# Patient Record
Sex: FEMALE | Race: WHITE | Employment: UNEMPLOYED | URBAN - METROPOLITAN AREA
[De-identification: names, ages, dates, MRNs, and addresses within clinical notes are randomized per-mention and may not be internally consistent; named-entity substitution may affect disease eponyms.]

---

## 2018-08-31 ENCOUNTER — HOSPITAL ENCOUNTER (OUTPATIENT)
Dept: OTHER | Age: 58
Discharge: HOME | End: 2018-08-31
Attending: FAMILY MEDICINE | Admitting: FAMILY MEDICINE

## 2018-08-31 NOTE — PROGRESS NOTES
Physical Therapy    Facility/Department: [facility]  Power Wheelchair Assessment    NAME: Thad Zuleta  : 1960  MRN: [id number]    Date of Service: 2018    Patient Diagnosis(es): cerebral palsy (G80.9)   has a past medical history of Closed anterior dislocation of humerus; Infantile cerebral palsy, unspecified ([address] and Irritable bowel syndrome. has a past surgical history that includes pr total hip arthroplasty (Right); pr total knee arthroplasty (Left); and Total shoulder arthroplasty. Patient is 65year old with the diagnosis of cerebral palsy (G80.9). Present for wheelchair assessment: the patient, her mother, Rishi Alvarez, the patient's son, Costa Andrews;  ATP from [employer]; and myself, Sabra Hayes, PT. See entire wheelchair assessment completed on this date and later scanned into the system. Overall, Thad Zuleta  requires a wheelchair for the following reasons:    Patient is nonambulatory due to severe spasticity and contractions in lower extremities since her cardiac arrest in . The following conditions impact the patient's ability to ambulate and/or propel an optimally configured manual wheelchair independently, safely and in a timely manner to participate in mobility related activities of daily living:  Weakness, loss of range of motion, lack of coordination/motor skills, cardiac condition, imbalance, history of falls, fatigue/poor endurance, comprised respiratory system, and pain. The goals of the wheelchair that is being requested is as follows:   Allow participation in mobility related activities of daily living  Reduce incidence of skin breakdown  Improve independent function  Accommodate/slow progression of deformity  Provide total body comfort/increase sitting tolerance  Improve sitting balance  Improve mobility  Promote/improve alignment    Assessment   Patient is a good candidate for a power wheelchair given she has been nonambulatory since her cardiac arrest in 2015. She currently relies on assist of 2 people using a kenzie lift for all transfers, as the patient is unable to bear any weight on her lower extremities due to spasticity and severe equinas positioning in bilateral feet. She is unable to propel an optimally configured manual wheelchair due to sever limitation in bilateral lower extremities and left upper extremity, due to history of 2 shoulder replacements with humeral dislocation. Her sitting balance is poor and she would not be able to sit in or transfer in/out of a power scooter. Patient would benefit from a power wheelchair for mobility related ADLs to allow increased independence. See recommendations for specifics for power WC. Plan Justod SONIYA Lyons from [employer] already completed a home assessment with the patient. He has determined the specifics required for the power wheelchair, which was included in the paperwork given to him. When all signatures are obtained, [employer] will file the paperwork with the patient's insurance company and deliver the approved wheelchair to the patient's home.     G-Code  PT G-Codes  Functional Limitation: Mobility: Walking and moving around  Mobility: Walking and Moving Around Current Status (): 100 percent impaired, limited or restricted  Mobility: Walking and Moving Around Goal Status (): 100 percent impaired, limited or restricted  Mobility: Walking and Moving Around Discharge Status (): 100 percent impaired, limited or restricted     Therapy Time   Individual Concurrent Group Co-treatment   Time In [address]         Time Out 1215         Minutes 90         Timed Code Treatment Minutes: Minh PT #8833

## 2018-09-01 ENCOUNTER — HOSPITAL ENCOUNTER (OUTPATIENT)
Dept: OTHER | Age: 58
Discharge: HOME | End: 2018-09-01
Attending: FAMILY MEDICINE | Admitting: FAMILY MEDICINE

## 2021-09-27 ENCOUNTER — HOSPITAL ENCOUNTER (INPATIENT)
Age: 61
LOS: 9 days | Discharge: HOME | DRG: 871 | End: 2021-10-06
Attending: EMERGENCY MEDICINE | Admitting: INTERNAL MEDICINE
Payer: MEDICARE

## 2021-09-27 ENCOUNTER — APPOINTMENT (OUTPATIENT)
Dept: GENERAL RADIOLOGY | Age: 61
DRG: 871 | End: 2021-09-27
Payer: MEDICARE

## 2021-09-27 DIAGNOSIS — G89.4 CHRONIC PAIN SYNDROME: ICD-10-CM

## 2021-09-27 DIAGNOSIS — J69.0 ASPIRATION PNEUMONITIS (HCC): Primary | ICD-10-CM

## 2021-09-27 PROBLEM — J96.01 ACUTE TYPE 1 RESPIRATORY FAILURE (HCC): Status: ACTIVE | Noted: 2021-09-27

## 2021-09-27 PROBLEM — J96.02 ACUTE RESPIRATORY FAILURE WITH HYPOXIA AND HYPERCAPNIA (HCC): Status: ACTIVE | Noted: 2021-09-27

## 2021-09-27 PROBLEM — J96.01 ACUTE RESPIRATORY FAILURE WITH HYPOXIA AND HYPERCAPNIA (HCC): Status: ACTIVE | Noted: 2021-09-27

## 2021-09-27 LAB
A/G RATIO: 1 (ref 1.1–2.2)
ALBUMIN SERPL-MCNC: 4.1 G/DL (ref 3.4–5)
ALP BLD-CCNC: 267 U/L (ref 40–129)
ALT SERPL-CCNC: 156 U/L (ref 10–40)
ANION GAP SERPL CALCULATED.3IONS-SCNC: 20 MMOL/L (ref 3–16)
ANISOCYTOSIS: ABNORMAL
AST SERPL-CCNC: 31 U/L (ref 15–37)
BANDED NEUTROPHILS RELATIVE PERCENT: 1 % (ref 0–7)
BASE EXCESS ARTERIAL: -3.9 MMOL/L (ref -3–3)
BASE EXCESS VENOUS: -4.8 MMOL/L
BASOPHILS ABSOLUTE: 0 K/UL (ref 0–0.2)
BASOPHILS RELATIVE PERCENT: 0 %
BILIRUB SERPL-MCNC: 0.7 MG/DL (ref 0–1)
BUN BLDV-MCNC: 21 MG/DL (ref 7–20)
CALCIUM SERPL-MCNC: 9.1 MG/DL (ref 8.3–10.6)
CARBOXYHEMOGLOBIN ARTERIAL: 0.8 % (ref 0–1.5)
CARBOXYHEMOGLOBIN: 0.2 %
CHLORIDE BLD-SCNC: 101 MMOL/L (ref 99–110)
CO2: 20 MMOL/L (ref 21–32)
CREAT SERPL-MCNC: 1.2 MG/DL (ref 0.9–1.3)
EOSINOPHILS ABSOLUTE: 0.2 K/UL (ref 0–0.6)
EOSINOPHILS RELATIVE PERCENT: 3 %
GFR AFRICAN AMERICAN: >60
GFR NON-AFRICAN AMERICAN: >60
GLOBULIN: 4 G/DL
GLUCOSE BLD-MCNC: 170 MG/DL (ref 70–99)
HCO3 ARTERIAL: 26.5 MMOL/L (ref 21–29)
HCO3 VENOUS: 26 MMOL/L (ref 23–29)
HCT VFR BLD CALC: 43.4 % (ref 40.5–52.5)
HEMOGLOBIN, ART, EXTENDED: 13.2 G/DL (ref 13.5–17.5)
HEMOGLOBIN: 13.6 G/DL (ref 13.5–17.5)
LACTIC ACID: 3.5 MMOL/L (ref 0.4–2)
LYMPHOCYTES ABSOLUTE: 3.6 K/UL (ref 1–5.1)
LYMPHOCYTES RELATIVE PERCENT: 45 %
MCH RBC QN AUTO: 28.5 PG (ref 26–34)
MCHC RBC AUTO-ENTMCNC: 31.4 G/DL (ref 31–36)
MCV RBC AUTO: 90.7 FL (ref 80–100)
METAMYELOCYTES RELATIVE PERCENT: 1 %
METHEMOGLOBIN ARTERIAL: 1.5 %
METHEMOGLOBIN VENOUS: 0.7 %
MONOCYTES ABSOLUTE: 0.5 K/UL (ref 0–1.3)
MONOCYTES RELATIVE PERCENT: 6 %
NEUTROPHILS ABSOLUTE: 3.7 K/UL (ref 1.7–7.7)
NEUTROPHILS RELATIVE PERCENT: 44 %
O2 SAT, ARTERIAL: 94.8 %
O2 SAT, VEN: 55 %
O2 THERAPY: ABNORMAL
O2 THERAPY: ABNORMAL
PCO2 ARTERIAL: 75.4 MMHG (ref 35–45)
PCO2, VEN: 76.9 MMHG (ref 40–50)
PDW BLD-RTO: 18.3 % (ref 12.4–15.4)
PH ARTERIAL: 7.16 (ref 7.35–7.45)
PH VENOUS: 7.14 (ref 7.35–7.45)
PLATELET # BLD: 127 K/UL (ref 135–450)
PLATELET SLIDE REVIEW: ADEQUATE
PMV BLD AUTO: 9.3 FL (ref 5–10.5)
PO2 ARTERIAL: 95.2 MMHG (ref 75–108)
PO2, VEN: 38 MMHG
POTASSIUM REFLEX MAGNESIUM: 4.5 MMOL/L (ref 3.5–5.1)
RBC # BLD: 4.78 M/UL (ref 4.2–5.9)
SLIDE REVIEW: ABNORMAL
SODIUM BLD-SCNC: 141 MMOL/L (ref 136–145)
TCO2 ARTERIAL: 28.8 MMOL/L
TCO2 CALC VENOUS: 29 MMOL/L
TOTAL PROTEIN: 8.1 G/DL (ref 6.4–8.2)
TROPONIN: <0.01 NG/ML
WBC # BLD: 8.1 K/UL (ref 4–11)

## 2021-09-27 PROCEDURE — 85025 COMPLETE CBC W/AUTO DIFF WBC: CPT

## 2021-09-27 PROCEDURE — 82803 BLOOD GASES ANY COMBINATION: CPT

## 2021-09-27 PROCEDURE — 71045 X-RAY EXAM CHEST 1 VIEW: CPT

## 2021-09-27 PROCEDURE — 36600 WITHDRAWAL OF ARTERIAL BLOOD: CPT

## 2021-09-27 PROCEDURE — 2700000000 HC OXYGEN THERAPY PER DAY

## 2021-09-27 PROCEDURE — 36415 COLL VENOUS BLD VENIPUNCTURE: CPT

## 2021-09-27 PROCEDURE — 2000000000 HC ICU R&B

## 2021-09-27 PROCEDURE — 99285 EMERGENCY DEPT VISIT HI MDM: CPT

## 2021-09-27 PROCEDURE — 2580000003 HC RX 258: Performed by: EMERGENCY MEDICINE

## 2021-09-27 PROCEDURE — 83605 ASSAY OF LACTIC ACID: CPT

## 2021-09-27 PROCEDURE — 96360 HYDRATION IV INFUSION INIT: CPT

## 2021-09-27 PROCEDURE — 80053 COMPREHEN METABOLIC PANEL: CPT

## 2021-09-27 PROCEDURE — 93005 ELECTROCARDIOGRAM TRACING: CPT | Performed by: EMERGENCY MEDICINE

## 2021-09-27 PROCEDURE — 96361 HYDRATE IV INFUSION ADD-ON: CPT

## 2021-09-27 PROCEDURE — 94660 CPAP INITIATION&MGMT: CPT

## 2021-09-27 PROCEDURE — 84484 ASSAY OF TROPONIN QUANT: CPT

## 2021-09-27 PROCEDURE — 94761 N-INVAS EAR/PLS OXIMETRY MLT: CPT

## 2021-09-27 RX ORDER — 0.9 % SODIUM CHLORIDE 0.9 %
1000 INTRAVENOUS SOLUTION INTRAVENOUS ONCE
Status: COMPLETED | OUTPATIENT
Start: 2021-09-27 | End: 2021-09-27

## 2021-09-27 RX ORDER — LEVOFLOXACIN 5 MG/ML
750 INJECTION, SOLUTION INTRAVENOUS ONCE
Status: DISCONTINUED | OUTPATIENT
Start: 2021-09-27 | End: 2021-09-27

## 2021-09-27 RX ADMIN — SODIUM CHLORIDE 1000 ML: 9 INJECTION, SOLUTION INTRAVENOUS at 19:13

## 2021-09-27 ASSESSMENT — ENCOUNTER SYMPTOMS
CHOKING: 1
STRIDOR: 1
WHEEZING: 1
CHEST TIGHTNESS: 0
TROUBLE SWALLOWING: 0
ABDOMINAL PAIN: 0
SHORTNESS OF BREATH: 1
COUGH: 1

## 2021-09-27 ASSESSMENT — PAIN SCALES - GENERAL
PAINLEVEL_OUTOF10: 0
PAINLEVEL_OUTOF10: 0

## 2021-09-27 NOTE — ED PROVIDER NOTES
eMERGENCY dEPARTMENT eNCOUnter      Pt Name: Tejal Mosley  MRN: [id number]  [place] 1960  Date of evaluation: 9/27/2021  Provider: Yi Joseph MD     [address] [place] [address]       Chief Complaint   Patient presents with    Aspiration         HISTORY OF PRESENT ILLNESS   (Location/Symptom, Timing/Onset,Context/Setting, Quality, Duration, Modifying Factors, Severity) Note limiting factors. Jane Larkin is a __ y/o female with cerebral palsy who presents via EMS from her ECF after aspirating while eating dinner. She is short of breath. She is a poor historian and is unable to communicate what happened to her. She denies abdominal pain and chest pain. Nursing Notes were reviewed. REVIEW OFSYSTEMS    (2+ for level 4; 10+ for level 5)   Review of Systems   Constitutional: Negative for fever. HENT: Negative for trouble swallowing. Respiratory: Positive for cough, choking, shortness of breath, wheezing and stridor. Negative for chest tightness. Cardiovascular: Negative for chest pain. Gastrointestinal: Negative for abdominal pain. PAST MEDICAL HISTORY     Past Medical History:   Diagnosis Date    Cerebral palsy ([facility]        SURGICAL HISTORY     History reviewed. No pertinent surgical history. CURRENT MEDICATIONS       Previous Medications    No medications on file       ALLERGIES     Patient has no known allergies. FAMILY HISTORY     History reviewed. No pertinent family history.      SOCIAL HISTORY       Social History     Socioeconomic History    Marital status: Single     Spouse name: None    Number of children: None    Years of education: None    Highest education level: None   Occupational History    None   Tobacco Use    Smoking status: Never Smoker    Smokeless tobacco: Never Used   Vaping Use    Vaping Use: Never used   Substance and Sexual Activity    Alcohol use: Never    Drug use: Never    Sexual activity: Not Currently   Other Topics Concern    None   Social and dry. Neurological:      Mental Status: Bt [place]k [place] is alert. Bt Unk [place] is disoriented. Comments: Unsure of baseline neurological function due to cerebral palsy and poor historian; patient was not able to move arms or legs. DIAGNOSTIC RESULTS     EKG (Per Emergency Physician):       RADIOLOGY (Per Emergency Physician): Interpretation per the Radiologist below, if available at the time of this note:  XR CHEST PORTABLE    Result Date: 9/27/2021  EXAMINATION: ONE XRAY VIEW OF THE CHEST 9/27/2021 7:49 pm COMPARISON: None. HISTORY: ORDERING SYSTEM PROVIDED HISTORY: SOB TECHNOLOGIST PROVIDED HISTORY: Reason for exam:->SOB Reason for Exam: sob Acuity: Acute Type of Exam: Initial FINDINGS: Frontal portable view of the chest.  Patient rotation to the right. Reverse apical lordotic projection. Low lung volume. Right greater than left basilar patchy opacities. No definite pleural effusion or pneumothorax. Borderline cardiomegaly. Prominence of the superior mediastinum. No acute osseous abnormality. Right greater than left basilar patchy opacities may represent atelectasis or a developing infectious/inflammatory process. Follow-up PA and lateral chest radiographs may be helpful for further evaluation. Prominence of the superior mediastinum may be accentuated by patient positioning. Underlying mediastinal pathology is not excluded. Short-term follow-up PA and lateral chest radiographs or chest CT may be helpful for further evaluation as warranted.        ED BEDSIDE ULTRASOUND:   Performed by ED Physician - none    LABS:  Labs Reviewed   CBC WITH AUTO DIFFERENTIAL - Abnormal; Notable for the following components:       Result Value    RDW 18.3 (*)     Platelets 729 (*)     Metamyelocytes Relative 1 (*)     Anisocytosis Occasional (*)     All other components within normal limits    Narrative:     Performed at:  [facility]  [address] [place] [address]., [facility] [address]   Phone [phone]   COMPREHENSIVE METABOLIC PANEL W/ REFLEX TO MG FOR LOW K - Abnormal; Notable for the following components:    CO2 20 (*)     Anion Gap 20 (*)     Glucose 170 (*)     BUN 21 (*)     Albumin/Globulin Ratio 1.0 (*)     Alkaline Phosphatase 267 (*)      (*)     All other components within normal limits    Narrative:     Performed at:  [address] [facility] [address]   Phone [phone]   LACTIC ACID, PLASMA - Abnormal; Notable for the following components:    Lactic Acid 3.5 (*)     All other components within normal limits    Narrative:     Performed at:  [address] [place] [address]   Phone [phone]   BLOOD GAS, VENOUS - Abnormal; Notable for the following components:    pH, Taran 7.140 (*)     pCO2, Taran 76.9 (*)     All other components within normal limits    Narrative:     Rick Mason tel. [phone],  Chemistry results called to and read back by Toya Chung RN, 09/27/2021  19:11, by Ramo Xie  Performed at:  [address] [place] [address]   Phone [phone]   BLOOD GAS, ARTERIAL - Abnormal; Notable for the following components:    pH, Arterial 7.155 (*)     pCO2, Arterial 75.4 (*)     Base Excess, Arterial -3.9 (*)     Hemoglobin, Art, Extended 13.2 (*)     Methemoglobin, Arterial 1.5 (*)     All other components within normal limits    Narrative:     Rick Mason tel. [phone],  Chemistry results called to and read back by Jamie Yuan RN, 09/27/2021  22:34, by Ramo Xie  Performed at:  [address] [place] [address]   Phone [phone]   TROPONIN    Narrative:     Performed at:  [address] [facility] [place]   Phone  COVID-19   COVID-19        All other labs were within normal range or not returned as of this dictation. Procedures      EMERGENCY DEPARTMENT COURSE and DIFFERENTIAL DIAGNOSIS/MDM:   Vitals:    Vitals:    09/27/21 2140 09/27/21 2216 09/27/21 2249 09/27/21 2326   BP: (!) 103/59   103/63   Pulse: 73   75   Resp: 18 11 20 17   Temp:       TempSrc:       SpO2: 96% 96%  96%   Weight:           Medications   0.9 % sodium chloride bolus (0 mLs IntraVENous Stopped 9/27/21 2126)       MDM. Patient is a 81-year-old cerebral palsy patient presents with respiratory distress secondary to aspiration. Patient has aspiration pneumonitis. On arrival was hypoxic and was placed on BiPAP. Patient responded. However she was still very acidotic from her VBG. Patient's pH was 7. 14. Patient will need to be titrated for the hyperventilation. Patient is alert awake discussed with the hospitalist for admission. Patient will need a PCU bed. However ABG was obtained which shows a low pH consistent with her respiratory acidosis still. Patient does not want to be intubated. Patient when I talked about intubation her heart rate as well as a respiratory rate jumped up. 2year-old freighter that. But because he was able to make her decisions we will honor her wishes. Will discuss with hospitalist.      Ransom Halsted:         CRITICAL CARE TIME   Total CriticalCare time was 30 minutes, excluding separately reportable procedures. There was a high probability of clinically significant/life threatening deterioration in the patient's condition which required my urgent intervention. CONSULTS:  None    PROCEDURES:  Unless otherwise noted below, none     [unfilled]    FINAL IMPRESSION      1. Aspiration pneumonitis (HCC)          DISPOSITION/PLAN   DISPOSITION        PATIENT REFERRED TO:  No follow-up provider specified.     DISCHARGE MEDICATIONS:  New Prescriptions    No medications on file          (Please note:  Portions of this note were completed with a voice recognition program.Efforts were made to edit the dictations but occasionally words and phrases are mis-transcribed.)  Form v2016. J.5-cn    Emerson OLIVA MD (electronically signed)  Emergency Medicine Provider        Radha Morris MD  09/27/21 4508

## 2021-09-28 LAB
ANION GAP SERPL CALCULATED.3IONS-SCNC: 16 MMOL/L (ref 3–16)
BASE EXCESS ARTERIAL: -0.7 MMOL/L (ref -3–3)
BASE EXCESS ARTERIAL: 2.1 MMOL/L (ref -3–3)
BASOPHILS ABSOLUTE: 0.1 K/UL (ref 0–0.2)
BASOPHILS RELATIVE PERCENT: 0.8 %
BUN BLDV-MCNC: 19 MG/DL (ref 7–20)
CALCIUM SERPL-MCNC: 8.8 MG/DL (ref 8.3–10.6)
CARBOXYHEMOGLOBIN ARTERIAL: 0.5 % (ref 0–1.5)
CARBOXYHEMOGLOBIN ARTERIAL: 0.6 % (ref 0–1.5)
CHLORIDE BLD-SCNC: 101 MMOL/L (ref 99–110)
CO2: 22 MMOL/L (ref 21–32)
CREAT SERPL-MCNC: 0.7 MG/DL (ref 0.9–1.3)
EKG ATRIAL RATE: 89 BPM
EKG DIAGNOSIS: NORMAL
EKG P AXIS: 25 DEGREES
EKG P-R INTERVAL: 168 MS
EKG Q-T INTERVAL: 400 MS
EKG QRS DURATION: 102 MS
EKG QTC CALCULATION (BAZETT): 486 MS
EKG R AXIS: 4 DEGREES
EKG T AXIS: -9 DEGREES
EKG VENTRICULAR RATE: 89 BPM
EOSINOPHILS ABSOLUTE: 0 K/UL (ref 0–0.6)
EOSINOPHILS RELATIVE PERCENT: 0 %
GFR AFRICAN AMERICAN: >60
GFR NON-AFRICAN AMERICAN: >60
GLUCOSE BLD-MCNC: 113 MG/DL (ref 70–99)
GLUCOSE BLD-MCNC: 113 MG/DL (ref 70–99)
GLUCOSE BLD-MCNC: 119 MG/DL (ref 70–99)
GLUCOSE BLD-MCNC: 120 MG/DL (ref 70–99)
GLUCOSE BLD-MCNC: 123 MG/DL (ref 70–99)
GLUCOSE BLD-MCNC: 159 MG/DL (ref 70–99)
HCO3 ARTERIAL: 29.2 MMOL/L (ref 21–29)
HCO3 ARTERIAL: 29.4 MMOL/L (ref 21–29)
HCT VFR BLD CALC: 39.3 % (ref 40.5–52.5)
HEMOGLOBIN, ART, EXTENDED: 12.4 G/DL (ref 13.5–17.5)
HEMOGLOBIN, ART, EXTENDED: 12.9 G/DL (ref 13.5–17.5)
HEMOGLOBIN: 12.1 G/DL (ref 13.5–17.5)
LACTIC ACID: 2.1 MMOL/L (ref 0.4–2)
LYMPHOCYTES ABSOLUTE: 1.6 K/UL (ref 1–5.1)
LYMPHOCYTES RELATIVE PERCENT: 10.4 %
MAGNESIUM: 2 MG/DL (ref 1.8–2.4)
MCH RBC QN AUTO: 28.2 PG (ref 26–34)
MCHC RBC AUTO-ENTMCNC: 30.9 G/DL (ref 31–36)
MCV RBC AUTO: 91.1 FL (ref 80–100)
METHEMOGLOBIN ARTERIAL: 1.2 %
METHEMOGLOBIN ARTERIAL: 1.6 %
MONOCYTES ABSOLUTE: 0.9 K/UL (ref 0–1.3)
MONOCYTES RELATIVE PERCENT: 5.9 %
NEUTROPHILS ABSOLUTE: 12.8 K/UL (ref 1.7–7.7)
NEUTROPHILS RELATIVE PERCENT: 82.9 %
O2 SAT, ARTERIAL: 97.2 %
O2 SAT, ARTERIAL: 97.3 %
O2 THERAPY: ABNORMAL
O2 THERAPY: ABNORMAL
PCO2 ARTERIAL: 57 MMHG (ref 35–45)
PCO2 ARTERIAL: 75.2 MMHG (ref 35–45)
PDW BLD-RTO: 18.3 % (ref 12.4–15.4)
PERFORMED ON: ABNORMAL
PH ARTERIAL: 7.2 (ref 7.35–7.45)
PH ARTERIAL: 7.32 (ref 7.35–7.45)
PHOSPHORUS: 3.8 MG/DL (ref 2.5–4.9)
PLATELET # BLD: 114 K/UL (ref 135–450)
PLATELET SLIDE REVIEW: ABNORMAL
PMV BLD AUTO: 10 FL (ref 5–10.5)
PO2 ARTERIAL: 131 MMHG (ref 75–108)
PO2 ARTERIAL: 93.2 MMHG (ref 75–108)
POTASSIUM SERPL-SCNC: 5.4 MMOL/L (ref 3.5–5.1)
PROCALCITONIN: 0.97 NG/ML (ref 0–0.15)
RBC # BLD: 4.31 M/UL (ref 4.2–5.9)
SARS-COV-2: NOT DETECTED
SLIDE REVIEW: ABNORMAL
SODIUM BLD-SCNC: 139 MMOL/L (ref 136–145)
TCO2 ARTERIAL: 31.2 MMOL/L
TCO2 ARTERIAL: 31.5 MMOL/L
WBC # BLD: 15.5 K/UL (ref 4–11)

## 2021-09-28 PROCEDURE — 2580000003 HC RX 258: Performed by: INTERNAL MEDICINE

## 2021-09-28 PROCEDURE — 84100 ASSAY OF PHOSPHORUS: CPT

## 2021-09-28 PROCEDURE — 6360000002 HC RX W HCPCS: Performed by: NURSE PRACTITIONER

## 2021-09-28 PROCEDURE — 99291 CRITICAL CARE FIRST HOUR: CPT | Performed by: INTERNAL MEDICINE

## 2021-09-28 PROCEDURE — 84145 PROCALCITONIN (PCT): CPT

## 2021-09-28 PROCEDURE — U0003 INFECTIOUS AGENT DETECTION BY NUCLEIC ACID (DNA OR RNA); SEVERE ACUTE RESPIRATORY SYNDROME CORONAVIRUS 2 (SARS-COV-2) (CORONAVIRUS DISEASE [COVID-19]), AMPLIFIED PROBE TECHNIQUE, MAKING USE OF HIGH THROUGHPUT TECHNOLOGIES AS DESCRIBED BY CMS-2020-01-R: HCPCS

## 2021-09-28 PROCEDURE — 6360000002 HC RX W HCPCS: Performed by: INTERNAL MEDICINE

## 2021-09-28 PROCEDURE — 2000000000 HC ICU R&B

## 2021-09-28 PROCEDURE — 83605 ASSAY OF LACTIC ACID: CPT

## 2021-09-28 PROCEDURE — APPNB15 APP NON BILLABLE TIME 0-15 MINS: Performed by: NURSE PRACTITIONER

## 2021-09-28 PROCEDURE — 94761 N-INVAS EAR/PLS OXIMETRY MLT: CPT

## 2021-09-28 PROCEDURE — 2700000000 HC OXYGEN THERAPY PER DAY

## 2021-09-28 PROCEDURE — 2580000003 HC RX 258: Performed by: NURSE PRACTITIONER

## 2021-09-28 PROCEDURE — 36415 COLL VENOUS BLD VENIPUNCTURE: CPT

## 2021-09-28 PROCEDURE — 36600 WITHDRAWAL OF ARTERIAL BLOOD: CPT

## 2021-09-28 PROCEDURE — 93010 ELECTROCARDIOGRAM REPORT: CPT | Performed by: INTERNAL MEDICINE

## 2021-09-28 PROCEDURE — U0005 INFEC AGEN DETEC AMPLI PROBE: HCPCS

## 2021-09-28 PROCEDURE — 80048 BASIC METABOLIC PNL TOTAL CA: CPT

## 2021-09-28 PROCEDURE — 85025 COMPLETE CBC W/AUTO DIFF WBC: CPT

## 2021-09-28 PROCEDURE — 94660 CPAP INITIATION&MGMT: CPT

## 2021-09-28 PROCEDURE — 6370000000 HC RX 637 (ALT 250 FOR IP): Performed by: NURSE PRACTITIONER

## 2021-09-28 PROCEDURE — 83735 ASSAY OF MAGNESIUM: CPT

## 2021-09-28 PROCEDURE — 82803 BLOOD GASES ANY COMBINATION: CPT

## 2021-09-28 PROCEDURE — 87040 BLOOD CULTURE FOR BACTERIA: CPT

## 2021-09-28 RX ORDER — FAMOTIDINE 40 MG/1
40 TABLET, FILM COATED ORAL DAILY
Status: ON HOLD | COMMUNITY
End: 2021-10-06 | Stop reason: HOSPADM

## 2021-09-28 RX ORDER — SERTRALINE HYDROCHLORIDE 100 MG/1
150 TABLET, FILM COATED ORAL DAILY
COMMUNITY

## 2021-09-28 RX ORDER — PREGABALIN 200 MG/1
200 CAPSULE ORAL 3 TIMES DAILY
COMMUNITY

## 2021-09-28 RX ORDER — ACETAMINOPHEN AND CODEINE PHOSPHATE 300; 30 MG/1; MG/1
1 TABLET ORAL EVERY 12 HOURS PRN
Status: ON HOLD | COMMUNITY
End: 2021-10-06 | Stop reason: SDUPTHER

## 2021-09-28 RX ORDER — SODIUM CHLORIDE 0.9 % (FLUSH) 0.9 %
5-40 SYRINGE (ML) INJECTION PRN
Status: DISCONTINUED | OUTPATIENT
Start: 2021-09-28 | End: 2021-10-06 | Stop reason: HOSPADM

## 2021-09-28 RX ORDER — METOPROLOL SUCCINATE 50 MG/1
50 TABLET, EXTENDED RELEASE ORAL DAILY
Status: ON HOLD | COMMUNITY
End: 2021-10-06 | Stop reason: HOSPADM

## 2021-09-28 RX ORDER — NICOTINE POLACRILEX 4 MG
15 LOZENGE BUCCAL PRN
Status: DISCONTINUED | OUTPATIENT
Start: 2021-09-28 | End: 2021-10-06 | Stop reason: HOSPADM

## 2021-09-28 RX ORDER — DEXTROSE MONOHYDRATE 50 MG/ML
100 INJECTION, SOLUTION INTRAVENOUS PRN
Status: DISCONTINUED | OUTPATIENT
Start: 2021-09-28 | End: 2021-10-06 | Stop reason: HOSPADM

## 2021-09-28 RX ORDER — ONDANSETRON 2 MG/ML
4 INJECTION INTRAMUSCULAR; INTRAVENOUS EVERY 6 HOURS PRN
Status: DISCONTINUED | OUTPATIENT
Start: 2021-09-28 | End: 2021-10-06 | Stop reason: HOSPADM

## 2021-09-28 RX ORDER — RISPERIDONE 0.5 MG/1
0.5 TABLET, FILM COATED ORAL NIGHTLY
COMMUNITY

## 2021-09-28 RX ORDER — CLONAZEPAM 2 MG/1
2 TABLET ORAL 2 TIMES DAILY
Status: ON HOLD | COMMUNITY
End: 2021-10-06 | Stop reason: SDUPTHER

## 2021-09-28 RX ORDER — DANTROLENE SODIUM 100 MG/1
200 CAPSULE ORAL 2 TIMES DAILY
COMMUNITY

## 2021-09-28 RX ORDER — SODIUM CHLORIDE 0.9 % (FLUSH) 0.9 %
5-40 SYRINGE (ML) INJECTION EVERY 12 HOURS SCHEDULED
Status: DISCONTINUED | OUTPATIENT
Start: 2021-09-28 | End: 2021-10-06 | Stop reason: HOSPADM

## 2021-09-28 RX ORDER — POTASSIUM CHLORIDE 7.45 MG/ML
10 INJECTION INTRAVENOUS PRN
Status: DISCONTINUED | OUTPATIENT
Start: 2021-09-28 | End: 2021-10-06 | Stop reason: HOSPADM

## 2021-09-28 RX ORDER — DEXTROSE MONOHYDRATE 25 G/50ML
12.5 INJECTION, SOLUTION INTRAVENOUS PRN
Status: DISCONTINUED | OUTPATIENT
Start: 2021-09-28 | End: 2021-10-06 | Stop reason: HOSPADM

## 2021-09-28 RX ORDER — OMEPRAZOLE 40 MG/1
40 CAPSULE, DELAYED RELEASE ORAL 2 TIMES DAILY
COMMUNITY

## 2021-09-28 RX ORDER — AMITRIPTYLINE HYDROCHLORIDE 25 MG/1
25 TABLET, FILM COATED ORAL NIGHTLY
COMMUNITY

## 2021-09-28 RX ORDER — MAGNESIUM SULFATE IN WATER 40 MG/ML
2000 INJECTION, SOLUTION INTRAVENOUS PRN
Status: DISCONTINUED | OUTPATIENT
Start: 2021-09-28 | End: 2021-10-06 | Stop reason: HOSPADM

## 2021-09-28 RX ORDER — SODIUM CHLORIDE 9 MG/ML
25 INJECTION, SOLUTION INTRAVENOUS PRN
Status: DISCONTINUED | OUTPATIENT
Start: 2021-09-28 | End: 2021-10-06 | Stop reason: HOSPADM

## 2021-09-28 RX ORDER — SULFAMETHOXAZOLE AND TRIMETHOPRIM 800; 160 MG/1; MG/1
1 TABLET ORAL 2 TIMES DAILY
Status: ON HOLD | COMMUNITY
End: 2021-10-06 | Stop reason: HOSPADM

## 2021-09-28 RX ORDER — LEVOTHYROXINE SODIUM 0.03 MG/1
25 TABLET ORAL DAILY
COMMUNITY

## 2021-09-28 RX ADMIN — SODIUM CHLORIDE, PRESERVATIVE FREE 10 ML: 5 INJECTION INTRAVENOUS at 21:00

## 2021-09-28 RX ADMIN — ENOXAPARIN SODIUM 40 MG: 40 INJECTION SUBCUTANEOUS at 08:36

## 2021-09-28 RX ADMIN — AZITHROMYCIN MONOHYDRATE 500 MG: 500 INJECTION, POWDER, LYOPHILIZED, FOR SOLUTION INTRAVENOUS at 04:11

## 2021-09-28 RX ADMIN — AMPICILLIN SODIUM AND SULBACTAM SODIUM 3000 MG: 2; 1 INJECTION, POWDER, FOR SOLUTION INTRAMUSCULAR; INTRAVENOUS at 10:08

## 2021-09-28 RX ADMIN — INSULIN LISPRO 1 UNITS: 100 INJECTION, SOLUTION INTRAVENOUS; SUBCUTANEOUS at 08:36

## 2021-09-28 RX ADMIN — PIPERACILLIN AND TAZOBACTAM 3375 MG: 3; .375 INJECTION, POWDER, LYOPHILIZED, FOR SOLUTION INTRAVENOUS at 06:24

## 2021-09-28 RX ADMIN — SODIUM CHLORIDE, PRESERVATIVE FREE 10 ML: 5 INJECTION INTRAVENOUS at 08:37

## 2021-09-28 RX ADMIN — AMPICILLIN SODIUM AND SULBACTAM SODIUM 3000 MG: 2; 1 INJECTION, POWDER, FOR SOLUTION INTRAMUSCULAR; INTRAVENOUS at 16:37

## 2021-09-28 ASSESSMENT — PAIN SCALES - GENERAL
PAINLEVEL_OUTOF10: 0

## 2021-09-28 NOTE — PROGRESS NOTES
0045 To ICU bed 2125 from ED on BIPAP after aspiration and decreased LOC at home. 0500 Critical ABG results pH 7.198 and pCO2 75.2 received from lab    0505 Dr. Daisy Cabezas to bedside. Patient arousable and able to answer questions appropriately. States she would not desire use of a ventilator if needed to save her life. She stated she would rather die comfortably. Code status updated to DNR CCA. Orders for levophed placed Map >60in case BP decreases. 0700 Still no output from purewick. Attempted with CHIO Ordoñez to place indwelling catheter. Unable to place. 3468 Spoke with patient's sister, Luis Carnes, for update. She is an RN and her family members defer to her for medical questions in most cases. She was able to give some insight. She states that patient lives at home and has 24 hour home care. She is oriented and of sound mind at baseline. She mostly remains in bed at home, with wheelchair available when she needs it. Right hand strength and coordination is greater than left. Lately she has had increased difficulty sitting up to eat. She is also experiencing decreased coordination when trying to feed herself and increased difficulty swallowing. With these combined issues, she states it sometimes takes a few hours for pt to simply eat a meal. She has still been able to swallow pills with thin liquids as far as Luis Carnes knows. From Nina's understanding, the patient was at home eating and the aide went to the kitchen as patient can feed herself at baseline. Aide returned and found patient unresponsive. Nina also provided updates on contact information. Her next of kin would be her son, Angela Cochran. She believes that she recently updated her POA paperwork, and either Akshat or patient's mother, Jerome Bryant, would be the POA. Jerome Bryant is in assisted living. Luis Carnes will try to clarify with their brother, Brian Coleman, who helped with the paperwork.      Nina will also try to get patient's medication list and call us later to update it. Will update contacts in chart.

## 2021-09-28 NOTE — H&P
[address] [facility]                              HISTORY AND PHYSICAL    PATIENT NAME: Jesus Metz                     :        1960  MED REC NO:   [id number]                          ROOM:       2125  ACCOUNT NO:   [de-identified]                           ADMIT DATE: 2021  PROVIDER:     Rudi Cramer MD    I obtained the history and performed the physical exam on the patient in  the intensive care unit on 2021. CHIEF COMPLAINT:  Drowsiness. The patient unable to provide a good  history. HISTORY OF PRESENT ILLNESS:  The patient is a 81-year-old   female who presented with episode of aspiration, this is per the  emergency room EMS report. They were called to the [facility] because the  patient started having choking and shortness of breath while she was  eating her dinner. She was found to be severely hypoxic and was placed  on noninvasive positive pressure ventilation. At the time of my exam,  the patient is definitely significantly drowsy but is waking up and  responding. PAST MEDICAL/PAST SURGICAL HISTORY:  Cerebral palsy. PAST SURGICAL HISTORY:  No major surgical procedures. ALLERGIC HISTORY:  No known drug allergies. FAMILY HISTORY:  Reviewed by me and is currently noncontributory. SOCIAL HISTORY:  Nonsmoker. No illicit substance use. MEDICATIONS:  The patient is not on any medications. REVIEW OF SYSTEMS:  Unobtainable from the patient because of drowsiness  and per the history of present illness. All other systems have been  reviewed and they are negative except for the history of present  illness. PHYSICAL EXAMINATION:  VITAL SIGNS:  Temperature is 98.5, respiratory rate 13, pulse 93, blood  pressure 95/55, saturating 95%.   CNS:  Patient is drowsy but arousable and when she is awake, she knows  where she is and is able to answer questions but then falls

## 2021-09-28 NOTE — CONSULTS
PATIENT IS SEEN AT THE REQUEST OF DR. Pickett for respiratory failure, ICU admission     CONSULTING PHYSICIAN: Piyush    HISTORY OF PRESENT ILLNESS:  This is a 62 y.o. adult who presented to the ED on 9/27 with a CC of aspiration. Per ED notes she has underlying cerebral palsy and came to the ED after aspirating while eating dinner. She was hypoxic and hypercapnia and placed on bpap. She can only provide limited information       Established Pulmonologist:      PAST MEDICAL HISTORY:  Past Medical History:   Diagnosis Date    Cerebral palsy ([address]        PAST SURGICAL HISTORY:  Hip replacements x 2    FAMILY HISTORY:  Unknown     SOCIAL HISTORY:   reports that she has never smoked. She has never used smokeless tobacco.    Scheduled Meds:   sodium chloride flush  5-40 mL IntraVENous 2 times per day    enoxaparin  40 mg SubCUTAneous Daily    piperacillin-tazobactam  3,375 mg IntraVENous Q8H    azithromycin  500 mg IntraVENous Q24H       Continuous Infusions:   sodium chloride      norepinephrine         PRN Meds:  sodium chloride flush, sodium chloride, potassium chloride, magnesium sulfate, ondansetron    ALLERGIES:  Patient has No Known Allergies. REVIEW OF SYSTEMS:  Unable    PHYSICAL EXAM:  Blood pressure (!) 91/49, pulse 73, temperature 96.8 °F (36 °C), temperature source Axillary, resp. rate 20, weight 200 lb 13.4 oz (91.1 kg), SpO2 94 %.'  Gen: Somnolent on bpap   Eyes: bpa mask on, dilated pupils   ENT: No discharge. Pharynx with bpap   Neck: Trachea midline. No obvious mass. Resp: Diminished   CV: Regular rate. Regular rhythm. No murmur or rub. GI: Non-tender. Non-distended. No hernia. BS present. Skin: Warm and dry. No nodule on exposed extremities. Lymph: No cervical LAD. No supraclavicular LAD.    M/S: Contractures, deformities  Neuro: Awakens to name, quickly falls back asleep   EXT:   no edema, no clubbing    LABS:  CBC:   Recent Labs     09/27/21  1848   WBC 8.1   HGB 13.6   HCT 43.4

## 2021-09-28 NOTE — CARE COORDINATION
Patient sleeping in the room. Per nurse, she is becoming more alert. Will try again for initial assessment tomorrow.   Marietta Minor RN, BSN, Case Management  Phone: [phone]  Electronically signed by Marietta Minor RN on 9/28/2021 at 5:07 PM

## 2021-09-28 NOTE — PLAN OF CARE
Problem: Falls - Risk of:  Goal: Will remain free from falls  Description: Will remain free from falls  Outcome: Ongoing  Note: Side rails up x 3. Bed locked and low position. Falling star program remains in place. Call light and personal belongings within reach. Frequent visual monitoring continues. Toileting program inplace. Patient assisted in turning/repositioning at least once every 2 hours, and on a prn basis. Problem: Falls - Risk of:  Goal: Absence of physical injury  Description: Absence of physical injury  Outcome: Ongoing  Note: No physical injury during this shift. Problem: Pain:  Goal: Control of acute pain  Description: Control of acute pain  Outcome: Ongoing     Problem: Pain:  Goal: Control of chronic pain  Description: Control of chronic pain  Outcome: Ongoing     Problem: Skin Integrity:  Goal: Will show no infection signs and symptoms  Description: Will show no infection signs and symptoms  Outcome: Ongoing  Note: Reposition was performed to PRN basis. Skin care was provided. Mepilex was placed in her bilateral heels. Problem: Skin Integrity:  Goal: Absence of new skin breakdown  Description: Absence of new skin breakdown  Outcome: Ongoing  Note: No new skin breakdown during this shift. Problem: Pain:  Goal: Pain level will decrease  Description: Pain level will decrease  Note: Patient was monitor for pain during this shift. FLACC scale was used. No pain med given during this shift.

## 2021-09-28 NOTE — PROGRESS NOTES
Patient refusing ABG stick. Yelling \"No no no no do not stick me\". ABG not obtained at this time. Nurse aware.   Electronically signed by Frantz Hernandez RCP on 9/28/2021 at 11:01 AM

## 2021-09-28 NOTE — PROGRESS NOTES
H/p dictation id [id number]. Date of service 09/28/21. Acute encephalopathy. Respi failure with hypoxia and hypercapnia. Aspiration pneumonia.   CP.

## 2021-09-29 ENCOUNTER — APPOINTMENT (OUTPATIENT)
Dept: GENERAL RADIOLOGY | Age: 61
DRG: 871 | End: 2021-09-29
Payer: MEDICARE

## 2021-09-29 LAB
ANION GAP SERPL CALCULATED.3IONS-SCNC: 13 MMOL/L (ref 3–16)
BASOPHILS ABSOLUTE: 0 K/UL (ref 0–0.2)
BASOPHILS RELATIVE PERCENT: 0.4 %
BUN BLDV-MCNC: 15 MG/DL (ref 7–20)
CALCIUM SERPL-MCNC: 9 MG/DL (ref 8.3–10.6)
CHLORIDE BLD-SCNC: 106 MMOL/L (ref 99–110)
CO2: 26 MMOL/L (ref 21–32)
CREAT SERPL-MCNC: <0.5 MG/DL (ref 0.9–1.3)
EOSINOPHILS ABSOLUTE: 0 K/UL (ref 0–0.6)
EOSINOPHILS RELATIVE PERCENT: 0 %
GFR AFRICAN AMERICAN: >60
GFR NON-AFRICAN AMERICAN: >60
GLUCOSE BLD-MCNC: 116 MG/DL (ref 70–99)
GLUCOSE BLD-MCNC: 123 MG/DL (ref 70–99)
GLUCOSE BLD-MCNC: 126 MG/DL (ref 70–99)
GLUCOSE BLD-MCNC: 163 MG/DL (ref 70–99)
HCT VFR BLD CALC: 34.6 % (ref 40.5–52.5)
HEMOGLOBIN: 11.1 G/DL (ref 13.5–17.5)
LYMPHOCYTES ABSOLUTE: 1.3 K/UL (ref 1–5.1)
LYMPHOCYTES RELATIVE PERCENT: 11.8 %
MAGNESIUM: 1.8 MG/DL (ref 1.8–2.4)
MCH RBC QN AUTO: 28.5 PG (ref 26–34)
MCHC RBC AUTO-ENTMCNC: 32 G/DL (ref 31–36)
MCV RBC AUTO: 89.2 FL (ref 80–100)
MONOCYTES ABSOLUTE: 0.6 K/UL (ref 0–1.3)
MONOCYTES RELATIVE PERCENT: 5.8 %
NEUTROPHILS ABSOLUTE: 9 K/UL (ref 1.7–7.7)
NEUTROPHILS RELATIVE PERCENT: 82 %
PDW BLD-RTO: 17.4 % (ref 12.4–15.4)
PERFORMED ON: ABNORMAL
PHOSPHORUS: 1.4 MG/DL (ref 2.5–4.9)
PLATELET # BLD: 133 K/UL (ref 135–450)
PMV BLD AUTO: 9.7 FL (ref 5–10.5)
POTASSIUM SERPL-SCNC: 4 MMOL/L (ref 3.5–5.1)
RBC # BLD: 3.88 M/UL (ref 4.2–5.9)
SODIUM BLD-SCNC: 145 MMOL/L (ref 136–145)
WBC # BLD: 11 K/UL (ref 4–11)

## 2021-09-29 PROCEDURE — 84100 ASSAY OF PHOSPHORUS: CPT

## 2021-09-29 PROCEDURE — 74230 X-RAY XM SWLNG FUNCJ C+: CPT

## 2021-09-29 PROCEDURE — 6370000000 HC RX 637 (ALT 250 FOR IP): Performed by: STUDENT IN AN ORGANIZED HEALTH CARE EDUCATION/TRAINING PROGRAM

## 2021-09-29 PROCEDURE — 2500000003 HC RX 250 WO HCPCS: Performed by: NURSE PRACTITIONER

## 2021-09-29 PROCEDURE — 92611 MOTION FLUOROSCOPY/SWALLOW: CPT

## 2021-09-29 PROCEDURE — 94761 N-INVAS EAR/PLS OXIMETRY MLT: CPT

## 2021-09-29 PROCEDURE — 94660 CPAP INITIATION&MGMT: CPT

## 2021-09-29 PROCEDURE — 83735 ASSAY OF MAGNESIUM: CPT

## 2021-09-29 PROCEDURE — 6370000000 HC RX 637 (ALT 250 FOR IP): Performed by: NURSE PRACTITIONER

## 2021-09-29 PROCEDURE — 80048 BASIC METABOLIC PNL TOTAL CA: CPT

## 2021-09-29 PROCEDURE — 36415 COLL VENOUS BLD VENIPUNCTURE: CPT

## 2021-09-29 PROCEDURE — 99233 SBSQ HOSP IP/OBS HIGH 50: CPT | Performed by: INTERNAL MEDICINE

## 2021-09-29 PROCEDURE — 92610 EVALUATE SWALLOWING FUNCTION: CPT

## 2021-09-29 PROCEDURE — 2060000000 HC ICU INTERMEDIATE R&B

## 2021-09-29 PROCEDURE — 92526 ORAL FUNCTION THERAPY: CPT

## 2021-09-29 PROCEDURE — 2700000000 HC OXYGEN THERAPY PER DAY

## 2021-09-29 PROCEDURE — 6360000002 HC RX W HCPCS: Performed by: INTERNAL MEDICINE

## 2021-09-29 PROCEDURE — 6360000002 HC RX W HCPCS: Performed by: NURSE PRACTITIONER

## 2021-09-29 PROCEDURE — 85025 COMPLETE CBC W/AUTO DIFF WBC: CPT

## 2021-09-29 PROCEDURE — 2580000003 HC RX 258: Performed by: INTERNAL MEDICINE

## 2021-09-29 PROCEDURE — 2580000003 HC RX 258: Performed by: NURSE PRACTITIONER

## 2021-09-29 PROCEDURE — APPNB15 APP NON BILLABLE TIME 0-15 MINS: Performed by: NURSE PRACTITIONER

## 2021-09-29 RX ORDER — SODIUM CHLORIDE 9 MG/ML
INJECTION, SOLUTION INTRAVENOUS CONTINUOUS
Status: DISCONTINUED | OUTPATIENT
Start: 2021-09-30 | End: 2021-10-06 | Stop reason: HOSPADM

## 2021-09-29 RX ORDER — RISPERIDONE 0.5 MG/1
0.5 TABLET, FILM COATED ORAL NIGHTLY
Status: DISCONTINUED | OUTPATIENT
Start: 2021-09-29 | End: 2021-10-06 | Stop reason: HOSPADM

## 2021-09-29 RX ORDER — AMITRIPTYLINE HYDROCHLORIDE 25 MG/1
25 TABLET, FILM COATED ORAL NIGHTLY
Status: DISCONTINUED | OUTPATIENT
Start: 2021-09-29 | End: 2021-10-06 | Stop reason: HOSPADM

## 2021-09-29 RX ORDER — METOPROLOL SUCCINATE 50 MG/1
50 TABLET, EXTENDED RELEASE ORAL DAILY
Status: DISCONTINUED | OUTPATIENT
Start: 2021-09-29 | End: 2021-09-29

## 2021-09-29 RX ORDER — LEVOTHYROXINE SODIUM 0.03 MG/1
25 TABLET ORAL DAILY
Status: DISCONTINUED | OUTPATIENT
Start: 2021-09-29 | End: 2021-10-06 | Stop reason: HOSPADM

## 2021-09-29 RX ORDER — DANTROLENE SODIUM 25 MG/1
100 CAPSULE ORAL 2 TIMES DAILY
Status: DISCONTINUED | OUTPATIENT
Start: 2021-09-29 | End: 2021-09-30

## 2021-09-29 RX ORDER — PREGABALIN 100 MG/1
200 CAPSULE ORAL 3 TIMES DAILY
Status: DISCONTINUED | OUTPATIENT
Start: 2021-09-29 | End: 2021-10-06 | Stop reason: HOSPADM

## 2021-09-29 RX ORDER — MAGNESIUM SULFATE IN WATER 40 MG/ML
2000 INJECTION, SOLUTION INTRAVENOUS ONCE
Status: COMPLETED | OUTPATIENT
Start: 2021-09-29 | End: 2021-09-29

## 2021-09-29 RX ADMIN — AMPICILLIN SODIUM AND SULBACTAM SODIUM 3000 MG: 2; 1 INJECTION, POWDER, FOR SOLUTION INTRAMUSCULAR; INTRAVENOUS at 17:01

## 2021-09-29 RX ADMIN — AMPICILLIN SODIUM AND SULBACTAM SODIUM 3000 MG: 2; 1 INJECTION, POWDER, FOR SOLUTION INTRAMUSCULAR; INTRAVENOUS at 04:42

## 2021-09-29 RX ADMIN — METOPROLOL TARTRATE 25 MG: 25 TABLET, FILM COATED ORAL at 21:05

## 2021-09-29 RX ADMIN — SERTRALINE 150 MG: 100 TABLET, FILM COATED ORAL at 10:54

## 2021-09-29 RX ADMIN — LEVOTHYROXINE SODIUM 25 MCG: 0.03 TABLET ORAL at 15:38

## 2021-09-29 RX ADMIN — DANTROLENE SODIUM 100 MG: 25 CAPSULE ORAL at 21:05

## 2021-09-29 RX ADMIN — PREGABALIN 200 MG: 100 CAPSULE ORAL at 15:38

## 2021-09-29 RX ADMIN — INSULIN LISPRO 1 UNITS: 100 INJECTION, SOLUTION INTRAVENOUS; SUBCUTANEOUS at 12:40

## 2021-09-29 RX ADMIN — AMPICILLIN SODIUM AND SULBACTAM SODIUM 3000 MG: 2; 1 INJECTION, POWDER, FOR SOLUTION INTRAMUSCULAR; INTRAVENOUS at 11:44

## 2021-09-29 RX ADMIN — SODIUM CHLORIDE, PRESERVATIVE FREE 10 ML: 5 INJECTION INTRAVENOUS at 08:22

## 2021-09-29 RX ADMIN — MAGNESIUM SULFATE HEPTAHYDRATE 2000 MG: 40 INJECTION, SOLUTION INTRAVENOUS at 08:37

## 2021-09-29 RX ADMIN — ENOXAPARIN SODIUM 40 MG: 40 INJECTION SUBCUTANEOUS at 08:58

## 2021-09-29 RX ADMIN — AMPICILLIN SODIUM AND SULBACTAM SODIUM 3000 MG: 2; 1 INJECTION, POWDER, FOR SOLUTION INTRAMUSCULAR; INTRAVENOUS at 21:07

## 2021-09-29 RX ADMIN — AMITRIPTYLINE HYDROCHLORIDE 25 MG: 25 TABLET, FILM COATED ORAL at 21:05

## 2021-09-29 RX ADMIN — DANTROLENE SODIUM 100 MG: 25 CAPSULE ORAL at 10:54

## 2021-09-29 RX ADMIN — PREGABALIN 200 MG: 100 CAPSULE ORAL at 21:06

## 2021-09-29 RX ADMIN — POTASSIUM PHOSPHATE, MONOBASIC AND POTASSIUM PHOSPHATE, DIBASIC 20 MMOL: 224; 236 INJECTION, SOLUTION, CONCENTRATE INTRAVENOUS at 12:45

## 2021-09-29 RX ADMIN — PREGABALIN 200 MG: 100 CAPSULE ORAL at 10:53

## 2021-09-29 RX ADMIN — RISPERIDONE 0.5 MG: 0.5 TABLET ORAL at 21:06

## 2021-09-29 ASSESSMENT — PAIN SCALES - GENERAL
PAINLEVEL_OUTOF10: 0

## 2021-09-29 NOTE — CARE COORDINATION
Attempted to see patient for initial assessment, she was getting on the bed pan. Will try back later.   Monisha García RN, BSN, Case Management  Phone: [phone]  Electronically signed by Monisha García RN on 9/29/2021 at 4:47 PM

## 2021-09-29 NOTE — PROGRESS NOTES
Pt is Alert oriented to self and situation including why in the hospital and tx. Pt told this RN it is okay to share information with Nina when she called this morning. Pt stated it is okay for son Brien Cruz to visit. Brien Cruz made aware at this time.      Electronically signed by Ian Prince RN on 9/29/2021 at 1:04 PM

## 2021-09-29 NOTE — PROGRESS NOTES
Speech Language Pathology  Facility/Department: [address] ICU   CLINICAL BEDSIDE SWALLOW EVALUATION    NAME: Noelle Rowe  : 1960  MRN: [id number]    ADMISSION DATE: 2021  ADMITTING DIAGNOSIS:  Aspiration pneumonitis ([address] [J69.0]  Acute respiratory failure with hypoxia and hypercapnia ([address] [J96.01, J96.02]  Acute type 1 respiratory failure ([address] [J96.01]     Noelle Rowe has Acute type 1 respiratory failure ([address] Acute respiratory failure with hypoxia and hypercapnia ([address] Aspiration pneumonitis ([address] Choking; and Metabolic encephalopathy on their problem list.    ONSET DATE: 2021    CHART REVIEW:  2021 admitted s/p reported aspiration episode: ADMISSION H&P HPI: The patient is a 52-year-old  female who presented with episode of aspiration, this is per the emergency room EMS report. They were called to the F because the patient started having choking and shortness of breath while she was eating her dinner. She was found to be severely hypoxic and was placed on noninvasive positive pressure ventilation. At the time of my exam, the patient is definitely significantly drowsy but is waking up and responding. 2021 CXR  Impression   Right greater than left basilar patchy opacities may represent atelectasis or   a developing infectious/inflammatory process.  Follow-up PA and lateral chest   radiographs may be helpful for further evaluation.       Prominence of the superior mediastinum may be accentuated by patient   positioning.  Underlying mediastinal pathology is not excluded.  Short-term   follow-up PA and lateral chest radiographs or chest CT may be helpful for   further evaluation as warranted.      2021 BiPAP until afternoon when patient placed on 6L NC; BiPAP resumed overnight with return to 6L NC in the AM    DYSPHAGIA HISTORY  PEG placed  s/p cardiac arrest with intubation; PEG removed : no SLP notes per EMR review      Date of Eval: 9/29/2021  Evaluating Therapist: TAWANDA Crawley    Current Diet level:  Current Diet : NPO (reported regular diet texture prior to admission)  Current Liquid Diet : NPO (reported thin liquids prior to admission)      Primary Complaint  Patient Complaint: patient reports coughing with liquids prior to admission    Pain:  Pain Level: 0    Reason for Referral  Enedelia Reyes was referred for a bedside swallow evaluation to assess the efficiency of her swallow function, identify signs and symptoms of aspiration and make recommendations regarding safe dietary consistencies, effective compensatory strategies, and safe eating environment. Impression  Dysphagia Diagnosis: Moderate to severe oral stage dysphagia; Moderate to severe pharyngeal stage dysphagia; Suspected needs further assessment  · Accepted and tolerated evaluation at bedside. · Patient alert, cooperative, peasant, follows dx, verbally responsive able to make needs/wants/known but poor historian; oriented to self and situation. · Extensive oral care administered prior to PO trials with noted mucous residue throughout oropharynx; patient sensates and requests continued oral care until oral cavity clear with ability to self-administer oral care which assists with toleration limited by hyperactive gag at times. · Moderate-severe oropharyngeal dysphagia characterized by poor mastication, poor lingual manipulation/coordination, delayed swallow, and decreased laryngeal elevation. Concern for disorganized, potentially incomplete at times, bolus prep d/t severiyt of oral motor weakness and discoordination. Patient appears at high risk for premature bolus loss to the pharynx d/t severity of reduced coordination. Wet vocal quality followed by throat clear noted with nectar thick liquids. · Patient does appears at increased risk for penetration/aspiration and would likely benefit from [address] for continued assessment.  Recommend consideration for minced/moist with honey thick liquids until MBS can be completed; please write//obtain MD order for diet as well as MBS if in agreement. Dysphagia Outcome Severity Scale: Level 3: Moderate dysphagia- Total assisstance, supervision or strategies. Two or more diet consistencies restricted     Treatment Plan  Requires SLP Intervention: Yes  Duration/Frequency of Treatment: ST to tx 3-5 times per week during acute admission unless otherwise noted  D/C Recommendations: To be determined    Recommended Diet and Intervention  Diet Solids Recommendation: Dysphagia Minced and Moist (Dysphagia II)  Liquid Consistency Recommendation: Moderately Thick (Honey)  Recommended Form of Meds: Crushed in puree as able     Therapeutic Interventions: Diet tolerance monitoring;Patient/Family education; Therapeutic PO trials with SLP;Bolus control exercises;Oral motor exercises; Laryngeal exercises; Pharyngeal exercises    Compensatory Swallowing Strategies  Compensatory Swallowing Strategies: Upright as possible for all oral intake;Remain upright for 30-45 minutes after meals;Eat/Feed slowly; Small bites/sips    Treatment/Goals  The patient will tolerate instrumental swallowing procedure; The patient will tolerate recommended diet without observed clinical signs of aspiration; The patient/caregiver will demonstrate understanding of compensatory strategies for improved swallowing safety.;  The patient will improve oral motor function via therapeutic oral motor exercises to 5/5 each trial.;  The patient will improve oral preparation phase via bolus control/manipulation exercises to 5/5 each trial.    General  Chart Reviewed: Yes  Behavior/Cognition: Alert; Cooperative;Pleasant mood (Oriented to self and situation;  Follows dx; Verbally repsonsive - makes wants/needs known, but poor historian)  Communication Observation: Dysarthria  Follows Directions: Simple  Dentition: Adequate  Patient Positioning: Upright in bed  Baseline Vocal Quality: Weak  Volitional Cough: Weak  Volitional Swallow: Delayed  Consistencies Administered: Dysphagia Pureed (Dysphagia I); Honey - cup;Honey - straw;Nectar - cup;Dysphagia Minced and Moist (Dysphagia II); Dysphagia Soft and Bite-Sized (Dysphagia III)    Vision/Hearing  Vision:  (adequate for assessment needs)  Hearing:  (adequate for assessment needs)    Oral Motor Deficits  Labial Strength: Reduced  Labial Coordination: Reduced  Lingual Strength: Reduced  Lingual Coordination: Reduced  Mandible: Impaired  Gag:  (hypersensitive)    Oral Phase Dysfunction  Impaired Mastication: Mechanical soft; Soft Solid (concern for high risk for incomplete mastication with soft solids d/t reduced coordination)  Decreased Anterior to Posterior Transit: All  Suspected Premature Bolus Loss: All     Indicators of Pharyngeal Phase Dysfunction  Delayed Swallow: All  Decreased Laryngeal Elevation: All  Wet Vocal Quality: Nectar - cup  Throat Clearing - Immediate: Nectar - cup    Prognosis  Prognosis for safe diet advancement: guarded  Barriers to reach goals: severity of dysphagia (suspect potential long-term dysphagia?)  Consulted and agree with results and recommendations: Patient;RN    Education  Patient Education: Completed on resutls/recs/plan  Patient Education Response: Verbalizes understanding;Needs reinforcement         Therapy Time  SLP Individual Minutes  Time In: 4478  Time Out: [address]  Minutes: [address]  Barbara Mart, [id number]  Speech-Language Pathologist  Portable phone: [phone]  9/29/2021 9:50 AM

## 2021-09-29 NOTE — PROGRESS NOTES
Pulmonary Progress Note    CC:  Follow up respiratory     Subjective: On and off bpap  FIO2 down to 50%  Acidosis has improved but still reliant on bpap   Says she is a little less SOB    ROS  Less SOB  Dry mouth       Intake/Output Summary (Last 24 hours) at 9/29/2021 0711  Last data filed at 9/29/2021 0600  Gross per 24 hour   Intake 485.59 ml   Output --   Net 485.59 ml         PHYSICAL EXAM:  Blood pressure (!) 97/52, pulse 93, temperature 99.1 °F (37.3 °C), temperature source Axillary, resp. rate 21, weight 182 lb 12.2 oz (82.9 kg), SpO2 98 %.'  Gen: Frail, chronically ill  Eyes: PERRL. No sclera icterus. No conjunctival injection. ENT: No discharge. Pharynx clear. External appearance of ears and nose normal.  Neck: Trachea midline. No obvious mass. Resp: Diminished   CV: Regular rate. Regular rhythm. No murmur or rub. GI: Non-tender. Non-distended. No hernia. Skin: Warm, dry, normal texture and turgor. No nodule on exposed extremities. Lymph: No cervical LAD. No supraclavicular LAD. M/S: Contractures, deformities   Neuro: Moves all four extremities. CN 2-12 tested, no defect noted.   Ext:   no edema    Medications:    Scheduled Meds:   sodium chloride flush  5-40 mL IntraVENous 2 times per day    enoxaparin  40 mg SubCUTAneous Daily    insulin lispro  0-6 Units SubCUTAneous Q4H    ampicillin-sulbactam  3,000 mg IntraVENous Q6H       Continuous Infusions:   sodium chloride      dextrose         PRN Meds:  sodium chloride flush, sodium chloride, potassium chloride, magnesium sulfate, ondansetron, glucose, dextrose, glucagon (rDNA), dextrose    Labs:  CBC:   Recent Labs     09/27/21 1848 09/28/21  0945 09/29/21  0420   WBC 8.1 15.5* 11.0   HGB 13.6 12.1* 11.1*   HCT 43.4 39.3* 34.6*   MCV 90.7 91.1 89.2   * 114* 133*     BMP:   Recent Labs     09/27/21 1848 09/28/21  0945 09/29/21  0420    139 145   K 4.5 5.4* 4.0    101 106   CO2 20* 22 26   PHOS  --  3.8 1.4*   BUN 21* 19 15   CREATININE 1.2 0.7* <0.5*     LIVER PROFILE:   Recent Labs     21  1848   AST 31   *   BILITOT 0.7   ALKPHOS 267*     PT/INR: No results for input(s): PROTIME, INR in the last 72 hours. APTT: No results for input(s): APTT in the last 72 hours. UA:No results for input(s): NITRITE, COLORU, PHUR, LABCAST, WBCUA, RBCUA, MUCUS, TRICHOMONAS, YEAST, BACTERIA, CLARITYU, SPECGRAV, LEUKOCYTESUR, UROBILINOGEN, BILIRUBINUR, BLOODU, GLUCOSEU, AMORPHOUS in the last 72 hours. Invalid input(s): Carmen Dumas  No results for input(s): PH, PCO2, PO2 in the last 72 hours. Films:  Chest imaging reports were reviewed and imaging was reviewed by me and showed no new films     AB    Cultures:  Blood:  Pending    I reviewed the labs and images listed above    ASSESSMENT/PLAN:  · Acute Hypoxic/Hypercapnic Respiratory Failure with saturations less than 90% on room air and pH less than 7.35 due to aspiration/choking/pneumonia  ? Cycle between NC and bpap based on needs, take off bpap now     · Titrate oxygen for saturations greater than or equal to 90%  · Choking episode   ? Swallow evaluation  · Aspiration into the airway   ? Unasyn   ? Blood cultures sent   ? Add lactobacillus when able to swallow   ? Check procal    · Acute Metabolic Encephalopathy   ?  Likely due to CO2 narcosis   · Lactic acidosis, improved  · Hypophosphatemia   · Replace phos       Needs to get her home meds resumed once off bpap    DVT prophylaxis  Adelia Rios, DO [facility]

## 2021-09-29 NOTE — PROGRESS NOTES
Hospitalist Progress Note      PCP: Liset Chanel    Date of Admission: 9/27/2021    Chief Complaint: aspiration. Hospital Course:   62year old female patient with a past medical history of cerebral palsy who presents via EMS from her ECF after aspirating while eating dinner, patient was  hypoxic and hypercapnia and placed on BiPAP. Patient was admitted as a case of aspiration pneumonia. Subjective:   Patient is significantly better, alert and calm, denies any active complaints at the time being. Medications:  Reviewed    Infusion Medications    sodium chloride      dextrose       Scheduled Medications    potassium phosphate IVPB  20 mmol IntraVENous Once    amitriptyline  25 mg Oral Nightly    dantrolene  100 mg Oral BID    sertraline  150 mg Oral Daily    pregabalin  200 mg Oral TID    risperiDONE  0.5 mg Oral Nightly    sodium chloride flush  5-40 mL IntraVENous 2 times per day    enoxaparin  40 mg SubCUTAneous Daily    insulin lispro  0-6 Units SubCUTAneous Q4H    ampicillin-sulbactam  3,000 mg IntraVENous Q6H     PRN Meds: sodium chloride flush, sodium chloride, potassium chloride, magnesium sulfate, ondansetron, glucose, dextrose, glucagon (rDNA), dextrose      Intake/Output Summary (Last 24 hours) at 9/29/2021 1234  Last data filed at 9/29/2021 0600  Gross per 24 hour   Intake 485.59 ml   Output --   Net 485.59 ml       Physical Exam Performed:    BP (!) 155/88   Pulse 102   Temp 99.6 °F (37.6 °C) (Axillary)   Resp (!) 46   Wt 182 lb 12.2 oz (82.9 kg)   SpO2 93%     General appearance:plesant female patient, not in pain or distress. HEENT: Pupils equal, round, and reactive to light. Conjunctivae/corneas clear. Neck: Supple, with full range of motion. No jugular venous distention. Trachea midline. Respiratory:  Normal respiratory effort. Clear to auscultation, bilaterally without Rales/Wheezes/Rhonchi.   Cardiovascular: Regular rate and rhythm with normal S1/S2 without murmurs, rubs or gallops. Abdomen: Soft, non-tender, non-distended with normal bowel sounds. Musculoskeletal:upper and lower limb deformity. Skin: Skin color, texture, turgor normal.  No rashes or lesions. Neurologic:  Neurovascularly intact without any focal sensory/motor deficits. Cranial nerves: II-XII intact, grossly non-focal.  Psychiatric: Alert . Capillary Refill: Brisk,3 seconds, normal   Peripheral Pulses: +2 palpable, equal bilaterally       Labs:   Recent Labs     09/27/21 1848 09/28/21  0945 09/29/21  0420   WBC 8.1 15.5* 11.0   HGB 13.6 12.1* 11.1*   HCT 43.4 39.3* 34.6*   * 114* 133*     Recent Labs     09/27/21 1848 09/28/21  0945 09/29/21  0420    139 145   K 4.5 5.4* 4.0    101 106   CO2 20* 22 26   BUN 21* 19 15   CREATININE 1.2 0.7* <0.5*   CALCIUM 9.1 8.8 9.0   PHOS  --  3.8 1.4*     Recent Labs     09/27/21 1848   AST 31   *   BILITOT 0.7   ALKPHOS 267*     No results for input(s): INR in the last 72 hours. Recent Labs     09/27/21 1848   TROPONINI <0.01       Urinalysis:    No results found for: Jn [place], BACTERIA, RBCUA, BLOODU, SPECGRAV, GLUCOSEU    Radiology:  XR CHEST PORTABLE   Final Result   Right greater than left basilar patchy opacities may represent atelectasis or   a developing infectious/inflammatory process. Follow-up PA and lateral chest   radiographs may be helpful for further evaluation. Prominence of the superior mediastinum may be accentuated by patient   positioning. Underlying mediastinal pathology is not excluded. Short-term   follow-up PA and lateral chest radiographs or chest CT may be helpful for   further evaluation as warranted.          Fluoroscopy modified barium swallow with video    (Results Pending)           Assessment/Plan:    Active Hospital Problems    Diagnosis     Aspiration pneumonitis ([facility] [J69.0]     Choking [T21.128S]     Metabolic encephalopathy [O52.69]     Acute type 1 respiratory failure ([facility] [J96.01]     Acute respiratory failure with hypoxia and hypercapnia ([facility] [J96.01, J96.02]      Acute hypoxic and hypercapnic respiratory failure. Aspiration pneumonia. Acute metabolic encephalopathy. Patient presented from ECF with aspiration, was found to have hypoxic and hypercapnic, patient was agitated and confused. XR with R>L infiltration suggestive of aspiration. Patient was managed in the ICU with BiPAP and unasyn. Plan :  - continue Unasyn Day 2 .   - BiPAP as per ICU. - agree with SLP eval, patient placed on modified diet. - home meds resumed ( clonopin held for now). CP.   bedbound at baseline. To return to [facility] after improvement. DVT Prophylaxis: lovenox  Diet: ADULT DIET; Dysphagia - Minced and Moist; Moderately Thick (Honey)  Code Status: DNR-CCA    PT/OT Eval Status: bedbound at baseline. Dispo - ECF once stable .      Janet Michaels MD

## 2021-09-29 NOTE — PROCEDURES
INSTRUMENTAL SWALLOW REPORT  MODIFIED BARIUM SWALLOW    NAME: Jim Posadas   : 1960  MRN: [id number]       Date of Eval: 2021     Ordering Physician: Keisha Jimenez  Radiologist: Maya Wei     Referring Diagnosis: oropharyngeal dysphagia; r13.12    Jim Posadas has a past medical history of Cerebral palsy ([address] She has no past surgical history on file.     Current Diet Solid Consistency: Dysphagia Minced and Moist (Dysphagia II)  Current Diet Liquid Consistency: Moderately Thick (Honey)    CHART REVIEW:  2021 admitted s/p reported aspiration episode: ADMISSION H&P HPI: The patient is a 71-year-old  female who presented with episode of aspiration, this is per the emergency room EMS report. Verena Newman were called to the ECF because the patient started having choking and shortness of breath while she was eating her dinner. Reyes Gale was found to be severely hypoxic and was placed on noninvasive positive pressure ventilation.  At the time of my exam, the patient is definitely significantly drowsy but is waking up and responding.     2021 CXR  Impression   Right greater than left basilar patchy opacities may represent atelectasis or   a developing infectious/inflammatory process.  Follow-up PA and lateral chest   radiographs may be helpful for further evaluation.       Prominence of the superior mediastinum may be accentuated by patient   positioning.  Underlying mediastinal pathology is not excluded.  Short-term   follow-up PA and lateral chest radiographs or chest CT may be helpful for   further evaluation as warranted.      2021 BiPAP until afternoon when patient placed on 6L NC; BiPAP resumed overnight with return to 6L NC in the AM     DYSPHAGIA HISTORY  PEG placed  s/p cardiac arrest with intubation; PEG removed 2015: no SLP notes per EMR review       Patient Complaints/Reason for Referral:  Jim Posadas was referred for a MBS to assess the efficiency of his/her swallow function, assess for aspiration, and to make recommendations regarding safe dietary consistencies, effective compensatory strategies, and safe eating environment. Patient complaints: patient reports coughing with liquids prior to admission    Onset of problem:   Date of Onset: 9/27/2021    Behavior/Cognition/Vision/Hearing:  Behavior/Cognition: Alert; Cooperative;Pleasant mood (Oriented to self and situation; Follows dx; Verbally responsive - makes wants/needs known, inconsistent historian)  Vision:  (adequate for assessment needs)  Hearing:  (adequate for assessment needs)    Impressions:  Treatment Dx and ICD 10: oropharyngeal dysphagia; r 13.12    Patient Position: Lateral   Patient Degrees: Fully upright in VSE chair  Consistencies Administered: Dysphagia Pureed (Dysphagia I); Honey straw;Nectar straw; Thin straw;Dysphagia Minced and Moist (Dysphagia II); Dysphagia Soft and Bite-Sized (Dysphagia III)    Moderate-severe oral stage dysphagia characterized by decreased mastication and decreased lingula manipulation/coordination. · Prolonged, disorganized bolus formation and movement with all. Incomplete bolus formation/cohesion with all but adequate mastication with solids. · Premature bolus loss to the e pharynx with all; decreased oral control exacerbates. · Piecemeal swallow with all. · Oral residue with all with repeat swallows, but residue is not completely cleared (even with liquids). Moderate pharyngeal stage dysphagia characterized by delayed swallow, decreased laryngeal elevation, and decreased pharyngeal peristalsis. · Premature spillage to the valleculae with all. · Mild vallecular residue with moist solids, puree, and liquids; mod vallecular residue with soft solids. Trace pyriform residue with all appears r/t reduced cricopharyngeal opening. Spontaneous repeat swallows do not completely clear residue.  Liquid wash minimizes solids residue, but results in deep penetration and/or aspiration with thin liquids. · Appears to present with inconsistent, intermittent deep penetration/aspiration of thin taken in isolation. · Patient appears most optimal with minced/moist with nectar thick liquids; could consider SMALL sips thin between meals. Cricopharyngeal dysfunction noted with potential to exacerbate pharyngeal symptoms of dysphagia. Dysphagia Outcome Severity Scale: Level 3: Moderate dysphagia- Total assisstance, supervision or strategies. Two or more diet consistencies restricted  Penetration-Aspiration Scale (PAS): 8 - Material Enters the airway, passes below the vocal folds, and no effort is made to eject    Recommended Diet:  Solid consistency: Dysphagia Minced and Moist (Dysphagia II)  Liquid consistency: Mildly Thick (Nectar)  Liquid administration via: Straw (unable to toelrate via cup)  Medication administration: Meds in puree (crushed if able)  Compensatory Swallowing Strategies: Upright as possible for all oral intake;Remain upright for 30-45 minutes after meals;Eat/Feed slowly; Small bites/sips    Recommendations/Treatment  Requires SLP Intervention: Yes  Duration/Frequency of Treatment: ST to tx 3-5 times per week during acute admission unless otherwise noted  Therapeutic Interventions: Diet tolerance monitoring;Patient/Family education; Therapeutic PO trials with SLP;Bolus control exercises;Oral motor exercises; Laryngeal exercises; Pharyngeal exercises  D/C Recommendations: Home ST    Education:   Patient Education: Completed on resutls/recs/plan  Patient Education Response: Verbalizes understanding;Needs reinforcement    Prognosis  Prognosis for safe diet advancement: guarded  Barriers to reach goals: severity of dysphagia (suspect potential chronic dysphagia without recent work-up)      Goals:    Dysphagia Goals: The patient will tolerate recommended diet without observed clinical signs of aspiration; The patient/caregiver will demonstrate understanding of compensatory strategies for improved swallowing safety. ;The patient will improve oral motor function via therapeutic oral motor exercises to 5/5 each trial.;The patient will improve oral preparation phase via bolus control/manipulation exercises to 5/5 each trial. (The patient will improve pharyngeal phase via pharyngolaryngeal exercise completed 10/10)    Oral Preparation / Oral Phase  Oral Phase - Major Contributing Deficits  Poor Mastication: Mechanical soft solid; Soft solid  Weak Lingual Manipulation: All  Reduced Posterior Propulsion: All  Reduced Bolus Control: All  Decreased Bolus Cohesion: All  Lingual / Palatal Residue: All  Piecemeal Swallowing: All  Premature Bolus Loss to Pharynx: All    Pharyngeal Phase  Pharyngeal Phase - Major Contributing Deficits  Delayed Swallow Initiation: All  Premature Spillage to Valleculae: All  Reduced Pharyngeal Peristalsis: All  Reduced Laryngeal Elevation: All  Deep Penetration During: Thin straw (consistently noted with liquid wash with soft solids; intermittent with isolated thin liquid trials)  Pharyngeal Residue - Valleculae: All (minimal with puree, moist solid, and liquids; moderate with soft solid)  Pharyngeal Residue - Pyriform: All (trace - appears 2/2 cricopharyngeal dysfunction)    Upper Esophageal Phase  Upper Esophageal Screen- Major Contributing Deficits  Reduced Cricopharyngeal Opening: All      Pain   Patient Currently in Pain: No      Therapy Time:   Individual   Time In 1415   Time Out 1515   Minutes 60       Aleida Martinez, [address] [facility], [id number]  Speech-Language Pathologist  Portable phone: [phone]  9/29/2021, 3:15 PM

## 2021-09-29 NOTE — PROGRESS NOTES
0730: Report received from [address].     2139: Spoke with sibling Donna Roman) with updates on patient. Was informed that the pt's son will be visiting 9/29.    0731 40 [phone] [id number]: Patient placed on BiPAP to maintain O2 sats > 90%.    0700: Patient tolerating BiPAP well. O2 sats remain > 90%.

## 2021-09-30 ENCOUNTER — APPOINTMENT (OUTPATIENT)
Dept: GENERAL RADIOLOGY | Age: 61
DRG: 871 | End: 2021-09-30
Payer: MEDICARE

## 2021-09-30 LAB
ANION GAP SERPL CALCULATED.3IONS-SCNC: 13 MMOL/L (ref 3–16)
BASOPHILS ABSOLUTE: 0 K/UL (ref 0–0.2)
BASOPHILS RELATIVE PERCENT: 0.3 %
BUN BLDV-MCNC: 15 MG/DL (ref 7–20)
CALCIUM SERPL-MCNC: 8.7 MG/DL (ref 8.3–10.6)
CHLORIDE BLD-SCNC: 104 MMOL/L (ref 99–110)
CO2: 27 MMOL/L (ref 21–32)
CREAT SERPL-MCNC: <0.5 MG/DL (ref 0.9–1.3)
EOSINOPHILS ABSOLUTE: 0 K/UL (ref 0–0.6)
EOSINOPHILS RELATIVE PERCENT: 0.3 %
GFR AFRICAN AMERICAN: >60
GFR NON-AFRICAN AMERICAN: >60
GLUCOSE BLD-MCNC: 101 MG/DL (ref 70–99)
GLUCOSE BLD-MCNC: 104 MG/DL (ref 70–99)
GLUCOSE BLD-MCNC: 106 MG/DL (ref 70–99)
GLUCOSE BLD-MCNC: 124 MG/DL (ref 70–99)
GLUCOSE BLD-MCNC: 131 MG/DL (ref 70–99)
GLUCOSE BLD-MCNC: 91 MG/DL (ref 70–99)
HCT VFR BLD CALC: 33.4 % (ref 40.5–52.5)
HEMOGLOBIN: 10.7 G/DL (ref 13.5–17.5)
LYMPHOCYTES ABSOLUTE: 1.5 K/UL (ref 1–5.1)
LYMPHOCYTES RELATIVE PERCENT: 14.7 %
MAGNESIUM: 1.9 MG/DL (ref 1.8–2.4)
MCH RBC QN AUTO: 28.6 PG (ref 26–34)
MCHC RBC AUTO-ENTMCNC: 32.1 G/DL (ref 31–36)
MCV RBC AUTO: 88.9 FL (ref 80–100)
MONOCYTES ABSOLUTE: 0.7 K/UL (ref 0–1.3)
MONOCYTES RELATIVE PERCENT: 6.3 %
NEUTROPHILS ABSOLUTE: 8.2 K/UL (ref 1.7–7.7)
NEUTROPHILS RELATIVE PERCENT: 78.4 %
PDW BLD-RTO: 17 % (ref 12.4–15.4)
PERFORMED ON: ABNORMAL
PERFORMED ON: NORMAL
PHOSPHORUS: 2.4 MG/DL (ref 2.5–4.9)
PLATELET # BLD: 140 K/UL (ref 135–450)
PMV BLD AUTO: 9.2 FL (ref 5–10.5)
POTASSIUM SERPL-SCNC: 3.7 MMOL/L (ref 3.5–5.1)
PROCALCITONIN: 0.31 NG/ML (ref 0–0.15)
RBC # BLD: 3.76 M/UL (ref 4.2–5.9)
SODIUM BLD-SCNC: 144 MMOL/L (ref 136–145)
WBC # BLD: 10.5 K/UL (ref 4–11)

## 2021-09-30 PROCEDURE — 85025 COMPLETE CBC W/AUTO DIFF WBC: CPT

## 2021-09-30 PROCEDURE — 6370000000 HC RX 637 (ALT 250 FOR IP): Performed by: NURSE PRACTITIONER

## 2021-09-30 PROCEDURE — 6370000000 HC RX 637 (ALT 250 FOR IP): Performed by: STUDENT IN AN ORGANIZED HEALTH CARE EDUCATION/TRAINING PROGRAM

## 2021-09-30 PROCEDURE — 2580000003 HC RX 258: Performed by: INTERNAL MEDICINE

## 2021-09-30 PROCEDURE — 92526 ORAL FUNCTION THERAPY: CPT

## 2021-09-30 PROCEDURE — 2580000003 HC RX 258: Performed by: NURSE PRACTITIONER

## 2021-09-30 PROCEDURE — 2060000000 HC ICU INTERMEDIATE R&B

## 2021-09-30 PROCEDURE — 6360000002 HC RX W HCPCS: Performed by: NURSE PRACTITIONER

## 2021-09-30 PROCEDURE — 94761 N-INVAS EAR/PLS OXIMETRY MLT: CPT

## 2021-09-30 PROCEDURE — 2500000003 HC RX 250 WO HCPCS: Performed by: NURSE PRACTITIONER

## 2021-09-30 PROCEDURE — 71045 X-RAY EXAM CHEST 1 VIEW: CPT

## 2021-09-30 PROCEDURE — 84145 PROCALCITONIN (PCT): CPT

## 2021-09-30 PROCEDURE — 83735 ASSAY OF MAGNESIUM: CPT

## 2021-09-30 PROCEDURE — APPNB15 APP NON BILLABLE TIME 0-15 MINS: Performed by: NURSE PRACTITIONER

## 2021-09-30 PROCEDURE — 6360000002 HC RX W HCPCS: Performed by: INTERNAL MEDICINE

## 2021-09-30 PROCEDURE — 99233 SBSQ HOSP IP/OBS HIGH 50: CPT | Performed by: INTERNAL MEDICINE

## 2021-09-30 PROCEDURE — 36415 COLL VENOUS BLD VENIPUNCTURE: CPT

## 2021-09-30 PROCEDURE — 2700000000 HC OXYGEN THERAPY PER DAY

## 2021-09-30 PROCEDURE — 84100 ASSAY OF PHOSPHORUS: CPT

## 2021-09-30 PROCEDURE — 6370000000 HC RX 637 (ALT 250 FOR IP): Performed by: INTERNAL MEDICINE

## 2021-09-30 PROCEDURE — 80048 BASIC METABOLIC PNL TOTAL CA: CPT

## 2021-09-30 RX ORDER — PANTOPRAZOLE SODIUM 40 MG/1
40 TABLET, DELAYED RELEASE ORAL
Status: DISCONTINUED | OUTPATIENT
Start: 2021-09-30 | End: 2021-10-06 | Stop reason: HOSPADM

## 2021-09-30 RX ORDER — CLONAZEPAM 1 MG/1
1 TABLET ORAL 2 TIMES DAILY
Status: DISCONTINUED | OUTPATIENT
Start: 2021-09-30 | End: 2021-10-06 | Stop reason: HOSPADM

## 2021-09-30 RX ORDER — DANTROLENE SODIUM 25 MG/1
200 CAPSULE ORAL 2 TIMES DAILY
Status: DISCONTINUED | OUTPATIENT
Start: 2021-09-30 | End: 2021-10-06 | Stop reason: HOSPADM

## 2021-09-30 RX ORDER — MAGNESIUM SULFATE IN WATER 40 MG/ML
2000 INJECTION, SOLUTION INTRAVENOUS ONCE
Status: COMPLETED | OUTPATIENT
Start: 2021-09-30 | End: 2021-09-30

## 2021-09-30 RX ORDER — LACTOBACILLUS RHAMNOSUS GG 10B CELL
1 CAPSULE ORAL 2 TIMES DAILY WITH MEALS
Status: DISCONTINUED | OUTPATIENT
Start: 2021-09-30 | End: 2021-10-06 | Stop reason: HOSPADM

## 2021-09-30 RX ADMIN — METOPROLOL TARTRATE 25 MG: 25 TABLET, FILM COATED ORAL at 21:07

## 2021-09-30 RX ADMIN — DANTROLENE SODIUM 200 MG: 25 CAPSULE ORAL at 21:45

## 2021-09-30 RX ADMIN — POTASSIUM PHOSPHATE, MONOBASIC AND POTASSIUM PHOSPHATE, DIBASIC 20 MMOL: 224; 236 INJECTION, SOLUTION, CONCENTRATE INTRAVENOUS at 11:52

## 2021-09-30 RX ADMIN — LEVOTHYROXINE SODIUM 25 MCG: 0.03 TABLET ORAL at 05:54

## 2021-09-30 RX ADMIN — ENOXAPARIN SODIUM 40 MG: 40 INJECTION SUBCUTANEOUS at 09:34

## 2021-09-30 RX ADMIN — PREGABALIN 200 MG: 100 CAPSULE ORAL at 14:35

## 2021-09-30 RX ADMIN — PANTOPRAZOLE SODIUM 40 MG: 40 TABLET, DELAYED RELEASE ORAL at 13:08

## 2021-09-30 RX ADMIN — AMPICILLIN SODIUM AND SULBACTAM SODIUM 3000 MG: 2; 1 INJECTION, POWDER, FOR SOLUTION INTRAMUSCULAR; INTRAVENOUS at 16:43

## 2021-09-30 RX ADMIN — AMPICILLIN SODIUM AND SULBACTAM SODIUM 3000 MG: 2; 1 INJECTION, POWDER, FOR SOLUTION INTRAMUSCULAR; INTRAVENOUS at 21:51

## 2021-09-30 RX ADMIN — CLONAZEPAM 1 MG: 1 TABLET ORAL at 21:07

## 2021-09-30 RX ADMIN — SODIUM CHLORIDE, PRESERVATIVE FREE 10 ML: 5 INJECTION INTRAVENOUS at 21:08

## 2021-09-30 RX ADMIN — METOPROLOL TARTRATE 25 MG: 25 TABLET, FILM COATED ORAL at 09:34

## 2021-09-30 RX ADMIN — Medication 1 CAPSULE: at 13:08

## 2021-09-30 RX ADMIN — PANTOPRAZOLE SODIUM 40 MG: 40 TABLET, DELAYED RELEASE ORAL at 16:44

## 2021-09-30 RX ADMIN — DANTROLENE SODIUM 100 MG: 25 CAPSULE ORAL at 09:34

## 2021-09-30 RX ADMIN — SODIUM CHLORIDE, PRESERVATIVE FREE 10 ML: 5 INJECTION INTRAVENOUS at 09:35

## 2021-09-30 RX ADMIN — CLONAZEPAM 1 MG: 1 TABLET ORAL at 09:34

## 2021-09-30 RX ADMIN — AMPICILLIN SODIUM AND SULBACTAM SODIUM 3000 MG: 2; 1 INJECTION, POWDER, FOR SOLUTION INTRAMUSCULAR; INTRAVENOUS at 04:17

## 2021-09-30 RX ADMIN — SERTRALINE 150 MG: 100 TABLET, FILM COATED ORAL at 09:34

## 2021-09-30 RX ADMIN — RISPERIDONE 0.5 MG: 0.5 TABLET ORAL at 21:08

## 2021-09-30 RX ADMIN — MAGNESIUM SULFATE HEPTAHYDRATE 2000 MG: 40 INJECTION, SOLUTION INTRAVENOUS at 09:37

## 2021-09-30 RX ADMIN — AMITRIPTYLINE HYDROCHLORIDE 25 MG: 25 TABLET, FILM COATED ORAL at 21:08

## 2021-09-30 RX ADMIN — SODIUM CHLORIDE: 9 INJECTION, SOLUTION INTRAVENOUS at 00:01

## 2021-09-30 RX ADMIN — Medication 1 CAPSULE: at 16:44

## 2021-09-30 RX ADMIN — PREGABALIN 200 MG: 100 CAPSULE ORAL at 21:08

## 2021-09-30 RX ADMIN — AMPICILLIN SODIUM AND SULBACTAM SODIUM 3000 MG: 2; 1 INJECTION, POWDER, FOR SOLUTION INTRAMUSCULAR; INTRAVENOUS at 11:53

## 2021-09-30 RX ADMIN — PREGABALIN 200 MG: 100 CAPSULE ORAL at 09:34

## 2021-09-30 ASSESSMENT — PAIN SCALES - GENERAL
PAINLEVEL_OUTOF10: 0

## 2021-09-30 NOTE — PROGRESS NOTES
TRANSFER - IN REPORT:    Verbal report received from ICU on Clarice Talbert  being received from [facility] for routine progression of patient care      Report consisted of patient's Situation, Background, Assessment and   Recommendations(SBAR). Information from the following report(s) Nurse Handoff Report was reviewed with the receiving nurse. Opportunity for questions and clarification was provided. Assessment completed upon patient's arrival to unit and care assumed. Pt was sent with belongings including cell phone,  and personal pillows and blankets.     Orientation to room and use of call light was provided    Electronically signed by Cyndy North RN on 9/30/2021 at 4:32 PM

## 2021-09-30 NOTE — PROGRESS NOTES
Physician Progress Note      PATIENT:               Dinh Clemens  CSN #:                  [id number]  :                       1960  ADMIT DATE:       2021 6:43 PM  DISCH DATE:  RESPONDING  PROVIDER #:        Terry Flores          QUERY TEXT:    Pt admitted with aspiration PNA. Pt noted to have WBC- 15.5, HR- 93, Resp-23,   B/P 95/55, lactic acid 3.5 . If possible, please document in the progress   notes and discharge summary if you are evaluating and /or treating any of the   following: The medical record reflects the following:  Risk Factors: aspiration PNA  Clinical Indicators: on admission, WBC- 15.5, HR- 93, Resp-23, B/P 95/55,   lactic acid 3.5, procalcitonin 0.97,  acute resp failure with hypoxia and    metabolic encephalopathy  Treatment: IV ABX, IVF, blood cultures, BiPap  Options provided:  -- Sepsis, present on admission  -- Other - I will add my own diagnosis  -- Disagree - Not applicable / Not valid  -- Disagree - Clinically unable to determine / Unknown  -- Refer to Clinical Documentation Reviewer    PROVIDER RESPONSE TEXT:    This patient has sepsis which was present on admission.     Query created by: Regla Ashby on 2021 11:34 AM      Electronically signed by:  Terry Flores 2021 3:00 PM

## 2021-09-30 NOTE — PROGRESS NOTES
Pulmonary Progress Note    CC:  Follow up respiratory     Subjective:  6 liters of oxygen   Does not feel SOB nor congested   Does not like the modified diet as the food does not taste good     ROS  NO SOB  NO congestion       Intake/Output Summary (Last 24 hours) at 9/30/2021 1224  Last data filed at 9/30/2021 0555  Gross per 24 hour   Intake 861.51 ml   Output 525 ml   Net 336.51 ml         PHYSICAL EXAM:  Blood pressure 130/81, pulse 94, temperature 99.7 °F (37.6 °C), temperature source Oral, resp. rate (!) 42, weight 181 lb (82.1 kg), SpO2 93 %.'  Gen: Frail, chronically ill, more awake  Eyes: PERRL. No sclera icterus. No conjunctival injection. ENT: No discharge. Pharynx clear. External appearance of ears and nose normal.  Neck: Trachea midline. No obvious mass. Resp: Diminished   CV: Regular rate. Regular rhythm. No murmur or rub. GI: Non-tender. Non-distended. No hernia. Skin: Warm, dry, normal texture and turgor. No nodule on exposed extremities. Lymph: No cervical LAD. No supraclavicular LAD. M/S: Contractures, deformities   Neuro: Moves all four extremities. CN 2-12 tested, no defect noted.   Ext:   no edema    Medications:    Scheduled Meds:   lactobacillus  1 capsule Oral BID WC    potassium phosphate IVPB  20 mmol IntraVENous Once    pantoprazole  40 mg Oral BID AC    clonazePAM  1 mg Oral BID    dantrolene  200 mg Oral BID    amitriptyline  25 mg Oral Nightly    sertraline  150 mg Oral Daily    pregabalin  200 mg Oral TID    risperiDONE  0.5 mg Oral Nightly    levothyroxine  25 mcg Oral Daily    metoprolol tartrate  25 mg Oral BID    sodium chloride flush  5-40 mL IntraVENous 2 times per day    enoxaparin  40 mg SubCUTAneous Daily    insulin lispro  0-6 Units SubCUTAneous Q4H    ampicillin-sulbactam  3,000 mg IntraVENous Q6H       Continuous Infusions:   sodium chloride 5 mL/hr at 09/30/21 0555    sodium chloride      dextrose         PRN Meds:  sodium chloride flush, sodium chloride, potassium chloride, magnesium sulfate, ondansetron, glucose, dextrose, glucagon (rDNA), dextrose    Labs:  CBC:   Recent Labs     21  0945 21  0420 21   WBC 15.5* 11.0 10.5   HGB 12.1* 11.1* 10.7*   HCT 39.3* 34.6* 33.4*   MCV 91.1 89.2 88.9   * 133* 140     BMP:   Recent Labs     21  0945 21  0420 21  042    145 144   K 5.4* 4.0 3.7    106 104   CO2 22 26 27   PHOS 3.8 1.4* 2.4*   BUN 19 15 15   CREATININE 0.7* <0.5* <0.5*     LIVER PROFILE:   Recent Labs     21  1848   AST 31   *   BILITOT 0.7   ALKPHOS 267*     PT/INR: No results for input(s): PROTIME, INR in the last 72 hours. APTT: No results for input(s): APTT in the last 72 hours. UA:No results for input(s): NITRITE, COLORU, PHUR, LABCAST, WBCUA, RBCUA, MUCUS, TRICHOMONAS, YEAST, BACTERIA, CLARITYU, SPECGRAV, LEUKOCYTESUR, UROBILINOGEN, BILIRUBINUR, BLOODU, GLUCOSEU, AMORPHOUS in the last 72 hours. Invalid input(s): Guy Munoz  No results for input(s): PH, PCO2, PO2 in the last 72 hours. Films:  Chest imaging reports were reviewed and imaging was reviewed by me and showed no new films     AB.32/57/93    Cultures:  Blood:  Pending    I reviewed the labs and images listed above    ASSESSMENT/PLAN:  · Acute Hypoxic/Hypercapnic Respiratory Failure with saturations less than 90% on room air and pH less than 7.35 due to aspiration/choking/pneumonia  · Titrate oxygen for saturations greater than or equal to 90%  · CXR today   · Choking episode   ? Barium swallow showed intermittent aspiration  ? Modified diet in place   · Aspiration into the airway   ? Unasyn for 7 days (most likely)  ? Lactobacillus   ? Check procal today   · Acute Metabolic Encephalopathy, improved   ?  Likely due to CO2 narcosis   · Lactic acidosis, improved  · Hypophosphatemia   · Replace phos again today        DVT prophylaxis  Lovenox   PT/OT       Polina Mccray DO  [facility]

## 2021-09-30 NOTE — PLAN OF CARE
Problem: Falls - Risk of:  Goal: Will remain free from falls  Description: Will remain free from falls  Outcome: Ongoing     Problem: Pain:  Goal: Control of acute pain  Description: Control of acute pain  Outcome: Ongoing     Problem: Skin Integrity:  Goal: Absence of new skin breakdown  Description: Absence of new skin breakdown  Outcome: Ongoing

## 2021-09-30 NOTE — PROGRESS NOTES
[facility]   Speech Therapy  Daily Dysphagia Treatment Note    Jaylin Michael  AGE: 62 y.o. GENDER: adult  : 1960  [id number]  EPISODE DATE:  2021     Patient Active Problem List   Diagnosis    Acute type 1 respiratory failure (HCC)    Acute respiratory failure with hypoxia and hypercapnia (HCC)    Aspiration pneumonitis (HCC)    Choking    Metabolic encephalopathy     No Known Allergies    Treatment Diagnosis: Dysphagia     CHART REVIEW:  2021 admitted s/p reported aspiration episode: ADMISSION H&P HPI: The patient is a 71-year-old  female who presented with episode of aspiration, this is per the emergency room EMS report. Cuca Gallardo were called to the ECF because the patient started having choking and shortness of breath while she was eating her dinner. Dalton Maxwell was found to be severely hypoxic and was placed on noninvasive positive pressure ventilation.  At the time of my exam, the patient is definitely significantly drowsy but is waking up and responding.     2021 CXR  Impression   Right greater than left basilar patchy opacities may represent atelectasis or   a developing infectious/inflammatory process.  Follow-up PA and lateral chest   radiographs may be helpful for further evaluation.       Prominence of the superior mediastinum may be accentuated by patient   positioning.  Underlying mediastinal pathology is not excluded.  Short-term   follow-up PA and lateral chest radiographs or chest CT may be helpful for   further evaluation as warranted.      2021 BiPAP until afternoon when patient placed on 6L NC; BiPAP resumed overnight with return to 6L NC in the AM     2021 MBS  Moderate-severe oral stage dysphagia characterized by decreased mastication and decreased lingula manipulation/coordination. · Prolonged, disorganized bolus formation and movement with all. Incomplete bolus formation/cohesion with all but adequate mastication with solids. · Premature bolus loss to the e pharynx with all; decreased oral control exacerbates. · Piecemeal swallow with all. · Oral residue with all with repeat swallows, but residue is not completely cleared (even with liquids). Moderate pharyngeal stage dysphagia characterized by delayed swallow, decreased laryngeal elevation, and decreased pharyngeal peristalsis. · Premature spillage to the valleculae with all. · Mild vallecular residue with moist solids, puree, and liquids; mod vallecular residue with soft solids. Trace pyriform residue with all appears r/t reduced cricopharyngeal opening. Spontaneous repeat swallows do not completely clear residue. Liquid wash minimizes solids residue, but results in deep penetration and/or aspiration with thin liquids. · Appears to present with inconsistent, intermittent deep penetration/aspiration of thin taken in isolation. · Patient appears most optimal with minced/moist with nectar thick liquids; could consider SMALL sips thin between meals. Cricopharyngeal dysfunction noted with potential to exacerbate pharyngeal symptoms of dysphagia. DYSPHAGIA HISTORY  PEG placed 2015 s/p cardiac arrest with intubation; PEG removed 2015: no SLP notes per EMR review      Subjective:     Current diet  Solid consistency: Dysphagia Minced and Moist (Dysphagia II)  Liquid consistency: Mildly Thick (Nectar)  Liquid administration via: Straw (unable to tolerate via cup)  Medication administration: Meds in puree (crushed if able)  Compensatory Swallowing Strategies: Upright as possible for all oral intake;Remain upright for 30-45 minutes after meals;Eat/Feed slowly; Small bites/sips    Comments regarding tolerating Current Diet:   Patient with distaste for modified diet, but acknowledges need/benefit      Objective:     Pain   Patient Currently in Pain: No    Cognitive/Behavior   Behavior/Cognition: Alert, Cooperative, Pleasant mood, Oriented to self/situation/place;  Follows dx; Verbally responsive - makes wants/needs known, now adequate historian    Positioning   Upright in bed    PO Trials: held d/t distaste, focus on ed, and transport arrival  · Thin Liquids  · Nectar thick liquids  · Honey Thick liquids  · Puree   · Soft food  · Regular food    Dysphagia Tx:   Direct Dysphagia tx: PO trials not administered this date d/t initial focus on patient education followed by transport arrival for room transfer  · Patient education completed: patient with recall of MBS results/recs as well as rationale; patient disappointed by blandness of modified diet and lack of variety, but acknowledges ability to improve taste of minced/moist with foods at home; [employer] Protocol introduced - patient and pulmonology agreeable  Dysphagia ex: plan to address next visit; patient agreeable and motivated for dysphagia rehab  Training in compensatory strategies: patient recalls strats, but carryover not observed d/t no PO trials this date  Pt response to ex/training: good response; eager and motivated    Goals: The patient will tolerate recommended diet without observed clinical signs of aspiration, continue  The patient/caregiver will demonstrate understanding of compensatory strategies for improved swallowing safety. , continue  The patient will improve oral motor function via therapeutic oral motor exercises to 5/5 each trial., to be addressed  The patient will improve oral preparation phase via bolus control/manipulation exercises to 5/5 each trial. to be addressed  The patient will improve pharyngeal phase via pharyngolaryngeal exercise completed 10/10 to be addressed    Assessment:   Impressions:   Accepted and tolerated tx. Patient alert, cooperative, pleasant with good recall of MBS and recommendations. Son present.   Session focus on education: patient with recall of MBS results/recs as well as rationale; patient disappointed by blandness of modified diet and lack of variety, but acknowledges ability to improve taste of minced/moist with foods at home; [employer] Protocol introduced - patient and pulmonology agreeable  Plan to address swallow ex next visit; patient agreeable and motivated for dysphagia rehab    Diet Recommendations:  Solid consistency: Dysphagia Minced and Moist (Dysphagia II)   Liquid consistency: Mildly Thick (Nectar)   Medication administration: Meds in puree (crushed if able)   Recommended Form of Meds: Crushed in puree as able    Strategies:   Compensatory Swallowing Strategies: Upright as possible for all oral intake, Remain upright for 30-45 minutes after meals, Eat/Feed slowly, Small bites/sips    Education:  Consulted and agree with results and recommendations: Patient, RN  Patient Education: Completed on resutls/recs/plan  Patient Education Response: Verbalizes understanding, Needs reinforcement    Prognosis:   Guarded for dysphagia    Plan:     Continue Dysphagia Therapy:   Interventions: Therapeutic Interventions: Diet tolerance monitoring, Patient/Family education, Therapeutic PO trials with SLP, Bolus control exercises, Oral motor exercises, Laryngeal exercises, Pharyngeal exercises  Duration/Frequency of therapy while on unit: Duration/Frequency of Treatment  Duration/Frequency of Treatment: [facility]  Discharge Instructions:   Anticipate NEED for further skilled Speech Therapy for Dysphagia at discharge    This note serves as a D/C Summary in the event that this patient is discharged prior to the next therapy session.     Coded treatment time: 0  Total treatment time: 30    Electronically signed by TAWANDA Schroeder on 9/30/2021 at 3:22 PM

## 2021-09-30 NOTE — PROGRESS NOTES
Hospitalist Progress Note      PCP: Mauro Sharpe    Date of Admission: 9/27/2021    Chief Complaint: aspiration. Hospital Course:   62year old female patient with a past medical history of cerebral palsy who presents via EMS from her ECF after aspirating while eating dinner, patient was  hypoxic and hypercapnia and placed on BiPAP. Patient was admitted as a case of aspiration pneumonia. Subjective:   Very pleasant this morning, on 6 liters of oxygen via nasal canula. On modified diet. Medications:  Reviewed    Infusion Medications    sodium chloride 5 mL/hr at 09/30/21 0555    sodium chloride      dextrose       Scheduled Medications    lactobacillus  1 capsule Oral BID WC    potassium phosphate IVPB  20 mmol IntraVENous Once    pantoprazole  40 mg Oral BID AC    clonazePAM  1 mg Oral BID    dantrolene  200 mg Oral BID    amitriptyline  25 mg Oral Nightly    sertraline  150 mg Oral Daily    pregabalin  200 mg Oral TID    risperiDONE  0.5 mg Oral Nightly    levothyroxine  25 mcg Oral Daily    metoprolol tartrate  25 mg Oral BID    sodium chloride flush  5-40 mL IntraVENous 2 times per day    enoxaparin  40 mg SubCUTAneous Daily    insulin lispro  0-6 Units SubCUTAneous Q4H    ampicillin-sulbactam  3,000 mg IntraVENous Q6H     PRN Meds: sodium chloride flush, sodium chloride, potassium chloride, magnesium sulfate, ondansetron, glucose, dextrose, glucagon (rDNA), dextrose      Intake/Output Summary (Last 24 hours) at 9/30/2021 1501  Last data filed at 9/30/2021 1300  Gross per 24 hour   Intake 861.51 ml   Output 650 ml   Net 211.51 ml       Physical Exam Performed:    /81   Pulse 94   Temp 98.4 °F (36.9 °C) (Axillary)   Resp (!) 42   Wt 181 lb (82.1 kg)   SpO2 93%     General appearance:plesant female patient, not in pain or distress. HEENT: Pupils equal, round, and reactive to light. Conjunctivae/corneas clear. Neck: Supple, with full range of motion.  No jugular venous distention. Trachea midline. Respiratory:  Normal respiratory effort. Clear to auscultation, bilaterally without Rales/Wheezes/Rhonchi. Cardiovascular: Regular rate and rhythm with normal S1/S2 without murmurs, rubs or gallops. Abdomen: Soft, non-tender, non-distended with normal bowel sounds. Musculoskeletal:upper and lower limb deformity. Skin: Skin color, texture, turgor normal.  No rashes or lesions. Neurologic:  Neurovascularly intact without any focal sensory/motor deficits. Cranial nerves: II-XII intact, grossly non-focal.  Psychiatric: Alert . Capillary Refill: Brisk,3 seconds, normal   Peripheral Pulses: +2 palpable, equal bilaterally       Labs:   Recent Labs     09/28/21  0945 09/29/21  0420 09/30/21 0421   WBC 15.5* 11.0 10.5   HGB 12.1* 11.1* 10.7*   HCT 39.3* 34.6* 33.4*   * 133* 140     Recent Labs     09/28/21  0945 09/29/21 0420 09/30/21 0421    145 144   K 5.4* 4.0 3.7    106 104   CO2 22 26 27   BUN 19 15 15   CREATININE 0.7* <0.5* <0.5*   CALCIUM 8.8 9.0 8.7   PHOS 3.8 1.4* 2.4*     Recent Labs     09/27/21  1848   AST 31   *   BILITOT 0.7   ALKPHOS 267*     No results for input(s): INR in the last 72 hours. Recent Labs     09/27/21  1848   TROPONINI <0.01       Urinalysis:    No results found for: Nell Kayleen, BACTERIA, RBCUA, BLOODU, SPECGRAV, GLUCOSEU    Radiology:  XR CHEST PORTABLE   Final Result   Airspace disease primarily right parahilar and lower lung. This likely   represents pneumonia and/or aspiration is also considered as given in the   clinical history. Fluoroscopy modified barium swallow with video   Final Result   Intermittent aspiration with thin liquid. Please see separate speech pathology report for full discussion of findings   and recommendations.          XR CHEST PORTABLE   Final Result   Right greater than left basilar patchy opacities may represent atelectasis or   a developing infectious/inflammatory process. Follow-up PA and lateral chest   radiographs may be helpful for further evaluation. Prominence of the superior mediastinum may be accentuated by patient   positioning. Underlying mediastinal pathology is not excluded. Short-term   follow-up PA and lateral chest radiographs or chest CT may be helpful for   further evaluation as warranted. Assessment/Plan:    Active Hospital Problems    Diagnosis     Aspiration pneumonitis ([facility] [address] [J69.0]     Choking [O61.332Z]     Metabolic encephalopathy [L79.73]     Acute type 1 respiratory failure (HCC) [J96.01]     Acute respiratory failure with hypoxia and hypercapnia (HCC) [J96.01, J96.02]      Acute hypoxic and hypercapnic respiratory failure. Aspiration pneumonia. Acute metabolic encephalopathy. Patient presented from ECF with aspiration, was found to have hypoxic and hypercapnic, patient was agitated and confused. XR with R>L infiltration suggestive of aspiration. Patient was managed in the ICU with BiPAP and unasyn. Continues to be on oxygen at 6 liters. Plan :  - continue Unasyn Day 3/7.   - wean down oxygen. - BiPAP as per ICU. - agree with SLP eval, patient placed on modified diet. - home meds resumed ( clonopin resumed). CP.   bedbound at baseline. Home with 24 hour care. DVT Prophylaxis: lovenox  Diet: ADULT DIET; Dysphagia - Minced and Moist; Mildly Thick (Nectar)  Adult Oral Nutrition Supplement; Frozen Oral Supplement  Code Status: DNR-CCA    PT/OT Eval Status: bedbound at baseline. Dispo - home with 24 hour care ( baseline ) once stable .      Fred Franklin MD

## 2021-10-01 LAB
ANION GAP SERPL CALCULATED.3IONS-SCNC: 10 MMOL/L (ref 3–16)
BASOPHILS ABSOLUTE: 0 K/UL (ref 0–0.2)
BASOPHILS RELATIVE PERCENT: 0.1 %
BUN BLDV-MCNC: 12 MG/DL (ref 7–20)
CALCIUM SERPL-MCNC: 8.3 MG/DL (ref 8.3–10.6)
CHLORIDE BLD-SCNC: 102 MMOL/L (ref 99–110)
CO2: 29 MMOL/L (ref 21–32)
CREAT SERPL-MCNC: <0.5 MG/DL (ref 0.9–1.3)
EOSINOPHILS ABSOLUTE: 0.1 K/UL (ref 0–0.6)
EOSINOPHILS RELATIVE PERCENT: 1.1 %
GFR AFRICAN AMERICAN: >60
GFR NON-AFRICAN AMERICAN: >60
GLUCOSE BLD-MCNC: 105 MG/DL (ref 70–99)
GLUCOSE BLD-MCNC: 109 MG/DL (ref 70–99)
GLUCOSE BLD-MCNC: 132 MG/DL (ref 70–99)
GLUCOSE BLD-MCNC: 175 MG/DL (ref 70–99)
HCT VFR BLD CALC: 33.3 % (ref 40.5–52.5)
HEMOGLOBIN: 10.7 G/DL (ref 13.5–17.5)
LYMPHOCYTES ABSOLUTE: 1.4 K/UL (ref 1–5.1)
LYMPHOCYTES RELATIVE PERCENT: 14.2 %
MAGNESIUM: 2 MG/DL (ref 1.8–2.4)
MCH RBC QN AUTO: 28.6 PG (ref 26–34)
MCHC RBC AUTO-ENTMCNC: 32.1 G/DL (ref 31–36)
MCV RBC AUTO: 89.1 FL (ref 80–100)
MONOCYTES ABSOLUTE: 0.6 K/UL (ref 0–1.3)
MONOCYTES RELATIVE PERCENT: 6.4 %
NEUTROPHILS ABSOLUTE: 7.4 K/UL (ref 1.7–7.7)
NEUTROPHILS RELATIVE PERCENT: 78.2 %
PDW BLD-RTO: 17 % (ref 12.4–15.4)
PERFORMED ON: ABNORMAL
PHOSPHORUS: 3.5 MG/DL (ref 2.5–4.9)
PLATELET # BLD: 139 K/UL (ref 135–450)
PMV BLD AUTO: 8.8 FL (ref 5–10.5)
POTASSIUM SERPL-SCNC: 3.7 MMOL/L (ref 3.5–5.1)
RBC # BLD: 3.73 M/UL (ref 4.2–5.9)
SODIUM BLD-SCNC: 141 MMOL/L (ref 136–145)
WBC # BLD: 9.5 K/UL (ref 4–11)

## 2021-10-01 PROCEDURE — 6370000000 HC RX 637 (ALT 250 FOR IP): Performed by: INTERNAL MEDICINE

## 2021-10-01 PROCEDURE — 1200000000 HC SEMI PRIVATE

## 2021-10-01 PROCEDURE — 6370000000 HC RX 637 (ALT 250 FOR IP): Performed by: NURSE PRACTITIONER

## 2021-10-01 PROCEDURE — 92526 ORAL FUNCTION THERAPY: CPT

## 2021-10-01 PROCEDURE — 2580000003 HC RX 258: Performed by: STUDENT IN AN ORGANIZED HEALTH CARE EDUCATION/TRAINING PROGRAM

## 2021-10-01 PROCEDURE — 94761 N-INVAS EAR/PLS OXIMETRY MLT: CPT

## 2021-10-01 PROCEDURE — 2580000003 HC RX 258: Performed by: INTERNAL MEDICINE

## 2021-10-01 PROCEDURE — 6360000002 HC RX W HCPCS: Performed by: INTERNAL MEDICINE

## 2021-10-01 PROCEDURE — 36415 COLL VENOUS BLD VENIPUNCTURE: CPT

## 2021-10-01 PROCEDURE — 80048 BASIC METABOLIC PNL TOTAL CA: CPT

## 2021-10-01 PROCEDURE — 85025 COMPLETE CBC W/AUTO DIFF WBC: CPT

## 2021-10-01 PROCEDURE — 99232 SBSQ HOSP IP/OBS MODERATE 35: CPT | Performed by: INTERNAL MEDICINE

## 2021-10-01 PROCEDURE — 83735 ASSAY OF MAGNESIUM: CPT

## 2021-10-01 PROCEDURE — 84100 ASSAY OF PHOSPHORUS: CPT

## 2021-10-01 PROCEDURE — 2580000003 HC RX 258: Performed by: NURSE PRACTITIONER

## 2021-10-01 PROCEDURE — 6370000000 HC RX 637 (ALT 250 FOR IP): Performed by: STUDENT IN AN ORGANIZED HEALTH CARE EDUCATION/TRAINING PROGRAM

## 2021-10-01 PROCEDURE — 6360000002 HC RX W HCPCS: Performed by: STUDENT IN AN ORGANIZED HEALTH CARE EDUCATION/TRAINING PROGRAM

## 2021-10-01 PROCEDURE — 2700000000 HC OXYGEN THERAPY PER DAY

## 2021-10-01 PROCEDURE — 6360000002 HC RX W HCPCS: Performed by: NURSE PRACTITIONER

## 2021-10-01 RX ORDER — ACETAMINOPHEN AND CODEINE PHOSPHATE 300; 30 MG/1; MG/1
1 TABLET ORAL EVERY 8 HOURS PRN
Status: DISCONTINUED | OUTPATIENT
Start: 2021-10-01 | End: 2021-10-06 | Stop reason: HOSPADM

## 2021-10-01 RX ADMIN — ACETAMINOPHEN AND CODEINE PHOSPHATE 1 TABLET: 300; 30 TABLET ORAL at 21:15

## 2021-10-01 RX ADMIN — DANTROLENE SODIUM 200 MG: 25 CAPSULE ORAL at 22:58

## 2021-10-01 RX ADMIN — DANTROLENE SODIUM 200 MG: 25 CAPSULE ORAL at 11:13

## 2021-10-01 RX ADMIN — ENOXAPARIN SODIUM 40 MG: 40 INJECTION SUBCUTANEOUS at 12:54

## 2021-10-01 RX ADMIN — METOPROLOL TARTRATE 25 MG: 25 TABLET, FILM COATED ORAL at 21:15

## 2021-10-01 RX ADMIN — METOPROLOL TARTRATE 25 MG: 25 TABLET, FILM COATED ORAL at 11:28

## 2021-10-01 RX ADMIN — AMPICILLIN SODIUM AND SULBACTAM SODIUM 3000 MG: 2; 1 INJECTION, POWDER, FOR SOLUTION INTRAMUSCULAR; INTRAVENOUS at 11:36

## 2021-10-01 RX ADMIN — Medication 1 CAPSULE: at 18:56

## 2021-10-01 RX ADMIN — AMITRIPTYLINE HYDROCHLORIDE 25 MG: 25 TABLET, FILM COATED ORAL at 21:15

## 2021-10-01 RX ADMIN — SODIUM CHLORIDE, PRESERVATIVE FREE 10 ML: 5 INJECTION INTRAVENOUS at 13:03

## 2021-10-01 RX ADMIN — CLONAZEPAM 1 MG: 1 TABLET ORAL at 21:15

## 2021-10-01 RX ADMIN — AMPICILLIN SODIUM AND SULBACTAM SODIUM 3000 MG: 2; 1 INJECTION, POWDER, FOR SOLUTION INTRAMUSCULAR; INTRAVENOUS at 18:57

## 2021-10-01 RX ADMIN — RISPERIDONE 0.5 MG: 0.5 TABLET ORAL at 21:15

## 2021-10-01 RX ADMIN — PREGABALIN 200 MG: 100 CAPSULE ORAL at 11:13

## 2021-10-01 RX ADMIN — PREGABALIN 200 MG: 100 CAPSULE ORAL at 21:15

## 2021-10-01 RX ADMIN — SODIUM CHLORIDE, PRESERVATIVE FREE 10 ML: 5 INJECTION INTRAVENOUS at 21:24

## 2021-10-01 RX ADMIN — LEVOTHYROXINE SODIUM 25 MCG: 0.03 TABLET ORAL at 06:22

## 2021-10-01 RX ADMIN — SERTRALINE 150 MG: 100 TABLET, FILM COATED ORAL at 11:13

## 2021-10-01 RX ADMIN — Medication 1 CAPSULE: at 12:55

## 2021-10-01 RX ADMIN — PANTOPRAZOLE SODIUM 40 MG: 40 TABLET, DELAYED RELEASE ORAL at 15:48

## 2021-10-01 RX ADMIN — AMPICILLIN SODIUM AND SULBACTAM SODIUM 3000 MG: 2; 1 INJECTION, POWDER, FOR SOLUTION INTRAMUSCULAR; INTRAVENOUS at 03:37

## 2021-10-01 RX ADMIN — PREGABALIN 200 MG: 100 CAPSULE ORAL at 12:55

## 2021-10-01 RX ADMIN — CLONAZEPAM 1 MG: 1 TABLET ORAL at 11:12

## 2021-10-01 RX ADMIN — PANTOPRAZOLE SODIUM 40 MG: 40 TABLET, DELAYED RELEASE ORAL at 06:22

## 2021-10-01 ASSESSMENT — PAIN DESCRIPTION - PROGRESSION
CLINICAL_PROGRESSION: NOT CHANGED
CLINICAL_PROGRESSION: GRADUALLY WORSENING

## 2021-10-01 ASSESSMENT — PAIN DESCRIPTION - LOCATION
LOCATION: FOOT
LOCATION: FOOT

## 2021-10-01 ASSESSMENT — PAIN SCALES - GENERAL
PAINLEVEL_OUTOF10: 8
PAINLEVEL_OUTOF10: 7

## 2021-10-01 ASSESSMENT — PAIN DESCRIPTION - ORIENTATION
ORIENTATION: RIGHT
ORIENTATION: RIGHT

## 2021-10-01 ASSESSMENT — PAIN DESCRIPTION - FREQUENCY
FREQUENCY: INTERMITTENT
FREQUENCY: INTERMITTENT

## 2021-10-01 ASSESSMENT — PAIN - FUNCTIONAL ASSESSMENT
PAIN_FUNCTIONAL_ASSESSMENT: ACTIVITIES ARE NOT PREVENTED
PAIN_FUNCTIONAL_ASSESSMENT: ACTIVITIES ARE NOT PREVENTED

## 2021-10-01 ASSESSMENT — PAIN DESCRIPTION - ONSET
ONSET: GRADUAL
ONSET: ON-GOING

## 2021-10-01 ASSESSMENT — PAIN DESCRIPTION - PAIN TYPE
TYPE: ACUTE PAIN
TYPE: ACUTE PAIN

## 2021-10-01 ASSESSMENT — PAIN DESCRIPTION - DESCRIPTORS
DESCRIPTORS: SORE
DESCRIPTORS: SORE

## 2021-10-01 NOTE — PROGRESS NOTES
Pulmonary Progress Note    Date of Admission: 9/27/2021   LOS: 4 days       CC:  Aspiration pneumonia    Subjective:  Feeling better. weane to 4L NC   Drinking     ROS:   No nausea  No Vomiting  No chest pain      Assessment:     Aspiration pneumonia   Choking  Acute hypoxemic respiratory failure with saturation < 90% on room air      Plan:        Hospital Day: 4     * Unasyn x 7 days. * Wean O2 to sat >90%  * current 4L   * SLP working with pt. Data:        PHYSICAL EXAM:   Blood pressure 121/69, pulse 89, temperature 99.5 °F (37.5 °C), temperature source Oral, resp. rate 16, weight 181 lb 3.5 oz (82.2 kg), SpO2 91 %.'  There is no height or weight on file to calculate BMI. Gen: No distress. ENT:   Resp: No accessory muscle use. No crackles. No wheezes. No rhonchi. CV: Regular rate. Regular rhythm. No murmur or rub. No edema. Skin: Warm, dry, normal texture and turgor. No nodule on exposed extremities. M/S: No cyanosis. No clubbing. No joint deformity. Psych: Oriented x 3. No anxiety. Awake. Alert. Intact judgement and insight. Good Mood / Affect.   Memory appears in tact       Medications:    Scheduled Meds:   lactobacillus  1 capsule Oral BID WC    pantoprazole  40 mg Oral BID AC    clonazePAM  1 mg Oral BID    dantrolene  200 mg Oral BID    amitriptyline  25 mg Oral Nightly    sertraline  150 mg Oral Daily    pregabalin  200 mg Oral TID    risperiDONE  0.5 mg Oral Nightly    levothyroxine  25 mcg Oral Daily    metoprolol tartrate  25 mg Oral BID    sodium chloride flush  5-40 mL IntraVENous 2 times per day    enoxaparin  40 mg SubCUTAneous Daily    insulin lispro  0-6 Units SubCUTAneous Q4H    ampicillin-sulbactam  3,000 mg IntraVENous Q6H       Continuous Infusions:   sodium chloride 5 mL/hr at 09/30/21 0555    sodium chloride 100 mL/hr at 10/01/21 0335    dextrose         PRN Meds:  sodium chloride flush, sodium chloride, potassium chloride, magnesium sulfate, ondansetron, glucose, dextrose, glucagon (rDNA), dextrose    Labs reviewed:  CBC:   Recent Labs     09/29/21  0420 09/30/21  0421 10/01/21  0457   WBC 11.0 10.5 9.5   HGB 11.1* 10.7* 10.7*   HCT 34.6* 33.4* 33.3*   MCV 89.2 88.9 89.1   * 140 139     BMP:   Recent Labs     09/29/21  0420 09/30/21  0421 10/01/21  0457    144 141   K 4.0 3.7 3.7    104 102   CO2 26 27 29   PHOS 1.4* 2.4* 3.5   BUN 15 15 12   CREATININE <0.5* <0.5* <0.5*     LIVER PROFILE: No results for input(s): AST, ALT, LIPASE, BILIDIR, BILITOT, ALKPHOS in the last 72 hours. Invalid input(s): AMYLASE,  ALB  PT/INR: No results for input(s): PROTIME, INR in the last 72 hours. APTT: No results for input(s): APTT in the last 72 hours. UA:No results for input(s): NITRITE, COLORU, PHUR, LABCAST, WBCUA, RBCUA, MUCUS, TRICHOMONAS, YEAST, BACTERIA, CLARITYU, SPECGRAV, LEUKOCYTESUR, UROBILINOGEN, BILIRUBINUR, BLOODU, GLUCOSEU, AMORPHOUS in the last 72 hours. Invalid input(s): Willaim Furnish  No results for input(s): PH, PCO2, PO2 in the last 72 hours. Cx:      Films:  Radiology Review:  Pertinent images / reports were reviewed as a part of this visit. CT Chest w/ contrast: No results found for this or any previous visit. CT Chest w/o contrast: No results found for this or any previous visit. CTPA: No results found for this or any previous visit. CXR PA/LAT: No results found for this or any previous visit. CXR portable: Results for orders placed during the hospital encounter of 09/27/21    XR CHEST PORTABLE    Narrative  EXAMINATION:  ONE XRAY VIEW OF THE CHEST    9/30/2021 10:01 am    COMPARISON:  09/27/2021    HISTORY:  ORDERING SYSTEM PROVIDED HISTORY: aspiration pneumonia  TECHNOLOGIST PROVIDED HISTORY:  Reason for exam:->aspiration pneumonia  Reason for Exam: aspiration pneumonia  Acuity: Acute  Type of Exam: Initial    FINDINGS:  The heart is upper normal size and unchanged.   There is asymmetric airspace  disease right parahilar and lower lung. Linear atelectasis seen left lung  inferolateral.  No pneumothorax or significant effusion. Impression  Airspace disease primarily right parahilar and lower lung. This likely  represents pneumonia and/or aspiration is also considered as given in the  clinical history. This note was transcribed using [id number] Tel[employer]. Please disregard any translational errors.       Mayito Sullivan Pulmonary, Sleep and [facility] [phone]

## 2021-10-01 NOTE — PROGRESS NOTES
[facility]   Speech Therapy  Daily Dysphagia Treatment Note    Gabriel Late  AGE: 62 y.o. GENDER: adult  : 1960  [id number]  EPISODE DATE:  2021     Patient Active Problem List   Diagnosis    Acute type 1 respiratory failure (HCC)    Acute respiratory failure with hypoxia and hypercapnia (HCC)    Aspiration pneumonitis (HCC)    Choking    Metabolic encephalopathy     No Known Allergies    Treatment Diagnosis: Dysphagia     CHART REVIEW:  2021 admitted s/p reported aspiration episode: ADMISSION H&P HPI: The patient is a 32-year-old  female who presented with episode of aspiration, this is per the emergency room EMS report. Varsha Rachele were called to the ECF because the patient started having choking and shortness of breath while she was eating her dinner. Jacinta Zarco was found to be severely hypoxic and was placed on noninvasive positive pressure ventilation.  At the time of my exam, the patient is definitely significantly drowsy but is waking up and responding.     2021 CXR  Impression   Right greater than left basilar patchy opacities may represent atelectasis or   a developing infectious/inflammatory process.  Follow-up PA and lateral chest   radiographs may be helpful for further evaluation.       Prominence of the superior mediastinum may be accentuated by patient   positioning.  Underlying mediastinal pathology is not excluded.  Short-term   follow-up PA and lateral chest radiographs or chest CT may be helpful for   further evaluation as warranted.      2021 BiPAP until afternoon when patient placed on 6L NC; BiPAP resumed overnight with return to 6L NC in the AM     2021 MBS  Moderate-severe oral stage dysphagia characterized by decreased mastication and decreased lingula manipulation/coordination. · Prolonged, disorganized bolus formation and movement with all. Incomplete bolus formation/cohesion with all but adequate mastication with solids. · Premature bolus loss to the e pharynx with all; decreased oral control exacerbates. · Piecemeal swallow with all. · Oral residue with all with repeat swallows, but residue is not completely cleared (even with liquids). Moderate pharyngeal stage dysphagia characterized by delayed swallow, decreased laryngeal elevation, and decreased pharyngeal peristalsis. · Premature spillage to the valleculae with all. · Mild vallecular residue with moist solids, puree, and liquids; mod vallecular residue with soft solids. Trace pyriform residue with all appears r/t reduced cricopharyngeal opening. Spontaneous repeat swallows do not completely clear residue. Liquid wash minimizes solids residue, but results in deep penetration and/or aspiration with thin liquids. · Appears to present with inconsistent, intermittent deep penetration/aspiration of thin taken in isolation. · Patient appears most optimal with minced/moist with nectar thick liquids; could consider SMALL sips thin between meals. Cricopharyngeal dysfunction noted with potential to exacerbate pharyngeal symptoms of dysphagia. DYSPHAGIA HISTORY  PEG placed 2015 s/p cardiac arrest with intubation; PEG removed 2015: no SLP notes per EMR review      Subjective:     Current diet  Solid consistency: Dysphagia Minced and Moist (Dysphagia II)  Liquid consistency: Mildly Thick (Nectar)  Liquid administration via: Straw (unable to tolerate via cup)  Medication administration: Meds in puree (crushed if able)  Compensatory Swallowing Strategies: Upright as possible for all oral intake;Remain upright for 30-45 minutes after meals;Eat/Feed slowly; Small bites/sips    Comments regarding tolerating Current Diet:   Patient with distaste for modified diet, but acknowledges need/benefit      Objective:     Pain   Patient Currently in Pain: No    Cognitive/Behavior   Behavior/Cognition: Alert, Cooperative, Pleasant mood, Oriented to self/situation/place;  Follows dx; Verbally responsive - makes wants/needs known, now adequate historian    Positioning   Upright in bed    PO Trials:   · Thin Liquids (thin H2O): prolonged transit; suspect premature bolus loss to the pharynx; delayed swallow; decreased laryngeal elevation  · Nectar thick liquids: DNT  · Puree: DNT   · Moist food: DNT    Dysphagia Tx:   Direct Dysphagia tx: tolerated thin H2O via straw; small sips independently; recalls procedure for Exelon Octapoly Protocol  Dysphagia ex: initiated lingual manipulation ex for improved bolus manipulation/control: mod imp; completed falsetto and glottals with mod imp; unable to execute Cherry but attempts with observed contraction consistently  Training in compensatory strategies: continue to reinforce  Pt response to ex/training: good response; eager and motivated    Goals: The patient will tolerate recommended diet without observed clinical signs of aspiration, continue  The patient/caregiver will demonstrate understanding of compensatory strategies for improved swallowing safety. , continue  The patient will improve oral motor function via therapeutic oral motor exercises to 5/5 each trial., continue  The patient will improve oral preparation phase via bolus control/manipulation exercises to 5/5 each trial. continue  The patient will improve pharyngeal phase via pharyngolaryngeal exercise completed 10/10 continue    Assessment:   Impressions:   Accepted and tolerated tx. Patient alert, cooperative, pleasant with good recall of MBS and recommendations. Swallow function suspected to remain comparable to above noted MBS  Executing Exelon Octapoly Protocol independently and noted with good comp start carryover. Swallow ex initiated.   ST to continue    Diet Recommendations:  Solid consistency: Dysphagia Minced and Moist (Dysphagia II)   Liquid consistency: Mildly Thick (Nectar)   Medication administration: Meds in puree (crushed if able)   May have thin H2O between meals    Strategies: Compensatory Swallowing Strategies: Upright as possible for all oral intake, Remain upright for 30-45 minutes after meals, Eat/Feed slowly, Small bites/sips    Education:  Consulted and agree with results and recommendations: Patient, RN  Patient Education: Completed on resutls/recs/plan  Patient Education Response: Verbalizes understanding, Needs reinforcement    Prognosis:   Guarded for dysphagia    Plan:     Continue Dysphagia Therapy:   Interventions: Therapeutic Interventions: Diet tolerance monitoring, Patient/Family education, Therapeutic PO trials with SLP, Bolus control exercises, Oral motor exercises, Laryngeal exercises, Pharyngeal exercises  Duration/Frequency of therapy while on unit: Duration/Frequency of Treatment  Duration/Frequency of Treatment: ST to tx 3-5 tiems per week durign acute admission unless otherwise noted  Discharge Instructions:   Anticipate NEED for further skilled Speech Therapy for Dysphagia at discharge    This note serves as a D/C Summary in the event that this patient is discharged prior to the next therapy session.     Coded treatment time: 0  Total treatment time: 30    Electronically signed by TAWANDA Conner on 10/1/2021 at 11:46 AM

## 2021-10-01 NOTE — PROGRESS NOTES
Hospitalist Progress Note      PCP: Shaun Martinez    Date of Admission: 9/27/2021    Chief Complaint: aspiration. Hospital Course:   62year old female patient with a past medical history of cerebral palsy who presents via EMS from her ECF after aspirating while eating dinner, patient was  hypoxic and hypercapnia and placed on BiPAP. Patient was admitted as a case of aspiration pneumonia. Subjective:   Very pleasant this morning, on 4 liters of oxygen via nasal canula. On modified diet. Denies any new complaints. Medications:  Reviewed    Infusion Medications    sodium chloride 5 mL/hr at 09/30/21 0555    sodium chloride 100 mL/hr at 10/01/21 0335    dextrose       Scheduled Medications    ampicillin-sulbactam  3,000 mg IntraVENous Q6H    lactobacillus  1 capsule Oral BID WC    pantoprazole  40 mg Oral BID AC    clonazePAM  1 mg Oral BID    dantrolene  200 mg Oral BID    amitriptyline  25 mg Oral Nightly    sertraline  150 mg Oral Daily    pregabalin  200 mg Oral TID    risperiDONE  0.5 mg Oral Nightly    levothyroxine  25 mcg Oral Daily    metoprolol tartrate  25 mg Oral BID    sodium chloride flush  5-40 mL IntraVENous 2 times per day    enoxaparin  40 mg SubCUTAneous Daily    insulin lispro  0-6 Units SubCUTAneous Q4H     PRN Meds: sodium chloride flush, sodium chloride, potassium chloride, magnesium sulfate, ondansetron, glucose, dextrose, glucagon (rDNA), dextrose      Intake/Output Summary (Last 24 hours) at 10/1/2021 1245  Last data filed at 10/1/2021 1044  Gross per 24 hour   Intake 440.7 ml   Output 300 ml   Net 140.7 ml       Physical Exam Performed:    /61   Pulse 94   Temp 98.2 °F (36.8 °C) (Oral)   Resp 24   Wt 181 lb 3.5 oz (82.2 kg)   SpO2 92%     General appearance:plesant female patient, not in pain or distress. On 4 liters of oxygen. HEENT: Pupils equal, round, and reactive to light. Conjunctivae/corneas clear.   Neck: Supple, with full range of motion. No jugular venous distention. Trachea midline. Respiratory:  Normal respiratory effort. Clear to auscultation, bilaterally without Rales/Wheezes/Rhonchi. Cardiovascular: Regular rate and rhythm with normal S1/S2 without murmurs, rubs or gallops. Abdomen: Soft, non-tender, non-distended with normal bowel sounds. Musculoskeletal:upper and lower limb deformity. Skin: Skin color, texture, turgor normal.  No rashes or lesions. Neurologic:  Neurovascularly intact without any focal sensory/motor deficits. Cranial nerves: II-XII intact, grossly non-focal.  Psychiatric: Alert . Capillary Refill: Brisk,3 seconds, normal   Peripheral Pulses: +2 palpable, equal bilaterally       Labs:   Recent Labs     09/29/21  0420 09/30/21  0421 10/01/21  0457   WBC 11.0 10.5 9.5   HGB 11.1* 10.7* 10.7*   HCT 34.6* 33.4* 33.3*   * 140 139     Recent Labs     09/29/21  0420 09/30/21  0421 10/01/21  0457    144 141   K 4.0 3.7 3.7    104 102   CO2 26 27 29   BUN 15 15 12   CREATININE <0.5* <0.5* <0.5*   CALCIUM 9.0 8.7 8.3   PHOS 1.4* 2.4* 3.5     No results for input(s): AST, ALT, BILIDIR, BILITOT, ALKPHOS in the last 72 hours. No results for input(s): INR in the last 72 hours. No results for input(s): Festus Pears in the last 72 hours. Urinalysis:    No results found for: Madi Drone, BACTERIA, RBCUA, BLOODU, SPECGRAV, [address] [place] [address]    Radiology:  XR CHEST PORTABLE   Final Result   Airspace disease primarily right parahilar and lower lung. This likely   represents pneumonia and/or aspiration is also considered as given in the   clinical history. Fluoroscopy modified barium swallow with video   Final Result   Intermittent aspiration with thin liquid. Please see separate speech pathology report for full discussion of findings   and recommendations.          XR CHEST PORTABLE   Final Result   Right greater than left basilar patchy opacities may represent atelectasis or   a developing infectious/inflammatory process. Follow-up PA and lateral chest   radiographs may be helpful for further evaluation. Prominence of the superior mediastinum may be accentuated by patient   positioning. Underlying mediastinal pathology is not excluded. Short-term   follow-up PA and lateral chest radiographs or chest CT may be helpful for   further evaluation as warranted. Assessment/Plan:    Active Hospital Problems    Diagnosis     Aspiration pneumonitis ([address] [J69.0]     Choking [C65.610J]     Metabolic encephalopathy [N59.97]     Acute type 1 respiratory failure (HCC) [J96.01]     Acute respiratory failure with hypoxia and hypercapnia (HCC) [J96.01, J96.02]      Acute hypoxic and hypercapnic respiratory failure. Aspiration pneumonia. Acute metabolic encephalopathy. Patient [facility] with aspiration, was found to have hypoxic and hypercapnic, patient was agitated and confused. XR with R>L infiltration suggestive of aspiration. Patient was managed in the ICU with BiPAP and unasyn, improved and transferred to the floor. Continues to be on oxygen at 4 liters. Plan :  - continue Unasyn Day 4/7.   - wean down oxygen. .   - agree with SLP eval, patient placed on modified diet. - home meds resumed ( clonopin resumed). CP.   bedbound at baseline. Home with 24 hour care. DVT Prophylaxis: lovenox  Diet: Adult Oral Nutrition Supplement; Frozen Oral Supplement  ADULT DIET; Dysphagia - Minced and Moist; Mildly Thick (Nectar)  Code Status: DNR-CCA    PT/OT Eval Status: bedbound at baseline. Dispo - home with 24 hour care ( baseline ) once stable .      Nicky Dillard MD

## 2021-10-01 NOTE — PROGRESS NOTES
Report given to [facility]. All questions answered. Notified son Seth Ragland and sister Yvonne Duarter of transfer. [facility] notified as well.

## 2021-10-01 NOTE — PROGRESS NOTES
Patient without urine output this shift. Bladder scan showed 491. Patient reports she may have been holding as she distrusts the purewick. Patient now with 200 out. Unable to reach primary MD, paged consulting physician.

## 2021-10-01 NOTE — PROGRESS NOTES
Patient is alert and oriented x 4. Patient takes medication whole with pudding. Patient is in bed with fall precautions in place and call light is within reach. Denies nausea and vomiting. Ice pack applied to left shoulder and right foot.   Electronically signed by Shawn Brown on 10/1/2021 at 7:18 PM

## 2021-10-02 LAB
ANION GAP SERPL CALCULATED.3IONS-SCNC: 10 MMOL/L (ref 3–16)
BASOPHILS ABSOLUTE: 0 K/UL (ref 0–0.2)
BASOPHILS RELATIVE PERCENT: 0.3 %
BLOOD CULTURE, ROUTINE: NORMAL
BUN BLDV-MCNC: 10 MG/DL (ref 7–20)
CALCIUM SERPL-MCNC: 8.2 MG/DL (ref 8.3–10.6)
CHLORIDE BLD-SCNC: 101 MMOL/L (ref 99–110)
CO2: 30 MMOL/L (ref 21–32)
CREAT SERPL-MCNC: <0.5 MG/DL (ref 0.9–1.3)
CULTURE, BLOOD 2: NORMAL
EOSINOPHILS ABSOLUTE: 0.1 K/UL (ref 0–0.6)
EOSINOPHILS RELATIVE PERCENT: 1 %
GFR AFRICAN AMERICAN: >60
GFR NON-AFRICAN AMERICAN: >60
GLUCOSE BLD-MCNC: 100 MG/DL (ref 70–99)
GLUCOSE BLD-MCNC: 115 MG/DL (ref 70–99)
GLUCOSE BLD-MCNC: 138 MG/DL (ref 70–99)
GLUCOSE BLD-MCNC: 142 MG/DL (ref 70–99)
GLUCOSE BLD-MCNC: 164 MG/DL (ref 70–99)
HCT VFR BLD CALC: 32.8 % (ref 40.5–52.5)
HEMOGLOBIN: 10.2 G/DL (ref 13.5–17.5)
LYMPHOCYTES ABSOLUTE: 1.8 K/UL (ref 1–5.1)
LYMPHOCYTES RELATIVE PERCENT: 20 %
MAGNESIUM: 1.8 MG/DL (ref 1.8–2.4)
MCH RBC QN AUTO: 28.2 PG (ref 26–34)
MCHC RBC AUTO-ENTMCNC: 31.1 G/DL (ref 31–36)
MCV RBC AUTO: 90.8 FL (ref 80–100)
MONOCYTES ABSOLUTE: 0.6 K/UL (ref 0–1.3)
MONOCYTES RELATIVE PERCENT: 6.9 %
NEUTROPHILS ABSOLUTE: 6.5 K/UL (ref 1.7–7.7)
NEUTROPHILS RELATIVE PERCENT: 71.8 %
PDW BLD-RTO: 16.8 % (ref 12.4–15.4)
PERFORMED ON: ABNORMAL
PHOSPHORUS: 3.7 MG/DL (ref 2.5–4.9)
PLATELET # BLD: 147 K/UL (ref 135–450)
PMV BLD AUTO: 8.8 FL (ref 5–10.5)
POTASSIUM SERPL-SCNC: 3.4 MMOL/L (ref 3.5–5.1)
RBC # BLD: 3.61 M/UL (ref 4.2–5.9)
SODIUM BLD-SCNC: 141 MMOL/L (ref 136–145)
WBC # BLD: 9 K/UL (ref 4–11)

## 2021-10-02 PROCEDURE — 1200000000 HC SEMI PRIVATE

## 2021-10-02 PROCEDURE — 84100 ASSAY OF PHOSPHORUS: CPT

## 2021-10-02 PROCEDURE — 80048 BASIC METABOLIC PNL TOTAL CA: CPT

## 2021-10-02 PROCEDURE — 2580000003 HC RX 258: Performed by: INTERNAL MEDICINE

## 2021-10-02 PROCEDURE — 83735 ASSAY OF MAGNESIUM: CPT

## 2021-10-02 PROCEDURE — 94760 N-INVAS EAR/PLS OXIMETRY 1: CPT

## 2021-10-02 PROCEDURE — 2700000000 HC OXYGEN THERAPY PER DAY

## 2021-10-02 PROCEDURE — 6370000000 HC RX 637 (ALT 250 FOR IP): Performed by: NURSE PRACTITIONER

## 2021-10-02 PROCEDURE — 6360000002 HC RX W HCPCS: Performed by: STUDENT IN AN ORGANIZED HEALTH CARE EDUCATION/TRAINING PROGRAM

## 2021-10-02 PROCEDURE — 2580000003 HC RX 258: Performed by: STUDENT IN AN ORGANIZED HEALTH CARE EDUCATION/TRAINING PROGRAM

## 2021-10-02 PROCEDURE — 99232 SBSQ HOSP IP/OBS MODERATE 35: CPT | Performed by: INTERNAL MEDICINE

## 2021-10-02 PROCEDURE — 83036 HEMOGLOBIN GLYCOSYLATED A1C: CPT

## 2021-10-02 PROCEDURE — 36415 COLL VENOUS BLD VENIPUNCTURE: CPT

## 2021-10-02 PROCEDURE — 6370000000 HC RX 637 (ALT 250 FOR IP): Performed by: STUDENT IN AN ORGANIZED HEALTH CARE EDUCATION/TRAINING PROGRAM

## 2021-10-02 PROCEDURE — 85025 COMPLETE CBC W/AUTO DIFF WBC: CPT

## 2021-10-02 PROCEDURE — 6360000002 HC RX W HCPCS: Performed by: INTERNAL MEDICINE

## 2021-10-02 PROCEDURE — 6370000000 HC RX 637 (ALT 250 FOR IP): Performed by: INTERNAL MEDICINE

## 2021-10-02 RX ORDER — POTASSIUM CHLORIDE 750 MG/1
40 TABLET, FILM COATED, EXTENDED RELEASE ORAL ONCE
Status: COMPLETED | OUTPATIENT
Start: 2021-10-02 | End: 2021-10-02

## 2021-10-02 RX ADMIN — Medication 1 CAPSULE: at 08:44

## 2021-10-02 RX ADMIN — SERTRALINE 150 MG: 100 TABLET, FILM COATED ORAL at 08:44

## 2021-10-02 RX ADMIN — AMPICILLIN SODIUM AND SULBACTAM SODIUM 3000 MG: 2; 1 INJECTION, POWDER, FOR SOLUTION INTRAMUSCULAR; INTRAVENOUS at 17:09

## 2021-10-02 RX ADMIN — CLONAZEPAM 1 MG: 1 TABLET ORAL at 22:20

## 2021-10-02 RX ADMIN — PANTOPRAZOLE SODIUM 40 MG: 40 TABLET, DELAYED RELEASE ORAL at 05:33

## 2021-10-02 RX ADMIN — PREGABALIN 200 MG: 100 CAPSULE ORAL at 22:22

## 2021-10-02 RX ADMIN — AMITRIPTYLINE HYDROCHLORIDE 25 MG: 25 TABLET, FILM COATED ORAL at 22:20

## 2021-10-02 RX ADMIN — METOPROLOL TARTRATE 25 MG: 25 TABLET, FILM COATED ORAL at 22:35

## 2021-10-02 RX ADMIN — SODIUM CHLORIDE, PRESERVATIVE FREE 10 ML: 5 INJECTION INTRAVENOUS at 08:45

## 2021-10-02 RX ADMIN — DANTROLENE SODIUM 200 MG: 25 CAPSULE ORAL at 08:45

## 2021-10-02 RX ADMIN — POTASSIUM CHLORIDE 40 MEQ: 750 TABLET, FILM COATED, EXTENDED RELEASE ORAL at 08:44

## 2021-10-02 RX ADMIN — CLONAZEPAM 1 MG: 1 TABLET ORAL at 08:44

## 2021-10-02 RX ADMIN — SODIUM CHLORIDE: 9 INJECTION, SOLUTION INTRAVENOUS at 18:22

## 2021-10-02 RX ADMIN — DANTROLENE SODIUM 200 MG: 25 CAPSULE ORAL at 22:18

## 2021-10-02 RX ADMIN — LEVOTHYROXINE SODIUM 25 MCG: 0.03 TABLET ORAL at 05:33

## 2021-10-02 RX ADMIN — ENOXAPARIN SODIUM 40 MG: 40 INJECTION SUBCUTANEOUS at 08:44

## 2021-10-02 RX ADMIN — AMPICILLIN SODIUM AND SULBACTAM SODIUM 3000 MG: 2; 1 INJECTION, POWDER, FOR SOLUTION INTRAMUSCULAR; INTRAVENOUS at 05:34

## 2021-10-02 RX ADMIN — Medication 1 CAPSULE: at 17:10

## 2021-10-02 RX ADMIN — PREGABALIN 200 MG: 100 CAPSULE ORAL at 15:35

## 2021-10-02 RX ADMIN — SODIUM CHLORIDE, PRESERVATIVE FREE 10 ML: 5 INJECTION INTRAVENOUS at 22:23

## 2021-10-02 RX ADMIN — PREGABALIN 200 MG: 100 CAPSULE ORAL at 08:44

## 2021-10-02 RX ADMIN — ACETAMINOPHEN AND CODEINE PHOSPHATE 1 TABLET: 300; 30 TABLET ORAL at 17:59

## 2021-10-02 RX ADMIN — PANTOPRAZOLE SODIUM 40 MG: 40 TABLET, DELAYED RELEASE ORAL at 17:10

## 2021-10-02 RX ADMIN — AMPICILLIN SODIUM AND SULBACTAM SODIUM 3000 MG: 2; 1 INJECTION, POWDER, FOR SOLUTION INTRAMUSCULAR; INTRAVENOUS at 12:25

## 2021-10-02 RX ADMIN — AMPICILLIN SODIUM AND SULBACTAM SODIUM 3000 MG: 2; 1 INJECTION, POWDER, FOR SOLUTION INTRAMUSCULAR; INTRAVENOUS at 00:36

## 2021-10-02 RX ADMIN — RISPERIDONE 0.5 MG: 0.5 TABLET ORAL at 22:20

## 2021-10-02 ASSESSMENT — PAIN DESCRIPTION - LOCATION
LOCATION: FOOT
LOCATION: FOOT

## 2021-10-02 ASSESSMENT — PAIN SCALES - GENERAL
PAINLEVEL_OUTOF10: 5
PAINLEVEL_OUTOF10: 6
PAINLEVEL_OUTOF10: 0

## 2021-10-02 ASSESSMENT — PAIN DESCRIPTION - PAIN TYPE
TYPE: ACUTE PAIN
TYPE: ACUTE PAIN

## 2021-10-02 ASSESSMENT — PAIN DESCRIPTION - DESCRIPTORS
DESCRIPTORS: SORE
DESCRIPTORS: SORE

## 2021-10-02 ASSESSMENT — PAIN DESCRIPTION - ORIENTATION
ORIENTATION: RIGHT
ORIENTATION: RIGHT

## 2021-10-02 ASSESSMENT — PAIN DESCRIPTION - ONSET
ONSET: ON-GOING
ONSET: ON-GOING

## 2021-10-02 ASSESSMENT — PAIN DESCRIPTION - PROGRESSION
CLINICAL_PROGRESSION: NOT CHANGED
CLINICAL_PROGRESSION: NOT CHANGED

## 2021-10-02 ASSESSMENT — PAIN DESCRIPTION - FREQUENCY
FREQUENCY: INTERMITTENT
FREQUENCY: INTERMITTENT

## 2021-10-02 NOTE — PROGRESS NOTES
Pulmonary Progress Note    CC:  Follow up respiratory     Subjective:  4 liters of oxygen   Eating with assistance. Swallowing has improved. Intake/Output Summary (Last 24 hours) at 10/2/2021 1040  Last data filed at 10/1/2021 1626  Gross per 24 hour   Intake 120 ml   Output 200 ml   Net -80 ml         PHYSICAL EXAM:  Blood pressure (!) 93/55, pulse 69, temperature 98.5 °F (36.9 °C), temperature source Oral, resp. rate 16, weight 179 lb 14.4 oz (81.6 kg), SpO2 98 %.'  Gen: Frail, chronically ill, awake  Eyes: PERRL. No sclera icterus. No conjunctival injection. ENT: No discharge. Pharynx clear. External appearance of ears and nose normal.  Neck: Trachea midline. No obvious mass. Resp: Diminished   CV: Regular rate. Regular rhythm. No murmur or rub. GI: Non-tender. Non-distended. No hernia. Skin: Warm, dry, normal texture and turgor. No nodule on exposed extremities. Lymph: No cervical LAD. No supraclavicular LAD. M/S: Contractures, deformities   Neuro: Moves all four extremities. CN 2-12 tested, no defect noted.   Ext:   no edema    Medications:    Scheduled Meds:   ampicillin-sulbactam  3,000 mg IntraVENous Q6H    lactobacillus  1 capsule Oral BID WC    pantoprazole  40 mg Oral BID AC    clonazePAM  1 mg Oral BID    dantrolene  200 mg Oral BID    amitriptyline  25 mg Oral Nightly    sertraline  150 mg Oral Daily    pregabalin  200 mg Oral TID    risperiDONE  0.5 mg Oral Nightly    levothyroxine  25 mcg Oral Daily    metoprolol tartrate  25 mg Oral BID    sodium chloride flush  5-40 mL IntraVENous 2 times per day    enoxaparin  40 mg SubCUTAneous Daily    insulin lispro  0-6 Units SubCUTAneous Q4H       Continuous Infusions:   sodium chloride 5 mL/hr at 09/30/21 0555    sodium chloride 100 mL/hr at 10/01/21 0335    dextrose         PRN Meds:  acetaminophen-codeine, sodium chloride flush, sodium chloride, potassium chloride, magnesium sulfate, ondansetron, glucose, dextrose, glucagon (rDNA), dextrose    Labs:  CBC:   Recent Labs     21  0421 10/01/21  0457 10/02/21  0605   WBC 10.5 9.5 9.0   HGB 10.7* 10.7* 10.2*   HCT 33.4* 33.3* 32.8*   MCV 88.9 89.1 90.8    139 147     BMP:   Recent Labs     21  0421 10/01/21  0457 10/02/21  0605    141 141   K 3.7 3.7 3.4*    102 101   CO2 27 29 30   PHOS 2.4* 3.5 3.7   BUN 15 12 10   CREATININE <0.5* <0.5* <0.5*     LIVER PROFILE:   No results for input(s): AST, ALT, LIPASE, BILIDIR, BILITOT, ALKPHOS in the last 72 hours. Invalid input(s): AMYLASE,  ALB  PT/INR: No results for input(s): PROTIME, INR in the last 72 hours. APTT: No results for input(s): APTT in the last 72 hours. UA:No results for input(s): NITRITE, COLORU, PHUR, LABCAST, WBCUA, RBCUA, MUCUS, TRICHOMONAS, YEAST, BACTERIA, CLARITYU, SPECGRAV, LEUKOCYTESUR, UROBILINOGEN, BILIRUBINUR, BLOODU, GLUCOSEU, AMORPHOUS in the last 72 hours. Invalid input(s): Abad Collar  No results for input(s): PH, PCO2, PO2 in the last 72 hours. Films:  Chest imaging reports were reviewed and imaging was reviewed by me and showed no new films     AB.[id number]    Cultures:  Blood:  Pending    I reviewed the labs and images listed above    ASSESSMENT/PLAN:  · Acute Hypoxic/Hypercapnic Respiratory Failure with saturations less than 90% on room air and pH less than 7.35 due to aspiration/choking/pneumonia  · Titrate oxygen for saturations greater than or equal to 90%  · Aspiration into the airway   ? Unasyn for 7 days. Stop dates added   ? Lactobacillus   · Acute Metabolic Encephalopathy, improved   ?  Likely due to CO2 narcosis        DVT prophylaxis  Lovenox    Increase activity as tolerated     Salvatore Aguilar, DO [facility]

## 2021-10-02 NOTE — PROGRESS NOTES
Patient is resting in bed with eyes closed. 4L of O2 NC. Fall precautions are in place. Bed alarm on. Bed is in lowest position. Call light, telephone and bedside table are within reach. Will continue to monitor patient per unit protocols.  Electronically signed by Earline Torres RN on 10/2/2021 at [address] PM

## 2021-10-02 NOTE — PLAN OF CARE
Problem: Falls - Risk of:  Goal: Will remain free from falls  Description: Will remain free from falls  10/2/2021 1014 by Nicolas Morley RN  Outcome: Ongoing   Will remain free from falls. Fall precautions are in place. Call light, telephone and bedside table are within reach. Problem: Falls - Risk of:  Goal: Absence of physical injury  Description: Absence of physical injury  10/2/2021 1014 by Nicolas Morley RN  Outcome: Ongoing     Problem: Pain:  Goal: Pain level will decrease  Description: Pain level will decrease  10/2/2021 1014 by Nicolas Morley RN  Outcome: Ongoing   Pain level will decrease  Problem: Pain:  Goal: Control of acute pain  Description: Control of acute pain  10/2/2021 1014 by Nicolas Morley RN  Outcome: Ongoing   Patient educated on acute pain. Taught patient to use call light to ask for pain medication. PRN pain medication given for acute pain. Will continue to monitor pain per unit protocol.     Problem: Pain:  Goal: Control of chronic pain  Description: Control of chronic pain  10/2/2021 1014 by Nicolas Morley RN  Outcome: Ongoing     Problem: Skin Integrity:  Goal: Will show no infection signs and symptoms  Description: Will show no infection signs and symptoms  10/2/2021 1014 by Nicolas Morley RN  Outcome: Ongoing     Problem: Skin Integrity:  Goal: Absence of new skin breakdown  Description: Absence of new skin breakdown  10/2/2021 1014 by Nicolas Morley RN  Outcome: Ongoing

## 2021-10-02 NOTE — PROGRESS NOTES
Patient is A&O. Patient is resting in bed, awake and quiet. 4L of O2 NC. Side rails are up x3. Fall precautions are in place. Bed alarm on. Bed is in lowest position. Call light, telephone and bedside table are within reach. VSS taken. AM meds given. Shift assessment completed. Will continue to monitor patient per unit protocols.  Electronically signed by Katelyn Olson RN on 10/2/2021 at 10:39 AM

## 2021-10-02 NOTE — PROGRESS NOTES
Patient resting comfortably. With complaints of pain this evening. Administered patient home dose of pain medication per eMAR. Patient expressing intermittent expressions of anxiety and concern with hospitalization. Tolerating PO intake well. Cleaned and repositioning Q2 hours. Continuing to monitor.

## 2021-10-02 NOTE — PROGRESS NOTES
Patient with episodes of urine x2. Some confusion overnight but easily oriented. Continuing to monitor.

## 2021-10-02 NOTE — PROGRESS NOTES
Patient is refusing insulin for blood glucose of 164. This RN educated patient about the importance of glucose management and insulin. Patient verbalized understanding. MD aware. Will continue to monitor patient per unit protocols.  Electronically signed by Belen Ramirez RN on 10/2/2021 at 12:34 PM

## 2021-10-02 NOTE — PROGRESS NOTES
range of motion. No jugular venous distention. Trachea midline. Respiratory:  Normal respiratory effort. Clear to auscultation, bilaterally without Rales/Wheezes/Rhonchi. Cardiovascular: Regular rate and rhythm with normal S1/S2 without murmurs, rubs or gallops. Abdomen: Soft, non-tender, non-distended with normal bowel sounds. Musculoskeletal:upper and lower limb deformity. Skin: Skin color, texture, turgor normal.  No rashes or lesions. Neurologic:  Neurovascularly intact without any focal sensory/motor deficits. Cranial nerves: II-XII intact, grossly non-focal.  Psychiatric: Alert . Capillary Refill: Brisk,3 seconds, normal   Peripheral Pulses: +2 palpable, equal bilaterally       Labs:   Recent Labs     09/30/21  0421 10/01/21  0457 10/02/21  0605   WBC 10.5 9.5 9.0   HGB 10.7* 10.7* 10.2*   HCT 33.4* 33.3* 32.8*    139 147     Recent Labs     09/30/21  0421 10/01/21  0457 10/02/21  0605    141 141   K 3.7 3.7 3.4*    102 101   CO2 27 29 30   BUN 15 12 10   CREATININE <0.5* <0.5* <0.5*   CALCIUM 8.7 8.3 8.2*   PHOS 2.4* 3.5 3.7     No results for input(s): AST, ALT, BILIDIR, BILITOT, ALKPHOS in the last 72 hours. No results for input(s): INR in the last 72 hours. No results for input(s): Prentis Revels in the last 72 hours. Urinalysis:    No results found for: Eckert Gonzales, BACTERIA, RBCUA, BLOODU, SPECGRAV, [address] [place] [address]    Radiology:  XR CHEST PORTABLE   Final Result   Airspace disease primarily right parahilar and lower lung. This likely   represents pneumonia and/or aspiration is also considered as given in the   clinical history. Fluoroscopy modified barium swallow with video   Final Result   Intermittent aspiration with thin liquid. Please see separate speech pathology report for full discussion of findings   and recommendations.          XR CHEST PORTABLE   Final Result   Right greater than left basilar patchy opacities may represent atelectasis or   a developing infectious/inflammatory process. Follow-up PA and lateral chest   radiographs may be helpful for further evaluation. Prominence of the superior mediastinum may be accentuated by patient   positioning. Underlying mediastinal pathology is not excluded. Short-term   follow-up PA and lateral chest radiographs or chest CT may be helpful for   further evaluation as warranted. Assessment/Plan:    Active Hospital Problems    Diagnosis     Aspiration pneumonitis ([address] [J69.0]     Choking [V08.533J]     Metabolic encephalopathy [Z24.18]     Acute type 1 respiratory failure (HCC) [J96.01]     Acute respiratory failure with hypoxia and hypercapnia (HCC) [J96.01, J96.02]      Acute hypoxic and hypercapnic respiratory failure. Aspiration pneumonia. Acute metabolic encephalopathy. Patient presented from [facility] with aspiration, was found to have hypoxic and hypercapnic, patient was agitated and confused. XR with R>L infiltration suggestive of aspiration. Patient was managed in the ICU with BiPAP and unasyn, improved and transferred to the floor. Continues to be on oxygen at 4 liters. Plan :  - continue Unasyn Day 5/7.   - wean down oxygen. .   - agree with SLP eval, patient placed on modified diet. - home meds resumed ( clonopin resumed). Mild hypokalemia . Replaced. CP.   bedbound at baseline. Home with 24 hour care. DVT Prophylaxis: lovenox  Diet: Adult Oral Nutrition Supplement; Frozen Oral Supplement  ADULT DIET; Dysphagia - Minced and Moist; Mildly Thick (Nectar)  Code Status: DNR-CCA    PT/OT Eval Status: bedbound at baseline. Dispo - home with 24 hour care ( baseline ) once stable .      Geraldo Cabrera MD

## 2021-10-03 LAB
ANION GAP SERPL CALCULATED.3IONS-SCNC: 8 MMOL/L (ref 3–16)
BASOPHILS ABSOLUTE: 0 K/UL (ref 0–0.2)
BASOPHILS RELATIVE PERCENT: 0.5 %
BUN BLDV-MCNC: 7 MG/DL (ref 7–20)
CALCIUM SERPL-MCNC: 8.7 MG/DL (ref 8.3–10.6)
CHLORIDE BLD-SCNC: 100 MMOL/L (ref 99–110)
CO2: 31 MMOL/L (ref 21–32)
CREAT SERPL-MCNC: <0.5 MG/DL (ref 0.9–1.3)
EOSINOPHILS ABSOLUTE: 0.1 K/UL (ref 0–0.6)
EOSINOPHILS RELATIVE PERCENT: 1.6 %
ESTIMATED AVERAGE GLUCOSE: 108.3 MG/DL
GFR AFRICAN AMERICAN: >60
GFR NON-AFRICAN AMERICAN: >60
GLUCOSE BLD-MCNC: 121 MG/DL (ref 70–99)
GLUCOSE BLD-MCNC: 146 MG/DL (ref 70–99)
GLUCOSE BLD-MCNC: 147 MG/DL (ref 70–99)
GLUCOSE BLD-MCNC: 154 MG/DL (ref 70–99)
GLUCOSE BLD-MCNC: 156 MG/DL (ref 70–99)
GLUCOSE BLD-MCNC: 94 MG/DL (ref 70–99)
HBA1C MFR BLD: 5.4 %
HCT VFR BLD CALC: 37.8 % (ref 40.5–52.5)
HEMOGLOBIN: 11.9 G/DL (ref 13.5–17.5)
LYMPHOCYTES ABSOLUTE: 2.6 K/UL (ref 1–5.1)
LYMPHOCYTES RELATIVE PERCENT: 29.3 %
MAGNESIUM: 1.7 MG/DL (ref 1.8–2.4)
MCH RBC QN AUTO: 28.4 PG (ref 26–34)
MCHC RBC AUTO-ENTMCNC: 31.5 G/DL (ref 31–36)
MCV RBC AUTO: 90.3 FL (ref 80–100)
MONOCYTES ABSOLUTE: 0.8 K/UL (ref 0–1.3)
MONOCYTES RELATIVE PERCENT: 9 %
NEUTROPHILS ABSOLUTE: 5.3 K/UL (ref 1.7–7.7)
NEUTROPHILS RELATIVE PERCENT: 59.6 %
PDW BLD-RTO: 17 % (ref 12.4–15.4)
PERFORMED ON: ABNORMAL
PHOSPHORUS: 3.5 MG/DL (ref 2.5–4.9)
PLATELET # BLD: 166 K/UL (ref 135–450)
PMV BLD AUTO: 9.3 FL (ref 5–10.5)
POTASSIUM SERPL-SCNC: 4.6 MMOL/L (ref 3.5–5.1)
RBC # BLD: 4.19 M/UL (ref 4.2–5.9)
SODIUM BLD-SCNC: 139 MMOL/L (ref 136–145)
WBC # BLD: 8.9 K/UL (ref 4–11)

## 2021-10-03 PROCEDURE — 6370000000 HC RX 637 (ALT 250 FOR IP): Performed by: NURSE PRACTITIONER

## 2021-10-03 PROCEDURE — 99232 SBSQ HOSP IP/OBS MODERATE 35: CPT | Performed by: INTERNAL MEDICINE

## 2021-10-03 PROCEDURE — 80048 BASIC METABOLIC PNL TOTAL CA: CPT

## 2021-10-03 PROCEDURE — 36415 COLL VENOUS BLD VENIPUNCTURE: CPT

## 2021-10-03 PROCEDURE — 6360000002 HC RX W HCPCS: Performed by: INTERNAL MEDICINE

## 2021-10-03 PROCEDURE — 85025 COMPLETE CBC W/AUTO DIFF WBC: CPT

## 2021-10-03 PROCEDURE — 6370000000 HC RX 637 (ALT 250 FOR IP): Performed by: STUDENT IN AN ORGANIZED HEALTH CARE EDUCATION/TRAINING PROGRAM

## 2021-10-03 PROCEDURE — 83735 ASSAY OF MAGNESIUM: CPT

## 2021-10-03 PROCEDURE — 1200000000 HC SEMI PRIVATE

## 2021-10-03 PROCEDURE — 2580000003 HC RX 258: Performed by: INTERNAL MEDICINE

## 2021-10-03 PROCEDURE — 6370000000 HC RX 637 (ALT 250 FOR IP): Performed by: INTERNAL MEDICINE

## 2021-10-03 PROCEDURE — 84100 ASSAY OF PHOSPHORUS: CPT

## 2021-10-03 RX ADMIN — DANTROLENE SODIUM 200 MG: 25 CAPSULE ORAL at 20:50

## 2021-10-03 RX ADMIN — Medication 1 CAPSULE: at 17:13

## 2021-10-03 RX ADMIN — ENOXAPARIN SODIUM 40 MG: 40 INJECTION SUBCUTANEOUS at 09:20

## 2021-10-03 RX ADMIN — CLONAZEPAM 1 MG: 1 TABLET ORAL at 09:21

## 2021-10-03 RX ADMIN — SERTRALINE 150 MG: 100 TABLET, FILM COATED ORAL at 09:21

## 2021-10-03 RX ADMIN — PREGABALIN 200 MG: 100 CAPSULE ORAL at 09:21

## 2021-10-03 RX ADMIN — DANTROLENE SODIUM 200 MG: 25 CAPSULE ORAL at 09:20

## 2021-10-03 RX ADMIN — SODIUM CHLORIDE, PRESERVATIVE FREE 10 ML: 5 INJECTION INTRAVENOUS at 09:22

## 2021-10-03 RX ADMIN — INSULIN LISPRO 2 UNITS: 100 INJECTION, SOLUTION INTRAVENOUS; SUBCUTANEOUS at 00:37

## 2021-10-03 RX ADMIN — AMPICILLIN SODIUM AND SULBACTAM SODIUM 3000 MG: 2; 1 INJECTION, POWDER, FOR SOLUTION INTRAMUSCULAR; INTRAVENOUS at 17:15

## 2021-10-03 RX ADMIN — ACETAMINOPHEN AND CODEINE PHOSPHATE 1 TABLET: 300; 30 TABLET ORAL at 22:48

## 2021-10-03 RX ADMIN — RISPERIDONE 0.5 MG: 0.5 TABLET ORAL at 20:49

## 2021-10-03 RX ADMIN — PREGABALIN 200 MG: 100 CAPSULE ORAL at 14:36

## 2021-10-03 RX ADMIN — PANTOPRAZOLE SODIUM 40 MG: 40 TABLET, DELAYED RELEASE ORAL at 17:13

## 2021-10-03 RX ADMIN — PREGABALIN 200 MG: 100 CAPSULE ORAL at 20:50

## 2021-10-03 RX ADMIN — Medication 1 CAPSULE: at 09:21

## 2021-10-03 RX ADMIN — AMPICILLIN SODIUM AND SULBACTAM SODIUM 3000 MG: 2; 1 INJECTION, POWDER, FOR SOLUTION INTRAMUSCULAR; INTRAVENOUS at 05:06

## 2021-10-03 RX ADMIN — AMPICILLIN SODIUM AND SULBACTAM SODIUM 3000 MG: 2; 1 INJECTION, POWDER, FOR SOLUTION INTRAMUSCULAR; INTRAVENOUS at 00:32

## 2021-10-03 RX ADMIN — AMPICILLIN SODIUM AND SULBACTAM SODIUM 3000 MG: 2; 1 INJECTION, POWDER, FOR SOLUTION INTRAMUSCULAR; INTRAVENOUS at 23:45

## 2021-10-03 RX ADMIN — LEVOTHYROXINE SODIUM 25 MCG: 0.03 TABLET ORAL at 05:05

## 2021-10-03 RX ADMIN — CLONAZEPAM 1 MG: 1 TABLET ORAL at 20:50

## 2021-10-03 RX ADMIN — METOPROLOL TARTRATE 25 MG: 25 TABLET, FILM COATED ORAL at 20:49

## 2021-10-03 RX ADMIN — SODIUM CHLORIDE, PRESERVATIVE FREE 10 ML: 5 INJECTION INTRAVENOUS at 20:51

## 2021-10-03 RX ADMIN — AMITRIPTYLINE HYDROCHLORIDE 25 MG: 25 TABLET, FILM COATED ORAL at 20:49

## 2021-10-03 RX ADMIN — PANTOPRAZOLE SODIUM 40 MG: 40 TABLET, DELAYED RELEASE ORAL at 05:05

## 2021-10-03 RX ADMIN — AMPICILLIN SODIUM AND SULBACTAM SODIUM 3000 MG: 2; 1 INJECTION, POWDER, FOR SOLUTION INTRAMUSCULAR; INTRAVENOUS at 12:01

## 2021-10-03 ASSESSMENT — PAIN DESCRIPTION - LOCATION
LOCATION: FOOT
LOCATION: FOOT

## 2021-10-03 ASSESSMENT — PAIN DESCRIPTION - FREQUENCY
FREQUENCY: INTERMITTENT
FREQUENCY: INTERMITTENT

## 2021-10-03 ASSESSMENT — PAIN SCALES - GENERAL
PAINLEVEL_OUTOF10: 2
PAINLEVEL_OUTOF10: 4
PAINLEVEL_OUTOF10: 0

## 2021-10-03 ASSESSMENT — PAIN DESCRIPTION - ONSET
ONSET: ON-GOING
ONSET: GRADUAL

## 2021-10-03 ASSESSMENT — PAIN DESCRIPTION - PROGRESSION
CLINICAL_PROGRESSION: GRADUALLY WORSENING
CLINICAL_PROGRESSION: GRADUALLY IMPROVING

## 2021-10-03 ASSESSMENT — PAIN DESCRIPTION - ORIENTATION: ORIENTATION: RIGHT

## 2021-10-03 ASSESSMENT — PAIN DESCRIPTION - PAIN TYPE
TYPE: ACUTE PAIN
TYPE: ACUTE PAIN

## 2021-10-03 ASSESSMENT — PAIN DESCRIPTION - DESCRIPTORS
DESCRIPTORS: SORE
DESCRIPTORS: SORE

## 2021-10-03 ASSESSMENT — PAIN - FUNCTIONAL ASSESSMENT
PAIN_FUNCTIONAL_ASSESSMENT: PREVENTS OR INTERFERES SOME ACTIVE ACTIVITIES AND ADLS
PAIN_FUNCTIONAL_ASSESSMENT: PREVENTS OR INTERFERES SOME ACTIVE ACTIVITIES AND ADLS

## 2021-10-03 NOTE — CARE COORDINATION
Chart Reviewed. Met with patient to introduce  role and complete ACP. See separate note for ACP. She reports she has named her son, Arpan Hernandez as Decision Maker and she believes the documents are at her mother's house. [facility]. She reports she does not wish to return home as she does not trust the private duty company that she is with. [facility] is. Referral initiated but she would be long term or private pay as she is bedbound for years. Did provide her with a new list of Private Duty agencies if she wanted to switch companies and return home. Following for DC disposition. [place], [place]     Case Management   [phone]    10/3/2021  3:19 PM   [phone]  Left message for admitting office.   [place], [place]     Case Management   [phone]    10/3/2021  3:24 PM

## 2021-10-03 NOTE — PLAN OF CARE
Problem: Falls - Risk of:  Goal: Absence of physical injury  Description: Absence of physical injury  Outcome: Ongoing  Note: Patient remains free from physical injury. Patient educated on safety precautions. Will continue to monitor to ensure patient remains free from physical injury throughout remainder of shift. Problem: Pain:  Goal: Pain level will decrease  Description: Pain level will decrease  Outcome: Ongoing  Note: Pt educated to attempt non-phagological method of pain control, but it it becomes too strong use PRN analgesics. Pain and discomfort being managed PRN analgesics per MD orders. Pt able to express presence of pain. Problem: Pain:  Goal: Control of chronic pain  Description: Control of chronic pain  Outcome: Ongoing  Note: Patient educated on chronic pain. Taught patient to use call light to ask for pain medication. PRN pain medication given for chronic pain. Will continue to monitor pain per unit protocol. Problem: Skin Integrity:  Goal: Will show no infection signs and symptoms  Description: Will show no infection signs and symptoms  Outcome: Ongoing  Note: Patient receiving antibiotics to treat infection. Problem: Skin Integrity:  Goal: Absence of new skin breakdown  Description: Absence of new skin breakdown  Outcome: Ongoing  Note: Skin assessment complete. No new signs of skin breakdown noted. Assistance provided with repositioning while in bed. Problem: Falls - Risk of:  Goal: Will remain free from falls  Description: Will remain free from falls  Note: Fall risk assessment completed . Fall precautions in place, bed/ chair alarm on, side rails 2/4 up, call light in reach, educated pt on calling for assistance when needed, room clear of clutter. Pt verbalized understanding. Problem: Pain:  Goal: Control of acute pain  Description: Control of acute pain  Note: Patient educated on acute pain. Taught patient to use call light to ask for pain medication.   PRN pain medication given for acute pain. Will continue to monitor pain per unit protocol.

## 2021-10-03 NOTE — PROGRESS NOTES
Patient is resting in bed with eyes closed. Fall precautions are in place. Bed alarm on. Bed is in lowest position. Call light, telephone and bedside table are within reach. Will continue to monitor patient per unit protocols.  Electronically signed by Maya Gonzalez RN on 10/3/2021 at 4:44 PM

## 2021-10-03 NOTE — PROGRESS NOTES
Hospitalist Progress Note      PCP: Mauro Sharpe    Date of Admission: 9/27/2021    Chief Complaint: aspiration. Hospital Course:   62year old female patient with a past medical history of cerebral palsy who presents via EMS from her ECF after aspirating while eating dinner, patient was  hypoxic and hypercapnia and placed on BiPAP. Patient was admitted as a case of aspiration pneumonia. Subjective:   Continues to be on 4 liters of oxygen via nasal canula. Denies any complaints this morning. Medications:  Reviewed    Infusion Medications    sodium chloride 5 mL/hr at 10/02/21 1822    sodium chloride 100 mL/hr at 10/01/21 0335    dextrose       Scheduled Medications    ampicillin-sulbactam  3,000 mg IntraVENous Q6H    lactobacillus  1 capsule Oral BID WC    pantoprazole  40 mg Oral BID AC    clonazePAM  1 mg Oral BID    dantrolene  200 mg Oral BID    amitriptyline  25 mg Oral Nightly    sertraline  150 mg Oral Daily    pregabalin  200 mg Oral TID    risperiDONE  0.5 mg Oral Nightly    levothyroxine  25 mcg Oral Daily    metoprolol tartrate  25 mg Oral BID    sodium chloride flush  5-40 mL IntraVENous 2 times per day    enoxaparin  40 mg SubCUTAneous Daily    insulin lispro  0-6 Units SubCUTAneous Q4H     PRN Meds: acetaminophen-codeine, sodium chloride flush, sodium chloride, potassium chloride, magnesium sulfate, ondansetron, glucose, dextrose, glucagon (rDNA), dextrose      Intake/Output Summary (Last 24 hours) at 10/3/2021 1242  Last data filed at 10/3/2021 0945  Gross per 24 hour   Intake 468.71 ml   Output 400 ml   Net 68.71 ml       Physical Exam Performed:    BP (!) 99/56   Pulse 94   Temp 99.7 °F (37.6 °C) (Oral)   Resp 16   Wt 183 lb 3.2 oz (83.1 kg)   SpO2 95%     General appearance:plesant female patient, not in pain or distress. On 4 liters of oxygen. HEENT: Pupils equal, round, and reactive to light. Conjunctivae/corneas clear.   Neck: Supple, with full range of motion. No jugular venous distention. Trachea midline. Respiratory:  Normal respiratory effort. Clear to auscultation, bilaterally without Rales/Wheezes/Rhonchi. Cardiovascular: Regular rate and rhythm with normal S1/S2 without murmurs, rubs or gallops. Abdomen: Soft, non-tender, non-distended with normal bowel sounds. Musculoskeletal:upper and lower limb deformity. Skin: Skin color, texture, turgor normal.  No rashes or lesions. Neurologic:  Neurovascularly intact without any focal sensory/motor deficits. Cranial nerves: II-XII intact, grossly non-focal.  Psychiatric: Alert . Capillary Refill: Brisk,3 seconds, normal   Peripheral Pulses: +2 palpable, equal bilaterally       Labs:   Recent Labs     10/01/21  0457 10/02/21  0605 10/03/21  0504   WBC 9.5 9.0 8.9   HGB 10.7* 10.2* 11.9*   HCT 33.3* 32.8* 37.8*    147 166     Recent Labs     10/01/21  0457 10/02/21  0605 10/03/21  0504    141 139   K 3.7 3.4* 4.6    101 100   CO2 29 30 31   BUN 12 10 7   CREATININE <0.5* <0.5* <0.5*   CALCIUM 8.3 8.2* 8.7   PHOS 3.5 3.7 3.5     No results for input(s): AST, ALT, BILIDIR, BILITOT, ALKPHOS in the last 72 hours. No results for input(s): INR in the last 72 hours. No results for input(s): [place] in the last 72 hours. Urinalysis:    No results found for: Madi Drone, BACTERIA, RBCUA, BLOODU, SPECGRAV, [address] [place] [address]    Radiology:  XR CHEST PORTABLE   Final Result   Airspace disease primarily right parahilar and lower lung. This likely   represents pneumonia and/or aspiration is also considered as given in the   clinical history. Fluoroscopy modified barium swallow with video   Final Result   Intermittent aspiration with thin liquid. Please see separate speech pathology report for full discussion of findings   and recommendations.          XR CHEST PORTABLE   Final Result   Right greater than left basilar patchy opacities may represent atelectasis or   a developing infectious/inflammatory process. Follow-up PA and lateral chest   radiographs may be helpful for further evaluation. Prominence of the superior mediastinum may be accentuated by patient   positioning. Underlying mediastinal pathology is not excluded. Short-term   follow-up PA and lateral chest radiographs or chest CT may be helpful for   further evaluation as warranted. Assessment/Plan:    Active Hospital Problems    Diagnosis     Aspiration pneumonitis ([address] [J69.0]     Choking [Q70.140G]     Metabolic encephalopathy [C49.96]     Acute type 1 respiratory failure (HCC) [J96.01]     Acute respiratory failure with hypoxia and hypercapnia (HCC) [J96.01, J96.02]      Acute hypoxic and hypercapnic respiratory failure. Aspiration pneumonia. Acute metabolic encephalopathy. Patient presented from F with aspiration, was found to have hypoxic and hypercapnic, patient was agitated and confused. XR with R>L infiltration suggestive of aspiration. Patient was managed in the ICU with BiPAP and unasyn, improved and transferred to the floor. Continues to be on oxygen at 4 liters. Plan :  - continue Unasyn Day 6/7.   - wean down oxygen. .   - agree with SLP eval, patient placed on modified diet. - home meds resumed ( clonopin resumed). Mild hypokalemia . Replaced. CP.   bedbound at baseline. Home with 24 hour care. DVT Prophylaxis: lovenox  Diet: Adult Oral Nutrition Supplement; Frozen Oral Supplement  ADULT DIET; Dysphagia - Minced and Moist; Mildly Thick (Nectar)  Code Status: DNR-CCA    PT/OT Eval Status: bedbound at baseline ( PT and OT ordered as the patient is below her baseline, has 24 hour aid at home, will ensure that patient is safe for DC). Dispo - home with 24 hour care ( baseline ) once stable .      Yassine Calloway MD

## 2021-10-03 NOTE — PROGRESS NOTES
Pulmonary Progress Note    CC:  Follow up respiratory     Subjective:  4 liters of oxygen   Feels like she has a cold  Runny nose         Intake/Output Summary (Last 24 hours) at 10/3/2021 1000  Last data filed at 10/3/2021 0531  Gross per 24 hour   Intake 348.71 ml   Output 400 ml   Net -51.29 ml         PHYSICAL EXAM:  Blood pressure 108/61, pulse 91, temperature 100.8 °F (38.2 °C), temperature source Oral, resp. rate 16, weight 183 lb 3.2 oz (83.1 kg), SpO2 96 %.'  Gen: Frail, chronically ill, awake  Eyes: PERRL. No sclera icterus. No conjunctival injection. ENT: No discharge. Pharynx clear. External appearance of ears and nose normal.  Neck: Trachea midline. No obvious mass. Resp: Diminished   CV: Regular rate. Regular rhythm. No murmur or rub. GI: Non-tender. Non-distended. No hernia. Skin: Warm, dry, normal texture and turgor. No nodule on exposed extremities. Lymph: No cervical LAD. No supraclavicular LAD. M/S: Contractures, deformities   Neuro: Moves all four extremities. CN 2-12 tested, no defect noted.   Ext:   no edema    Medications:    Scheduled Meds:   ampicillin-sulbactam  3,000 mg IntraVENous Q6H    lactobacillus  1 capsule Oral BID WC    pantoprazole  40 mg Oral BID AC    clonazePAM  1 mg Oral BID    dantrolene  200 mg Oral BID    amitriptyline  25 mg Oral Nightly    sertraline  150 mg Oral Daily    pregabalin  200 mg Oral TID    risperiDONE  0.5 mg Oral Nightly    levothyroxine  25 mcg Oral Daily    metoprolol tartrate  25 mg Oral BID    sodium chloride flush  5-40 mL IntraVENous 2 times per day    enoxaparin  40 mg SubCUTAneous Daily    insulin lispro  0-6 Units SubCUTAneous Q4H       Continuous Infusions:   sodium chloride 5 mL/hr at 10/02/21 1822    sodium chloride 100 mL/hr at 10/01/21 0335    dextrose         PRN Meds:  acetaminophen-codeine, sodium chloride flush, sodium chloride, potassium chloride, magnesium sulfate, ondansetron, glucose, dextrose, glucagon (rDNA), dextrose    Labs:  CBC:   Recent Labs     10/01/21  0457 10/02/21  0605 10/03/21  0504   WBC 9.5 9.0 8.9   HGB 10.7* 10.2* 11.9*   HCT 33.3* 32.8* 37.8*   MCV 89.1 90.8 90.3    147 166     BMP:   Recent Labs     10/01/21  0457 10/02/21  0605 10/03/21  0504    141 139   K 3.7 3.4* 4.6    101 100   CO2 29 30 31   PHOS 3.5 3.7 3.5   BUN 12 10 7   CREATININE <0.5* <0.5* <0.5*     LIVER PROFILE:   No results for input(s): AST, ALT, LIPASE, BILIDIR, BILITOT, ALKPHOS in the last 72 hours. Invalid input(s): AMYLASE,  ALB  PT/INR: No results for input(s): PROTIME, INR in the last 72 hours. APTT: No results for input(s): APTT in the last 72 hours. UA:No results for input(s): NITRITE, COLORU, PHUR, LABCAST, WBCUA, RBCUA, MUCUS, TRICHOMONAS, YEAST, BACTERIA, CLARITYU, SPECGRAV, LEUKOCYTESUR, UROBILINOGEN, BILIRUBINUR, BLOODU, GLUCOSEU, AMORPHOUS in the last 72 hours. Invalid input(s): Klaus Pippa  No results for input(s): PH, PCO2, PO2 in the last 72 hours. Films:  Chest imaging reports were reviewed and imaging was reviewed by me and showed no new films     AB.32//93    Cultures:  Blood:  Pending    I reviewed the labs and images listed above    ASSESSMENT/PLAN:  · Acute Hypoxic/Hypercapnic Respiratory Failure with saturations less than 90% on room air and pH less than 7.35 due to aspiration/choking/pneumonia  · Titrate oxygen for saturations greater than or equal to 90%  · Aspiration into the airway   ? Unasyn for 7 days. Stop dates added   ? Lactobacillus   · Acute Metabolic Encephalopathy, improved   ?  Likely due to CO2 narcosis        DVT prophylaxis  Lovenox    Increase activity as tolerated         Dennis Roche, DO  [facility]

## 2021-10-03 NOTE — CARE COORDINATION
Chart Reviewed. Spoke with bedside RN. Call placed to son, Gary Oconnor [phone] to obtain dc disposition. INITIAL CASE MANAGEMENT ASSESSMENT    Living Situation:    [facility] address on face sheet correct, with 24 hour Aide Services from [facility]. Goal is to return there with [facility] services. Ramped Entry. SON REPORTS HE IS NOT THE DPOA, HE BELIEVES HER MOTHER IS BUT WILL CHECK ON THIS. MOTHER IS CURRENLY LIVING IN ASSISTED LIVING RESIDENCE. ADLs:   Pt has been bed bound for 4 years. DME:    Hospital bed, wheelchair, kenzie lift. PT/OT Recs:   NONE. PT is bedbound     Active Services:   [facility] for Private Duty 24 hour Aide Services. Wants to resume upon discharge. Transportation:   Will need stretcher transport/ Ramped Entry. Medications:   Uses [facility] in [address]    PCP:   Dr Harley Girard      HD/PD:   neither  PLAN/COMMENTS:    Son's goal is to return home with 24 hour care in home from [place].           [facility]     Case Management   [phone]    10/3/2021  2:13 PM

## 2021-10-03 NOTE — PROGRESS NOTES
Patient refused insulin coverage for blood glucose level of 156. This RN educated patient about the importance of glucose management and insulin. Patient verbalized understanding. MD aware. Will continue to monitor per unit protocols.  Electronically signed by Yin Pepe RN on 10/3/2021 at 12:15 PM

## 2021-10-03 NOTE — PROGRESS NOTES
Patient is A&O. Patient is resting in bed, awake and quiet. Patient is on 4L of O2 NC. Side rails are up x3. Fall precautions are in place. Bed alarm on. Bed is in lowest position. Call light, telephone and bedside table are within reach. VSS taken. AM meds given. Shift assessment completed. Will continue to monitor patient per unit protocols.  Electronically signed by Placido Pickering RN on 10/3/2021 at 10:35 AM'

## 2021-10-03 NOTE — ACP (ADVANCE CARE PLANNING)
Advance Care Planning     Advance Care Planning Activator (Inpatient)  Conversation Note      Date of ACP Conversation: 10/3/2021     Conversation Conducted with: Danielle Allen    ACP Activator: [id number] Decision Maker:     Current Designated Health Care Decision Maker:     Click here to complete Healthcare Decision Makers including section of the Healthcare Decision Maker Relationship (ie \"Primary\")      Care Preferences    Ventilation: \"If you were in your present state of health and suddenly became very ill and were unable to breathe on your own, what would your preference be about the use of a ventilator (breathing machine) if it were available to you? \"      Would the patient desire the use of ventilator (breathing machine)?:    \"Yes\"    \"If your health worsens and it becomes clear that your chance of recovery is unlikely, what would your preference be about the use of a ventilator (breathing machine) if it were available to you? \"     Would the patient desire the use of ventilator (breathing machine)?:    :NO\"      Resuscitation  \"CPR works best to restart the heart when there is a sudden event, like a heart attack, in someone who is otherwise healthy. Unfortunately, CPR does not typically restart the heart for people who have serious health conditions or who are very sick. \"    \"In the event your heart stopped as a result of an underlying serious health condition, would you want attempts to be made to restart your heart (answer \"yes\" for attempt to resuscitate) or would you prefer a natural death (answer \"no\" for do not attempt to resuscitate)? \"      \"YES\"       [] Yes   [] No   Educated Patient / [facility] regarding differences between Advance Directives and portable DNR orders.     Length of ACP Conversation in minutes:   3 minutes    Conversation Outcomes:  [x] ACP discussion completed  [] Existing advance directive reviewed with patient; no changes to patient's previously recorded wishes  [] New Advance Directive completed  [] Portable Do Not Rescitate prepared for Provider review and signature  [] POLST/POST/MOLST/MOST prepared for Provider review and signature      Follow-up plan:    [] Schedule follow-up conversation to continue planning  [] Referred individual to Provider for additional questions/concerns   [] Advised patient/agent/surrogate to review completed ACP document and update if needed with changes in condition, patient preferences or care setting    [] This note routed to one or more involved healthcare providers    [place], [place]     Case Management   [phone]    10/3/2021  3:15 PM

## 2021-10-04 LAB
ANION GAP SERPL CALCULATED.3IONS-SCNC: 8 MMOL/L (ref 3–16)
BASOPHILS ABSOLUTE: 0 K/UL (ref 0–0.2)
BASOPHILS RELATIVE PERCENT: 0.4 %
BUN BLDV-MCNC: 5 MG/DL (ref 7–20)
CALCIUM SERPL-MCNC: 8.7 MG/DL (ref 8.3–10.6)
CHLORIDE BLD-SCNC: 98 MMOL/L (ref 99–110)
CO2: 34 MMOL/L (ref 21–32)
CREAT SERPL-MCNC: <0.5 MG/DL (ref 0.9–1.3)
EOSINOPHILS ABSOLUTE: 0.1 K/UL (ref 0–0.6)
EOSINOPHILS RELATIVE PERCENT: 1.5 %
GFR AFRICAN AMERICAN: >60
GFR NON-AFRICAN AMERICAN: >60
GLUCOSE BLD-MCNC: 129 MG/DL (ref 70–99)
GLUCOSE BLD-MCNC: 135 MG/DL (ref 70–99)
GLUCOSE BLD-MCNC: 137 MG/DL (ref 70–99)
GLUCOSE BLD-MCNC: 139 MG/DL (ref 70–99)
HCT VFR BLD CALC: 32.2 % (ref 40.5–52.5)
HEMOGLOBIN: 10.2 G/DL (ref 13.5–17.5)
LYMPHOCYTES ABSOLUTE: 1.9 K/UL (ref 1–5.1)
LYMPHOCYTES RELATIVE PERCENT: 24.4 %
MAGNESIUM: 1.5 MG/DL (ref 1.8–2.4)
MCH RBC QN AUTO: 28.7 PG (ref 26–34)
MCHC RBC AUTO-ENTMCNC: 31.8 G/DL (ref 31–36)
MCV RBC AUTO: 90.2 FL (ref 80–100)
MONOCYTES ABSOLUTE: 0.6 K/UL (ref 0–1.3)
MONOCYTES RELATIVE PERCENT: 8.2 %
NEUTROPHILS ABSOLUTE: 5 K/UL (ref 1.7–7.7)
NEUTROPHILS RELATIVE PERCENT: 65.5 %
PDW BLD-RTO: 16.2 % (ref 12.4–15.4)
PERFORMED ON: ABNORMAL
PHOSPHORUS: 3.3 MG/DL (ref 2.5–4.9)
PLATELET # BLD: 181 K/UL (ref 135–450)
PMV BLD AUTO: 8.8 FL (ref 5–10.5)
POTASSIUM SERPL-SCNC: 3.9 MMOL/L (ref 3.5–5.1)
RBC # BLD: 3.56 M/UL (ref 4.2–5.9)
SODIUM BLD-SCNC: 140 MMOL/L (ref 136–145)
WBC # BLD: 7.7 K/UL (ref 4–11)

## 2021-10-04 PROCEDURE — 6360000002 HC RX W HCPCS: Performed by: INTERNAL MEDICINE

## 2021-10-04 PROCEDURE — 6370000000 HC RX 637 (ALT 250 FOR IP): Performed by: INTERNAL MEDICINE

## 2021-10-04 PROCEDURE — 80048 BASIC METABOLIC PNL TOTAL CA: CPT

## 2021-10-04 PROCEDURE — 6370000000 HC RX 637 (ALT 250 FOR IP): Performed by: STUDENT IN AN ORGANIZED HEALTH CARE EDUCATION/TRAINING PROGRAM

## 2021-10-04 PROCEDURE — 83735 ASSAY OF MAGNESIUM: CPT

## 2021-10-04 PROCEDURE — 1200000000 HC SEMI PRIVATE

## 2021-10-04 PROCEDURE — 84100 ASSAY OF PHOSPHORUS: CPT

## 2021-10-04 PROCEDURE — 2580000003 HC RX 258: Performed by: INTERNAL MEDICINE

## 2021-10-04 PROCEDURE — 97530 THERAPEUTIC ACTIVITIES: CPT

## 2021-10-04 PROCEDURE — 97162 PT EVAL MOD COMPLEX 30 MIN: CPT

## 2021-10-04 PROCEDURE — 6370000000 HC RX 637 (ALT 250 FOR IP): Performed by: NURSE PRACTITIONER

## 2021-10-04 PROCEDURE — 92526 ORAL FUNCTION THERAPY: CPT

## 2021-10-04 PROCEDURE — 94761 N-INVAS EAR/PLS OXIMETRY MLT: CPT

## 2021-10-04 PROCEDURE — 97167 OT EVAL HIGH COMPLEX 60 MIN: CPT

## 2021-10-04 PROCEDURE — 2700000000 HC OXYGEN THERAPY PER DAY

## 2021-10-04 PROCEDURE — 36415 COLL VENOUS BLD VENIPUNCTURE: CPT

## 2021-10-04 PROCEDURE — 85025 COMPLETE CBC W/AUTO DIFF WBC: CPT

## 2021-10-04 RX ADMIN — DANTROLENE SODIUM 200 MG: 25 CAPSULE ORAL at 20:47

## 2021-10-04 RX ADMIN — PREGABALIN 200 MG: 100 CAPSULE ORAL at 20:47

## 2021-10-04 RX ADMIN — PREGABALIN 200 MG: 100 CAPSULE ORAL at 08:46

## 2021-10-04 RX ADMIN — CLONAZEPAM 1 MG: 1 TABLET ORAL at 20:47

## 2021-10-04 RX ADMIN — PANTOPRAZOLE SODIUM 40 MG: 40 TABLET, DELAYED RELEASE ORAL at 18:04

## 2021-10-04 RX ADMIN — Medication 1 CAPSULE: at 18:04

## 2021-10-04 RX ADMIN — AMITRIPTYLINE HYDROCHLORIDE 25 MG: 25 TABLET, FILM COATED ORAL at 20:47

## 2021-10-04 RX ADMIN — Medication 1 CAPSULE: at 08:46

## 2021-10-04 RX ADMIN — LEVOTHYROXINE SODIUM 25 MCG: 0.03 TABLET ORAL at 07:09

## 2021-10-04 RX ADMIN — RISPERIDONE 0.5 MG: 0.5 TABLET ORAL at 20:47

## 2021-10-04 RX ADMIN — MAGNESIUM SULFATE HEPTAHYDRATE 2000 MG: 40 INJECTION, SOLUTION INTRAVENOUS at 08:47

## 2021-10-04 RX ADMIN — DANTROLENE SODIUM 200 MG: 25 CAPSULE ORAL at 11:15

## 2021-10-04 RX ADMIN — PANTOPRAZOLE SODIUM 40 MG: 40 TABLET, DELAYED RELEASE ORAL at 07:09

## 2021-10-04 RX ADMIN — PREGABALIN 200 MG: 100 CAPSULE ORAL at 14:11

## 2021-10-04 RX ADMIN — METOPROLOL TARTRATE 25 MG: 25 TABLET, FILM COATED ORAL at 20:47

## 2021-10-04 RX ADMIN — CLONAZEPAM 1 MG: 1 TABLET ORAL at 08:46

## 2021-10-04 RX ADMIN — SERTRALINE 150 MG: 100 TABLET, FILM COATED ORAL at 08:46

## 2021-10-04 RX ADMIN — AMPICILLIN SODIUM AND SULBACTAM SODIUM 3000 MG: 2; 1 INJECTION, POWDER, FOR SOLUTION INTRAMUSCULAR; INTRAVENOUS at 17:35

## 2021-10-04 RX ADMIN — AMPICILLIN SODIUM AND SULBACTAM SODIUM 3000 MG: 2; 1 INJECTION, POWDER, FOR SOLUTION INTRAMUSCULAR; INTRAVENOUS at 07:10

## 2021-10-04 RX ADMIN — ENOXAPARIN SODIUM 40 MG: 40 INJECTION SUBCUTANEOUS at 08:46

## 2021-10-04 ASSESSMENT — PAIN SCALES - GENERAL: PAINLEVEL_OUTOF10: 0

## 2021-10-04 NOTE — PROGRESS NOTES
Occupational Therapy   Occupational Therapy Initial Assessment  Date: 10/4/2021   Patient Name: Delmy Salguero  MRN: [id number]     : 1960    Date of Service: 10/4/2021    Discharge Recommendations:  24 hour supervision or assist, Home with nursing aide  OT Equipment Recommendations  Equipment Needed: No    Delmy Salguero scored a 10/24 on the AM-[facility] ADL Inpatient form. At this time, no further OT is recommended upon discharge due to pt functioning at her baseline- bed bound with 24 hr care and dependent all ADL besides feeding. Recommend patient returns to prior setting with prior services. Assessment   Performance deficits / Impairments: Decreased functional mobility ; Decreased balance;Decreased ADL status; Decreased ROM; Decreased endurance;Decreased high-level IADLs;Decreased strength;Decreased fine motor control  Assessment: 61 yo female admitted for aspiration with respiratory failure. PMH: CP. PTA, pt lives alone bed bound at baseline with 24 hr Total care bathing, dressing, toileting and IADLs. Pt able to feed self with set up. Today, pt functioning at her baseline most limited by BUE weakness and BLE contractures. Pt dependent (Max x2) bed mobility rolling R and L and scooting up in bed. Pt is able to hold self rolled on L d/t  strength on rails with CGA, unable on R side. Pt total A toileting (purewick) and anticipate Max A LB and Mod/Max A UB ADL based on PLOF, pain and mobility. RUE WFL for self care with weak , while LUE near flaccid. Pt reporting functioning at baseline and do not anticipate need for further acute OT. Rec d/c back home with the same level of care 24 hr,  Prognosis: Poor  Decision Making: Medium Complexity  OT Education: OT Role;Plan of Care  REQUIRES OT FOLLOW UP: No  Activity Tolerance  Activity Tolerance: Patient limited by pain; Patient limited by fatigue  Safety Devices  Safety Devices in place: Yes  Type of devices: Nurse notified;Call light within reach; Patient at risk for falls; Left in bed;Bed alarm in place         Patient Diagnosis(es): The encounter diagnosis was Aspiration pneumonitis ([facility] has a past medical history of Cerebral palsy ([facility] [facility] has no past surgical history on file. Restrictions  Position Activity Restriction  Other position/activity restrictions: bed bound at baseline    Subjective   General  Chart Reviewed: Yes  Patient assessed for rehabilitation services?: Yes  Additional Pertinent Hx: 63 yo female admitted for aspiration with respiratory failure. PMH: CP  Family / Caregiver Present: No  Referring Practitioner: Nicky Dillard MD  Diagnosis: Respiratory Failure  Subjective  Subjective: Pt resting in bed upon arrival and agreeable to bed OT/PT eval. Pt reporting R foot pain  Patient Currently in Pain: Denies  Vital Signs  Patient Currently in Pain: Denies  Height and Weight  Height: 5' 9\" (175.3 cm)  Social/Functional History  Social/Functional History  Lives With: Alone  Type of Home: House (condo)  Home Equipment: Hospital bed, Wheelchair-electric  Receives Help From: Personal care attendant (24 hrs Aide Services- meals, bathing, laundry, cleaning)  ADL Assistance: Needs assistance (bed pan. Total A)  Homemaking Assistance: Needs assistance  Ambulation Assistance: Needs assistance (kenzie to w/c 1x/week)  Transfer Assistance: Needs assistance (kenzie to w/c)  Additional Comments: Pt has 24hr caregivers. States she uses kenzie to get to electric WC 1x/week. Objective   Vision: Impaired  Vision Exceptions: Wears glasses at all times  Hearing: Within functional limits    Orientation  Overall Orientation Status: Within Normal Limits  Observation/Palpation  Observation: RLE knee flexion ~90* contracture.  LLE knee extension contracture  Balance  Sitting Balance: Unable to assess(comment)  Standing Balance: Unable to assess(comment)  Functional Mobility  Functional Mobility Comments: MP- bed bound baseline  ADL  Toileting: Dependent/Total (purewick)  Additional Comments: Anticipate Max A LB and Mod/Max A UB ADL based on PLOF, pain and mobility  Tone RUE  RUE Tone: Normotonic  Tone LUE  LUE Tone: Hypotonic  Coordination  Coordination and Movement description: Decreased speed;Decreased accuracy; Right UE;Left UE;Fine motor impairments     Bed mobility  Rolling to Left: Maximum assistance;2 Person assistance  Rolling to Right: 2 Person assistance;Maximum assistance  Scooting: Dependent/Total (scooting up in bed)  Transfers  Transfer Comments: UTA_ bed bound     Cognition  Overall Cognitive Status: WNL  Cognition Comment: slowed speech        Sensation  Overall Sensation Status: Impaired  Light Touch: Partial deficits in the RLE        LUE AROM (degrees)  LUE General AROM: hypotonic- near flaccid- trace contractions with little wrist and digit flexion/extension. Passive- full elbow, 90* shoulder  RUE AROM (degrees)  RUE AROM : WFL  RUE General AROM: shoulder ~90* flexion/abduction  LUE Strength  LUE Strength Comment: varied, trace shoulder and elbow.  2+/5 wrist and digits  RUE Strength  R Hand General: 3/5                Plan   Plan  Times per week: 0    AM-PAC Score        AM-[facility] Inpatient Daily Activity Raw Score: 10 (10/04/21 1031)  AM-PAC Inpatient ADL T-Scale Score : 27.31 (10/04/21 1031)  ADL Inpatient CMS 0-100% Score: 74.7 (10/04/21 1031)  ADL Inpatient CMS G-Code Modifier : CL (10/04/21 1031)    Goals  Short term goals  Short term goal 1: no OT acute goals identified       Therapy Time   Individual Concurrent Group Co-treatment   Time In 0945         Time Out 1015         Minutes 30         Timed Code Treatment Minutes: 15 Minutes (15 eval. 15 TA)       MEHRDAD Paul, OTR/L

## 2021-10-04 NOTE — CARE COORDINATION
[facility] can accept patient upon d/c pending financial documents being completed. They will reach out to patient.   Electronically signed by Deep Livingston on 10/4/2021 at 3:14 PM

## 2021-10-04 NOTE — PROGRESS NOTES
Physical Therapy    Facility/Department: [address] ORTHOPEDICS  Initial Assessment & Treatment   Discharge Summary    NAME: Josselyn Rivas  : 1960  MRN: [id number]    Date of Service: 10/4/2021    Discharge Recommendations:  Josselyn Rivas scored a 7/24 on the AM-PAC short mobility form. Recommending continued 24hr caregivers at home or consider LTC facility. Please see assessment section for further patient specific details. If patient discharges prior to next session this note will serve as a discharge summary. Please see below for the latest assessment towards goals. Assessment   Body structures, Functions, Activity limitations: Decreased functional mobility   Assessment: Pt is a 62year old female patient with a past medical history of cerebral palsy who presents via EMS from her ECF after aspirating while eating dinner, patient was  hypoxic and hypercapnia and placed on BiPAP. At baseline, pt is bed bound with 24hr caregivers and gets up to an electric scooter 1x/week. Pt has not had home health PT for >5 months. Upon eval, pt is at baseline functioning. Pt signing off at this time. Recommending continued 24hr caregivers at home or consider LTC facility. No PT services needed. Treatment Diagnosis: weakness  Prognosis: Fair  Decision Making: Medium Complexity  Barriers to Learning: none  REQUIRES PT FOLLOW UP: No  Activity Tolerance  Activity Tolerance: Patient Tolerated treatment well       Patient Diagnosis(es): The encounter diagnosis was Aspiration pneumonitis ([address] has a past medical history of Cerebral palsy ([address] has no past surgical history on file.     Restrictions  Position Activity Restriction  Other position/activity restrictions: bed bound at baseline  Vision/Hearing  Vision: Impaired  Vision Exceptions: Wears glasses at all times  Hearing: Within functional limits     Subjective  General  Chart Reviewed: Yes  Patient assessed for rehabilitation services?: Yes  Additional Pertinent Hx: 62year old female patient with a past medical history of cerebral palsy who presents via EMS from her ECF after aspirating while eating dinner, patient was  hypoxic and hypercapnia and placed on BiPAP. Response To Previous Treatment: Not applicable  Family / Caregiver Present: No  Referring Practitioner: Yessenia Quevedo MD  Referral Date : 10/03/21  Diagnosis: hypoxia  Follows Commands: Within Functional Limits  Subjective  Subjective: Pt supine in bed upon arrival, agreeable to PT eval  Pain Screening  Patient Currently in Pain: Denies  Vital Signs  Patient Currently in Pain: Denies       Orientation  Orientation  Overall Orientation Status: Within Functional Limits  Social/Functional History  Social/Functional History  Lives With: Alone  Type of Home: House (condo)  Home Equipment: Hospital bed, Wheelchair-electric  Receives Help From: Personal care attendant (24 hrs Aide Services- meals, bathing, laundry, cleaning)  ADL Assistance: Needs assistance (bed pan. Total A)  Homemaking Assistance: Needs assistance  Ambulation Assistance: Needs assistance (kenzie to w/c 1x/week)  Transfer Assistance: Needs assistance (kenzie to w/c)  Additional Comments: Pt has 24hr caregivers. States she uses kenzie to get to electric WC 1x/week.   Cognition        Objective          PROM RLE (degrees)  RLE PROM: Exceptions  RLE General PROM: noted R knee flexion contracture ~100 deg flexion, R ankle inversion constracture  AROM RLE (degrees)  RLE AROM: Exceptions  RLE General AROM: Noted L knee ext contracture (able to passively flex apx 10 deg), L ankle DF contracture  Strength RLE  Strength RLE: Exception  Comment: not formally assessed, grossly 2/5  Strength LLE  Strength LLE: Exception  Comment: not formally assessed, grossly 2/5  Tone RLE  RLE Tone: Hypertonic  Tone LLE  LLE Tone: Hypertonic  Sensation  Overall Sensation Status: Impaired  Light Touch: Partial deficits in the RLE  Bed mobility  Rolling to Left: Maximum assistance;2 Person assistance  Rolling to Right: 2 Person assistance;Maximum assistance  Scooting: Dependent/Total (scooting up in bed)  Transfers  Comment: pt is Aileen transfer at baseline  Ambulation  Ambulation?: No (pt is non-ambulatory at baseline)  Stairs/Curb  Stairs?: No     Balance  Comments: unable to assess      Treatment included bed mobility, PROM, and pt education. Plan   Plan  Times per week: One time visit  Safety Devices  Type of devices:  All fall risk precautions in place, Bed alarm in place, Call light within reach, Left in bed, Nurse notified  Restraints  Initially in place: No    G-Code       OutComes Score                                                  AM-PAC Score  AM-PAC Inpatient Mobility Raw Score : 7 (10/04/21 1021)  AM-PAC Inpatient T-Scale Score : 26.42 (10/04/21 1021)  Mobility Inpatient CMS 0-100% Score: 92.36 (10/04/21 1021)  Mobility Inpatient CMS G-Code Modifier : CM (10/04/21 1021)          Goals  Short term goals  Time Frame for Short term goals: No acute PT goals identified  Patient Goals   Patient goals : none stated       Therapy Time   Individual Concurrent Group Co-treatment   Time In 0945         Time Out 1015         Minutes 30         Timed Code Treatment Minutes: [address] Toño Garrido PT   Electronically signed by Lisseth Ruiz PT on 10/4/2021 at 10:23 AM

## 2021-10-04 NOTE — PLAN OF CARE
Nutrition Problem #1:  Biting/chewing (masticatory) difficulty  Intervention: Food and/or Nutrient Delivery: Continue Current Diet, Continue Oral Nutrition Supplement  Nutritional Goals: tolerate most appropriate form of nutrition

## 2021-10-04 NOTE — PROGRESS NOTES
Comprehensive Nutrition Assessment    Type and Reason for Visit:  Initial, RD Nutrition Re-Screen/LOS    Nutrition Recommendations/Plan:   Continue Magic Cup bid    Nutrition Assessment:  Pt screened for LOS. PMH includes Cerebral Palsy. Adm diagnosis included aspiration pnuemonia, following a choking event at the [facility]. Diet advanced per Speech Therapy to Dysphagia, Minced and Moist with Nectar Liquids. Magic Cup added bid per Provider. Intake much improved at %. Noted records reflect a wt loss trend, but not at significant rate. Will continue ONS for now. Malnutrition Assessment:  Malnutrition Status:  No malnutrition    Context:  Acute Illness       Estimated Daily Nutrient Needs:  Energy (kcal):  6346-6805 (20-25 x ABW 83 kg); Weight Used for Energy Requirements:        Protein (g):  100 (1.2 x ABW 83 kg); Weight Used for Protein Requirements:           Fluid (ml/day):   ; Method Used for Fluid Requirements:  1 ml/kcal      Nutrition Related Findings:  Noted BM on 9/30. Noted +1 edema to BUE and BLE. Wounds:  None       Current Nutrition Therapies:    Adult Oral Nutrition Supplement; Frozen Oral Supplement  ADULT DIET;  Dysphagia - Minced and Moist; Mildly Thick (Nectar)    Anthropometric Measures:  · Height: 5' 9\" (175.3 cm)  · Current Body Weight: 183 lb (83 kg)     · Ideal Body Weight:   145 lb;   · BMI: 27  · Adjusted Body Weight:  ; No Adjustment   ·     Nutrition Diagnosis:   · Biting/chewing (masticatory) difficulty related to altered GI function as evidenced by swallow study results      Nutrition Interventions:   Food and/or Nutrient Delivery:  Continue Current Diet, Continue Oral Nutrition Supplement  Nutrition Education/Counseling:  No recommendation at this time   Coordination of Nutrition Care:  Continue to monitor while inpatient    Goals:  tolerate most appropriate form of nutrition       Nutrition Monitoring and Evaluation:   Behavioral-Environmental Outcomes:  None Identified Food/Nutrient Intake Outcomes:  Diet Advancement/Tolerance, Food and Nutrient Intake, Supplement Intake  Physical Signs/Symptoms Outcomes:  Biochemical Data, Chewing or Swallowing, Constipation, Diarrhea, Weight, Skin, Fluid Status or Edema     Discharge Planning:     Too soon to determine     Electronically signed by Shyann Varela RD, LD on 10/4/21 at 11:46 AM EDT    Contact: [phone]

## 2021-10-04 NOTE — PROGRESS NOTES
Hospitalist Progress Note      PCP: Donte Jones    Date of Admission: 9/27/2021    Chief Complaint: aspiration. Hospital Course:   62year old female patient with a past medical history of cerebral palsy who presents via EMS from her ECF after aspirating while eating dinner, patient was  hypoxic and hypercapnia and placed on BiPAP. Patient was admitted as a case of aspiration pneumonia. Subjective:   Spiked fever up to 39 yesterday night. Denies any complaints , continues to be on 3 liters via nasal canula. Medications:  Reviewed    Infusion Medications    sodium chloride 5 mL/hr at 10/02/21 1822    sodium chloride 100 mL/hr at 10/01/21 0335    dextrose       Scheduled Medications    ampicillin-sulbactam  3,000 mg IntraVENous Q6H    lactobacillus  1 capsule Oral BID WC    pantoprazole  40 mg Oral BID AC    clonazePAM  1 mg Oral BID    dantrolene  200 mg Oral BID    amitriptyline  25 mg Oral Nightly    sertraline  150 mg Oral Daily    pregabalin  200 mg Oral TID    risperiDONE  0.5 mg Oral Nightly    levothyroxine  25 mcg Oral Daily    metoprolol tartrate  25 mg Oral BID    sodium chloride flush  5-40 mL IntraVENous 2 times per day    enoxaparin  40 mg SubCUTAneous Daily    insulin lispro  0-6 Units SubCUTAneous Q4H     PRN Meds: acetaminophen-codeine, sodium chloride flush, sodium chloride, potassium chloride, magnesium sulfate, ondansetron, glucose, dextrose, glucagon (rDNA), dextrose      Intake/Output Summary (Last 24 hours) at 10/4/2021 1029  Last data filed at 10/4/2021 0958  Gross per 24 hour   Intake 820 ml   Output 650 ml   Net 170 ml       Physical Exam Performed:    BP (!) 98/56   Pulse 72   Temp 98.3 °F (36.8 °C) (Oral)   Resp 14   Ht 5' 9\" (1.753 m)   Wt 183 lb 3.2 oz (83.1 kg)   SpO2 97%   BMI 27.05 kg/m²     General appearance:plesant female patient, not in pain or distress. On 3 liters of oxygen. HEENT: Pupils equal, round, and reactive to light. Conjunctivae/corneas clear. Neck: Supple, with full range of motion. No jugular venous distention. Trachea midline. Respiratory:  Normal respiratory effort. Clear to auscultation, bilaterally without Rales/Wheezes/Rhonchi. Cardiovascular: Regular rate and rhythm with normal S1/S2 without murmurs, rubs or gallops. Abdomen: Soft, non-tender, non-distended with normal bowel sounds. Musculoskeletal:upper and lower limb deformity. Skin: Skin color, texture, turgor normal.  No rashes or lesions. Neurologic:  Neurovascularly intact without any focal sensory/motor deficits. Cranial nerves: II-XII intact, grossly non-focal.  Psychiatric: Alert . Capillary Refill: Brisk,3 seconds, normal   Peripheral Pulses: +2 palpable, equal bilaterally       Labs:   Recent Labs     10/02/21  0605 10/03/21  0504 10/04/21  0453   WBC 9.0 8.9 7.7   HGB 10.2* 11.9* 10.2*   HCT 32.8* 37.8* 32.2*    166 181     Recent Labs     10/02/21  0605 10/03/21  0504 10/04/21  0453    139 140   K 3.4* 4.6 3.9    100 98*   CO2 30 31 34*   BUN 10 7 5*   CREATININE <0.5* <0.5* <0.5*   CALCIUM 8.2* 8.7 8.7   PHOS 3.7 3.5 3.3     No results for input(s): AST, ALT, BILIDIR, BILITOT, ALKPHOS in the last 72 hours. No results for input(s): INR in the last 72 hours. No results for input(s): Disha Benedict in the last 72 hours. Urinalysis:    No results found for: Dorethia Pall, BACTERIA, RBCUA, BLOODU, SPECGRAV, [address] [place] [address]    Radiology:  XR CHEST PORTABLE   Final Result   Airspace disease primarily right parahilar and lower lung. This likely   represents pneumonia and/or aspiration is also considered as given in the   clinical history. Fluoroscopy modified barium swallow with video   Final Result   Intermittent aspiration with thin liquid. Please see separate speech pathology report for full discussion of findings   and recommendations.          XR CHEST PORTABLE   Final Result   Right greater than left basilar patchy opacities may represent atelectasis or   a developing infectious/inflammatory process. Follow-up PA and lateral chest   radiographs may be helpful for further evaluation. Prominence of the superior mediastinum may be accentuated by patient   positioning. Underlying mediastinal pathology is not excluded. Short-term   follow-up PA and lateral chest radiographs or chest CT may be helpful for   further evaluation as warranted. Assessment/Plan:    Active Hospital Problems    Diagnosis     Aspiration pneumonitis ([address] [J69.0]     Choking [F31.943O]     Metabolic encephalopathy [H68.44]     Acute type 1 respiratory failure (HCC) [J96.01]     Acute respiratory failure with hypoxia and hypercapnia (HCC) [J96.01, J96.02]      Acute hypoxic and hypercapnic respiratory failure. Aspiration pneumonia. Acute metabolic encephalopathy. Patient presented from ECF with aspiration, was found to have hypoxic and hypercapnic, patient was agitated and confused. XR with R>L infiltration suggestive of aspiration. Patient was managed in the ICU with BiPAP and unasyn, improved and transferred to the floor. Continues to be on oxygen at 3 liters, spiked fever up to 39 C last night. Will complete course today. Plan :  - continue Unasyn Day 7/7.   - wean down oxygen. .   - will send sepsis work up if patient spikes fever again and possibly restart AB. - agree with SLP eval, patient placed on modified diet. - home meds resumed ( clonopin resumed). Mild hypokalemia . Replaced. CP.   bedbound at baseline. Home with 24 hour care. Patient is interested in placement to ECF. DVT Prophylaxis: lovenox  Diet: Adult Oral Nutrition Supplement; Frozen Oral Supplement  ADULT DIET; Dysphagia - Minced and Moist; Mildly Thick (Nectar)  Code Status: DNR-CCA    PT/OT Eval Status: following. Dispo - ECF vs home with 24 hour care ( once clinically stable).      Phuc Ruvalcaba MD

## 2021-10-04 NOTE — PROGRESS NOTES
NP perfect served for temp 102. 4. tylenol given, no other orders given. Will continue to monitor pt temp.

## 2021-10-04 NOTE — PLAN OF CARE
Problem: Falls - Risk of:  Goal: Will remain free from falls  Description: Will remain free from falls  Outcome: Ongoing  Note: Fall risk assessment completed . Fall precautions in place, bed/ chair alarm on, side rails 2/4 up, call light in reach, educated pt on calling for assistance when needed, room clear of clutter. Pt verbalized understanding. Problem: Falls - Risk of:  Goal: Absence of physical injury  Description: Absence of physical injury  Outcome: Ongoing  Note: Patient remains free from physical injury. Patient educated on safety precautions. Will continue to monitor to ensure patient remains free from physical injury throughout remainder of shift. Problem: Pain:  Goal: Pain level will decrease  Description: Pain level will decrease  Outcome: Ongoing  Note: Pt educated to attempt non-phagological method of pain control, but it it becomes too strong use PRN analgesics. Pain and discomfort being managed PRN analgesics per MD orders. Pt able to express presence of pain. Problem: Pain:  Goal: Control of acute pain  Description: Control of acute pain  Outcome: Ongoing  Note: Patient educated on acute pain. Taught patient to use call light to ask for pain medication. PRN pain medication given for acute pain. Will continue to monitor pain per unit protocol. Problem: Pain:  Goal: Control of chronic pain  Description: Control of chronic pain  Outcome: Ongoing  Note: Patient educated on chronic pain. Taught patient to use call light to ask for pain medication. PRN pain medication given for chronic pain. Will continue to monitor pain per unit protocol. Problem: Skin Integrity:  Goal: Will show no infection signs and symptoms  Description: Will show no infection signs and symptoms  Outcome: Ongoing  Note: Pt temp 102.4, treated with tylenol. No antibiotics at this time. Will monitor.      Problem: Skin Integrity:  Goal: Absence of new skin breakdown  Description: Absence of new

## 2021-10-05 LAB
ANION GAP SERPL CALCULATED.3IONS-SCNC: 10 MMOL/L (ref 3–16)
BASOPHILS ABSOLUTE: 0 K/UL (ref 0–0.2)
BASOPHILS RELATIVE PERCENT: 0.4 %
BUN BLDV-MCNC: 8 MG/DL (ref 7–20)
CALCIUM SERPL-MCNC: 8.6 MG/DL (ref 8.3–10.6)
CHLORIDE BLD-SCNC: 99 MMOL/L (ref 99–110)
CO2: 34 MMOL/L (ref 21–32)
CREAT SERPL-MCNC: <0.5 MG/DL (ref 0.9–1.3)
EOSINOPHILS ABSOLUTE: 0.1 K/UL (ref 0–0.6)
EOSINOPHILS RELATIVE PERCENT: 2.1 %
GFR AFRICAN AMERICAN: >60
GFR NON-AFRICAN AMERICAN: >60
GLUCOSE BLD-MCNC: 102 MG/DL (ref 70–99)
HCT VFR BLD CALC: 31.2 % (ref 40.5–52.5)
HEMOGLOBIN: 10 G/DL (ref 13.5–17.5)
LYMPHOCYTES ABSOLUTE: 1.4 K/UL (ref 1–5.1)
LYMPHOCYTES RELATIVE PERCENT: 24.5 %
MAGNESIUM: 1.8 MG/DL (ref 1.8–2.4)
MCH RBC QN AUTO: 28.6 PG (ref 26–34)
MCHC RBC AUTO-ENTMCNC: 31.9 G/DL (ref 31–36)
MCV RBC AUTO: 89.5 FL (ref 80–100)
MONOCYTES ABSOLUTE: 0.6 K/UL (ref 0–1.3)
MONOCYTES RELATIVE PERCENT: 10.5 %
NEUTROPHILS ABSOLUTE: 3.6 K/UL (ref 1.7–7.7)
NEUTROPHILS RELATIVE PERCENT: 62.5 %
PDW BLD-RTO: 16.5 % (ref 12.4–15.4)
PHOSPHORUS: 3.4 MG/DL (ref 2.5–4.9)
PLATELET # BLD: 170 K/UL (ref 135–450)
PMV BLD AUTO: 8.4 FL (ref 5–10.5)
POTASSIUM SERPL-SCNC: 4.2 MMOL/L (ref 3.5–5.1)
RBC # BLD: 3.49 M/UL (ref 4.2–5.9)
SODIUM BLD-SCNC: 143 MMOL/L (ref 136–145)
WBC # BLD: 5.8 K/UL (ref 4–11)

## 2021-10-05 PROCEDURE — 92526 ORAL FUNCTION THERAPY: CPT

## 2021-10-05 PROCEDURE — 80048 BASIC METABOLIC PNL TOTAL CA: CPT

## 2021-10-05 PROCEDURE — 1200000000 HC SEMI PRIVATE

## 2021-10-05 PROCEDURE — 84100 ASSAY OF PHOSPHORUS: CPT

## 2021-10-05 PROCEDURE — 83735 ASSAY OF MAGNESIUM: CPT

## 2021-10-05 PROCEDURE — 2700000000 HC OXYGEN THERAPY PER DAY

## 2021-10-05 PROCEDURE — 6370000000 HC RX 637 (ALT 250 FOR IP): Performed by: INTERNAL MEDICINE

## 2021-10-05 PROCEDURE — 94761 N-INVAS EAR/PLS OXIMETRY MLT: CPT

## 2021-10-05 PROCEDURE — 85025 COMPLETE CBC W/AUTO DIFF WBC: CPT

## 2021-10-05 PROCEDURE — 6370000000 HC RX 637 (ALT 250 FOR IP): Performed by: NURSE PRACTITIONER

## 2021-10-05 PROCEDURE — 36415 COLL VENOUS BLD VENIPUNCTURE: CPT

## 2021-10-05 PROCEDURE — 6370000000 HC RX 637 (ALT 250 FOR IP): Performed by: STUDENT IN AN ORGANIZED HEALTH CARE EDUCATION/TRAINING PROGRAM

## 2021-10-05 PROCEDURE — 6360000002 HC RX W HCPCS: Performed by: INTERNAL MEDICINE

## 2021-10-05 RX ADMIN — PREGABALIN 200 MG: 100 CAPSULE ORAL at 09:27

## 2021-10-05 RX ADMIN — ENOXAPARIN SODIUM 40 MG: 40 INJECTION SUBCUTANEOUS at 09:28

## 2021-10-05 RX ADMIN — Medication 1 CAPSULE: at 09:27

## 2021-10-05 RX ADMIN — LEVOTHYROXINE SODIUM 25 MCG: 0.03 TABLET ORAL at 05:36

## 2021-10-05 RX ADMIN — CLONAZEPAM 1 MG: 1 TABLET ORAL at 09:27

## 2021-10-05 RX ADMIN — PANTOPRAZOLE SODIUM 40 MG: 40 TABLET, DELAYED RELEASE ORAL at 16:02

## 2021-10-05 RX ADMIN — ACETAMINOPHEN AND CODEINE PHOSPHATE 1 TABLET: 300; 30 TABLET ORAL at 18:30

## 2021-10-05 RX ADMIN — PANTOPRAZOLE SODIUM 40 MG: 40 TABLET, DELAYED RELEASE ORAL at 05:36

## 2021-10-05 RX ADMIN — DANTROLENE SODIUM 200 MG: 25 CAPSULE ORAL at 09:27

## 2021-10-05 RX ADMIN — SERTRALINE 150 MG: 100 TABLET, FILM COATED ORAL at 09:26

## 2021-10-05 RX ADMIN — PREGABALIN 200 MG: 100 CAPSULE ORAL at 16:02

## 2021-10-05 RX ADMIN — Medication 1 CAPSULE: at 16:02

## 2021-10-05 ASSESSMENT — PAIN SCALES - GENERAL
PAINLEVEL_OUTOF10: 0
PAINLEVEL_OUTOF10: 0
PAINLEVEL_OUTOF10: 8

## 2021-10-05 ASSESSMENT — PAIN DESCRIPTION - DESCRIPTORS: DESCRIPTORS: SORE

## 2021-10-05 ASSESSMENT — PAIN DESCRIPTION - LOCATION: LOCATION: GENERALIZED

## 2021-10-05 ASSESSMENT — PAIN - FUNCTIONAL ASSESSMENT: PAIN_FUNCTIONAL_ASSESSMENT: PREVENTS OR INTERFERES SOME ACTIVE ACTIVITIES AND ADLS

## 2021-10-05 ASSESSMENT — PAIN DESCRIPTION - PROGRESSION: CLINICAL_PROGRESSION: GRADUALLY IMPROVING

## 2021-10-05 ASSESSMENT — PAIN DESCRIPTION - FREQUENCY: FREQUENCY: INTERMITTENT

## 2021-10-05 ASSESSMENT — PAIN DESCRIPTION - PAIN TYPE: TYPE: ACUTE PAIN

## 2021-10-05 ASSESSMENT — PAIN DESCRIPTION - ONSET: ONSET: ON-GOING

## 2021-10-05 ASSESSMENT — PAIN DESCRIPTION - ORIENTATION: ORIENTATION: RIGHT

## 2021-10-05 NOTE — PROGRESS NOTES
Hospitalist Progress Note      PCP: Dino Aldridge    Date of Admission: 9/27/2021    Chief Complaint: aspiration. Hospital Course:   62year old female patient with a past medical history of cerebral palsy who presents via EMS from her ECF after aspirating while eating dinner, patient was  hypoxic and hypercapnia and placed on BiPAP. Patient was admitted with aspiration pneumonia. Subjective:   continues to be on 3 liters via nasal canula. Still SOB. Not much cough and no sputum. Medications:  Reviewed    Infusion Medications    sodium chloride 5 mL/hr at 10/02/21 1822    sodium chloride 100 mL/hr at 10/01/21 0335    dextrose       Scheduled Medications    lactobacillus  1 capsule Oral BID WC    pantoprazole  40 mg Oral BID AC    clonazePAM  1 mg Oral BID    dantrolene  200 mg Oral BID    amitriptyline  25 mg Oral Nightly    sertraline  150 mg Oral Daily    pregabalin  200 mg Oral TID    risperiDONE  0.5 mg Oral Nightly    levothyroxine  25 mcg Oral Daily    metoprolol tartrate  25 mg Oral BID    sodium chloride flush  5-40 mL IntraVENous 2 times per day    enoxaparin  40 mg SubCUTAneous Daily     PRN Meds: acetaminophen-codeine, sodium chloride flush, sodium chloride, potassium chloride, magnesium sulfate, ondansetron, glucose, dextrose, glucagon (rDNA), dextrose      Intake/Output Summary (Last 24 hours) at 10/5/2021 1247  Last data filed at 10/5/2021 0928  Gross per 24 hour   Intake 420 ml   Output 550 ml   Net -130 ml       Physical Exam Performed:    /60   Pulse 90   Temp 98.8 °F (37.1 °C) (Oral)   Resp 14   Ht 5' 9\" (1.753 m)   Wt 183 lb 6.8 oz (83.2 kg)   SpO2 92%   BMI 27.09 kg/m²     General appearance:plesant female patient, not in pain or distress. On 3 liters of oxygen. HEENT: Pupils equal, round, and reactive to light. Conjunctivae/corneas clear. Neck: Supple, with full range of motion. No jugular venous distention.  Trachea midline. Respiratory:  Normal respiratory effort. Clear to auscultation, bilaterally without Rales/Wheezes/Rhonchi. Cardiovascular: Regular rate and rhythm with normal S1/S2 without murmurs, rubs or gallops. Abdomen: Soft, non-tender, non-distended with normal bowel sounds. Musculoskeletal:upper and lower limb deformity. Skin: Skin color, texture, turgor normal.  No rashes or lesions. Neurologic:  Neurovascularly intact without any focal sensory/motor deficits. Cranial nerves: II-XII intact, grossly non-focal.  Psychiatric: Alert . Capillary Refill: Brisk,3 seconds, normal   Peripheral Pulses: +2 palpable, equal bilaterally       Labs:   Recent Labs     10/03/21  0504 10/04/21  0453 10/05/21  0446   WBC 8.9 7.7 5.8   HGB 11.9* 10.2* 10.0*   HCT 37.8* 32.2* 31.2*    181 170     Recent Labs     10/03/21  0504 10/04/21  0453 10/05/21  0446    140 143   K 4.6 3.9 4.2    98* 99   CO2 31 34* 34*   BUN 7 5* 8   CREATININE <0.5* <0.5* <0.5*   CALCIUM 8.7 8.7 8.6   PHOS 3.5 3.3 3.4     No results for input(s): AST, ALT, BILIDIR, BILITOT, ALKPHOS in the last 72 hours. No results for input(s): INR in the last 72 hours. No results for input(s): Shayla Height in the last 72 hours. Urinalysis:    No results found for: Tomas Rosie, BACTERIA, RBCUA, BLOODU, SPECGRAV, [address] [place] [address]    Radiology:  XR CHEST PORTABLE   Final Result   Airspace disease primarily right parahilar and lower lung. This likely   represents pneumonia and/or aspiration is also considered as given in the   clinical history. Fluoroscopy modified barium swallow with video   Final Result   Intermittent aspiration with thin liquid. Please see separate speech pathology report for full discussion of findings   and recommendations. XR CHEST PORTABLE   Final Result   Right greater than left basilar patchy opacities may represent atelectasis or   a developing infectious/inflammatory process.   Follow-up PA and lateral chest   radiographs may be helpful for further evaluation. Prominence of the superior mediastinum may be accentuated by patient   positioning. Underlying mediastinal pathology is not excluded. Short-term   follow-up PA and lateral chest radiographs or chest CT may be helpful for   further evaluation as warranted. Assessment/Plan:    Active Hospital Problems    Diagnosis     Aspiration pneumonitis ([facility] [address] [J69.0]     Choking [L77.485Z]     Metabolic encephalopathy [A50.77]     Acute type 1 respiratory failure (HCC) [J96.01]     Acute respiratory failure with hypoxia and hypercapnia (HCC) [J96.01, J96.02]      Acute hypoxic and hypercapnic respiratory failure. Aspiration pneumonia. Acute metabolic encephalopathy. Patient presented from ECF with aspiration, was found to have hypoxic and hypercapnic, patient was agitated and confused. XR with R>L infiltrate suggestive of aspiration. Patient was managed in the ICU with BiPAP and unasyn, improved and transferred to Med floor. Continues to be on oxygen at 3 liters  Plan :  - completed Unasyn Day 7/7 on 10/4.   - wean down oxygen. - agree with SLP eval, patient placed on modified diet. - home meds resumed ( clonopin resumed). Mild hypokalemia . Replaced. Cerebral palsy. bedbound at baseline. Hx of Cardiac arrest many years ago - lost ability to ambulate then./   At baseline Marco Antonio Haji lift to wheel chair. Chronic and severe LE contractures. Patient is interested in placement to ECF. DVT Prophylaxis: lovenox  Diet: Adult Oral Nutrition Supplement; Frozen Oral Supplement  ADULT DIET; Dysphagia - Minced and Moist; Mildly Thick (Nectar)  Code Status: DNR-CCA    PT/OT Eval Status: following. Dispo - d/c to [facility] tomorrow if afebrile. Likely looking at Home O2 on discharge.  Lkae Bean MD

## 2021-10-05 NOTE — PLAN OF CARE
Problem: Falls - Risk of:  Goal: Will remain free from falls  Description: Will remain free from falls  Outcome: Ongoing  Goal: Absence of physical injury  Description: Absence of physical injury  Outcome: Ongoing   Fall risk assessment completed every shift. All precautions in place. Pt has call light within reach at all times. Room clear of clutter. Pt aware to call for assistance when getting up. Problem: Pain:  Goal: Pain level will decrease  Description: Pain level will decrease  Outcome: Ongoing  Goal: Control of acute pain  Description: Control of acute pain  Outcome: Ongoing  Goal: Control of chronic pain  Description: Control of chronic pain  Outcome: Ongoing   Pain/discomfort being managed with PRN analgesics per MD orders. Pt able to express presence and absence of pain and rate pain appropriately using numerical scale. Problem: Skin Integrity:  Goal: Will show no infection signs and symptoms  Description: Will show no infection signs and symptoms  Outcome: Ongoing  Goal: Absence of new skin breakdown  Description: Absence of new skin breakdown  Outcome: Ongoing   Skin assessment completed every shift. Pt assessed for incontinence, appropriate barrier wipes used prn. Pt encouraged to turn/rotate every 2 hours. Assistance provided if pt unable to do so themselves.       Problem: Nutrition  Goal: Optimal nutrition therapy  Outcome: Ongoing

## 2021-10-05 NOTE — PLAN OF CARE
Trace Koch RN  Outcome: Ongoing  Note: Monitoring pt for signs and symptoms of infection. Will observe for any redness, warmth, or pus.         Problem: Skin Integrity:  Goal: Absence of new skin breakdown  Description: Absence of new skin breakdown  10/5/2021 1051 by Charan Ramirez RN  Outcome: Ongoing  Note: Pt absent from new skin breakdown on this shift      Problem: Nutrition  Goal: Optimal nutrition therapy  10/5/2021 1051 by Charan Ramirez RN  Outcome: Ongoing  Note: Nutrition remains ongoing

## 2021-10-05 NOTE — PROGRESS NOTES
Patient is alert & oriented x4, maximove, 2/4 bed rails up, bed in lowest position, fall precautions in place, call light within reach. Morning medications given whole two at a time in applesauce, morning assessment complete. Pt repositioned in bed. Will cont to monitor and reassess.   Electronically signed by Dalton Vee RN on 10/5/2021 at 10:10 AM

## 2021-10-05 NOTE — PROGRESS NOTES
[facility]   Speech Therapy  Daily Dysphagia Treatment Note    Jose Vance  AGE: 62 y.o. GENDER: adult  : 1960  [id number]  EPISODE DATE:  2021     Patient Active Problem List   Diagnosis    Acute type 1 respiratory failure (HCC)    Acute respiratory failure with hypoxia and hypercapnia (HCC)    Aspiration pneumonitis (HCC)    Choking    Metabolic encephalopathy     No Known Allergies    Treatment Diagnosis: Dysphagia     CHART REVIEW:  2021 admitted s/p reported aspiration episode: ADMISSION H&P HPI: The patient is a 81-year-old  female who presented with episode of aspiration, this is per the emergency room EMS report. Ritu Schultz were called to the ECF because the patient started having choking and shortness of breath while she was eating her dinner. Marylen Harden was found to be severely hypoxic and was placed on noninvasive positive pressure ventilation.  At the time of my exam, the patient is definitely significantly drowsy but is waking up and responding.     2021 CXR  Impression   Right greater than left basilar patchy opacities may represent atelectasis or   a developing infectious/inflammatory process.  Follow-up PA and lateral chest   radiographs may be helpful for further evaluation.       Prominence of the superior mediastinum may be accentuated by patient   positioning.  Underlying mediastinal pathology is not excluded.  Short-term   follow-up PA and lateral chest radiographs or chest CT may be helpful for   further evaluation as warranted.      2021 BiPAP until afternoon when patient placed on 6L NC; BiPAP resumed overnight with return to 6L NC in the AM     2021 MBS  Moderate-severe oral stage dysphagia characterized by decreased mastication and decreased lingula manipulation/coordination. · Prolonged, disorganized bolus formation and movement with all. Incomplete bolus formation/cohesion with all but adequate mastication with solids. · Premature bolus loss to the e pharynx with all; decreased oral control exacerbates. · Piecemeal swallow with all. · Oral residue with all with repeat swallows, but residue is not completely cleared (even with liquids). Moderate pharyngeal stage dysphagia characterized by delayed swallow, decreased laryngeal elevation, and decreased pharyngeal peristalsis. · Premature spillage to the valleculae with all. · Mild vallecular residue with moist solids, puree, and liquids; mod vallecular residue with soft solids. Trace pyriform residue with all appears r/t reduced cricopharyngeal opening. Spontaneous repeat swallows do not completely clear residue. Liquid wash minimizes solids residue, but results in deep penetration and/or aspiration with thin liquids. · Appears to present with inconsistent, intermittent deep penetration/aspiration of thin taken in isolation. · Patient appears most optimal with minced/moist with nectar thick liquids; could consider SMALL sips thin between meals. Cricopharyngeal dysfunction noted with potential to exacerbate pharyngeal symptoms of dysphagia. DYSPHAGIA HISTORY  PEG placed 2015 s/p cardiac arrest with intubation; PEG removed 2015: no SLP notes per EMR review      Subjective:     Current diet  Solid consistency: Dysphagia Minced and Moist (Dysphagia II)  Liquid consistency: Mildly Thick (Nectar)  Liquid administration via: Straw (unable to tolerate via cup)  Medication administration: Meds in puree (crushed if able)  Compensatory Swallowing Strategies: Upright as possible for all oral intake;Remain upright for 30-45 minutes after meals;Eat/Feed slowly; Small bites/sips    Comments regarding tolerating Current Diet:   Patient with distaste for modified diet, but acknowledges need/benefit      Objective:     Pain   Patient Currently in Pain: No    Cognitive/Behavior   Behavior/Cognition: Alert, Cooperative, Pleasant mood, Oriented to self/situation/place;  Follows dx; Verbally responsive - makes wants/needs known, now adequate historian    Positioning   Upright in bed    PO Trials:   · Thin Liquids (thin H2O x5): prolonged transit; suspect premature bolus loss to the pharynx; delayed swallow; decreased laryngeal elevation, no overt s/s of aspiration  · Nectar thick liquids: DNT  · Puree: DNT   · Moist food: DNT    Dysphagia Tx:   Direct Dysphagia tx: tolerated thin H2O via straw with no overt s/s of aspiration; small sips independently; recalls procedure for [employer] Protocol  Dysphagia ex: completed lingual manipulation ex for improved bolus manipulation/control: mod imp; completed exaggerated yawn x5 and Cherry x5 with mod imp and min cues for execution and attention; remaining deferred (falsetto, glottals, pitch swings) due to time restraints. Pt quite talkative during tx. Training in compensatory strategies: continue to reinforce  Pt response to ex/training: good response; eager and motivated    Goals: The patient will tolerate recommended diet without observed clinical signs of aspiration, continue  The patient/caregiver will demonstrate understanding of compensatory strategies for improved swallowing safety. , continue  The patient will improve oral motor function via therapeutic oral motor exercises to 5/5 each trial., continue  The patient will improve oral preparation phase via bolus control/manipulation exercises to 5/5 each trial. continue  The patient will improve pharyngeal phase via pharyngolaryngeal exercise completed 10/10 continue    Assessment:   Impressions:   Dysphagia Impression:  · Pt asleep upon SLP entry. Pt required min cues to awaken, but was cooperative and verbose throughout tx. Pt recalled strategies, MBS results, and [employer] Protocol independently. · Moderate-severe oral stage dysphagia characterized by decreased lingual manipulation and coordination, decreased AP transit, suspected premature loss.   · Moderate pharyngeal stage dysphagia characterized by delayed swallow initiation, decreased laryngeal elevation, and decreased pharyngeal peristalsis  · Swallow function suspected as comparable to 9/29 MBS  · Recommend continue minced/moist and nectar thick liquids. Meds crushed in puree. · ST to f/u to reassess diet tolerance and continue swallow exercises. Diet Recommendations:  Solid consistency: Dysphagia Minced and Moist (Dysphagia II)   Liquid consistency: Mildly Thick (Nectar)   Medication administration: Meds in puree (crushed if able)   May have thin H2O between meals    Strategies:   Compensatory Swallowing Strategies: Upright as possible for all oral intake, Remain upright for 30-45 minutes after meals, Eat/Feed slowly, Small bites/sips    Education:  Consulted and agree with results and recommendations: Patient, RN  Patient Education: Completed on resutls/recs/plan  Patient Education Response: Verbalizes understanding, Needs reinforcement    Prognosis:   Guarded for dysphagia    Plan:     Continue Dysphagia Therapy: yes   Interventions: Therapeutic Interventions: Diet tolerance monitoring, Patient/Family education, Therapeutic PO trials with SLP, Bolus control exercises, Oral motor exercises, Laryngeal exercises, Pharyngeal exercises  Duration/Frequency of therapy while on unit: Duration/Frequency of Treatment  Duration/Frequency of Treatment: ST to tx 3-5 tiems per week durign acute admission unless otherwise noted  Discharge Instructions:   Anticipate NEED for further skilled Speech Therapy for Dysphagia at discharge    This note serves as a D/C Summary in the event that this patient is discharged prior to the next therapy session.     Coded treatment time: 0  Total treatment time: 30    Electronically signed by  ROBIN Topete   in Speech-Language Pathology   on 10/5/2021 at 3:48 PM     Therapist was present, directed the patient's care, made skilled judgement, and was responsible for assessment and treatment of the patient.        Virginia Ramsay MS, CCC-SLP #[id number]  Speech Language Pathologist

## 2021-10-05 NOTE — PROGRESS NOTES
Pt AAO x4. No c/o pain. Pt repositioned. Assessment completed and charted. Purewick placed per pt request for leakage. Denies any other needs. Call light in reach. Will monitor.

## 2021-10-06 VITALS
TEMPERATURE: 98.5 F | WEIGHT: 184.53 LBS | OXYGEN SATURATION: 96 % | SYSTOLIC BLOOD PRESSURE: 121 MMHG | DIASTOLIC BLOOD PRESSURE: 70 MMHG | HEART RATE: 70 BPM | HEIGHT: 69 IN | RESPIRATION RATE: 18 BRPM | BODY MASS INDEX: 27.33 KG/M2

## 2021-10-06 LAB
ANION GAP SERPL CALCULATED.3IONS-SCNC: 8 MMOL/L (ref 3–16)
BASOPHILS ABSOLUTE: 0 K/UL (ref 0–0.2)
BASOPHILS RELATIVE PERCENT: 0.5 %
BUN BLDV-MCNC: 11 MG/DL (ref 7–20)
CALCIUM SERPL-MCNC: 9 MG/DL (ref 8.3–10.6)
CHLORIDE BLD-SCNC: 98 MMOL/L (ref 99–110)
CO2: 33 MMOL/L (ref 21–32)
CREAT SERPL-MCNC: <0.5 MG/DL (ref 0.9–1.3)
EOSINOPHILS ABSOLUTE: 0.2 K/UL (ref 0–0.6)
EOSINOPHILS RELATIVE PERCENT: 3.5 %
GFR AFRICAN AMERICAN: >60
GFR NON-AFRICAN AMERICAN: >60
GLUCOSE BLD-MCNC: 118 MG/DL (ref 70–99)
HCT VFR BLD CALC: 32.4 % (ref 40.5–52.5)
HEMOGLOBIN: 10.3 G/DL (ref 13.5–17.5)
LYMPHOCYTES ABSOLUTE: 1.8 K/UL (ref 1–5.1)
LYMPHOCYTES RELATIVE PERCENT: 37 %
MAGNESIUM: 1.8 MG/DL (ref 1.8–2.4)
MCH RBC QN AUTO: 28.4 PG (ref 26–34)
MCHC RBC AUTO-ENTMCNC: 31.7 G/DL (ref 31–36)
MCV RBC AUTO: 89.6 FL (ref 80–100)
MONOCYTES ABSOLUTE: 0.4 K/UL (ref 0–1.3)
MONOCYTES RELATIVE PERCENT: 8.9 %
NEUTROPHILS ABSOLUTE: 2.4 K/UL (ref 1.7–7.7)
NEUTROPHILS RELATIVE PERCENT: 50.1 %
PDW BLD-RTO: 16.3 % (ref 12.4–15.4)
PHOSPHORUS: 4.1 MG/DL (ref 2.5–4.9)
PLATELET # BLD: 174 K/UL (ref 135–450)
PMV BLD AUTO: 8.7 FL (ref 5–10.5)
POTASSIUM SERPL-SCNC: 4.2 MMOL/L (ref 3.5–5.1)
RBC # BLD: 3.62 M/UL (ref 4.2–5.9)
SARS-COV-2, NAAT: NOT DETECTED
SODIUM BLD-SCNC: 139 MMOL/L (ref 136–145)
WBC # BLD: 4.9 K/UL (ref 4–11)

## 2021-10-06 PROCEDURE — 6360000002 HC RX W HCPCS: Performed by: INTERNAL MEDICINE

## 2021-10-06 PROCEDURE — 84100 ASSAY OF PHOSPHORUS: CPT

## 2021-10-06 PROCEDURE — 94680 O2 UPTK RST&XERS DIR SIMPLE: CPT

## 2021-10-06 PROCEDURE — 80048 BASIC METABOLIC PNL TOTAL CA: CPT

## 2021-10-06 PROCEDURE — 36415 COLL VENOUS BLD VENIPUNCTURE: CPT

## 2021-10-06 PROCEDURE — 6370000000 HC RX 637 (ALT 250 FOR IP): Performed by: INTERNAL MEDICINE

## 2021-10-06 PROCEDURE — 92526 ORAL FUNCTION THERAPY: CPT

## 2021-10-06 PROCEDURE — 6370000000 HC RX 637 (ALT 250 FOR IP): Performed by: NURSE PRACTITIONER

## 2021-10-06 PROCEDURE — 87635 SARS-COV-2 COVID-19 AMP PRB: CPT

## 2021-10-06 PROCEDURE — 2580000003 HC RX 258: Performed by: INTERNAL MEDICINE

## 2021-10-06 PROCEDURE — 83735 ASSAY OF MAGNESIUM: CPT

## 2021-10-06 PROCEDURE — 85025 COMPLETE CBC W/AUTO DIFF WBC: CPT

## 2021-10-06 PROCEDURE — 6370000000 HC RX 637 (ALT 250 FOR IP): Performed by: STUDENT IN AN ORGANIZED HEALTH CARE EDUCATION/TRAINING PROGRAM

## 2021-10-06 RX ORDER — CLONAZEPAM 1 MG/1
1 TABLET ORAL 2 TIMES DAILY PRN
Qty: 10 TABLET | Refills: 0 | Status: SHIPPED | OUTPATIENT
Start: 2021-10-06 | End: 2021-10-11

## 2021-10-06 RX ORDER — ACETAMINOPHEN AND CODEINE PHOSPHATE 300; 30 MG/1; MG/1
1 TABLET ORAL EVERY 12 HOURS PRN
Qty: 10 TABLET | Refills: 0 | Status: SHIPPED | OUTPATIENT
Start: 2021-10-06 | End: 2021-10-11

## 2021-10-06 RX ADMIN — PREGABALIN 200 MG: 100 CAPSULE ORAL at 08:49

## 2021-10-06 RX ADMIN — CLONAZEPAM 1 MG: 1 TABLET ORAL at 00:09

## 2021-10-06 RX ADMIN — METOPROLOL TARTRATE 25 MG: 25 TABLET, FILM COATED ORAL at 00:09

## 2021-10-06 RX ADMIN — SERTRALINE 150 MG: 100 TABLET, FILM COATED ORAL at 08:49

## 2021-10-06 RX ADMIN — ENOXAPARIN SODIUM 40 MG: 40 INJECTION SUBCUTANEOUS at 08:49

## 2021-10-06 RX ADMIN — SODIUM CHLORIDE, PRESERVATIVE FREE 10 ML: 5 INJECTION INTRAVENOUS at 08:50

## 2021-10-06 RX ADMIN — DANTROLENE SODIUM 200 MG: 25 CAPSULE ORAL at 00:09

## 2021-10-06 RX ADMIN — DANTROLENE SODIUM 200 MG: 25 CAPSULE ORAL at 08:48

## 2021-10-06 RX ADMIN — CLONAZEPAM 1 MG: 1 TABLET ORAL at 08:49

## 2021-10-06 RX ADMIN — PREGABALIN 200 MG: 100 CAPSULE ORAL at 15:11

## 2021-10-06 RX ADMIN — PREGABALIN 200 MG: 100 CAPSULE ORAL at 00:08

## 2021-10-06 RX ADMIN — PANTOPRAZOLE SODIUM 40 MG: 40 TABLET, DELAYED RELEASE ORAL at 15:11

## 2021-10-06 RX ADMIN — METOPROLOL TARTRATE 25 MG: 25 TABLET, FILM COATED ORAL at 08:49

## 2021-10-06 RX ADMIN — Medication 1 CAPSULE: at 08:49

## 2021-10-06 RX ADMIN — RISPERIDONE 0.5 MG: 0.5 TABLET ORAL at 00:09

## 2021-10-06 RX ADMIN — AMITRIPTYLINE HYDROCHLORIDE 25 MG: 25 TABLET, FILM COATED ORAL at 00:09

## 2021-10-06 ASSESSMENT — PAIN SCALES - GENERAL: PAINLEVEL_OUTOF10: 0

## 2021-10-06 NOTE — PROGRESS NOTES
Pt rounded on this morning Q2h, whiteboard updated, pt given ice water and needs assessed. Pt planning to discharge to [facility] today if financially able, SW following for d/c needs. Rapid COVID sent to lab in order to transport. Pt remains on nasal cannula at 3LPM, Pt has no further needs or concerns at this time. Call light within reach, pt verbalized understanding to call prior to ambulating. Will continue to monitor and reassess.    Electronically signed by Jerson Nino RN on 10/6/2021 at 11:53 AM

## 2021-10-06 NOTE — CARE COORDINATION
Bernie received referral from RN-CM for [address]     [employer] rep will verify patient's insurance and once DME Orders are received, [employer] rep will follow up with patient prior to discharge to deliver Oxygen Equipment.     Thank you for the referral.  Electronically signed by Robert Pryor on 10/6/2021 at 4:35 PM  Cell ph# [phone]

## 2021-10-06 NOTE — CARE COORDINATION
DISCHARGE SUMMARY     DATE OF DISCHARGE: 10/6/21    DISCHARGE DESTINATION: home  [address] ,[address] [facility]    HOME CARE: Yes -private duty aides 24hr/day- 7 days/week    Agency Name: [facility] [facility]   Phone Number: [phone]    Jair Shows and informed them of discharge today - they will have services resumed at time of discharge    TRANSPORTATION: [place] [address] Name: [employer] [facility] Time: 4:30PM    Phone Number: [phone]    Notified son Jyotsna Freire of discharge -patient agreeable to plan    COMMENTS:   Patient is planning to go to [facility] under private pay- they were unable to complete financial intake paperwork with patient ( patient did not have all her financial information readily available to her) discussed with her son Jyotsna Freire [phone] who states he does not have access to her financial information either but he does feel that his mom should go to [employer] eventually    Beba De Santiago # [phone] visited with patient at bedside and plans to follow-up with patient at her home to continue to assist in getting her transferred in to [facility]    Electronically signed by Demetris Ordaz on 10/6/2021 at 2:35 PM  #[phone]

## 2021-10-06 NOTE — PROGRESS NOTES
Pt discharged to home. Transportation here with stretcher. Transported in ambulance. Home O2 delivered to pt's room and transported home with her. Discharge instructions, Rx, and personal belongings given to pt. Explanation of discharge medications and instructions understood by verbal statement. No questions, comments or concerns at this time.    Electronically signed by Jacqueline Salmeron RN on 10/6/2021 at 7:09 PM

## 2021-10-06 NOTE — PLAN OF CARE
Problem: Falls - Risk of:  Goal: Will remain free from falls  Description: Will remain free from falls  10/5/2021 2319 by Neyda Yan RN  Outcome: Ongoing  Note: Fall risk assessment completed. Fall precautions in place. Call light within reach. Pt educated on calling for assistance before getting up. Walkway free of clutter. Will continue to monitor. 10/5/2021 1051 by Janessa Jones RN  Outcome: Ongoing  Note: Fall risk assessment completed. Fall precautions in place. Bed in lowest position, wheels locked, bed/chair exit alarm in place, call light within reach, and non skid footwear on. Walkway free of clutter. Pt alert and oriented and able to make needs known. Pt educated to use call light when needing to get up, and pt utilizes call light to make needs known. Will continue to monitor. Problem: Falls - Risk of:  Goal: Absence of physical injury  Description: Absence of physical injury  10/5/2021 2319 by Neyda Yan RN  Outcome: Ongoing  Note: Pt is free of injury. No injury noted. Fall precautions in place. Call light within reach. Will monitor.     10/5/2021 1051 by Janessa Jones RN  Outcome: Ongoing  Note: Pt absent from physical injury on this shift

## 2021-10-06 NOTE — DISCHARGE INSTR - COC
Continuity of Care Form    Patient Name: Brinda Black   :  1960  MRN:  [id number]    Admit date:  2021  Discharge date:  10/06/21      Code Status Order: DNR-CCA   Advance Directives:     Admitting Physician:  Bruno Rinne, MD  PCP: Guanako Aguilera    Discharging Nurse: Electronically signed by Hillary Costa RN on 10/6/2021 at [facility] Unit/Room#: U3K-8007/3106-01  Discharging Unit Phone Number: [phone]    Emergency Contact:   Extended Emergency Contact Information  Primary Emergency Contact: Gris Torre, [address] [facility] Phone: [phone]  Relation: Parent  Secondary Emergency Contact: Mere Lazo  [place] Phone: [phone]  Relation: Brother/Sister    Past Surgical History:  History reviewed. No pertinent surgical history. Immunization History: There is no immunization history on file for this patient.     Active Problems:  Patient Active Problem List   Diagnosis Code    Acute type 1 respiratory failure (HCC) J96.01    Acute respiratory failure with hypoxia and hypercapnia (HCC) J96.01, J96.02    Aspiration pneumonitis ([address] J69.0    Choking V83.435N    Metabolic encephalopathy T06.53       Isolation/Infection:   Isolation          No Isolation        Patient Infection Status     Infection Onset Added Last Indicated Last Indicated By Review Planned Expiration Resolved Resolved By    None active    Resolved    COVID-19 Rule Out 21 COVID-19 (Ordered)   21 Rule-Out Test Resulted          Nurse Assessment:  Last Vital Signs: /70   Pulse 70   Temp 98.5 °F (36.9 °C) (Oral)   Resp 18   Ht 5' 9\" (1.753 m)   Wt 184 lb 8.4 oz (83.7 kg)   SpO2 96%   BMI 27.25 kg/m²     Last documented pain score (0-10 scale): Pain Level: 0  Last Weight:   Wt Readings from Last 1 Encounters:   10/06/21 184 lb 8.4 oz (83.7 kg)     Mental Status:  oriented and alert    IV Access:  - None    Nursing Mobility/ADLs:  Walking   Dependent  Transfer Dependent  Bathing  Dependent  Dressing  Dependent  Toileting  Assisted  Feeding  Assisted  Med Admin  Assisted  Med Delivery   whole and prefers mixed with applesauce    Wound Care Documentation and Therapy:        Elimination:  Continence:   · Bowel: Yes  · Bladder: Yes  Urinary Catheter: None   Colostomy/Ileostomy/Ileal Conduit: No       Date of Last BM: 10/06/21      Intake/Output Summary (Last 24 hours) at 10/6/2021 0939  Last data filed at 10/5/2021 2343  Gross per 24 hour   Intake 360 ml   Output 150 ml   Net 210 ml     I/O last 3 completed shifts: In: 480 [P.O.:480]  Out: 150 [Urine:150]    Safety Concerns:     Aspiration Risk    Impairments/Disabilities:      Speech and Contractures - thickened liquids and BLE contractures    Nutrition Therapy:  Current Nutrition Therapy:   - Oral Diet:  General    Routes of Feeding: Oral, soft and bitesized  Liquids: Nectar Thick Liquids with sips of thin liquids  Daily Fluid Restriction: no  Last Modified Barium Swallow with Video (Video Swallowing Test): not done    Treatments at the Time of Hospital Discharge:   Respiratory Treatments: 3LPM nasal cannula  Oxygen Therapy:  is on oxygen at 3 L/min per nasal cannula.   Ventilator:    - No ventilator support    Rehab Therapies: N/A  Weight Bearing Status/Restrictions: No weight bearing restirctions  Other Medical Equipment (for information only, NOT a DME order):  wheelchair and hospital bed  Other Treatments: N/A    Patient's personal belongings (please select all that are sent with patient):  None    RN SIGNATURE:  Electronically signed by Wisam Barger RN on 10/6/21 at 6:29 PM EDT    CASE MANAGEMENT/SOCIAL WORK SECTION    Inpatient Status Date: ***    Readmission Risk Assessment Score:  Readmission Risk              Risk of Unplanned Readmission:  17           Discharging to Facility/ Agency   · Name:   · Address:  · Phone:  · Fax:    Dialysis Facility (if applicable)   · Name:  · Address:  · Dialysis Schedule:  · Phone:  · Fax:    / signature: {Esignature:[id number]}    PHYSICIAN SECTION    Prognosis: {Prognosis:9710273076}    Condition at Discharge: Dennis Vega Patient Condition:567301136}    Rehab Potential (if transferring to Rehab): {Prognosis:2982723128}    Recommended Labs or Other Treatments After Discharge: ***    Physician Certification: I certify the above information and transfer of Clarice Talbert  is necessary for the continuing treatment of the diagnosis listed and that she requires {Admit to Appropriate Level of Care:54381} for {GREATER/LESS:344211449} 30 days.      Update Admission H&P: {CHP DME Changes in YNICR:906388077}    PHYSICIAN SIGNATURE:  {Esignature:[id number]}

## 2021-10-06 NOTE — PROGRESS NOTES
Pt resting in bed. A&Ox4. No complaints overnight. Fall precautions in place, call light and bedside table within reach.

## 2021-10-06 NOTE — PLAN OF CARE
Problem: Falls - Risk of:  Goal: Will remain free from falls  Description: Will remain free from falls  10/6/2021 1244 by Benny Fan RN  Outcome: Ongoing   Fall risk assessment completed. Fall precautions in place. Call light within reach. Pt educated on calling for assistance before getting up. Walkway free of clutter. Will continue to monitor. Electronically signed by Benny Fan RN on 10/6/2021 at 12:44 PM    Problem: Falls - Risk of:  Goal: Absence of physical injury  Description: Absence of physical injury  10/6/2021 1244 by Benny Fan RN  Outcome: Ongoing   Pt is free of injury. No injury noted. Fall precautions in place. Call light within reach. Will monitor. Electronically signed by Benny Fan RN on 10/6/2021 at 12:44 PM    Problem: Pain:  Goal: Pain level will decrease  Description: Pain level will decrease  10/6/2021 1244 by Benny Fan RN  Outcome: Ongoing   Pt assessed for pain. Pt denies any pain at this time. Will continue to monitor pt and assess for pain throughout rest of shift. Electronically signed by Benny Fan RN on 10/6/2021 at 12:44 PM    Problem: Skin Integrity:  Goal: Will show no infection signs and symptoms  Description: Will show no infection signs and symptoms  10/6/2021 1244 by Benny Fan RN  Outcome: Ongoing   Pt is free of signs and symptoms of infection. Vital signs stable. Will monitor. Electronically signed by Benny Fan RN on 10/6/2021 at 12:45 PM    Problem: Skin Integrity:  Goal: Absence of new skin breakdown  Description: Absence of new skin breakdown  10/6/2021 1244 by Benny Fan RN  Outcome: Ongoing   Skin assessment completed. No skin breakdown noted, mepilexes in place for prevention. Pt reminded to turn and reposition frequently. Will continue to monitor and reassess.   Electronically signed by Benny Fan RN on 10/6/2021 at 12:45 PM  Problem: Nutrition  Goal: Optimal nutrition therapy  10/6/2021 1244 by Benny Fan RN  Outcome: Ongoing  Nutrition Problem #1:  Biting/chewing (masticatory) difficulty  Intervention: Food and/or Nutrient Delivery: Continue Current Diet, Continue Oral Nutrition Supplement  Nutritional Goals: tolerate most appropriate form of nutrition

## 2021-10-06 NOTE — DISCHARGE SUMMARY
Hospital Medicine Discharge Summary    Patient ID: Ny Gibson      Patient's PCP: Juan Prado    Admit Date: 9/27/2021     Discharge Date:   10/6/2021    Admitting Physician: Vish Sarkar MD     Discharge Physician: Jorge L Ann MD     Discharge Diagnoses: Active Hospital Problems    Diagnosis     Aspiration pneumonitis ([facility] [address] [J69.0]     Choking [A71.128W]     Metabolic encephalopathy [J79.50]     Acute type 1 respiratory failure (HCC) [J96.01]     Acute respiratory failure with hypoxia and hypercapnia (HCC) [J96.01, J96.02]        The patient was seen and examined on day of discharge and this discharge summary is in conjunction with any daily progress note from day of discharge. Hospital Course: 62year old female patient with a past medical history of cerebral palsy who presents via EMS from her ECF after aspirating while eating dinner, patient was  hypoxic and hypercapnia and placed on BiPAP. Acute hypoxic and hypercapnic respiratory failure. Aspiration pneumonia. Acute metabolic encephalopathy. Patient presented from ECF with aspiration, was found to have hypoxic and hypercapnic, patient was agitated and confused. XR with R>L infiltrate suggestive of aspiration. Patient was managed in the ICU with BiPAP and unasyn, improved and transferred to Med floor. Continues to be on oxygen at 3 liters  Plan :  - completed Unasyn Day 7/7 on 10/4.   - wean down oxygen. - agree with SLP eval, patient placed on modified diet. - home meds resumed ( clonopin resumed).      Mild hypokalemia . Replaced.         Cerebral palsy. bedbound at baseline. Hx of Cardiac arrest many years ago - lost ability to ambulate then./   At baseline [facility] lift to wheel chair. Chronic and severe LE contractures. Patient is interested in placement to ECF. Pt is medically stable for discharge today - home with 24hr care vs ECF.    Will need 3l/min oxygen via NC continuous on discharge - discussed with patient. Physical Exam Performed:     /70   Pulse 70   Temp 98.5 °F (36.9 °C) (Oral)   Resp 18   Ht 5' 9\" (1.753 m)   Wt 184 lb 8.4 oz (83.7 kg)   SpO2 96%   BMI 27.25 kg/m²       General appearance:plesant female patient, not in pain or distress. On 3 liters of oxygen. HEENT: Pupils equal, round, and reactive to light. Conjunctivae/corneas clear. Neck: Supple, with full range of motion. No jugular venous distention. Trachea midline. Respiratory:  Normal respiratory effort. Clear to auscultation, bilaterally without Rales/Wheezes/Rhonchi. Cardiovascular: Regular rate and rhythm with normal S1/S2 without murmurs, rubs or gallops. Abdomen: Soft, non-tender, non-distended with normal bowel sounds. Musculoskeletal:upper and lower limb deformity. Skin: Skin color, texture, turgor normal.  No rashes or lesions. Neurologic:  Neurovascularly intact without any focal sensory/motor deficits. Cranial nerves: II-XII intact, grossly non-focal.  Psychiatric: Alert . Capillary Refill: Brisk,3 seconds, normal   Peripheral Pulses: +2 palpable, equal bilaterally          Labs: For convenience and continuity at follow-up the following most recent labs are provided:      CBC:    Lab Results   Component Value Date    WBC 4.9 10/06/2021    HGB 10.3 10/06/2021    HCT 32.4 10/06/2021     10/06/2021       Renal:    Lab Results   Component Value Date     10/06/2021    K 4.2 10/06/2021    K 4.5 09/27/2021    CL 98 10/06/2021    CO2 33 10/06/2021    BUN 11 10/06/2021    CREATININE <0.5 10/06/2021    CALCIUM 9.0 10/06/2021    PHOS 4.1 10/06/2021         Significant Diagnostic Studies    Radiology:   XR CHEST PORTABLE   Final Result   Airspace disease primarily right parahilar and lower lung. This likely   represents pneumonia and/or aspiration is also considered as given in the   clinical history.          Fluoroscopy modified barium swallow with video   Final Result Intermittent aspiration with thin liquid. Please see separate speech pathology report for full discussion of findings   and recommendations. XR CHEST PORTABLE   Final Result   Right greater than left basilar patchy opacities may represent atelectasis or   a developing infectious/inflammatory process. Follow-up PA and lateral chest   radiographs may be helpful for further evaluation. Prominence of the superior mediastinum may be accentuated by patient   positioning. Underlying mediastinal pathology is not excluded. Short-term   follow-up PA and lateral chest radiographs or chest CT may be helpful for   further evaluation as warranted. Consults:     None    Disposition:  home w 24hr care vs ECF - pt deciding. Condition at Discharge: Stable    Discharge Instructions/Follow-up:  PCP 1 week. Code Status:  DNR-CCA     Activity: activity as tolerated    Diet: regular diet      Discharge Medications:     Current Discharge Medication List           Details   metoprolol tartrate (LOPRESSOR) 25 MG tablet Take 1 tablet by mouth 2 times daily  Qty: 60 tablet, Refills: 1              Details   acetaminophen-codeine (TYLENOL/CODEINE #3) 300-30 MG per tablet Take 1 tablet by mouth every 12 hours as needed for Pain for up to 5 days. Qty: 10 tablet, Refills: 0    Comments: Reduce doses taken as pain becomes manageable  Associated Diagnoses: Chronic pain syndrome      clonazePAM (KLONOPIN) 1 MG tablet Take 1 tablet by mouth 2 times daily as needed for Anxiety for up to 5 days.   Qty: 10 tablet, Refills: 0    Associated Diagnoses: Chronic pain syndrome              Details   levothyroxine (SYNTHROID) 25 MCG tablet Take 25 mcg by mouth Daily      amitriptyline (ELAVIL) 25 MG tablet Take 25 mg by mouth nightly      omeprazole (PRILOSEC) 40 MG delayed release capsule Take 40 mg by mouth 2 times daily      dantrolene (DANTRIUM) 100 MG capsule Take 200 mg by mouth 2 times daily      sertraline (ZOLOFT) 100 MG tablet Take 150 mg by mouth daily      pregabalin (LYRICA) 200 MG capsule Take 200 mg by mouth 3 times daily. risperiDONE (RISPERDAL) 0.5 MG tablet Take 0.5 mg by mouth nightly             Time Spent on discharge is more than 30 minutes in the examination, evaluation, counseling and review of medications and discharge plan. Signed:    Ousmane Amaya MD   10/6/2021      Thank you Leonard Joe for the opportunity to be involved in this patient's care. If you have any questions or concerns please feel free to contact me at [phone].

## 2021-10-06 NOTE — CARE COORDINATION
[employer] rep delivered an Oxygen Concentrator to the patient's room and reviewed insurance coverage, copays, oxygen safety, and equipment setup with the patient. Notified RN.     Thank you for the referral.  Electronically signed by Pool Mccormack on 10/6/2021 at 5:52 PM Cell ph# [phone]

## 2021-10-06 NOTE — PROGRESS NOTES
[facility]   Speech Therapy  Daily Dysphagia Treatment Note    Gracie   AGE: 62 y.o. GENDER: adult  : 1960  [id number]  EPISODE DATE:  2021     Patient Active Problem List   Diagnosis    Acute type 1 respiratory failure (HCC)    Acute respiratory failure with hypoxia and hypercapnia (HCC)    Aspiration pneumonitis (HCC)    Choking    Metabolic encephalopathy     No Known Allergies    Treatment Diagnosis: Dysphagia     CHART REVIEW:  2021 admitted s/p reported aspiration episode: ADMISSION H&P HPI: The patient is a 81-year-old  female who presented with episode of aspiration, this is per the emergency room EMS report. Temi Jesus Alberto were called to the ECF because the patient started having choking and shortness of breath while she was eating her dinner. Mario Dickerson was found to be severely hypoxic and was placed on noninvasive positive pressure ventilation.  At the time of my exam, the patient is definitely significantly drowsy but is waking up and responding.     2021 CXR  Impression   Right greater than left basilar patchy opacities may represent atelectasis or   a developing infectious/inflammatory process.  Follow-up PA and lateral chest   radiographs may be helpful for further evaluation.       Prominence of the superior mediastinum may be accentuated by patient   positioning.  Underlying mediastinal pathology is not excluded.  Short-term   follow-up PA and lateral chest radiographs or chest CT may be helpful for   further evaluation as warranted.      2021 BiPAP until afternoon when patient placed on 6L NC; BiPAP resumed overnight with return to 6L NC in the AM     2021 MBS  Moderate-severe oral stage dysphagia characterized by decreased mastication and decreased lingula manipulation/coordination. · Prolonged, disorganized bolus formation and movement with all. Incomplete bolus formation/cohesion with all but adequate mastication with solids. · Premature bolus loss to the e pharynx with all; decreased oral control exacerbates. · Piecemeal swallow with all. · Oral residue with all with repeat swallows, but residue is not completely cleared (even with liquids). Moderate pharyngeal stage dysphagia characterized by delayed swallow, decreased laryngeal elevation, and decreased pharyngeal peristalsis. · Premature spillage to the valleculae with all. · Mild vallecular residue with moist solids, puree, and liquids; mod vallecular residue with soft solids. Trace pyriform residue with all appears r/t reduced cricopharyngeal opening. Spontaneous repeat swallows do not completely clear residue. Liquid wash minimizes solids residue, but results in deep penetration and/or aspiration with thin liquids. · Appears to present with inconsistent, intermittent deep penetration/aspiration of thin taken in isolation. · Patient appears most optimal with minced/moist with nectar thick liquids; could consider SMALL sips thin between meals. Cricopharyngeal dysfunction noted with potential to exacerbate pharyngeal symptoms of dysphagia. DYSPHAGIA HISTORY  PEG placed 2015 s/p cardiac arrest with intubation; PEG removed 2015: no SLP notes per EMR review      Subjective:     Current diet  Solid consistency: Dysphagia Minced and Moist (Dysphagia II)  Liquid consistency: Mildly Thick (Nectar)  Liquid administration via: Straw (unable to tolerate via cup)  Medication administration: Meds in puree (crushed if able)  Compensatory Swallowing Strategies: Upright as possible for all oral intake;Remain upright for 30-45 minutes after meals;Eat/Feed slowly; Small bites/sips    Comments regarding tolerating Current Diet:   Patient with distaste for modified diet, but acknowledges need/benefit      Objective:     Pain   Patient Currently in Pain: No    Cognitive/Behavior   Behavior/Cognition: Alert, Cooperative, Pleasant mood, Oriented to self/situation/place;  Follows dx; Verbally responsive - makes wants/needs known, now adequate historian    Positioning   Upright in bed    PO Trials:   · Thin Liquids (thin H2O x5): prolonged transit; suspect premature bolus loss to the pharynx; delayed swallow; decreased laryngeal elevation, no overt s/s of aspiration  · Nectar thick liquids: DNT  · Puree: DNT   · Moist food: DNT    Dysphagia Tx:   Direct Dysphagia tx: tolerated thin H2O via straw with no overt s/s of aspiration; small sips independently; recalls procedure for [employer] Protocol  Dysphagia ex (10 reps per ex): completed lingual manipulation ex for improved bolus manipulation/control: mod imp; completed falsetto, glottals, pitch swing, exaggerated yawn, and [place] with mod imp; handout provided for independent practice  Training in compensatory strategies: continue to reinforce  Pt response to ex/training: good response; eager and motivated    Goals: The patient will tolerate recommended diet without observed clinical signs of aspiration, continue  The patient/caregiver will demonstrate understanding of compensatory strategies for improved swallowing safety. , continue  The patient will improve oral motor function via therapeutic oral motor exercises to 5/5 each trial., continue  The patient will improve oral preparation phase via bolus control/manipulation exercises to 5/5 each trial. continue  The patient will improve pharyngeal phase via pharyngolaryngeal exercise completed 10/10 continue    Assessment:   Impressions:   Dysphagia Impression:  · Pt asleep upon SLP entry. Pt required min cues to awaken, but was cooperative throughout tx. Pt recalled strategies, MBS results, and [employer] Protocol independently. · Swallow function suspected as comparable to 9/29 MBS: Moderate-severe oral stage dysphagia characterized by decreased lingual manipulation and coordination, decreased AP transit, suspected premature loss.  Moderate pharyngeal stage dysphagia characterized by delayed swallow initiation, decreased laryngeal elevation, and decreased pharyngeal peristalsis  · Recommend continue minced/moist and nectar thick liquids. Meds crushed in puree. · ST to f/u to continue swallow exercises. Diet Recommendations:  Solid consistency: Dysphagia Minced and Moist (Dysphagia II)   Liquid consistency: Mildly Thick (Nectar)   Medication administration: Meds in puree (crushed if able)   May have thin H2O between meals    Strategies:   Compensatory Swallowing Strategies: Upright as possible for all oral intake, Remain upright for 30-45 minutes after meals, Eat/Feed slowly, Small bites/sips    Education:  Consulted and agree with results and recommendations: Patient, RN  Patient Education: Completed on resutls/recs/plan  Patient Education Response: Verbalizes understanding, Needs reinforcement    Prognosis:   Guarded for dysphagia    Plan:     Continue Dysphagia Therapy: yes   Interventions: Therapeutic Interventions: Diet tolerance monitoring, Patient/Family education, Therapeutic PO trials with SLP, Bolus control exercises, Oral motor exercises, Laryngeal exercises, Pharyngeal exercises  Duration/Frequency of therapy while on unit: Duration/Frequency of Treatment  Duration/Frequency of Treatment: ST to tx 3-5 tiems per week [facility] acute admission unless otherwise noted  Discharge Instructions:   Anticipate NEED for further skilled Speech Therapy for Dysphagia at discharge    This note serves as a D/C Summary in the event that this patient is discharged prior to the next therapy session. Coded treatment time: 0  Total treatment time: 30    Electronically signed by  Melissa Tompkins.  Joleenmichael Saldivar, [address] [place] [address], [id number]  Speech-Language Pathologist  Portable phone: [phone]

## 2021-10-06 NOTE — PROGRESS NOTES
Home Oxygen Evaluation    SaO2 95% on 3 L/m NC at rest  SaO2 82% on RA at rest    Pt is non-ambulatory, qualifies for Home Oxygen at rest for 3 L/m. Awaiting confirmation of destination post d/c for this pt before calling DME provider for home O2 needs.

## 2022-11-26 ENCOUNTER — HOSPITAL ENCOUNTER (INPATIENT)
Age: 62
LOS: 3 days | Discharge: NURSING, REHAB OR LONG-TERM CARE | DRG: 871 | End: 2022-11-30
Attending: EMERGENCY MEDICINE | Admitting: PEDIATRICS
Payer: MEDICARE

## 2022-11-26 DIAGNOSIS — U07.1 COVID: ICD-10-CM

## 2022-11-26 DIAGNOSIS — A41.9 SEPTICEMIA (HCC): Primary | ICD-10-CM

## 2022-11-26 DIAGNOSIS — K83.09 ACUTE CHOLANGITIS: ICD-10-CM

## 2022-11-26 PROCEDURE — 96368 THER/DIAG CONCURRENT INF: CPT

## 2022-11-26 PROCEDURE — 99285 EMERGENCY DEPT VISIT HI MDM: CPT

## 2022-11-26 PROCEDURE — 96365 THER/PROPH/DIAG IV INF INIT: CPT

## 2022-11-26 PROCEDURE — 96375 TX/PRO/DX INJ NEW DRUG ADDON: CPT

## 2022-11-27 ENCOUNTER — APPOINTMENT (OUTPATIENT)
Dept: GENERAL RADIOLOGY | Age: 62
DRG: 871 | End: 2022-11-27
Payer: MEDICARE

## 2022-11-27 ENCOUNTER — APPOINTMENT (OUTPATIENT)
Dept: CT IMAGING | Age: 62
DRG: 871 | End: 2022-11-27
Payer: MEDICARE

## 2022-11-27 PROBLEM — K83.09 CHOLANGITIS: Status: ACTIVE | Noted: 2022-11-27

## 2022-11-27 LAB
A/G RATIO: 1.3 (ref 1.1–2.2)
ALBUMIN SERPL-MCNC: 3.7 G/DL (ref 3.4–5)
ALBUMIN SERPL-MCNC: 4.1 G/DL (ref 3.4–5)
ALP BLD-CCNC: 192 U/L (ref 40–129)
ALP BLD-CCNC: 193 U/L (ref 40–129)
ALT SERPL-CCNC: 311 U/L (ref 10–40)
ALT SERPL-CCNC: 430 U/L (ref 10–40)
ANION GAP SERPL CALCULATED.3IONS-SCNC: 10 MMOL/L (ref 3–16)
ANION GAP SERPL CALCULATED.3IONS-SCNC: 16 MMOL/L (ref 3–16)
AST SERPL-CCNC: 570 U/L (ref 15–37)
AST SERPL-CCNC: 684 U/L (ref 15–37)
BACTERIA: ABNORMAL /HPF
BASOPHILS ABSOLUTE: 0 K/UL (ref 0–0.2)
BASOPHILS ABSOLUTE: 0 K/UL (ref 0–0.2)
BASOPHILS RELATIVE PERCENT: 0 %
BASOPHILS RELATIVE PERCENT: 0.2 %
BILIRUB SERPL-MCNC: 1.1 MG/DL (ref 0–1)
BILIRUB SERPL-MCNC: 1.5 MG/DL (ref 0–1)
BILIRUBIN DIRECT: 1 MG/DL (ref 0–0.3)
BILIRUBIN URINE: NEGATIVE
BILIRUBIN, INDIRECT: 0.1 MG/DL (ref 0–1)
BLOOD, URINE: ABNORMAL
BUN BLDV-MCNC: 10 MG/DL (ref 7–20)
BUN BLDV-MCNC: 16 MG/DL (ref 7–20)
CALCIUM SERPL-MCNC: 8.3 MG/DL (ref 8.3–10.6)
CALCIUM SERPL-MCNC: 9.2 MG/DL (ref 8.3–10.6)
CHLORIDE BLD-SCNC: 104 MMOL/L (ref 99–110)
CHLORIDE BLD-SCNC: 97 MMOL/L (ref 99–110)
CLARITY: ABNORMAL
CO2: 27 MMOL/L (ref 21–32)
CO2: 29 MMOL/L (ref 21–32)
COLOR: ABNORMAL
COMMENT UA: ABNORMAL
CREAT SERPL-MCNC: <0.5 MG/DL (ref 0.6–1.2)
CREAT SERPL-MCNC: <0.5 MG/DL (ref 0.6–1.2)
EKG ATRIAL RATE: 137 BPM
EKG DIAGNOSIS: NORMAL
EKG P AXIS: 33 DEGREES
EKG P-R INTERVAL: 184 MS
EKG Q-T INTERVAL: 238 MS
EKG QRS DURATION: 90 MS
EKG QTC CALCULATION (BAZETT): 359 MS
EKG R AXIS: 7 DEGREES
EKG T AXIS: 180 DEGREES
EKG VENTRICULAR RATE: 137 BPM
EOSINOPHILS ABSOLUTE: 0 K/UL (ref 0–0.6)
EOSINOPHILS ABSOLUTE: 0 K/UL (ref 0–0.6)
EOSINOPHILS RELATIVE PERCENT: 0.1 %
EOSINOPHILS RELATIVE PERCENT: 0.2 %
EPITHELIAL CELLS, UA: 9 /HPF (ref 0–5)
GFR SERPL CREATININE-BSD FRML MDRD: >60 ML/MIN/{1.73_M2}
GFR SERPL CREATININE-BSD FRML MDRD: >60 ML/MIN/{1.73_M2}
GLUCOSE BLD-MCNC: 120 MG/DL (ref 70–99)
GLUCOSE BLD-MCNC: 161 MG/DL (ref 70–99)
GLUCOSE URINE: NEGATIVE MG/DL
HAV IGM SER IA-ACNC: NORMAL
HCT VFR BLD CALC: 34.4 % (ref 36–48)
HCT VFR BLD CALC: 38.9 % (ref 36–48)
HEMOGLOBIN: 11.5 G/DL (ref 12–16)
HEMOGLOBIN: 12.7 G/DL (ref 12–16)
HEPATITIS B CORE IGM ANTIBODY: NORMAL
HEPATITIS B SURFACE ANTIGEN INTERPRETATION: NORMAL
HEPATITIS C ANTIBODY INTERPRETATION: NORMAL
HYALINE CASTS: 5 /LPF (ref 0–8)
KETONES, URINE: NEGATIVE MG/DL
LACTIC ACID: 2.6 MMOL/L (ref 0.4–2)
LACTIC ACID: 3.8 MMOL/L (ref 0.4–2)
LEUKOCYTE ESTERASE, URINE: ABNORMAL
LIPASE: 43 U/L (ref 13–60)
LYMPHOCYTES ABSOLUTE: 0.6 K/UL (ref 1–5.1)
LYMPHOCYTES ABSOLUTE: 0.8 K/UL (ref 1–5.1)
LYMPHOCYTES RELATIVE PERCENT: 8.1 %
LYMPHOCYTES RELATIVE PERCENT: 8.1 %
MCH RBC QN AUTO: 30.3 PG (ref 26–34)
MCH RBC QN AUTO: 30.5 PG (ref 26–34)
MCHC RBC AUTO-ENTMCNC: 32.7 G/DL (ref 31–36)
MCHC RBC AUTO-ENTMCNC: 33.5 G/DL (ref 31–36)
MCV RBC AUTO: 91.2 FL (ref 80–100)
MCV RBC AUTO: 92.6 FL (ref 80–100)
MICROSCOPIC EXAMINATION: YES
MONO TEST: NEGATIVE
MONOCYTES ABSOLUTE: 0.4 K/UL (ref 0–1.3)
MONOCYTES ABSOLUTE: 0.7 K/UL (ref 0–1.3)
MONOCYTES RELATIVE PERCENT: 4.8 %
MONOCYTES RELATIVE PERCENT: 7.4 %
NEUTROPHILS ABSOLUTE: 6.6 K/UL (ref 1.7–7.7)
NEUTROPHILS ABSOLUTE: 8.3 K/UL (ref 1.7–7.7)
NEUTROPHILS RELATIVE PERCENT: 84.4 %
NEUTROPHILS RELATIVE PERCENT: 86.7 %
NITRITE, URINE: NEGATIVE
PDW BLD-RTO: 14.2 % (ref 12.4–15.4)
PDW BLD-RTO: 14.3 % (ref 12.4–15.4)
PH UA: 7 (ref 5–8)
PLATELET # BLD: 102 K/UL (ref 135–450)
PLATELET # BLD: 102 K/UL (ref 135–450)
PMV BLD AUTO: 8.2 FL (ref 5–10.5)
PMV BLD AUTO: 8.5 FL (ref 5–10.5)
POTASSIUM REFLEX MAGNESIUM: 4.1 MMOL/L (ref 3.5–5.1)
POTASSIUM SERPL-SCNC: 4.1 MMOL/L (ref 3.5–5.1)
PRO-BNP: 452 PG/ML (ref 0–124)
PROTEIN UA: ABNORMAL MG/DL
RAPID INFLUENZA  B AGN: NEGATIVE
RAPID INFLUENZA A AGN: NEGATIVE
RBC # BLD: 3.77 M/UL (ref 4–5.2)
RBC # BLD: 4.2 M/UL (ref 4–5.2)
RBC UA: ABNORMAL /HPF (ref 0–4)
SARS-COV-2, NAAT: DETECTED
SODIUM BLD-SCNC: 140 MMOL/L (ref 136–145)
SODIUM BLD-SCNC: 143 MMOL/L (ref 136–145)
SPECIFIC GRAVITY UA: 1.02 (ref 1–1.03)
TOTAL PROTEIN: 6.5 G/DL (ref 6.4–8.2)
TOTAL PROTEIN: 7.9 G/DL (ref 6.4–8.2)
TROPONIN: <0.01 NG/ML
URINE REFLEX TO CULTURE: ABNORMAL
URINE TYPE: ABNORMAL
UROBILINOGEN, URINE: 1 E.U./DL
WBC # BLD: 7.6 K/UL (ref 4–11)
WBC # BLD: 9.8 K/UL (ref 4–11)
WBC UA: ABNORMAL /HPF (ref 0–5)

## 2022-11-27 PROCEDURE — 85025 COMPLETE CBC W/AUTO DIFF WBC: CPT

## 2022-11-27 PROCEDURE — 86664 EPSTEIN-BARR NUCLEAR ANTIGEN: CPT

## 2022-11-27 PROCEDURE — 93005 ELECTROCARDIOGRAM TRACING: CPT | Performed by: EMERGENCY MEDICINE

## 2022-11-27 PROCEDURE — 36415 COLL VENOUS BLD VENIPUNCTURE: CPT

## 2022-11-27 PROCEDURE — 87804 INFLUENZA ASSAY W/OPTIC: CPT

## 2022-11-27 PROCEDURE — 87040 BLOOD CULTURE FOR BACTERIA: CPT

## 2022-11-27 PROCEDURE — 6360000002 HC RX W HCPCS: Performed by: EMERGENCY MEDICINE

## 2022-11-27 PROCEDURE — 2580000003 HC RX 258: Performed by: EMERGENCY MEDICINE

## 2022-11-27 PROCEDURE — 2500000003 HC RX 250 WO HCPCS: Performed by: PEDIATRICS

## 2022-11-27 PROCEDURE — 83605 ASSAY OF LACTIC ACID: CPT

## 2022-11-27 PROCEDURE — 2580000003 HC RX 258: Performed by: PEDIATRICS

## 2022-11-27 PROCEDURE — 71260 CT THORAX DX C+: CPT

## 2022-11-27 PROCEDURE — 2700000000 HC OXYGEN THERAPY PER DAY

## 2022-11-27 PROCEDURE — 86665 EPSTEIN-BARR CAPSID VCA: CPT

## 2022-11-27 PROCEDURE — 80053 COMPREHEN METABOLIC PANEL: CPT

## 2022-11-27 PROCEDURE — 6370000000 HC RX 637 (ALT 250 FOR IP): Performed by: PEDIATRICS

## 2022-11-27 PROCEDURE — 6360000002 HC RX W HCPCS: Performed by: PEDIATRICS

## 2022-11-27 PROCEDURE — 83690 ASSAY OF LIPASE: CPT

## 2022-11-27 PROCEDURE — 1200000000 HC SEMI PRIVATE

## 2022-11-27 PROCEDURE — 81001 URINALYSIS AUTO W/SCOPE: CPT

## 2022-11-27 PROCEDURE — 93010 ELECTROCARDIOGRAM REPORT: CPT | Performed by: INTERNAL MEDICINE

## 2022-11-27 PROCEDURE — 86308 HETEROPHILE ANTIBODY SCREEN: CPT

## 2022-11-27 PROCEDURE — 84484 ASSAY OF TROPONIN QUANT: CPT

## 2022-11-27 PROCEDURE — 86663 EPSTEIN-BARR ANTIBODY: CPT

## 2022-11-27 PROCEDURE — 83880 ASSAY OF NATRIURETIC PEPTIDE: CPT

## 2022-11-27 PROCEDURE — 87635 SARS-COV-2 COVID-19 AMP PRB: CPT

## 2022-11-27 PROCEDURE — 80074 ACUTE HEPATITIS PANEL: CPT

## 2022-11-27 PROCEDURE — 94760 N-INVAS EAR/PLS OXIMETRY 1: CPT

## 2022-11-27 PROCEDURE — 71045 X-RAY EXAM CHEST 1 VIEW: CPT

## 2022-11-27 PROCEDURE — 2500000003 HC RX 250 WO HCPCS: Performed by: EMERGENCY MEDICINE

## 2022-11-27 PROCEDURE — 6360000004 HC RX CONTRAST MEDICATION: Performed by: EMERGENCY MEDICINE

## 2022-11-27 RX ORDER — AMITRIPTYLINE HYDROCHLORIDE 25 MG/1
50 TABLET, FILM COATED ORAL NIGHTLY
Status: DISCONTINUED | OUTPATIENT
Start: 2022-11-27 | End: 2022-11-30 | Stop reason: HOSPADM

## 2022-11-27 RX ORDER — PREDNISONE 1 MG/1
5 TABLET ORAL DAILY
Status: DISCONTINUED | OUTPATIENT
Start: 2022-11-27 | End: 2022-11-27

## 2022-11-27 RX ORDER — METRONIDAZOLE 500 MG/100ML
500 INJECTION, SOLUTION INTRAVENOUS EVERY 8 HOURS
Status: DISCONTINUED | OUTPATIENT
Start: 2022-11-27 | End: 2022-11-30 | Stop reason: HOSPADM

## 2022-11-27 RX ORDER — SERTRALINE HYDROCHLORIDE 100 MG/1
200 TABLET, FILM COATED ORAL NIGHTLY
Status: DISCONTINUED | OUTPATIENT
Start: 2022-11-27 | End: 2022-11-27

## 2022-11-27 RX ORDER — ONDANSETRON 4 MG/1
4 TABLET, ORALLY DISINTEGRATING ORAL EVERY 8 HOURS PRN
Status: DISCONTINUED | OUTPATIENT
Start: 2022-11-27 | End: 2022-11-30 | Stop reason: HOSPADM

## 2022-11-27 RX ORDER — CARVEDILOL 3.12 MG/1
3.12 TABLET ORAL 2 TIMES DAILY WITH MEALS
Status: DISCONTINUED | OUTPATIENT
Start: 2022-11-27 | End: 2022-11-27

## 2022-11-27 RX ORDER — ASPIRIN 81 MG/1
81 TABLET ORAL DAILY
COMMUNITY

## 2022-11-27 RX ORDER — SERTRALINE HYDROCHLORIDE 100 MG/1
150 TABLET, FILM COATED ORAL DAILY
COMMUNITY

## 2022-11-27 RX ORDER — CLONAZEPAM 1 MG/1
2 TABLET ORAL 2 TIMES DAILY
Status: DISCONTINUED | OUTPATIENT
Start: 2022-11-27 | End: 2022-11-30 | Stop reason: HOSPADM

## 2022-11-27 RX ORDER — DANTROLENE SODIUM 25 MG/1
200 CAPSULE ORAL 2 TIMES DAILY
Status: DISCONTINUED | OUTPATIENT
Start: 2022-11-27 | End: 2022-11-30 | Stop reason: HOSPADM

## 2022-11-27 RX ORDER — 0.9 % SODIUM CHLORIDE 0.9 %
30 INTRAVENOUS SOLUTION INTRAVENOUS ONCE
Status: COMPLETED | OUTPATIENT
Start: 2022-11-27 | End: 2022-11-27

## 2022-11-27 RX ORDER — SODIUM CHLORIDE 0.9 % (FLUSH) 0.9 %
5-40 SYRINGE (ML) INJECTION EVERY 12 HOURS SCHEDULED
Status: DISCONTINUED | OUTPATIENT
Start: 2022-11-27 | End: 2022-11-30 | Stop reason: HOSPADM

## 2022-11-27 RX ORDER — SODIUM BICARBONATE 42 MG/ML
5 INJECTION, SOLUTION INTRAVENOUS DAILY
COMMUNITY

## 2022-11-27 RX ORDER — FAMOTIDINE 20 MG/1
20 TABLET, FILM COATED ORAL 2 TIMES DAILY
Status: DISCONTINUED | OUTPATIENT
Start: 2022-11-27 | End: 2022-11-27

## 2022-11-27 RX ORDER — POLYETHYLENE GLYCOL 3350 17 G/17G
17 POWDER, FOR SOLUTION ORAL DAILY PRN
Status: DISCONTINUED | OUTPATIENT
Start: 2022-11-27 | End: 2022-11-29

## 2022-11-27 RX ORDER — METRONIDAZOLE 500 MG/100ML
500 INJECTION, SOLUTION INTRAVENOUS ONCE
Status: COMPLETED | OUTPATIENT
Start: 2022-11-27 | End: 2022-11-27

## 2022-11-27 RX ORDER — DIAZEPAM 5 MG/1
5 TABLET ORAL EVERY 8 HOURS PRN
Status: DISCONTINUED | OUTPATIENT
Start: 2022-11-27 | End: 2022-11-27

## 2022-11-27 RX ORDER — ACETAMINOPHEN 325 MG/1
650 TABLET ORAL EVERY 6 HOURS PRN
Status: DISCONTINUED | OUTPATIENT
Start: 2022-11-27 | End: 2022-11-27 | Stop reason: ALTCHOICE

## 2022-11-27 RX ORDER — PANTOPRAZOLE SODIUM 40 MG/1
40 TABLET, DELAYED RELEASE ORAL
Status: DISCONTINUED | OUTPATIENT
Start: 2022-11-27 | End: 2022-11-27 | Stop reason: ALTCHOICE

## 2022-11-27 RX ORDER — SENNA LEAF EXTRACT 176MG/5ML
10 SYRUP ORAL 2 TIMES DAILY
Status: DISCONTINUED | OUTPATIENT
Start: 2022-11-27 | End: 2022-11-30 | Stop reason: HOSPADM

## 2022-11-27 RX ORDER — DIVALPROEX SODIUM 500 MG/1
500 TABLET, DELAYED RELEASE ORAL NIGHTLY
Status: DISCONTINUED | OUTPATIENT
Start: 2022-11-27 | End: 2022-11-27

## 2022-11-27 RX ORDER — BACLOFEN 10 MG/1
10 TABLET ORAL 2 TIMES DAILY
Status: DISCONTINUED | OUTPATIENT
Start: 2022-11-27 | End: 2022-11-27

## 2022-11-27 RX ORDER — SODIUM CHLORIDE 9 MG/ML
INJECTION, SOLUTION INTRAVENOUS PRN
Status: DISCONTINUED | OUTPATIENT
Start: 2022-11-27 | End: 2022-11-30 | Stop reason: HOSPADM

## 2022-11-27 RX ORDER — ACETAMINOPHEN 160 MG/5ML
650 SOLUTION ORAL EVERY 4 HOURS PRN
Status: DISCONTINUED | OUTPATIENT
Start: 2022-11-27 | End: 2022-11-30 | Stop reason: HOSPADM

## 2022-11-27 RX ORDER — ACETAMINOPHEN 650 MG/1
650 SUPPOSITORY RECTAL EVERY 6 HOURS PRN
Status: DISCONTINUED | OUTPATIENT
Start: 2022-11-27 | End: 2022-11-27 | Stop reason: ALTCHOICE

## 2022-11-27 RX ORDER — LANSOPRAZOLE
30 KIT
Status: DISCONTINUED | OUTPATIENT
Start: 2022-11-27 | End: 2022-11-30 | Stop reason: HOSPADM

## 2022-11-27 RX ORDER — ASPIRIN 81 MG/1
81 TABLET, CHEWABLE ORAL DAILY
Status: DISCONTINUED | OUTPATIENT
Start: 2022-11-27 | End: 2022-11-30 | Stop reason: HOSPADM

## 2022-11-27 RX ORDER — ONDANSETRON 4 MG/1
4 TABLET, FILM COATED ORAL EVERY 6 HOURS PRN
COMMUNITY

## 2022-11-27 RX ORDER — SPIRONOLACTONE 25 MG/1
25 TABLET ORAL DAILY
Status: DISCONTINUED | OUTPATIENT
Start: 2022-11-27 | End: 2022-11-27

## 2022-11-27 RX ORDER — ACETAMINOPHEN 160 MG/5ML
650 SOLUTION ORAL EVERY 4 HOURS PRN
Status: DISCONTINUED | OUTPATIENT
Start: 2022-11-27 | End: 2022-11-27 | Stop reason: SDUPTHER

## 2022-11-27 RX ORDER — SODIUM CHLORIDE 0.9 % (FLUSH) 0.9 %
5-40 SYRINGE (ML) INJECTION PRN
Status: DISCONTINUED | OUTPATIENT
Start: 2022-11-27 | End: 2022-11-30 | Stop reason: HOSPADM

## 2022-11-27 RX ORDER — LEVOTHYROXINE SODIUM 0.03 MG/1
25 TABLET ORAL DAILY
Status: DISCONTINUED | OUTPATIENT
Start: 2022-11-27 | End: 2022-11-30 | Stop reason: HOSPADM

## 2022-11-27 RX ORDER — RISPERIDONE 0.5 MG/1
0.5 TABLET ORAL NIGHTLY
Status: DISCONTINUED | OUTPATIENT
Start: 2022-11-27 | End: 2022-11-27

## 2022-11-27 RX ORDER — SENNA AND DOCUSATE SODIUM 50; 8.6 MG/1; MG/1
1 TABLET, FILM COATED ORAL 2 TIMES DAILY
COMMUNITY

## 2022-11-27 RX ORDER — ONDANSETRON 2 MG/ML
4 INJECTION INTRAMUSCULAR; INTRAVENOUS EVERY 6 HOURS PRN
Status: DISCONTINUED | OUTPATIENT
Start: 2022-11-27 | End: 2022-11-30 | Stop reason: HOSPADM

## 2022-11-27 RX ORDER — CLONAZEPAM 2 MG/1
2 TABLET ORAL 2 TIMES DAILY
COMMUNITY

## 2022-11-27 RX ORDER — ACETAMINOPHEN AND CODEINE PHOSPHATE 300; 30 MG/1; MG/1
1 TABLET ORAL EVERY 12 HOURS PRN
COMMUNITY

## 2022-11-27 RX ORDER — ERGOCALCIFEROL 1.25 MG/1
50000 CAPSULE ORAL WEEKLY
COMMUNITY

## 2022-11-27 RX ORDER — PREGABALIN 100 MG/1
200 CAPSULE ORAL 3 TIMES DAILY
Status: DISCONTINUED | OUTPATIENT
Start: 2022-11-27 | End: 2022-11-30 | Stop reason: HOSPADM

## 2022-11-27 RX ORDER — ENOXAPARIN SODIUM 100 MG/ML
40 INJECTION SUBCUTANEOUS DAILY
Status: DISCONTINUED | OUTPATIENT
Start: 2022-11-27 | End: 2022-11-30 | Stop reason: HOSPADM

## 2022-11-27 RX ORDER — POLYETHYLENE GLYCOL 3350 17 G/17G
17 POWDER, FOR SOLUTION ORAL 2 TIMES DAILY
COMMUNITY

## 2022-11-27 RX ADMIN — METRONIDAZOLE 500 MG: 500 INJECTION, SOLUTION INTRAVENOUS at 10:37

## 2022-11-27 RX ADMIN — CLONAZEPAM 2 MG: 1 TABLET ORAL at 20:31

## 2022-11-27 RX ADMIN — METRONIDAZOLE 500 MG: 500 INJECTION, SOLUTION INTRAVENOUS at 00:44

## 2022-11-27 RX ADMIN — METOPROLOL TARTRATE 25 MG: 25 TABLET, FILM COATED ORAL at 10:29

## 2022-11-27 RX ADMIN — PREGABALIN 200 MG: 100 CAPSULE ORAL at 20:31

## 2022-11-27 RX ADMIN — METOPROLOL TARTRATE 25 MG: 25 TABLET, FILM COATED ORAL at 20:33

## 2022-11-27 RX ADMIN — DANTROLENE SODIUM 200 MG: 25 CAPSULE ORAL at 10:27

## 2022-11-27 RX ADMIN — ACETAMINOPHEN 650 MG: 650 SOLUTION ORAL at 05:23

## 2022-11-27 RX ADMIN — SODIUM CHLORIDE 2754 ML: 9 INJECTION, SOLUTION INTRAVENOUS at 00:41

## 2022-11-27 RX ADMIN — CEFEPIME 2000 MG: 2 INJECTION, POWDER, FOR SOLUTION INTRAVENOUS at 13:23

## 2022-11-27 RX ADMIN — HYDROCODONE BITARTRATE AND ACETAMINOPHEN 10 ML: 176/5 SOLUTION ORAL at 10:27

## 2022-11-27 RX ADMIN — SODIUM CHLORIDE, PRESERVATIVE FREE 10 ML: 5 INJECTION INTRAVENOUS at 10:00

## 2022-11-27 RX ADMIN — ASPIRIN 81 MG 81 MG: 81 TABLET ORAL at 10:27

## 2022-11-27 RX ADMIN — PREGABALIN 200 MG: 100 CAPSULE ORAL at 14:35

## 2022-11-27 RX ADMIN — CEFEPIME HYDROCHLORIDE 2000 MG: 2 INJECTION, POWDER, FOR SOLUTION INTRAVENOUS at 00:36

## 2022-11-27 RX ADMIN — METRONIDAZOLE 500 MG: 500 INJECTION, SOLUTION INTRAVENOUS at 18:26

## 2022-11-27 RX ADMIN — CLONAZEPAM 2 MG: 1 TABLET ORAL at 10:28

## 2022-11-27 RX ADMIN — SERTRALINE 150 MG: 100 TABLET, FILM COATED ORAL at 10:28

## 2022-11-27 RX ADMIN — AMITRIPTYLINE HYDROCHLORIDE 50 MG: 25 TABLET, FILM COATED ORAL at 20:31

## 2022-11-27 RX ADMIN — DANTROLENE SODIUM 200 MG: 25 CAPSULE ORAL at 20:32

## 2022-11-27 RX ADMIN — SODIUM CHLORIDE, PRESERVATIVE FREE 10 ML: 5 INJECTION INTRAVENOUS at 20:32

## 2022-11-27 RX ADMIN — POTASSIUM BICARBONATE 20 MEQ: 782 TABLET, EFFERVESCENT ORAL at 10:29

## 2022-11-27 RX ADMIN — PREGABALIN 200 MG: 100 CAPSULE ORAL at 10:28

## 2022-11-27 RX ADMIN — Medication 30 MG: at 10:27

## 2022-11-27 RX ADMIN — IOPAMIDOL 75 ML: 755 INJECTION, SOLUTION INTRAVENOUS at 01:16

## 2022-11-27 RX ADMIN — ENOXAPARIN SODIUM 40 MG: 100 INJECTION SUBCUTANEOUS at 10:28

## 2022-11-27 RX ADMIN — VANCOMYCIN HYDROCHLORIDE 1000 MG: 1 INJECTION, POWDER, LYOPHILIZED, FOR SOLUTION INTRAVENOUS at 02:02

## 2022-11-27 RX ADMIN — LEVOTHYROXINE SODIUM 25 MCG: 0.03 TABLET ORAL at 10:29

## 2022-11-27 RX ADMIN — SODIUM CHLORIDE: 9 INJECTION, SOLUTION INTRAVENOUS at 10:30

## 2022-11-27 ASSESSMENT — PAIN DESCRIPTION - ORIENTATION
ORIENTATION: RIGHT
ORIENTATION: RIGHT

## 2022-11-27 ASSESSMENT — PAIN DESCRIPTION - LOCATION
LOCATION: ANKLE
LOCATION: ANKLE

## 2022-11-27 ASSESSMENT — ENCOUNTER SYMPTOMS
ABDOMINAL PAIN: 1
CONSTIPATION: 0
SHORTNESS OF BREATH: 0
COLOR CHANGE: 0
EYE DISCHARGE: 0
EYE ITCHING: 0
VOMITING: 1
NAUSEA: 1
COUGH: 0

## 2022-11-27 ASSESSMENT — PAIN - FUNCTIONAL ASSESSMENT
PAIN_FUNCTIONAL_ASSESSMENT: PREVENTS OR INTERFERES SOME ACTIVE ACTIVITIES AND ADLS
PAIN_FUNCTIONAL_ASSESSMENT: PREVENTS OR INTERFERES WITH MANY ACTIVE NOT PASSIVE ACTIVITIES

## 2022-11-27 ASSESSMENT — PAIN SCALES - GENERAL
PAINLEVEL_OUTOF10: 5
PAINLEVEL_OUTOF10: 5

## 2022-11-27 ASSESSMENT — PAIN DESCRIPTION - DESCRIPTORS
DESCRIPTORS: ACHING
DESCRIPTORS: ACHING

## 2022-11-27 ASSESSMENT — PAIN DESCRIPTION - PAIN TYPE: TYPE: CHRONIC PAIN

## 2022-11-27 NOTE — H&P
Hospital Medicine History & Physical      PCP: Lewis Aquino    Date of Admission: 11/26/2022    Date of Service: Pt seen/examined on 11/27/22 and Admitted to Inpatient with expected LOS greater than two midnights due to medical therapy. Chief Complaint:  abdominal pain       History Of Present Illness:       62 y.o. female who presented to [facility] with hx of cerebral palsy with report of abdominal pain that started last night. She reports that the last time she had covid she had abdominal pain and diarrhea. Yesterday he stools were soft, so she thought she might have covid again. She returned to the ED and was again tested for covid, which was positive. She is frustrated because she thought she just got over covid. She denies rhinitis, sore throat, or cough. But does report having the chills. She denies pain in her shoulder. She reports a cardiac arrest about 2 years ago, attributed to hypokalemia from a water pill. She reports lost the ability to walk in the past 1-2 years.      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 [address]     Closed anterior dislocation of humerus     Humeral dislocation    Infantile cerebral palsy, unspecified     Cerebral palsy    Irritable bowel syndrome     Irritable bowel       Past Surgical History:          Procedure Laterality Date    CO TOTAL HIP ARTHROPLASTY Right     Hip Replacement, Total    CO TOTAL KNEE ARTHROPLASTY Left     Knee Replacement, Total    TOTAL SHOULDER ARTHROPLASTY      Shoulder replacement x 2 to left       Medications Prior to Admission:      Prior to Admission medications Medication Sig Start Date End Date Taking? Authorizing Provider   aspirin 81 MG EC tablet Take 81 mg by mouth daily   Yes Historical Provider, MD   clonazePAM (KLONOPIN) 2 MG tablet Take 2 mg by mouth in the morning and 2 mg in the evening. Yes Historical Provider, MD   cyanocobalamin 100 MCG tablet Take 100 mcg by mouth daily   Yes Historical Provider, MD   diclofenac sodium (VOLTAREN) 1 % GEL Apply 2 g topically in the morning, at noon, and at bedtime To left upper arm, back of neck, left knee   Yes Historical Provider, MD   potassium bicarb-citric acid (EFFER-K) 20 MEQ TBEF effervescent tablet Take 1 tablet by mouth daily   Yes Historical Provider, MD   vitamin D (ERGOCALCIFEROL) 1.25 MG (55735 UT) CAPS capsule Take 50,000 Units by mouth once a week On Monday   Yes Historical Provider, MD   lansoprazole (PREVACID) 3 mg/mL oral suspension 30 mg by Per J Tube route every morning (before breakfast)   Yes Historical Provider, MD   ondansetron (ZOFRAN) 4 MG tablet Take 4 mg by mouth every 6 hours as needed for Nausea or Vomiting   Yes Historical Provider, MD   polyethylene glycol (MIRALAX) 17 g PACK packet Take 17 g by mouth 2 times daily   Yes Historical Provider, MD   sennosides-docusate sodium (SENOKOT-S) 8.6-50 MG tablet Take 1 tablet by mouth in the morning and at bedtime   Yes Historical Provider, MD   sertraline (ZOLOFT) 100 MG tablet Take 150 mg by mouth daily   Yes Historical Provider, MD   sodium bicarbonate 4.2 % injection Infuse 5 mEq intravenously daily   Yes Historical Provider, MD   acetaminophen-codeine (TYLENOL/CODEINE #3) 300-30 MG per tablet Take 1 tablet by mouth every 12 hours as needed for Pain.    Yes Historical Provider, MD   metoprolol tartrate (LOPRESSOR) 25 MG tablet Take 1 tablet by mouth 2 times daily 10/6/21   Winnie Arreola MD   levothyroxine (SYNTHROID) 50 MCG tablet Take 50 mcg by mouth Daily    Historical Provider, MD   amitriptyline (ELAVIL) 25 MG tablet Take 50 mg by mouth nightly    Historical Provider, MD   dantrolene (DANTRIUM) 100 MG capsule Take 200 mg by mouth 2 times daily    Historical Provider, MD   pregabalin (LYRICA) 200 MG capsule Take 200 mg by mouth 3 times daily. Historical Provider, MD   acetaminophen (TYLENOL) 160 MG/5ML solution Take 20.3 mLs by mouth every 4 hours as needed 5/26/15   Ashley Ivey MD       Allergies:  Penicillins    Social History:      The patient currently lives at [facility] (for the past 1 year)     TOBACCO:   reports that she has never smoked. She has never used smokeless tobacco.  ETOH:   reports no history of alcohol use. E-cigarette/Vaping       Questions Responses    E-cigarette/Vaping Use Never User    Start Date     Passive Exposure     Quit Date     Counseling Given     Comments               Family History:           No family history on file. REVIEW OF SYSTEMS COMPLETED:   Pertinent positives as noted in the HPI. All other systems reviewed and negative. PHYSICAL EXAM PERFORMED:    /69   Pulse (!) 136 Comment: With pitting edema on the right pedal area  Temp (!) 100.7 °F (38.2 °C) (Oral)   Resp 22   Ht 5' 9\" (1.753 m)   Wt 189 lb 2.5 oz (85.8 kg)   SpO2 97%   BMI 27.93 kg/m²     General appearance:  No apparent distress, appears stated age and cooperative. HEENT:  Normal cephalic, atraumatic without obvious deformity. Pupils equal, round, and reactive to light. Extra ocular muscles intact. Conjunctivae/corneas clear. Neck: Supple, with full range of motion. No jugular venous distention. Trachea midline. Respiratory:  Normal respiratory effort. Clear to auscultation, bilaterally without Rales/Wheezes/Rhonchi. Cardiovascular:  Regular rate and rhythm with normal S1/S2 without murmurs, rubs or gallops.   Abdomen: Soft,  - not much tenderness on exam in the abdomen   No distended   And J tube site is well appearing     Musculoskeletal:    Terrible contractures apparent in her legs     Skin: Skin color, texture, turgor normal.    Neurologic:    Speech understandable   No facial droop      Psychiatric:  Alert and oriented   Capillary Refill: Brisk,3 seconds, normal  Peripheral Pulses: +2 palpable, equal bilaterally       Labs:     Recent Labs     11/27/22 0014   WBC 7.6   HGB 12.7   HCT 38.9   *     Recent Labs     11/27/22  0014      K 4.1   CL 97*   CO2 27   BUN 16   CREATININE <0.5*   CALCIUM 9.2     Recent Labs     11/27/22 0014   *   *   BILIDIR 1.0*   BILITOT 1.1*   ALKPHOS 193*     No results for input(s): INR in the last 72 hours. Recent Labs     11/27/22  0248   TROPONINI <0.01       Urinalysis:      Lab Results   Component Value Date/Time    NITRU Negative 11/27/2022 12:14 AM    WBCUA 3-5 11/27/2022 12:14 AM    BACTERIA 4+ 11/27/2022 12:14 AM    RBCUA 11-20 11/27/2022 12:14 AM    BLOODU TRACE 11/27/2022 12:14 AM    SPECGRAV 1.021 11/27/2022 12:14 AM    GLUCOSEU Negative 11/27/2022 12:14 AM       Radiology:     CXR: I have reviewed the CXR with the following interpretation:   EKG:  I have reviewed the EKG with the following interpretation:     CT CHEST ABDOMEN PELVIS W CONTRAST Additional Contrast? None   Preliminary Result   1. Hyperenhancement of the biliary mucosa which can be seen in the setting of   cholangitis. Correlate with laboratory values. 2. Large amount of stool in the rectal vault. Correlate for constipation. 3. No acute findings in the thorax. XR CHEST PORTABLE   Final Result   1. No active pulmonary disease.              Consults:    IP CONSULT TO GI  IP CONSULT TO GI    ASSESSMENT:    Active Hospital Problems    Diagnosis Date Noted    Cholangitis [K83.09] 11/27/2022     Priority: Rita Berman is a 62 y.o. female       Cholangitis:   - GI consulted   - NPO / clears   - cefepime IV   - flagyl IV   - consider addition of IVF prn (furosemide HELD)   - of note she reports a hx of cholecystectomy in the late 90s     Constipation:   - Senna 10 BID J tube     Covid positive, (chronic) respiratory failure with hypoxia:   - supportive measures   - CXR 11/27 neg   - oxygen - baseline O2 demand of 2 LPM     Cerebral palsy:   - dantrolene 200 mg po BID   - reports having a baclofen pump    - has J tube (stopped using it for feeds about 1 month ago)     Sleep aid / leg burning:   - amitriptyline 50 mg po qhs     Anxiety:   - clonazepam 1 mg PO BID PRN anxiety     Depression:   - zoloft 150 mg J tube daily     Neuropathy:   - pregabalin 200 mg po TID     Hypertension:   - metoprolol 25 mg po BID     Hx of hypokalemia, hx of associated cardiac arrest:   - potassium bicarb 20 meq daily     Prevention:   - aspirin 81 mg po daily    Hypothyroidism:   - levothyroxine 25 mcg po daily     Gerd:   - lansoprazole 30 mg J tube daily     Supplement:   - Vitamin D and B12 - HELD     COLLEEN:   - cont CPAP       DVT Prophylaxis: enoxaparin   Diet: Diet NPO Exceptions are: Sips of Water with Meds, Sips of Clear Liquids, Ice Chips  Code Status: Full Code   - alternative decision maker - brother Maggie Holliday) or son Marium Mitchell     PT/OT Eval Status: not consulted     Dispo - pending improvement        Clari Menchaca MD    Thank you Julio Enciso for the opportunity to be involved in this patient's care. Vahe Dash

## 2022-11-27 NOTE — CONSULTS
Gastroenterology Consult Note                          Patient: Regina Thomas  : 1960  CSN#:      Date:  2022    Subjective:       History of Present Illness  Patient is a 62 y.o.  female admitted with Cholangitis [K83.09]  Acute cholangitis [K83.09]  Septicemia ([address] [A41.9]  COVID [U07.1] who is seen in consult for possible cholangitis. She developed acute onset BLQ pain after eating shrimp at [facility]. Pain has since resolved. She also noted chills and malaise and so was brought to ED. She was found COVID (+) in ED. Patient states was (+) 19 days ago and feels pneumonia has resolved. She has cerebral palsy, but communicates well. She denies jaundice, history of hepatitis. She is s/p remote cholecystectomy. In ED, liver enzymes found to be elevated, CT Abd/pelvis revealed non dilated biliary tree, but biliary \"enhancement\" concerning for cholangitis. Past Medical History:   Diagnosis Date    Cerebral palsy ([address]     Closed anterior dislocation of humerus     Humeral dislocation    Infantile cerebral palsy, unspecified     Cerebral palsy    Irritable bowel syndrome     Irritable bowel      Past Surgical History:   Procedure Laterality Date    [place] TOTAL HIP ARTHROPLASTY Right     Hip Replacement, Total    [place] TOTAL KNEE ARTHROPLASTY Left     Knee Replacement, Total    TOTAL SHOULDER ARTHROPLASTY      Shoulder replacement x 2 to left      Past Endoscopic History: Last EGD here in  with PEG removal.  She has indwelling PEG now. Admission Meds  No current facility-administered medications on file prior to encounter. Current Outpatient Medications on File Prior to Encounter   Medication Sig Dispense Refill    aspirin 81 MG EC tablet Take 81 mg by mouth daily      clonazePAM (KLONOPIN) 2 MG tablet Take 2 mg by mouth in the morning and 2 mg in the evening.       cyanocobalamin 100 MCG tablet Take 100 mcg by mouth daily      diclofenac sodium (VOLTAREN) 1 % GEL Apply 2 g topically in the morning, at noon, and at bedtime To left upper arm, back of neck, left knee      potassium bicarb-citric acid (EFFER-K) 20 MEQ TBEF effervescent tablet Take 1 tablet by mouth daily      vitamin D (ERGOCALCIFEROL) 1.25 MG (79266 UT) CAPS capsule Take 50,000 Units by mouth once a week On Monday      lansoprazole (PREVACID) 3 mg/mL oral suspension 30 mg by Per J Tube route every morning (before breakfast)      ondansetron (ZOFRAN) 4 MG tablet Take 4 mg by mouth every 6 hours as needed for Nausea or Vomiting      polyethylene glycol (MIRALAX) 17 g PACK packet Take 17 g by mouth 2 times daily      sennosides-docusate sodium (SENOKOT-S) 8.6-50 MG tablet Take 1 tablet by mouth in the morning and at bedtime      sertraline (ZOLOFT) 100 MG tablet Take 150 mg by mouth daily      sodium bicarbonate 4.2 % injection Infuse 5 mEq intravenously daily      acetaminophen-codeine (TYLENOL/CODEINE #3) 300-30 MG per tablet Take 1 tablet by mouth every 12 hours as needed for Pain.      metoprolol tartrate (LOPRESSOR) 25 MG tablet Take 1 tablet by mouth 2 times daily 60 tablet 1    levothyroxine (SYNTHROID) 50 MCG tablet Take 50 mcg by mouth Daily      amitriptyline (ELAVIL) 25 MG tablet Take 50 mg by mouth nightly      dantrolene (DANTRIUM) 100 MG capsule Take 200 mg by mouth 2 times daily      pregabalin (LYRICA) 200 MG capsule Take 200 mg by mouth 3 times daily. acetaminophen (TYLENOL) 160 MG/5ML solution Take 20.3 mLs by mouth every 4 hours as needed 473 mL 3       Patient denies NSAID use. Allergies  Allergies   Allergen Reactions    Penicillins      Arm swelled. It has been years ago      Social   Social History     Tobacco Use    Smoking status: Never    Smokeless tobacco: Never   Substance Use Topics    Alcohol use: Never        No family history on file. No family history of colon cancer, Crohn's disease, or ulcerative colitis.     Review of Systems  Pertinent items are noted in HPI. Physical Exam  Blood pressure 137/69, pulse (!) 136, temperature (!) 100.7 °F (38.2 °C), temperature source Oral, resp. rate 22, height 5' 9\" (1.753 m), weight 189 lb 2.5 oz (85.8 kg), SpO2 97 %. General appearance: alert, cooperative, no distress, appears stated age  Eyes: Anicteric  Head: Normocephalic, without obvious abnormality  Lungs: clear to auscultation bilaterally, Normal Effort  Heart: regular rate and rhythm, normal S1 and S2, no murmurs or rubs  Abdomen: soft, non-tender. Bowel sounds normal. No masses,  no organomegaly. Extremities: atraumatic, no cyanosis or edema  Skin: warm and dry, no jaundice  Neuro: Grossly intact, A&OX3      Data Review:    Recent Labs     11/27/22  0014   WBC 7.6   HGB 12.7   HCT 38.9   MCV 92.6   *     Recent Labs     11/27/22 0014      K 4.1   CL 97*   CO2 27   BUN 16   CREATININE <0.5*     Recent Labs     11/27/22 0014   *   *   BILIDIR 1.0*   BILITOT 1.1*   ALKPHOS 193*     Recent Labs     11/27/22 0014   LIPASE 43.0     No results for input(s): PROTIME, INR in the last 72 hours. No results for input(s): PTT in the last 72 hours. No results for input(s): OCCULTBLD in the last 72 hours. Imaging Studies:                            CT-scan of abdomen and pelvis 11/27/2022:       FINDINGS:       Chest:       Mediastinum: No mediastinal adenopathy. The heart and pericardium are   without acute abnormality. Moderate hiatal hernia. Mild atherosclerotic   plaque. The central airways are patent. Lungs/Pleura: No focal consolidation. Trace bilateral pleural effusions. Plate-like atelectasis or scarring in the lung bases. Soft Tissues/Bones: No acute bony or soft tissue abnormality. Status post   reverse left shoulder arthroplasty. Abdomen/Pelvis:       Organs: Status post cholecystectomy. Mucosal enhancement of the common bile   duct and intrahepatic bile ducts.   The liver, spleen, pancreas, kidneys and   adrenal glands are without acute findings. There is an at least partially   duplicated left renal collecting system. Bilateral renal cysts. GI/Bowel: No mechanical bowel obstruction. Large amount of stool in the   rectal vault without evidence of impaction or _____ colitis. The appendix is   not definitely identified, but there are no secondary signs of appendicitis   in the right lower quadrant. Percutaneous gastrostomy tube is in place. Pelvis: The urinary bladder is distended without contour abnormality. The   uterus is present. Bilateral Essure devices. No adnexal mass. Peritoneum/Retroperitoneum: Mild-to-moderate atherosclerotic plaque. No   lymphadenopathy. No ascites or pneumoperitoneum. Bones/Soft Tissues: Status post right total hip arthroplasty. The bones   appear demineralized. No acute bony or soft tissue abnormality. Impression   1. Hyperenhancement of the biliary mucosa which can be seen in the setting of   cholangitis. Correlate with laboratory values. 2. Large amount of stool in the rectal vault. Correlate for constipation. 3. No acute findings in the thorax. Assessment:     Principal Problem:    Cholangitis  Resolved Problems:    * No resolved hospital problems. *    Elevated liver enzymes - in setting of acute abdominal pain and fever/chills concerning for biliary origin. CT shows enhancement of non dilated biliary tree. She has no leukocytosis, no RUQ tenderness. Other causes of elevated liver enzymes could include meds (on no new ones recently), COVID, and viral hepatitides. Will She has a spinal stimulator, so MRI likely not possible. Agree with empiric antibiotics. Will check viral serologies. Consider MRCP vs ERCP depending on above and her progress.     Recommendations:   1) Clear liquid diet ok  2) NPO after MN  3) Acute hepatitis and EBV titers  4) Empiric Abx's  5) Trend daily liver enzymes - ordered  6) Consider ERCP depending on above.        Vangie Barrera, [address] [place] [address]  11/27/2022

## 2022-11-27 NOTE — ED PROVIDER NOTES
intolerance. Genitourinary:  Negative for vaginal bleeding, vaginal discharge and vaginal pain. Musculoskeletal:  Negative for neck pain and neck stiffness. Skin:  Negative for color change and pallor. Neurological:  Negative for tremors and weakness. Psychiatric/Behavioral:  Positive for agitation and confusion. Negative for behavioral problems. Except as noted above the remainder of the review of systems was reviewed and negative. PAST MEDICAL HISTORY     Past Medical History:   Diagnosis Date    Cerebral palsy (HCC)     Closed anterior dislocation of humerus     Humeral dislocation    Infantile cerebral palsy, unspecified     Cerebral palsy    Irritable bowel syndrome     Irritable bowel       SURGICAL HISTORY       Past Surgical History:   Procedure Laterality Date    [place] TOTAL HIP ARTHROPLASTY Right     Hip Replacement, Total    [place] TOTAL KNEE ARTHROPLASTY Left     Knee Replacement, Total    TOTAL SHOULDER ARTHROPLASTY      Shoulder replacement x 2 to left       CURRENT MEDICATIONS       Previous Medications    ACETAMINOPHEN (TYLENOL) 160 MG/5ML SOLUTION    Take 20.3 mLs by mouth every 4 hours as needed    AMITRIPTYLINE (ELAVIL) 25 MG TABLET    Take 25 mg by mouth nightly    BACLOFEN (LIORESAL) 10 MG TABLET    Take 10 mg by mouth 2 times daily    CARVEDILOL (COREG) 3.125 MG TABLET    Take 1 tablet by mouth 2 times daily (with meals)    CLONAZEPAM (KLONOPIN) 1 MG TABLET    Take 1 tablet by mouth 2 times daily as needed for Anxiety for up to 5 days.     CLONAZEPAM (KLONOPIN) 2 MG TABLET    1 tablet by Per NG tube route 2 times daily as needed for Anxiety    DANTROLENE (DANTRIUM) 100 MG CAPSULE    Take 200 mg by mouth 2 times daily    DIAZEPAM (VALIUM) 5 MG TABLET    Take 5 mg by mouth every 8 hours as needed (spasms.)    DIVALPROEX (DEPAKOTE) 500 MG DR TABLET    Take 500 mg by mouth nightly    DOCUSATE (COLACE) 50 MG/5ML LIQUID    Take 10 mLs by mouth 2 times daily    ENOXAPARIN (LOVENOX) 40 MG/0.4ML INJECTION    Inject 0.4 mLs into the skin daily    FAMOTIDINE (PEPCID) 20 MG TABLET    Take 1 tablet by mouth 2 times daily    FUROSEMIDE (LASIX) 40 MG TABLET    Take 1 tablet by mouth daily    LEVOTHYROXINE (SYNTHROID) 25 MCG TABLET    Take 25 mcg by mouth Daily    METOPROLOL TARTRATE (LOPRESSOR) 25 MG TABLET    Take 1 tablet by mouth 2 times daily    OMEPRAZOLE (PRILOSEC) 40 MG DELAYED RELEASE CAPSULE    Take 40 mg by mouth 2 times daily    PREDNISONE (DELTASONE) 5 MG TABLET    Take 5 mg by mouth daily    PREGABALIN (LYRICA) 200 MG CAPSULE    Take 200 mg by mouth 3 times daily. RISPERIDONE (RISPERDAL) 0.5 MG TABLET    Take 0.5 mg by mouth nightly    SERTRALINE (ZOLOFT) 100 MG TABLET    Take 200 mg by mouth nightly    SERTRALINE (ZOLOFT) 100 MG TABLET    Take 150 mg by mouth daily    SPIRONOLACTONE (ALDACTONE) 25 MG TABLET    Take 1 tablet by mouth daily       ALLERGIES     Penicillins    FAMILY HISTORY      No family history on file. SOCIAL HISTORY       Social History     Socioeconomic History    Marital status: Single   Tobacco Use    Smoking status: Never    Smokeless tobacco: Never   Vaping Use    Vaping Use: Never used   Substance and Sexual Activity    Alcohol use: Never    Drug use: Never    Sexual activity: Not Currently   Social History Narrative    ** Merged History Encounter **            PHYSICAL EXAM       ED Triage Vitals   BP Temp Temp Source Heart Rate Resp SpO2 Height Weight   11/26/22 2353 11/26/22 2353 11/26/22 2353 11/26/22 2353 11/26/22 2353 11/26/22 2353 -- 11/26/22 2356   (!) 171/89 100.4 °F (38 °C) Oral (!) 141 (!) 49 99 %  202 lb 6.1 oz (91.8 kg)       Physical Exam  Vitals and nursing note reviewed. Constitutional:       Appearance: She is well-developed. She is ill-appearing and toxic-appearing. She is not diaphoretic. HENT:      Head: Normocephalic and atraumatic.       Right Ear: External ear normal.      Left Ear: External ear normal.   Eyes:      General: PANEL W/ REFLEX TO MG FOR LOW K - Abnormal; Notable for the following components:    Chloride 97 (*)     Glucose 161 (*)     Creatinine <0.5 (*)     All other components within normal limits   LACTIC ACID - Abnormal; Notable for the following components:    Lactic Acid 3.8 (*)     All other components within normal limits   URINALYSIS WITH REFLEX TO CULTURE - Abnormal; Notable for the following components:    Color, UA DARK YELLOW (*)     Clarity, UA CLOUDY (*)     Blood, Urine TRACE (*)     Protein, UA TRACE (*)     Leukocyte Esterase, Urine TRACE (*)     All other components within normal limits   HEPATIC FUNCTION PANEL - Abnormal; Notable for the following components:    Alkaline Phosphatase 193 (*)      (*)      (*)     Total Bilirubin 1.1 (*)     Bilirubin, Direct 1.0 (*)     All other components within normal limits   RAPID INFLUENZA A/B ANTIGENS   CULTURE, BLOOD 1   CULTURE, BLOOD 1   LIPASE   TROPONIN   BRAIN NATRIURETIC PEPTIDE   LACTIC ACID   MICROSCOPIC URINALYSIS       All other labs were withinnormal range or not returned as of this dictation. EMERGENCY DEPARTMENT COURSE and DIFFERENTIAL DIAGNOSIS/MDM:     PMH, Surgical Hx, FH, Social Hx reviewed by myself (ETOH usage, Tobacco usage, Drug usage reviewed by myself, no pertinent Hx)- No Pertinent Hx     Old records were reviewed by me     MDM 62 yr old with fever, N/V. found to be + for COVID also CT evidence acute cholangitis. LFTs elevated. Fluids abx started. GI consulted Dr. Palma Bucio. Blood pressure reassuring. Admission sepsis, transaminitis, cholangitis, COVID, fever. Disposition Admission    Is this patient to be included in the SEP-1 Core Measure due to severe sepsis or septic shock? Yes   SEP-1 CORE MEASURE DATA      Sepsis Criteria   Severe Sepsis Criteria   Septic Shock Criteria     Must be confirmed or suspected to move forward with diagnosis of sepsis.     Must meet 2:    [] Temperature > 100.9 F (38.3 C)        or < 96.8 F (36 C)  [] HR > 90  [] RR > 20  [] WBC > 12 or < 4 or 10% bands      AND:      [] Infection Confirmed or        Suspected. Must meet 1:    [] Lactate > 2       or   [] Signs of Organ Dysfunction:    - SBP < 90 or MAP < 65  - Altered mental status  - Creatinine > 2 or increased from      baseline  - Urine Output < 0.5 ml/kg/hr  - Bilirubin > 2  - INR > 1.5 (not anticoagulated)  - Platelets < 248,721  - Acute Respiratory Failure as     evidenced by new need for NIPPV     or mechanical ventilation      [] No criteria met for Severe Sepsis. Must meet 1:    [] Lactate > 4        or   [] SBP < 90 or MAP < 65 for at        least two readings in the first        hour after fluid bolus        administration      [] Vasopressors initiated (if hypotension persists after fluid resuscitation)        [] No criteria met for Septic Shock.    Patient Vitals for the past 6 hrs:   BP Temp Pulse Resp SpO2 Weight Weight Method Percent Weight Change   11/26/22 2353 (!) 171/89 100.4 °F (38 °C) (!) 141 (!) 49 99 % -- -- --   11/26/22 2356 -- -- -- -- -- 202 lb 6.1 oz (91.8 kg) Actual 0   11/27/22 0010 (!) 146/75 -- (!) 145 (!) 40 100 % -- -- --   11/27/22 0030 (!) 145/76 -- (!) 144 27 98 % -- -- --   11/27/22 0050 (!) 144/72 -- (!) 135 (!) 36 97 % -- -- --   11/27/22 0110 (!) 149/58 -- (!) 131 (!) 41 99 % -- -- --   11/27/22 0130 (!) 143/52 -- (!) 128 24 99 % -- -- --   11/27/22 0150 139/62 -- (!) 124 28 100 % -- -- --   11/27/22 0210 129/65 -- (!) 127 (!) 43 97 % -- -- --   11/27/22 0230 (!) 139/56 -- (!) 125 (!) 40 95 % -- -- --   11/27/22 0247 (!) 126/55 -- (!) 130 19 96 % -- -- --   11/27/22 0310 (!) 135/59 -- (!) 133 27 97 % -- -- --   11/27/22 0330 (!) 131/58 -- (!) 128 (!) 34 96 % -- -- --      Recent Labs     11/27/22  0014 11/27/22 0248   WBC 7.6  --    LACTA 3.8* 2.6*   CREATININE <0.5*  --    BILITOT 1.1*  --    *  --          Time Severe Sepsis    Fluid Resuscitation Rational: at least 30mL/kg based on entered actual weight at time of triage    Repeat lactate level: improving    Reassessment Exam:   Not applicable. Patient does not have septic shock. I PERSONALLY SAW THE PATIENT AND PERFORMED A SUBSTANTIVE PORTION OF THE VISIT INCLUDING ALL ASPECTS OF THE MEDICAL DECISION MAKING PROCESS. The primary clinician of record [address]   Total Critical Caretime was 99 minutes, excluding separately reportable procedures. There was a high probability of clinically significant/life threatening deterioration in the patient's condition which required my urgent intervention. CRITICAL CARE  I personally saw the patient and independently provided 99 minutes of non-concurrent critical care out of the total shared critical care time provided. This excludes seperately billable procedures. Critical care time was provided for patient as above that required close evaluation and/or intervention with concern for potential patient decompensation. PROCEDURES:  Unlessotherwise noted below, none    FINAL IMPRESSION      1. Septicemia ([address]    2. COVID    3.  Acute cholangitis          DISPOSITION/PLAN   DISPOSITION      ICU    (Please note that portions ofthis note were completed with a voice recognition program.  Efforts were made to edit the dictations but occasionally words are mis-transcribed.)    Kathrine Tariq MD(electronically signed)  Attending Emergency Physician            Kathrine Tariq MD  11/27/22 4329

## 2022-11-27 NOTE — PROGRESS NOTES
Requested that patient ask her family to bring in her home cpap machine. Patient states that they cannot but we can call [facility] and staff may be able to tell us her settings and we can use hospital equipment. Will reach out to [facility] staff for cpap settings.

## 2022-11-27 NOTE — PROGRESS NOTES
Pt transferred from ED, nonambulatory @baseline, 2l nc, iv fluids infusing. Gown changed, bed locked in lowest position.

## 2022-11-27 NOTE — PROGRESS NOTES
Pharmacy Medication Reconciliation Note     List of medications patient is currently taking is complete. Source of information:   1. Fax from [facility]    Notes regarding home medications:   1. Medication list updated. Several changes made to initial list on file.    2. Added potassium bicarb, ASA, and metoprolol to the list    Maria E Downs PharmD  11/27/2022 6:58 AM

## 2022-11-28 LAB
A/G RATIO: 1.2 (ref 1.1–2.2)
ALBUMIN SERPL-MCNC: 3.7 G/DL (ref 3.4–5)
ALP BLD-CCNC: 200 U/L (ref 40–129)
ALT SERPL-CCNC: 355 U/L (ref 10–40)
ANION GAP SERPL CALCULATED.3IONS-SCNC: 12 MMOL/L (ref 3–16)
AST SERPL-CCNC: 265 U/L (ref 15–37)
BASOPHILS ABSOLUTE: 0 K/UL (ref 0–0.2)
BASOPHILS RELATIVE PERCENT: 0.3 %
BILIRUB SERPL-MCNC: 2.9 MG/DL (ref 0–1)
BUN BLDV-MCNC: 9 MG/DL (ref 7–20)
CALCIUM SERPL-MCNC: 8.5 MG/DL (ref 8.3–10.6)
CHLORIDE BLD-SCNC: 102 MMOL/L (ref 99–110)
CO2: 28 MMOL/L (ref 21–32)
CREAT SERPL-MCNC: <0.5 MG/DL (ref 0.6–1.2)
EOSINOPHILS ABSOLUTE: 0.1 K/UL (ref 0–0.6)
EOSINOPHILS RELATIVE PERCENT: 1 %
GFR SERPL CREATININE-BSD FRML MDRD: >60 ML/MIN/{1.73_M2}
GLUCOSE BLD-MCNC: 77 MG/DL (ref 70–99)
HCT VFR BLD CALC: 35.2 % (ref 36–48)
HEMOGLOBIN: 11.6 G/DL (ref 12–16)
LYMPHOCYTES ABSOLUTE: 0.9 K/UL (ref 1–5.1)
LYMPHOCYTES RELATIVE PERCENT: 13.5 %
MCH RBC QN AUTO: 30.5 PG (ref 26–34)
MCHC RBC AUTO-ENTMCNC: 33 G/DL (ref 31–36)
MCV RBC AUTO: 92.3 FL (ref 80–100)
MONOCYTES ABSOLUTE: 0.6 K/UL (ref 0–1.3)
MONOCYTES RELATIVE PERCENT: 8.7 %
NEUTROPHILS ABSOLUTE: 5 K/UL (ref 1.7–7.7)
NEUTROPHILS RELATIVE PERCENT: 76.5 %
PDW BLD-RTO: 14.4 % (ref 12.4–15.4)
PLATELET # BLD: 88 K/UL (ref 135–450)
PLATELET SLIDE REVIEW: ABNORMAL
PMV BLD AUTO: 8.9 FL (ref 5–10.5)
POTASSIUM SERPL-SCNC: 4 MMOL/L (ref 3.5–5.1)
RBC # BLD: 3.82 M/UL (ref 4–5.2)
SLIDE REVIEW: ABNORMAL
SODIUM BLD-SCNC: 142 MMOL/L (ref 136–145)
TOTAL PROTEIN: 6.8 G/DL (ref 6.4–8.2)
WBC # BLD: 6.5 K/UL (ref 4–11)

## 2022-11-28 PROCEDURE — 6370000000 HC RX 637 (ALT 250 FOR IP): Performed by: PEDIATRICS

## 2022-11-28 PROCEDURE — 2700000000 HC OXYGEN THERAPY PER DAY

## 2022-11-28 PROCEDURE — 85025 COMPLETE CBC W/AUTO DIFF WBC: CPT

## 2022-11-28 PROCEDURE — 2500000003 HC RX 250 WO HCPCS: Performed by: PEDIATRICS

## 2022-11-28 PROCEDURE — 1200000000 HC SEMI PRIVATE

## 2022-11-28 PROCEDURE — 80053 COMPREHEN METABOLIC PANEL: CPT

## 2022-11-28 PROCEDURE — 2580000003 HC RX 258: Performed by: PEDIATRICS

## 2022-11-28 PROCEDURE — 36415 COLL VENOUS BLD VENIPUNCTURE: CPT

## 2022-11-28 PROCEDURE — 6360000002 HC RX W HCPCS: Performed by: PEDIATRICS

## 2022-11-28 PROCEDURE — 94760 N-INVAS EAR/PLS OXIMETRY 1: CPT

## 2022-11-28 RX ADMIN — METOPROLOL TARTRATE 25 MG: 25 TABLET, FILM COATED ORAL at 08:36

## 2022-11-28 RX ADMIN — CLONAZEPAM 2 MG: 1 TABLET ORAL at 21:23

## 2022-11-28 RX ADMIN — PREGABALIN 200 MG: 100 CAPSULE ORAL at 08:36

## 2022-11-28 RX ADMIN — DANTROLENE SODIUM 200 MG: 25 CAPSULE ORAL at 14:49

## 2022-11-28 RX ADMIN — HYDROCODONE BITARTRATE AND ACETAMINOPHEN 10 ML: 176/5 SOLUTION ORAL at 08:37

## 2022-11-28 RX ADMIN — CEFEPIME 2000 MG: 2 INJECTION, POWDER, FOR SOLUTION INTRAVENOUS at 04:21

## 2022-11-28 RX ADMIN — LEVOTHYROXINE SODIUM 25 MCG: 0.03 TABLET ORAL at 06:11

## 2022-11-28 RX ADMIN — METOPROLOL TARTRATE 25 MG: 25 TABLET, FILM COATED ORAL at 21:24

## 2022-11-28 RX ADMIN — PREGABALIN 200 MG: 100 CAPSULE ORAL at 14:49

## 2022-11-28 RX ADMIN — PREGABALIN 200 MG: 100 CAPSULE ORAL at 21:23

## 2022-11-28 RX ADMIN — ENOXAPARIN SODIUM 40 MG: 100 INJECTION SUBCUTANEOUS at 08:37

## 2022-11-28 RX ADMIN — HYDROCODONE BITARTRATE AND ACETAMINOPHEN 10 ML: 176/5 SOLUTION ORAL at 21:49

## 2022-11-28 RX ADMIN — SERTRALINE 150 MG: 100 TABLET, FILM COATED ORAL at 08:36

## 2022-11-28 RX ADMIN — METRONIDAZOLE 500 MG: 500 INJECTION, SOLUTION INTRAVENOUS at 08:57

## 2022-11-28 RX ADMIN — ASPIRIN 81 MG 81 MG: 81 TABLET ORAL at 08:36

## 2022-11-28 RX ADMIN — CEFEPIME 2000 MG: 2 INJECTION, POWDER, FOR SOLUTION INTRAVENOUS at 14:47

## 2022-11-28 RX ADMIN — Medication 30 MG: at 08:37

## 2022-11-28 RX ADMIN — METRONIDAZOLE 500 MG: 500 INJECTION, SOLUTION INTRAVENOUS at 02:51

## 2022-11-28 RX ADMIN — METRONIDAZOLE 500 MG: 500 INJECTION, SOLUTION INTRAVENOUS at 21:23

## 2022-11-28 RX ADMIN — AMITRIPTYLINE HYDROCHLORIDE 50 MG: 25 TABLET, FILM COATED ORAL at 21:23

## 2022-11-28 RX ADMIN — DANTROLENE SODIUM 200 MG: 25 CAPSULE ORAL at 23:59

## 2022-11-28 RX ADMIN — CLONAZEPAM 2 MG: 1 TABLET ORAL at 08:36

## 2022-11-28 RX ADMIN — POTASSIUM BICARBONATE 20 MEQ: 782 TABLET, EFFERVESCENT ORAL at 08:37

## 2022-11-28 ASSESSMENT — PAIN DESCRIPTION - ORIENTATION: ORIENTATION: RIGHT

## 2022-11-28 ASSESSMENT — PAIN SCALES - GENERAL: PAINLEVEL_OUTOF10: 2

## 2022-11-28 ASSESSMENT — PAIN DESCRIPTION - LOCATION: LOCATION: ANKLE

## 2022-11-28 ASSESSMENT — PAIN DESCRIPTION - PAIN TYPE: TYPE: CHRONIC PAIN

## 2022-11-28 ASSESSMENT — PAIN DESCRIPTION - DESCRIPTORS: DESCRIPTORS: ACHING

## 2022-11-28 ASSESSMENT — PAIN - FUNCTIONAL ASSESSMENT: PAIN_FUNCTIONAL_ASSESSMENT: PREVENTS OR INTERFERES SOME ACTIVE ACTIVITIES AND ADLS

## 2022-11-28 NOTE — PLAN OF CARE
Problem: Discharge Planning  Goal: Discharge to home or other facility with appropriate resources  11/28/2022 1135 by Sade Veloz RN  Outcome: Progressing  11/27/2022 2304 by Gilmar Coleman RN  Outcome: Progressing     Problem: Skin/Tissue Integrity  Goal: Absence of new skin breakdown  Description: 1. Monitor for areas of redness and/or skin breakdown  2. Assess vascular access sites hourly  3. Every 4-6 hours minimum:  Change oxygen saturation probe site  4. Every 4-6 hours:  If on nasal continuous positive airway pressure, respiratory therapy assess nares and determine need for appliance change or resting period.   11/28/2022 1135 by Sade Veloz RN  Outcome: Progressing  11/27/2022 2304 by Gilmar Coleman RN  Outcome: Progressing     Problem: Safety - Adult  Goal: Free from fall injury  11/28/2022 1135 by Sade Veloz RN  Outcome: Progressing  11/27/2022 2304 by Gilmar Coleman RN  Outcome: Progressing     Problem: ABCDS Injury Assessment  Goal: Absence of physical injury  11/28/2022 1135 by Sade Veloz RN  Outcome: Progressing  11/27/2022 2304 by Gilmar Coleman RN  Outcome: Progressing     Problem: Pain  Goal: Verbalizes/displays adequate comfort level or baseline comfort level  11/28/2022 1135 by Sade Veloz RN  Outcome: Progressing  11/27/2022 2304 by Gilmar Coleman RN  Outcome: Progressing     Problem: Nutrition Deficit:  Goal: Optimize nutritional status  Outcome: Progressing

## 2022-11-28 NOTE — PROGRESS NOTES
Hospitalist Progress Note      PCP: Johnny rGeco    Date of Admission: 11/26/2022    Chief Complaint: Abdominal pain    Hospital Course:     62 y.o. female who presented to [facility] with hx of cerebral palsy with report of abdominal pain /diarrhea for 1 day       Subjective:     Feels better although still has upper abdominal discomfort and pain. .  No nausea vomiting. .. No shortness of breath. Damián Range      Medications:  Reviewed    Infusion Medications    sodium chloride 250 mL/hr at 11/27/22 1030     Scheduled Medications    amitriptyline  50 mg Per J Tube Nightly    clonazePAM  2 mg Per J Tube BID    dantrolene  200 mg Oral BID    levothyroxine  25 mcg Per J Tube Daily    pregabalin  200 mg Oral TID    sertraline  150 mg Per J Tube Daily    sodium chloride flush  5-40 mL IntraVENous 2 times per day    enoxaparin  40 mg SubCUTAneous Daily    cefepime  2,000 mg IntraVENous Q12H    metroNIDAZOLE  500 mg IntraVENous Q8H    lansoprazole  30 mg Per J Tube QAM AC    aspirin  81 mg Per J Tube Daily    metoprolol tartrate  25 mg Per J Tube BID    potassium bicarb-citric acid  20 mEq Per NG tube Daily    senna  10 mL Per J Tube BID     PRN Meds: sodium chloride flush, sodium chloride, ondansetron **OR** ondansetron, polyethylene glycol, acetaminophen      Intake/Output Summary (Last 24 hours) at 11/28/2022 1243  Last data filed at 11/28/2022 0815  Gross per 24 hour   Intake 250 ml   Output 800 ml   Net -550 ml       Physical Exam Performed:    /78   Pulse 83   Temp 98.2 °F (36.8 °C) (Oral)   Resp 16   Ht 5' 9\" (1.753 m)   Wt 183 lb 13.8 oz (83.4 kg)   SpO2 98%   BMI 27.15 kg/m²     General appearance: No apparent distress, appears stated age and cooperative. HEENT: Pupils equal, round, and reactive to light. Conjunctivae/corneas clear. Neck: Supple, with full range of motion. No jugular venous distention. Trachea midline. Respiratory:  Normal respiratory effort.  Clear to auscultation, bilaterally without Rales/Wheezes/Rhonchi. Cardiovascular: Regular rate and rhythm with normal S1/S2 without murmurs, rubs or gallops. Abdomen: Soft, non-tender, non-distended with normal bowel sounds. Musculoskeletal: No clubbing, cyanosis or edema bilaterally. Full range of motion without deformity. Skin: Skin color, texture, turgor normal.  No rashes or lesions. Neurologic:  Neurovascularly intact without any focal sensory/motor deficits. Cranial nerves: II-XII intact, grossly non-focal.  Psychiatric: Alert and oriented, thought content appropriate, normal insight  Capillary Refill: Brisk, 3 seconds, normal   Peripheral Pulses: +2 palpable, equal bilaterally       Labs:   Recent Labs     11/27/22  0014 11/27/22  1057 11/28/22  0552   WBC 7.6 9.8 6.5   HGB 12.7 11.5* 11.6*   HCT 38.9 34.4* 35.2*   * 102* 88*     Recent Labs     11/27/22  0014 11/27/22  1057 11/28/22  0552    143 142   K 4.1 4.1 4.0   CL 97* 104 102   CO2 27 29 28   BUN 16 10 9   CREATININE <0.5* <0.5* <0.5*   CALCIUM 9.2 8.3 8.5     Recent Labs     11/27/22  0014 11/27/22  1057 11/28/22  0552   * 570* 265*   * 430* 355*   BILIDIR 1.0*  --   --    BILITOT 1.1* 1.5* 2.9*   ALKPHOS 193* 192* 200*     No results for input(s): INR in the last 72 hours. Recent Labs     11/27/22  0248   TROPONINI <0.01       Urinalysis:      Lab Results   Component Value Date/Time    NITRU Negative 11/27/2022 12:14 AM    WBCUA 3-5 11/27/2022 12:14 AM    BACTERIA 4+ 11/27/2022 12:14 AM    RBCUA 11-20 11/27/2022 12:14 AM    BLOODU TRACE 11/27/2022 12:14 AM    SPECGRAV 1.021 11/27/2022 12:14 AM    GLUCOSEU Negative 11/27/2022 12:14 AM       Radiology:  CT CHEST ABDOMEN PELVIS W CONTRAST Additional Contrast? None   Preliminary Result   1. Hyperenhancement of the biliary mucosa which can be seen in the setting of   cholangitis. Correlate with laboratory values. 2. Large amount of stool in the rectal vault. Correlate for constipation.    3. No acute findings in the thorax. XR CHEST PORTABLE   Final Result   1. No active pulmonary disease. IP CONSULT TO GI  IP CONSULT TO GI    Assessment/Plan:    COVID-19 infection  Chest x-ray is clear, oxygen requirements at baseline 2 L. .  Continue supportive treatment    Elevated liver enzymes  Suspect due to viral infection. .  Concern for cholangitis on CT. Sandra Blackshear CT showed hyperenhancement of the biliary mucosa without dilatation. Has prior cholecystectomy in the mid 90s  -c/w IV cefepime and Flagyl empirically. Follow-up on culture data  -Diet advanced to clears  -Follow-up with GI--to consider ERCP if bilirubin continues to rise    Sepsis due to above--fever, sinus tachycardia, tachypnea--improving    Thrombocytopenia likely due to COVID-19 infection,sepsis--continue to monitor       chronic respiratory failure with hypoxia:   - CXR 11/27 neg   - oxygen - baseline O2 demand of 2 LPM      Cerebral palsy: c/w dantrolene 200 mg po BID   - reports having a baclofen pump    - has J tube (stopped using it for feeds about 1 month ago)          Anxiety: c/w- clonazepam 1 mg PO BID PRN anxiety      Depression: - c/w zoloft 150 mg J tube daily      Neuropathy: c/w pregabalin 200 mg po TID      Hypertension: c/w metoprolol 25 mg po BID        Hypothyroidism: c/w- levothyroxine 25 mcg po daily      Gerd: c/w - lansoprazole 30 mg J tube daily         COLLEEN: cont CPAP hs       Constipation: c/w - Senna 10 BID J tube       DVT Prophylaxis: Lovenox  Diet: ADULT DIET;  Clear Liquid  Code Status: Full Code        Sabrina Barron MD

## 2022-11-28 NOTE — PLAN OF CARE
Problem: Discharge Planning  Goal: Discharge to home or other facility with appropriate resources  11/27/2022 2304 by Aniket Kim RN  Outcome: Progressing  11/27/2022 1346 by Fede Roman RN  Outcome: Progressing     Problem: ABCDS Injury Assessment  Goal: Absence of physical injury  11/27/2022 2304 by Aniket Kim RN  Outcome: Progressing  11/27/2022 1346 by Fede Roman RN  Outcome: Progressing     Problem: Skin/Tissue Integrity  Goal: Absence of new skin breakdown  Description: 1. Monitor for areas of redness and/or skin breakdown  2. Assess vascular access sites hourly  3. Every 4-6 hours minimum:  Change oxygen saturation probe site  4. Every 4-6 hours:  If on nasal continuous positive airway pressure, respiratory therapy assess nares and determine need for appliance change or resting period.   11/27/2022 2304 by Aniket Kim RN  Outcome: Progressing  11/27/2022 1346 by Fede Roman RN  Outcome: Progressing

## 2022-11-28 NOTE — PROGRESS NOTES
INPATIENT PROGRESS NOTE        IDENTIFYING DATA/REASON FOR CONSULTATION   PATIENT:  Kaitlin Correa  MRN:  [id number]  ADMIT DATE: 2022  TIME OF EVALUATION: 2022 9:31 AM  HOSPITAL STAY:   LOS: 1 day   CONSULTING PHYSICIAN: Óscar Puga MD   REASON FOR CONSULTATION:  concern for cholangitis    Subjective:    Patient seen in follow up     She reports she had mid abdominal pain overnight as well as some sharp pain along her left side. She attributes it to [employer]"  She reports she suffers from constipation. She denies pain along right abdomen. She denies nausea or vomiting. No fevers overnight    MEDICATIONS   SCHEDULED:  amitriptyline, 50 mg, Nightly  clonazePAM, 2 mg, BID  dantrolene, 200 mg, BID  levothyroxine, 25 mcg, Daily  pregabalin, 200 mg, TID  sertraline, 150 mg, Daily  sodium chloride flush, 5-40 mL, 2 times per day  enoxaparin, 40 mg, Daily  cefepime, 2,000 mg, Q12H  metroNIDAZOLE, 500 mg, Q8H  lansoprazole, 30 mg, QAM AC  aspirin, 81 mg, Daily  metoprolol tartrate, 25 mg, BID  potassium bicarb-citric acid, 20 mEq, Daily  senna, 10 mL, BID      FLUIDS/DRIPS:     sodium chloride 250 mL/hr at 22 1030     PRNs: sodium chloride flush, 5-40 mL, PRN  sodium chloride, , PRN  ondansetron, 4 mg, Q8H PRN   Or  ondansetron, 4 mg, Q6H PRN  polyethylene glycol, 17 g, Daily PRN  acetaminophen, 650 mg, Q4H PRN      ALLERGIES:    Allergies   Allergen Reactions    Penicillins      Arm swelled. It has been years ago         PHYSICAL EXAM   VITALS:  /78   Pulse 76   Temp 98.2 °F (36.8 °C) (Oral)   Resp 16   Ht 5' 9\" (1.753 m)   Wt 183 lb 13.8 oz (83.4 kg)   SpO2 98%   BMI 27.15 kg/m²   TEMPERATURE:  Current - Temp: 98.2 °F (36.8 °C);  Max - Temp  Av.6 °F (37 °C)  Min: 97.5 °F (36.4 °C)  Max: 99.7 °F (37.6 °C)    Physical Exam:  General appearance: alert, cooperative, no distress, appears stated age  Eyes: Anicteric  Head: Normocephalic, without obvious abnormality  Lungs: clear to auscultation bilaterally, Normal Effort  Heart: regular rate and rhythm, normal S1 and S2, no murmurs or rubs  Abdomen: soft, non-distended, non-tender. Bowel sounds normal.  +gastrostomy tube  Extremities: atraumatic, no cyanosis or edema  Skin: warm and dry, no jaundice  Neuro: Grossly intact, A&OX3    LABS AND IMAGING   Laboratory   Recent Labs     11/27/22  0014 11/27/22  1057 11/28/22  0552   WBC 7.6 9.8 6.5   HGB 12.7 11.5* 11.6*   HCT 38.9 34.4* 35.2*   MCV 92.6 91.2 92.3   * 102*  --      Recent Labs     11/27/22  0014 11/27/22  1057 11/28/22  0552    143 142   K 4.1 4.1 4.0   CL 97* 104 102   CO2 27 29 28   BUN 16 10 9   CREATININE <0.5* <0.5* <0.5*     Recent Labs     11/27/22  0014 11/27/22  1057 11/28/22  0552   * 570* 265*   * 430* 355*   BILIDIR 1.0*  --   --    BILITOT 1.1* 1.5* 2.9*   ALKPHOS 193* 192* 200*     Recent Labs     11/27/22  0014   LIPASE 43.0     No results for input(s): PROTIME, INR in the last 72 hours. Imaging  CT CHEST ABDOMEN PELVIS W CONTRAST Additional Contrast? None   Preliminary Result   1. Hyperenhancement of the biliary mucosa which can be seen in the setting of   cholangitis. Correlate with laboratory values. 2. Large amount of stool in the rectal vault. Correlate for constipation. 3. No acute findings in the thorax. XR CHEST PORTABLE   Final Result   1. No active pulmonary disease. Endoscopy      ASSESSMENT AND RECOMMENDATIONS   Jennifer Messer is a 62 y.o. female with PMH of cerebral palsy, gastrostomy tube, spinal stimulator, IBS-C who presented 11/26/22 with abdominal pain, fever/chills. Found to have elevated LFTs. CT showed hyperenhancement of the biliary mucosa concerning for cholangitis and large amount of stool in the rectal vault. Elevated LFTs:  possible related to Covid infection vs viral hepatitis. EBV Ab pending. CT showed enhancement the biliary mucosa concerning for cholangitis.   She is on Cefepime. She has no right sided abdominal pain, nausea, vomiting which makes cholangitis seem less likely. Her transaminases are improving, bilirubin remains elevated which is typical to lag. Will closely monitor. If bili continues to rise consider MRCP/ERCP  Covid infection with hypoxic respiratory failure. On 2L O2  Fevers:  suspect 2/2 #2 vs cholangitis. On Cefepime  Mid to Left sided Abdominal pain. Pt with hx of IBS-C.  CT showed large amount of stool in the rectal vault. On senokot. Will add miralax daily. PT is not on narcotics. Cerebral palsy  Gastrostomy tube. No longer uses for tube feeding    RECOMMENDATIONS:    Monitor LFTs  Continue cefepime  Miralax daily   Ok for clear liquids. Monitor tolerance    If you have any questions or need any further information, please feel free to contact us [phone]. Thank you for allowing us to participate in the care of James Boucher. The note was completed using Dragon voice recognition transcription. Every effort was made to ensure accuracy; however, inadvertent transcription errors may be present despite my best efforts to edit errors. Dorothy PEREZ    Attending physician addendum:      I have personally seen and examined the patient, reviewed the patient's medical record and pertinent labs and clinical imaging. I have personally staffed the case with ANA Guadalupe. I agree with her consultation note, exam findings, assessment and plans  as written above. I have made appropriate modifications and edited her assessment and plan where needed to reflect my impression and plans for this patient. Patient denies any right upper quadrant pain. She reports an occasional sharp pain in her left upper quadrant. She has had some constipation and only a small bowel movement. She has had no fevers or chills or any nausea or vomiting. She has no complaints of right upper quadrant pain.     /76   Pulse 72   Temp 97.9 °F (36.6 °C) (Oral)   Resp 18   Ht 5' 9\" (1.753 m)   Wt 183 lb 13.8 oz (83.4 kg)   SpO2 98%   BMI 27.15 kg/m²      NAD  CV- RRR  Abd- PEG tube noted, Slight LUQ TTP,   Baclofen pump palpable. Lungs- scattered rhonchi noted. Ext- no C/C/E/E    Labs and imaging reviewed. Impression: 60-year-old female with known history of cerebral palsy, who presented with complaints of abdominal pain    1) Elevated liver enzymes.-These were fairly normal and the end of August 2022 when the patient had a replacement of the battery of the left intra-abdominal intrathecal baclofen pump. She reports a previous history of COVID-19 several weeks ago. She was still positive on admission. She could have viral mediated acute hepatitis. Her transaminases seem to be improving. Her alkaline phosphatase and bilirubin were slightly higher today. She is not having any symptoms of cholangitis. She does have some suspected enhancement of her bile ducts which is nonspecific. She has had a previous cholecystectomy. The patient is not having any clinical signs of cholangitis. Specifically she denies any fever, has no leukocytosis, and has no right upper quadrant pain. 2) Covid -19-  Still with + PCR. Diagnosis date unclear. 3) Cerebral palsy with baclofen pump      Plan:   Supportive care of Covid 19  Supportive care of suspected viral mediated hepatitis . If ALP and Hyperbilirubinemia worsen, could consider an MRCP  Will follow along  Could consider additional serologic evaluation if the enzymes worsened  Diet as tolerated. Thank you for allowing me to participate in this patient's care. If there are any questions or concerns regarding this patient, or the plan we have set in place, please feel free to contact me at [phone].      Samantha Allen DO

## 2022-11-28 NOTE — PROGRESS NOTES
Pt is unsure of home settings and would prefer to check with Michelle Sorto tomorrow to obtain CPAP settings before using hospital supplied CPAP.     Electronically signed by Erasmo Merchant RCP on 11/27/2022 at 10:31 PM

## 2022-11-28 NOTE — PROGRESS NOTES
Comprehensive Nutrition Assessment    Type and Reason for Visit:  Initial, Positive Nutrition Screen    Nutrition Recommendations/Plan:   Start nutrition per MD plan of care  Refer to MD recommending swallowing evaluation to determine safest swallow, given need for modified texture at facility. Malnutrition Assessment:  Malnutrition Status:  Insufficient data (11/28/22 0935)    Context:  Acute Illness     Due to current CDC guidelines recommending 6-ft distancing for social isolation for COVID19 prevention, NFPE/malnutrition assessment was deferred at this time. Nutrition Assessment:    Pt triggered a positive screen r/t swallowing deficit and presence of J tube. PMH includes Cerebral Palsy, IBS, remote cholecystectomy. Pt adm with abd pain. Found to have possible cholangitis. Noted that pt is also positive for COVD. Current diet orders are NPO. Noted need for modified texture at facility where pt resides. Noted also that pt stopped using J tube for feeding ~ 1 month ago. Will refer to MD, recommending updated swallowing assessment before advancing diet. Nutrition Related Findings:    Noted BM on 11/26. Noted per imaging large stool burden in rectal vault. Noted +2 edema to RLE and non-pitting edema to LLE. Labs reviewed. Wound Type: None (Pt at risk r/t contractures of lower extremities.)       Current Nutrition Intake & Therapies:    Average Meal Intake: NPO  Average Supplements Intake: NPO  Diet NPO Exceptions are: Sips of Water with Meds, Sips of Clear Liquids, Ice Chips    Anthropometric Measures:  Height: 5' 9\" (175.3 cm)  Ideal Body Weight (IBW): 145 lbs (66 kg)    Admission Body Weight: 202 lb (91.6 kg)  Current Body Weight: 184 lb (83.5 kg), 126.9 % IBW. Weight Source: Bed Scale  Current BMI (kg/m2): 27.2  Usual Body Weight:  (records reflect wt range of 180-low 200s. )                       BMI Categories: Overweight (BMI 25.0-29. 9)    Estimated Daily Nutrient Needs:        Energy (kcal/day): 0312-0169 (20-25 x ABW 84 kg)     Protein (g/day):  (1-1.3 x ABW 84 kg (adj for age)  Method Used for Fluid Requirements: 1 ml/kcal  Fluid (ml/day):      Nutrition Diagnosis:   Inadequate oral intake related to inadequate protein-energy intake as evidenced by NPO or clear liquid status due to medical condition    Nutrition Interventions:   Food and/or Nutrient Delivery: Continue NPO (start nutrition when appropriate)  Nutrition Education/Counseling: Education not indicated  Coordination of Nutrition Care: Continue to monitor while inpatient       Goals:     Goals:  (tolerate most appropriate form of nutrition)       Nutrition Monitoring and Evaluation:   Behavioral-Environmental Outcomes: None Identified  Food/Nutrient Intake Outcomes:  (nutrition per MD plan of care)  Physical Signs/Symptoms Outcomes: Biochemical Data, Chewing or Swallowing, Constipation, Diarrhea, GI Status, Fluid Status or Edema, Weight, Skin    Discharge Planning:     Too soon to determine     Keily Yu, [address] [place] [address],   Contact: [phone]

## 2022-11-29 LAB
A/G RATIO: 1.2 (ref 1.1–2.2)
ALBUMIN SERPL-MCNC: 3.6 G/DL (ref 3.4–5)
ALP BLD-CCNC: 193 U/L (ref 40–129)
ALT SERPL-CCNC: 252 U/L (ref 10–40)
ANION GAP SERPL CALCULATED.3IONS-SCNC: 12 MMOL/L (ref 3–16)
AST SERPL-CCNC: 103 U/L (ref 15–37)
BASOPHILS ABSOLUTE: 0 K/UL (ref 0–0.2)
BASOPHILS RELATIVE PERCENT: 0.3 %
BILIRUB SERPL-MCNC: 1.3 MG/DL (ref 0–1)
BUN BLDV-MCNC: 5 MG/DL (ref 7–20)
CALCIUM SERPL-MCNC: 8.6 MG/DL (ref 8.3–10.6)
CHLORIDE BLD-SCNC: 100 MMOL/L (ref 99–110)
CO2: 29 MMOL/L (ref 21–32)
CREAT SERPL-MCNC: <0.5 MG/DL (ref 0.6–1.2)
EOSINOPHILS ABSOLUTE: 0.1 K/UL (ref 0–0.6)
EOSINOPHILS RELATIVE PERCENT: 1.5 %
EPSTEIN BARR VIRUS NUCLEAR AB IGG: >600 U/ML (ref 0–21.9)
EPSTEIN-BARR EARLY ANTIGEN ANTIBODY: 36.3 U/ML (ref 0–10.9)
EPSTEIN-BARR VCA IGG: 631 U/ML (ref 0–21.9)
EPSTEIN-BARR VCA IGM: <10 U/ML (ref 0–43.9)
GFR SERPL CREATININE-BSD FRML MDRD: >60 ML/MIN/{1.73_M2}
GLUCOSE BLD-MCNC: 89 MG/DL (ref 70–99)
HCT VFR BLD CALC: 33.9 % (ref 36–48)
HEMOGLOBIN: 11.3 G/DL (ref 12–16)
LYMPHOCYTES ABSOLUTE: 0.7 K/UL (ref 1–5.1)
LYMPHOCYTES RELATIVE PERCENT: 15.7 %
MCH RBC QN AUTO: 30.6 PG (ref 26–34)
MCHC RBC AUTO-ENTMCNC: 33.4 G/DL (ref 31–36)
MCV RBC AUTO: 91.6 FL (ref 80–100)
MONOCYTES ABSOLUTE: 0.4 K/UL (ref 0–1.3)
MONOCYTES RELATIVE PERCENT: 9.9 %
NEUTROPHILS ABSOLUTE: 3.3 K/UL (ref 1.7–7.7)
NEUTROPHILS RELATIVE PERCENT: 72.6 %
PDW BLD-RTO: 14.3 % (ref 12.4–15.4)
PLATELET # BLD: 87 K/UL (ref 135–450)
PMV BLD AUTO: 8.9 FL (ref 5–10.5)
POTASSIUM SERPL-SCNC: 3.5 MMOL/L (ref 3.5–5.1)
RBC # BLD: 3.7 M/UL (ref 4–5.2)
SODIUM BLD-SCNC: 141 MMOL/L (ref 136–145)
TOTAL PROTEIN: 6.7 G/DL (ref 6.4–8.2)
WBC # BLD: 4.6 K/UL (ref 4–11)

## 2022-11-29 PROCEDURE — 6370000000 HC RX 637 (ALT 250 FOR IP): Performed by: FAMILY MEDICINE

## 2022-11-29 PROCEDURE — 6370000000 HC RX 637 (ALT 250 FOR IP): Performed by: PEDIATRICS

## 2022-11-29 PROCEDURE — 1200000000 HC SEMI PRIVATE

## 2022-11-29 PROCEDURE — 6360000002 HC RX W HCPCS: Performed by: PEDIATRICS

## 2022-11-29 PROCEDURE — 85025 COMPLETE CBC W/AUTO DIFF WBC: CPT

## 2022-11-29 PROCEDURE — 2500000003 HC RX 250 WO HCPCS: Performed by: PEDIATRICS

## 2022-11-29 PROCEDURE — 36415 COLL VENOUS BLD VENIPUNCTURE: CPT

## 2022-11-29 PROCEDURE — 2580000003 HC RX 258: Performed by: PEDIATRICS

## 2022-11-29 PROCEDURE — 80053 COMPREHEN METABOLIC PANEL: CPT

## 2022-11-29 RX ORDER — POLYETHYLENE GLYCOL 3350 17 G/17G
17 POWDER, FOR SOLUTION ORAL DAILY
Status: DISCONTINUED | OUTPATIENT
Start: 2022-11-29 | End: 2022-11-30 | Stop reason: HOSPADM

## 2022-11-29 RX ORDER — POLYETHYLENE GLYCOL 3350 17 G/17G
17 POWDER, FOR SOLUTION ORAL DAILY
Status: DISCONTINUED | OUTPATIENT
Start: 2022-11-29 | End: 2022-11-29

## 2022-11-29 RX ADMIN — CEFEPIME 2000 MG: 2 INJECTION, POWDER, FOR SOLUTION INTRAVENOUS at 02:10

## 2022-11-29 RX ADMIN — HYDROCODONE BITARTRATE AND ACETAMINOPHEN 10 ML: 176/5 SOLUTION ORAL at 11:48

## 2022-11-29 RX ADMIN — POLYETHYLENE GLYCOL 3350 17 G: 17 POWDER, FOR SOLUTION ORAL at 23:12

## 2022-11-29 RX ADMIN — HYDROCODONE BITARTRATE AND ACETAMINOPHEN 10 ML: 176/5 SOLUTION ORAL at 22:54

## 2022-11-29 RX ADMIN — PREGABALIN 200 MG: 100 CAPSULE ORAL at 08:37

## 2022-11-29 RX ADMIN — METRONIDAZOLE 500 MG: 500 INJECTION, SOLUTION INTRAVENOUS at 21:49

## 2022-11-29 RX ADMIN — DANTROLENE SODIUM 200 MG: 25 CAPSULE ORAL at 08:30

## 2022-11-29 RX ADMIN — METOPROLOL TARTRATE 25 MG: 25 TABLET, FILM COATED ORAL at 21:55

## 2022-11-29 RX ADMIN — CLONAZEPAM 2 MG: 1 TABLET ORAL at 08:35

## 2022-11-29 RX ADMIN — DANTROLENE SODIUM 200 MG: 25 CAPSULE ORAL at 21:55

## 2022-11-29 RX ADMIN — ENOXAPARIN SODIUM 40 MG: 100 INJECTION SUBCUTANEOUS at 08:38

## 2022-11-29 RX ADMIN — AMITRIPTYLINE HYDROCHLORIDE 50 MG: 25 TABLET, FILM COATED ORAL at 21:55

## 2022-11-29 RX ADMIN — SODIUM CHLORIDE, PRESERVATIVE FREE 10 ML: 5 INJECTION INTRAVENOUS at 08:39

## 2022-11-29 RX ADMIN — LEVOTHYROXINE SODIUM 25 MCG: 0.03 TABLET ORAL at 06:21

## 2022-11-29 RX ADMIN — METOPROLOL TARTRATE 25 MG: 25 TABLET, FILM COATED ORAL at 08:35

## 2022-11-29 RX ADMIN — Medication 30 MG: at 06:21

## 2022-11-29 RX ADMIN — SODIUM CHLORIDE, PRESERVATIVE FREE 10 ML: 5 INJECTION INTRAVENOUS at 23:12

## 2022-11-29 RX ADMIN — CLONAZEPAM 2 MG: 1 TABLET ORAL at 21:55

## 2022-11-29 RX ADMIN — SERTRALINE 150 MG: 100 TABLET, FILM COATED ORAL at 08:34

## 2022-11-29 RX ADMIN — METRONIDAZOLE 500 MG: 500 INJECTION, SOLUTION INTRAVENOUS at 06:12

## 2022-11-29 RX ADMIN — PREGABALIN 200 MG: 100 CAPSULE ORAL at 21:55

## 2022-11-29 RX ADMIN — POTASSIUM BICARBONATE 20 MEQ: 782 TABLET, EFFERVESCENT ORAL at 08:34

## 2022-11-29 RX ADMIN — CEFEPIME 2000 MG: 2 INJECTION, POWDER, FOR SOLUTION INTRAVENOUS at 16:06

## 2022-11-29 RX ADMIN — ASPIRIN 81 MG 81 MG: 81 TABLET ORAL at 08:35

## 2022-11-29 RX ADMIN — METRONIDAZOLE 500 MG: 500 INJECTION, SOLUTION INTRAVENOUS at 16:05

## 2022-11-29 RX ADMIN — SODIUM CHLORIDE, PRESERVATIVE FREE 10 ML: 5 INJECTION INTRAVENOUS at 00:03

## 2022-11-29 RX ADMIN — PREGABALIN 200 MG: 100 CAPSULE ORAL at 16:10

## 2022-11-29 ASSESSMENT — PAIN SCALES - GENERAL: PAINLEVEL_OUTOF10: 0

## 2022-11-29 NOTE — PLAN OF CARE
Problem: Discharge Planning  Goal: Discharge to home or other facility with appropriate resources  11/29/2022 1153 by Roopa Mccormack RN  Outcome: Progressing  Flowsheets (Taken 11/29/2022 0831)  Discharge to home or other facility with appropriate resources: Identify barriers to discharge with patient and caregiver  11/29/2022 0135 by Aime Chung RN  Outcome: Progressing     Problem: Skin/Tissue Integrity  Goal: Absence of new skin breakdown  Description: 1. Monitor for areas of redness and/or skin breakdown  2. Assess vascular access sites hourly  3. Every 4-6 hours minimum:  Change oxygen saturation probe site  4. Every 4-6 hours:  If on nasal continuous positive airway pressure, respiratory therapy assess nares and determine need for appliance change or resting period.   11/29/2022 1153 by Roopa Mccormack RN  Outcome: Progressing  11/29/2022 0135 by Aime Chung RN  Outcome: Progressing     Problem: Safety - Adult  Goal: Free from fall injury  11/29/2022 1153 by Roopa Mccormack RN  Outcome: Progressing  11/29/2022 0135 by Aime Chung RN  Outcome: Progressing     Problem: ABCDS Injury Assessment  Goal: Absence of physical injury  11/29/2022 1153 by Roopa Mccormack RN  Outcome: Progressing  11/29/2022 0135 by Aime Chung RN  Outcome: Progressing     Problem: Pain  Goal: Verbalizes/displays adequate comfort level or baseline comfort level  11/29/2022 1153 by Roopa Mccormack RN  Outcome: Progressing  11/29/2022 0135 by Aime Chung RN  Outcome: Progressing     Problem: Nutrition Deficit:  Goal: Optimize nutritional status  11/29/2022 1153 by Roopa Mccormack RN  Outcome: Progressing  11/29/2022 0135 by Aime Chung RN  Outcome: Progressing

## 2022-11-29 NOTE — PROGRESS NOTES
Hospitalist Progress Note      PCP: Marino Downs    Date of Admission: 11/26/2022    Chief Complaint: constipation    Hospital Course:      Subjective: complaining of constipation       Medications:  Reviewed    Infusion Medications    sodium chloride 250 mL/hr at 11/27/22 1030     Scheduled Medications    polyethylene glycol  17 g Per J Tube Daily    amitriptyline  50 mg Per J Tube Nightly    clonazePAM  2 mg Per J Tube BID    dantrolene  200 mg Oral BID    levothyroxine  25 mcg Per J Tube Daily    pregabalin  200 mg Oral TID    sertraline  150 mg Per J Tube Daily    sodium chloride flush  5-40 mL IntraVENous 2 times per day    enoxaparin  40 mg SubCUTAneous Daily    cefepime  2,000 mg IntraVENous Q12H    metroNIDAZOLE  500 mg IntraVENous Q8H    lansoprazole  30 mg Per J Tube QAM AC    aspirin  81 mg Per J Tube Daily    metoprolol tartrate  25 mg Per J Tube BID    potassium bicarb-citric acid  20 mEq Per NG tube Daily    senna  10 mL Per J Tube BID     PRN Meds: sodium chloride flush, sodium chloride, ondansetron **OR** ondansetron, acetaminophen      Intake/Output Summary (Last 24 hours) at 11/29/2022 1614  Last data filed at 11/29/2022 0138  Gross per 24 hour   Intake 30 ml   Output 1 ml   Net 29 ml       Exam:    /80   Pulse 75   Temp 97.4 °F (36.3 °C) (Oral)   Resp 16   Ht 5' 9\" (1.753 m)   Wt 187 lb 2.7 oz (84.9 kg)   SpO2 98%   BMI 27.64 kg/m²     General appearance: No apparent distress, appears stated age and cooperative. HEENT: Pupils equal, round, and reactive to light. Conjunctivae/corneas clear. Neck: Supple, with full range of motion. No jugular venous distention. Trachea midline. Respiratory:  Normal respiratory effort. Clear to auscultation, bilaterally without Rales/Wheezes/Rhonchi. Cardiovascular: Regular rate and rhythm with normal S1/S2 without murmurs, rubs or gallops. Abdomen: Soft, non-tender, non-distended with normal bowel sounds. J tube in place.   Baclofen pump attached to left mid inner abdominal wall  Musculoskeletal: No clubbing, cyanosis or edema bilaterally. Full range of motion without deformity. Skin: Skin color, texture, turgor normal.  No rashes or lesions. Neurologic:  Neurovascularly intact without any focal sensory/motor deficits. Cranial nerves: II-XII intact, grossly non-focal.  Psychiatric: Alert and oriented, thought content appropriate, normal insight  Capillary Refill: Brisk,< 3 seconds   Peripheral Pulses: +2 palpable, equal bilaterally       Labs:   Recent Labs     11/27/22  1057 11/28/22  0552 11/29/22  0436   WBC 9.8 6.5 4.6   HGB 11.5* 11.6* 11.3*   HCT 34.4* 35.2* 33.9*   * 88* 87*     Recent Labs     11/27/22  1057 11/28/22  0552 11/29/22  0436    142 141   K 4.1 4.0 3.5    102 100   CO2 29 28 29   BUN 10 9 5*   CREATININE <0.5* <0.5* <0.5*   CALCIUM 8.3 8.5 8.6     Recent Labs     11/27/22  0014 11/27/22  1057 11/28/22  0552 11/29/22  0436   * 570* 265* 103*   * 430* 355* 252*   BILIDIR 1.0*  --   --   --    BILITOT 1.1* 1.5* 2.9* 1.3*   ALKPHOS 193* 192* 200* 193*     No results for input(s): INR in the last 72 hours. Recent Labs     11/27/22  0248   TROPONINI <0.01       Urinalysis:      Lab Results   Component Value Date/Time    NITRU Negative 11/27/2022 12:14 AM    WBCUA 3-5 11/27/2022 12:14 AM    BACTERIA 4+ 11/27/2022 12:14 AM    RBCUA 11-20 11/27/2022 12:14 AM    BLOODU TRACE 11/27/2022 12:14 AM    SPECGRAV 1.021 11/27/2022 12:14 AM    GLUCOSEU Negative 11/27/2022 12:14 AM       Radiology:  CT CHEST ABDOMEN PELVIS W CONTRAST Additional Contrast? None   Final Result   1. Hyperenhancement of the biliary mucosa which can be seen in the setting of   cholangitis. Correlate with laboratory values. 2. Large amount of stool in the rectal vault. Correlate for constipation. 3. No acute findings in the thorax. XR CHEST PORTABLE   Final Result   1. No active pulmonary disease. Assessment/Plan:    Active Hospital Problems    Diagnosis Date Noted    Cholangitis [K83.09] 11/27/2022     Priority: Medium       COVID-19 infection  Chest x-ray is clear, oxygen requirements at baseline 2 L. .  Continue supportive treatment     Elevated liver enzymes  Suspect due to viral infection. .  Concern for cholangitis on CT. Pink [facility] CT showed hyperenhancement of the biliary mucosa without dilatation. Has prior cholecystectomy in the mid 90s  -c/w IV cefepime and Flagyl empirically. Follow-up on culture data  -Diet advanced to clears  -Follow-up with GI--to consider MRCP if bilirubin continues to rise   - as improving will not need MRCP at this time     Sepsis due to above--fever, sinus tachycardia, tachypnea--improving     Thrombocytopenia likely due to COVID-19 infection,sepsis--continue to monitor        chronic respiratory failure with hypoxia:   - CXR 11/27 neg   - oxygen - baseline O2 demand of 2 LPM      Cerebral palsy: c/w dantrolene 200 mg po BID   - reports having a baclofen pump    - has J tube (stopped using it for feeds about 1 month ago)      Chronic constipation  - senna BID and miralax daily    DVT Prophylaxis: Lovenox  Diet: ADULT DIET;  Clear Liquid  ADULT ORAL NUTRITION SUPPLEMENT; Breakfast, Lunch, Dinner; Clear Liquid Oral Supplement  Code Status: Full Code    PT/OT Eval Status: long term care    Dispo - PCU; potential d/c tomorrow if continues to improve    Bre Monroe MD

## 2022-11-29 NOTE — PROGRESS NOTES
INPATIENT PROGRESS NOTE        IDENTIFYING DATA/REASON FOR CONSULTATION   PATIENT:  Krystle Hubbard  MRN:  [id number]  ADMIT DATE: 2022  TIME OF EVALUATION: 2022 10:49 AM  HOSPITAL STAY:   LOS: 2 days   CONSULTING PHYSICIAN: Brian Jarrell MD   REASON FOR CONSULTATION:  concern for cholangitis    Subjective:    Patient seen in follow up   She report no further abdominal pain  No n/v  She had BM yesterday  No fevers      MEDICATIONS   SCHEDULED:  amitriptyline, 50 mg, Nightly  clonazePAM, 2 mg, BID  dantrolene, 200 mg, BID  levothyroxine, 25 mcg, Daily  pregabalin, 200 mg, TID  sertraline, 150 mg, Daily  sodium chloride flush, 5-40 mL, 2 times per day  enoxaparin, 40 mg, Daily  cefepime, 2,000 mg, Q12H  metroNIDAZOLE, 500 mg, Q8H  lansoprazole, 30 mg, QAM AC  aspirin, 81 mg, Daily  metoprolol tartrate, 25 mg, BID  potassium bicarb-citric acid, 20 mEq, Daily  senna, 10 mL, BID    FLUIDS/DRIPS:     sodium chloride 250 mL/hr at 22 1030     PRNs: sodium chloride flush, 5-40 mL, PRN  sodium chloride, , PRN  ondansetron, 4 mg, Q8H PRN   Or  ondansetron, 4 mg, Q6H PRN  polyethylene glycol, 17 g, Daily PRN  acetaminophen, 650 mg, Q4H PRN    ALLERGIES:    Allergies   Allergen Reactions    Penicillins      Arm swelled. It has been years ago         PHYSICAL EXAM   VITALS:  /80   Pulse 75   Temp 97.4 °F (36.3 °C) (Oral)   Resp 16   Ht 5' 9\" (1.753 m)   Wt 187 lb 2.7 oz (84.9 kg)   SpO2 98%   BMI 27.64 kg/m²   TEMPERATURE:  Current - Temp: 97.4 °F (36.3 °C); Max - Temp  Av.2 °F (36.8 °C)  Min: 97.4 °F (36.3 °C)  Max: 98.8 °F (37.1 °C)    Physical Exam:  General appearance: alert, cooperative, no distress, appears stated age  Eyes: Anicteric  Head: Normocephalic, without obvious abnormality  Lungs: clear to auscultation bilaterally, Normal Effort  Heart: regular rate and rhythm, normal S1 and S2, no murmurs or rubs  Abdomen: soft, non-distended, non-tender.  Bowel sounds normal.  +gastrostomy tube  Extremities: atraumatic, no cyanosis or edema  Skin: warm and dry, no jaundice  Neuro: Grossly intact, A&OX3    LABS AND IMAGING   Laboratory   Recent Labs     11/27/22  1057 11/28/22  0552 11/29/22  0436   WBC 9.8 6.5 4.6   HGB 11.5* 11.6* 11.3*   HCT 34.4* 35.2* 33.9*   MCV 91.2 92.3 91.6   * 88* 87*     Recent Labs     11/27/22  1057 11/28/22  0552 11/29/22  0436    142 141   K 4.1 4.0 3.5    102 100   CO2 29 28 29   BUN 10 9 5*   CREATININE <0.5* <0.5* <0.5*     Recent Labs     11/27/22  0014 11/27/22  1057 11/28/22  0552 11/29/22  0436   * 570* 265* 103*   * 430* 355* 252*   BILIDIR 1.0*  --   --   --    BILITOT 1.1* 1.5* 2.9* 1.3*   ALKPHOS 193* 192* 200* 193*     Recent Labs     11/27/22  0014   LIPASE 43.0     No results for input(s): PROTIME, INR in the last 72 hours. Imaging  CT CHEST ABDOMEN PELVIS W CONTRAST Additional Contrast? None   Final Result   1. Hyperenhancement of the biliary mucosa which can be seen in the setting of   cholangitis. Correlate with laboratory values. 2. Large amount of stool in the rectal vault. Correlate for constipation. 3. No acute findings in the thorax. XR CHEST PORTABLE   Final Result   1. No active pulmonary disease. Endoscopy      ASSESSMENT AND RECOMMENDATIONS   Racheal Robb is a 62 y.o. female with PMH of cerebral palsy, gastrostomy tube, spinal stimulator, IBS-C who presented 11/26/22 with abdominal pain, fever/chills. Found to have elevated LFTs. CT showed hyperenhancement of the biliary mucosa concerning for cholangitis and large amount of stool in the rectal vault. Elevated LFTs:  improving. Possible related to Covid infection vs viral hepatitis. EBV Ab pending but mono screen neg. CT showed enhancement the biliary mucosa. She has no right sided abdominal pain, nausea, vomiting which makes cholangitis seem less likely. Covid infection with hypoxic respiratory failure.   On 2L O2  Fevers:  suspect 2/2 #2 vs less likely cholangitis. Mid to Left sided Abdominal pain. Resolved. Pt with hx of IBS-C.  CT showed large amount of stool in the rectal vault. On senokot and miralax daily. Cerebral palsy  Gastrostomy tube. No longer uses for tube feeding    RECOMMENDATIONS:    LFTs improving. Will hold off on MRCP or additional serological liver work up as suspect LFT elevation related to acute viral illness  Continue Miralax daily to promote BMs  Diet as tolerated      If you have any questions or need any further information, please feel free to contact us [phone]. Thank you for allowing us to participate in the care of Jennifer Messer. The note was completed using Dragon voice recognition transcription. Every effort was made to ensure accuracy; however, inadvertent transcription errors may be present despite my best efforts to edit errors. Eliu PEREZ      Attending physician addendum:      I have personally seen and examined the patient, reviewed the patient's medical record and pertinent labs and clinical imaging. I have personally staffed the case with ANA Barber. I agree with her consultation note, exam findings, assessment and plans  as written above. I have made appropriate modifications and edited her assessment and plan where needed to reflect my impression and plans for this patient. No acute events reported    /78   Pulse 70   Temp 97.6 °F (36.4 °C) (Oral)   Resp 16   Ht 5' 9\" (1.753 m)   Wt 187 lb 2.7 oz (84.9 kg)   SpO2 97%   BMI 27.64 kg/m²      NAD  CV- RRR  Abd- PEG tube noted, Slight LUQ TTP,   Baclofen pump palpable. Lungs- scattered rhonchi noted. Ext- no C/C/E/E     Labs and imaging reviewed. Impression: 71-year-old female with known history of cerebral palsy, who presented with complaints of abdominal pain     1) Elevated liver enzymes. -improving.   These were fairly normal and the end of August 2022 when the patient had a replacement of the battery of the left intra-abdominal intrathecal baclofen pump. She reports a previous history of COVID-19 several weeks ago. She was still positive on admission. She could have viral mediated acute hepatitis. Her transaminases seem to be improving. Her alkaline phosphatase and bilirubin were slightly higher today. She is not having any symptoms of cholangitis. She does have some suspected enhancement of her bile ducts which is nonspecific. She has had a previous cholecystectomy. The patient is not having any clinical signs of cholangitis. Specifically she denies any fever, has no leukocytosis, and has no right upper quadrant pain. 2) Covid -19-  Still with + PCR. Diagnosis date unclear. 3) Cerebral palsy with baclofen pump        Plan:   Supportive care of Covid 19  Supportive care of suspected viral mediated hepatitis . Would repeat CMP in 2-3 weeks  If still abnormal the patient can follow up and we can pursue additional serologic evaluation Diet as tolerated. Will sign off. Call with ? Thank you for allowing me to participate in this patient's care. If there are any questions or concerns regarding this patient, or the plan we have set in place, please feel free to contact me at [phone].      Ellie Nina, DO

## 2022-11-30 VITALS
HEART RATE: 72 BPM | SYSTOLIC BLOOD PRESSURE: 145 MMHG | BODY MASS INDEX: 27.82 KG/M2 | WEIGHT: 187.83 LBS | TEMPERATURE: 97.8 F | OXYGEN SATURATION: 97 % | RESPIRATION RATE: 16 BRPM | DIASTOLIC BLOOD PRESSURE: 81 MMHG | HEIGHT: 69 IN

## 2022-11-30 PROCEDURE — 2580000003 HC RX 258: Performed by: PEDIATRICS

## 2022-11-30 PROCEDURE — 2500000003 HC RX 250 WO HCPCS: Performed by: PEDIATRICS

## 2022-11-30 PROCEDURE — 6360000002 HC RX W HCPCS: Performed by: PEDIATRICS

## 2022-11-30 PROCEDURE — 6370000000 HC RX 637 (ALT 250 FOR IP): Performed by: FAMILY MEDICINE

## 2022-11-30 PROCEDURE — 6370000000 HC RX 637 (ALT 250 FOR IP): Performed by: PEDIATRICS

## 2022-11-30 RX ADMIN — HYDROCODONE BITARTRATE AND ACETAMINOPHEN 10 ML: 176/5 SOLUTION ORAL at 09:45

## 2022-11-30 RX ADMIN — ENOXAPARIN SODIUM 40 MG: 100 INJECTION SUBCUTANEOUS at 09:45

## 2022-11-30 RX ADMIN — ASPIRIN 81 MG 81 MG: 81 TABLET ORAL at 09:44

## 2022-11-30 RX ADMIN — CEFEPIME 2000 MG: 2 INJECTION, POWDER, FOR SOLUTION INTRAVENOUS at 13:49

## 2022-11-30 RX ADMIN — DANTROLENE SODIUM 200 MG: 25 CAPSULE ORAL at 10:08

## 2022-11-30 RX ADMIN — METRONIDAZOLE 500 MG: 500 INJECTION, SOLUTION INTRAVENOUS at 13:58

## 2022-11-30 RX ADMIN — CEFEPIME 2000 MG: 2 INJECTION, POWDER, FOR SOLUTION INTRAVENOUS at 03:10

## 2022-11-30 RX ADMIN — POTASSIUM BICARBONATE 20 MEQ: 782 TABLET, EFFERVESCENT ORAL at 09:44

## 2022-11-30 RX ADMIN — METRONIDAZOLE 500 MG: 500 INJECTION, SOLUTION INTRAVENOUS at 07:05

## 2022-11-30 RX ADMIN — LEVOTHYROXINE SODIUM 25 MCG: 0.03 TABLET ORAL at 07:02

## 2022-11-30 RX ADMIN — SERTRALINE 150 MG: 100 TABLET, FILM COATED ORAL at 09:44

## 2022-11-30 RX ADMIN — POLYETHYLENE GLYCOL 3350 17 G: 17 POWDER, FOR SOLUTION ORAL at 09:45

## 2022-11-30 RX ADMIN — SODIUM CHLORIDE, PRESERVATIVE FREE 10 ML: 5 INJECTION INTRAVENOUS at 09:30

## 2022-11-30 RX ADMIN — PREGABALIN 200 MG: 100 CAPSULE ORAL at 13:58

## 2022-11-30 RX ADMIN — Medication 30 MG: at 07:02

## 2022-11-30 RX ADMIN — CLONAZEPAM 2 MG: 1 TABLET ORAL at 09:44

## 2022-11-30 RX ADMIN — METOPROLOL TARTRATE 25 MG: 25 TABLET, FILM COATED ORAL at 09:44

## 2022-11-30 RX ADMIN — PREGABALIN 200 MG: 100 CAPSULE ORAL at 09:44

## 2022-11-30 ASSESSMENT — PAIN SCALES - GENERAL: PAINLEVEL_OUTOF10: 0

## 2022-11-30 NOTE — CARE COORDINATION
CASE MANAGEMENT DISCHARGE SUMMARY: Discharge order noted. Patient returning to [facility]. On oxygen at baseline at 2L/min/NC.      DISCHARGE DATE: 11/30/22    DISCHARGED TO: Long-term Care    Discharging to Facility/ Agency   Name: [facility]  Address:  [address] [address]  Phone:  [phone]  Fax:  [phone]                 REPORT NUMBER: [phone]               FAX NUMBER: [phone]    TRANSPORTATION: [address]             TIME: 8493   Yes Form completed and on chart    INSURANCE PRECERT OBTAINED: N/A    HENS/PASAAR COMPLETED: N/A    Yes CECELIA Updated   Yes Case Management   Yes Physician   Yes Nurse    Yes Destination updated (SNF/HHC)    Yes Whiteboard Note Updated with above    Lary MASON RN  Case Management  [phone]    Electronically signed by Lary Olsen RN on 11/30/2022 at 12:23 PM

## 2022-11-30 NOTE — ACP (ADVANCE CARE PLANNING)
Advance Care Planning     Advance Care Planning Activator (Inpatient)  Conversation Note      Date of ACP Conversation: 11/30/2022     Conversation Conducted with: Patient with Decision Making Capacity    ACP Activator: Tabatha Molina RN    Health Care Decision Maker:     Current Designated Health Care Decision Maker:     Primary Decision Maker: Aki Sanjay Child - [phone]    Care Preferences    Ventilation: \"If you were in your present state of health and suddenly became very ill and were unable to breathe on your own, what would your preference be about the use of a ventilator (breathing machine) if it were available to you? \"      Would the patient desire the use of ventilator (breathing machine)?: yes    \"If your health worsens and it becomes clear that your chance of recovery is unlikely, what would your preference be about the use of a ventilator (breathing machine) if it were available to you? \"     Would the patient desire the use of ventilator (breathing machine)?: Unsure      Resuscitation  \"CPR works best to restart the heart when there is a sudden event, like a heart attack, in someone who is otherwise healthy. Unfortunately, CPR does not typically restart the heart for people who have serious health conditions or who are very sick. \"    \"In the event your heart stopped as a result of an underlying serious health condition, would you want attempts to be made to restart your heart (answer \"yes\" for attempt to resuscitate) or would you prefer a natural death (answer \"no\" for do not attempt to resuscitate)? \" yes       [] Yes   [x] No   Educated Patient / Dybandar Colon regarding differences between Advance Directives and portable DNR orders.     Length of ACP Conversation in minutes:   5    Conversation Outcomes:  [x] ACP discussion completed  [] Existing advance directive reviewed with patient; no changes to patient's previously recorded wishes  [] New Advance Directive completed  [] Portable Do Not Rescitate prepared for Provider review and signature  [] POLST/POST/MOLST/MOST prepared for Provider review and signature      Follow-up plan:    [] Schedule follow-up conversation to continue planning  [] Referred individual to Provider for additional questions/concerns   [] Advised patient/agent/surrogate to review completed ACP document and update if needed with changes in condition, patient preferences or care setting    [] This note routed to one or more involved healthcare providers    Michael MASON RN  Case Management  [phone]    Electronically signed by Michael Stanley RN on 11/30/2022 at 12:31 PM

## 2022-11-30 NOTE — DISCHARGE INSTR - COC
Continuity of Care Form    Patient Name: Simran Kaufman   :  1960  MRN:  [id number]    Admit date:  2022  Discharge date:  ***    Code Status Order: Full Code   Advance Directives:     Admitting Physician:  Wen Tinajero MD  PCP: Luis Hill    Discharging Nurse: [facility] Unit/Room#: M3L-1205/1763-71  Discharging Unit Phone Number: ***    Emergency Contact:   Extended Emergency Contact Information  Primary Emergency Contact: [address] Phone: [phone]  Relation: Parent  Secondary Emergency Contact: [address] Phone: [phone]  Relation: Brother/Sister    Past Surgical History:  Past Surgical History:   Procedure Laterality Date    [place] TOTAL HIP ARTHROPLASTY Right     Hip Replacement, Total    [place] TOTAL KNEE ARTHROPLASTY Left     Knee Replacement, Total    TOTAL SHOULDER ARTHROPLASTY      Shoulder replacement x 2 to left       Immunization History:   Immunization History   Administered Date(s) Administered    COVID-19, PFIZER PURPLE top, DILUTE for use, (age 15 y+), 30mcg/0.3mL 2021, 2021, 2022, 2022       Active Problems:  Patient Active Problem List   Diagnosis Code    Cardiac arrest ([facility] [address] [address] I46.9    Acute respiratory failure ([address] J96.00    Encephalopathy G93.40    Ventricular fibrillation (HCC) I49.01    Hypokalemia E87.6    Hypoxic encephalopathy (HCC) G93.1    Acidosis E87.20    Shock ([address] R57.9    Prolonged QT interval R94.31    Fluid overload E87.70    Aspiration pneumonia ([address] J69.0    Cardiomyopathy ([address] I42.9    Pulmonary edema J81.1    Acute type 1 respiratory failure ([address] J96.01    Acute respiratory failure with hypoxia and hypercapnia (HCC) J96.01, J96.02    Aspiration pneumonitis (HCC) J69.0    Choking G85.843N    Metabolic encephalopathy G99.56    Cholangitis K83.09       Isolation/Infection:   Isolation            Droplet Plus          Patient Infection Status       Infection Onset Added Last Indicated Last Indicated By Review Planned Expiration Resolved Resolved By    COVID-19 22 COVID-19, Rapid 22      Resolved    COVID-19 (Rule Out) 22 COVID-19 Rapid (Ordered)   22 Rule-Out Test Resulted    COVID-19 (Rule Out) 21 COVID-19 (Ordered)   21 Rule-Out Test Resulted            Nurse Assessment:  Last Vital Signs: BP (!) 142/81   Pulse 72   Temp 97.6 °F (36.4 °C) (Oral)   Resp 16   Ht 5' 9\" (1.753 m)   Wt 187 lb 13.3 oz (85.2 kg)   SpO2 98%   BMI 27.74 kg/m²     Last documented pain score (0-10 scale): Pain Level: 0  Last Weight:   Wt Readings from Last 1 Encounters:   22 187 lb 13.3 oz (85.2 kg)     Mental Status:  {IP PT MENTAL STATUS:}    IV Access:  { CECELIA IV ACCESS:686705548}    Nursing Mobility/ADLs:  Walking   {CHP DME WKX}  Transfer  {P DME LULA:384192765}  Bathing  {CHP DME KKSQ:796079604}  Dressing  {CHP DME TKRP:167413150}  Toileting  {CHP DME XLEM:037110778}  Feeding  {P DME SWIR:791626287}  Med Admin  {P DME LDPI:205022544}  Med Delivery   { CECELIA MED Delivery:667455440}    Wound Care Documentation and Therapy:        Elimination:  Continence: Bowel: {YES / RR:28187}  Bladder: {YES / ZR:51967}  Urinary Catheter: {Urinary Catheter:195511868}   Colostomy/Ileostomy/Ileal Conduit: {YES / Y}       Date of Last BM: ***    Intake/Output Summary (Last 24 hours) at 2022 1228  Last data filed at 2022 0800  Gross per 24 hour   Intake 800 ml   Output --   Net 800 ml     I/O last 3 completed shifts:   In: 60 [NG/GT:60]  Out: 1 [Urine:1]    Safety Concerns:     [address] CECELIA Safety Concerns:537504213}    Impairments/Disabilities:      [address] CECELIA Impairments/Disabilities:777058346}    Nutrition Therapy:  Current Nutrition Therapy:   [address] Diet List:938860168}    Routes of Feeding: {CHP DME Other Feedings:619425453}  Liquids: {Slp liquid thickness:90008}  Daily Fluid Restriction: {CHP DME Yes amt example:584288778}  Last Modified Barium Swallow with Video (Video Swallowing Test): {Done Not Done QESJ:138325732}    Treatments at the Time of Hospital Discharge:   Respiratory Treatments: ***  Oxygen Therapy:  {Therapy; copd oxygen:12456}  Ventilator:    {MH CC Vent EKEP:708534357}    Rehab Therapies: {THERAPEUTIC INTERVENTION:3984337526}  Weight Bearing Status/Restrictions: [facility] CC Weight Bearin}  Other Medical Equipment (for information only, NOT a DME order):  {EQUIPMENT:478731707}  Other Treatments: ***    Patient's personal belongings (please select all that are sent with patient):  {CHP DME Belongings:118473495}    RN SIGNATURE:  {Esignature:791105155}    CASE MANAGEMENT/SOCIAL WORK SECTION    Inpatient Status Date: 22    Readmission Risk Assessment Score:  Readmission Risk              Risk of Unplanned Readmission:  15           Discharging to Facility/ Agency   Name: [facility]  Address:  [address] [place] [address]  Phone:  [phone]  Fax:  [phone]     / signature: Electronically signed by Ebony Mejia RN on 22 at 12:29 PM EST    PHYSICIAN SECTION    Prognosis: Good    Condition at Discharge: Stable    Rehab Potential (if transferring to Rehab): Good    Recommended Labs or Other Treatments After Discharge:   Would repeat CMP in 2-3 weeks  If still abnormal the patient can follow up with GI to pursue additional serologic evaluation    Physician Certification: I certify the above information and transfer of Kaitlin Correa  is necessary for the continuing treatment of the diagnosis listed and that she requires Intermediate Nursing Care for less 30 days.      Update Admission H&P: No change in H&P    PHYSICIAN SIGNATURE:  Electronically signed by Rena Padilla MD on 22 at 1:45 PM EST

## 2022-11-30 NOTE — CARE COORDINATION
Patient came from [facility] prior to arrival.  Call to Matthew Salcedo, Admission [phone], at [facility] who confirmed the patient is:  [x] [address], no Precert required for return.  [] [address] will be required for return. [] Skilled Nursing Care, no Precert required for return. [] [address] required for return. [x] Is fully vaccinated for Covid. [] Has started Covid vaccination, but is not complete. [] Is not vaccinated for Covid. [x] No Covid Test needed for return.  [] Rapid Covid test needed before return within: [] 48 hours, [] 72 hours, [] 5 days, [] other   [] PCR Covid test needed before return. [x] Confirmed with the patient or family that the plan is to return to this facility at D/C. [] Patient and/or designated decision maker does not plan for the patient to return to this facility at D/C.      Marysue Bloch BSN RN  Case Management  [phone]    Electronically signed by Marysue Bloch, RN on 11/30/2022 at 12:18 PM

## 2022-11-30 NOTE — PROGRESS NOTES
AVS completed and added to pt packet. IV removed w/o complication, dressing applied. Pt transported via stretcher by medical transport. This RN called report to [facility]. All questions answered.

## 2022-11-30 NOTE — CARE COORDINATION
11/30/22 1231   IMM Letter   IMM Letter given to Patient/Family/Significant other/Guardian/POA/by: Reviewed with the patient. Verbalized understanding and denies questions.  Copy provided by Ilia Hahn RN    IMM Letter date given: 11/30/22   IMM Letter time given: [address] TamezN RN  Case Management  [phone]    Electronically signed by Ilia Hahn RN on 11/30/2022 at 12:32 PM

## 2022-11-30 NOTE — PLAN OF CARE
Problem: Discharge Planning  Goal: Discharge to home or other facility with appropriate resources  11/30/2022 1153 by Cici Mckee RN  Outcome: Completed  11/30/2022 0809 by Naif Carr RN  Outcome: Progressing     Problem: Skin/Tissue Integrity  Goal: Absence of new skin breakdown  Description: 1. Monitor for areas of redness and/or skin breakdown  2. Assess vascular access sites hourly  3. Every 4-6 hours minimum:  Change oxygen saturation probe site  4. Every 4-6 hours:  If on nasal continuous positive airway pressure, respiratory therapy assess nares and determine need for appliance change or resting period.   11/30/2022 1153 by Cici Mckee RN  Outcome: Completed  11/30/2022 0809 by Naif Carr RN  Outcome: Progressing     Problem: Safety - Adult  Goal: Free from fall injury  11/30/2022 1153 by Cici Mckee RN  Outcome: Completed  11/30/2022 0809 by Naif Carr RN  Outcome: Progressing     Problem: ABCDS Injury Assessment  Goal: Absence of physical injury  11/30/2022 1153 by Cici Mckee RN  Outcome: Completed  11/30/2022 0809 by Naif Carr RN  Outcome: Progressing

## 2022-11-30 NOTE — DISCHARGE SUMMARY
Hospital Medicine Discharge Summary    Patient ID: Raymon Winter      Patient's PCP: Jannette Swift    Admit Date: 11/26/2022     Discharge Date:   11/30/2022    Admitting Physician: Xochitl Westbrook MD     Discharge Physician: Raj Phelps MD     Discharge Diagnoses: Active Hospital Problems    Diagnosis Date Noted    Cholangitis [K83.09] 11/27/2022     Priority: Medium       The patient was seen and examined on day of discharge and this discharge summary is in conjunction with any daily progress note from day of discharge. Hospital Course:     COVID-19 infection  Chest x-ray is clear, oxygen requirements at baseline 2 L. .  Continue supportive treatment     Elevated liver enzymes:  improving  Suspect due to viral infection. .  Concern for cholangitis on CT. Harden Beech CT showed hyperenhancement of the biliary mucosa without dilatation. Has prior cholecystectomy in the mid 90s  -no signs of infection per GI --> OK to stop  IV cefepime and Flagyl  -Diet advanced to clears  - as improving did  not need MRCP at this time  - can get repeat LFT check in 2-3 weeks. If still elevated, needs to f/u with GI outpatient     Sepsis due to above--fever, sinus tachycardia, tachypnea--improving     Thrombocytopenia likely due to COVID-19 infection,sepsis--continue to monitor        chronic respiratory failure with hypoxia:   - CXR 11/27 neg   - oxygen - baseline O2 demand of 2 LPM      Cerebral palsy: c/w dantrolene 200 mg po BID   - reports having a baclofen pump    - has J tube (stopped using it for feeds about 1 month ago)      Chronic constipation  - senna BID and miralax daily      Exam:     BP (!) 145/81   Pulse 72   Temp 97.8 °F (36.6 °C) (Axillary)   Resp 16   Ht 5' 9\" (1.753 m)   Wt 187 lb 13.3 oz (85.2 kg)   SpO2 97%   BMI 27.74 kg/m²     General appearance: No apparent distress, appears stated age and cooperative. HEENT: Pupils equal, round, and reactive to light.  Conjunctivae/corneas clear.  Neck: Supple, with full range of motion. No jugular venous distention. Trachea midline. Respiratory:  Normal respiratory effort. Clear to auscultation, bilaterally without Rales/Wheezes/Rhonchi. Cardiovascular: Regular rate and rhythm with normal S1/S2 without murmurs, rubs or gallops. Abdomen: Soft, non-tender, non-distended with normal bowel sounds. J tube in place. Baclofen pump attached to left mid inner abdominal wall  Musculoskelatal: No clubbing, cyanosis or edema bilaterally. Full range of motion without deformity. Skin: Skin color, texture, turgor normal.  No rashes or lesions. Neurologic:  Neurovascularly intact without any focal sensory/motor deficits. Cranial nerves: II-XII intact, grossly non-focal.  Psychiatric: Alert and oriented, thought content appropriate, normal insight      Consults:     IP CONSULT TO GI  IP CONSULT TO GI    Significant Diagnostic Studies:          Radiology:  CT CHEST ABDOMEN PELVIS W CONTRAST Additional Contrast? None   Final Result   1. Hyperenhancement of the biliary mucosa which can be seen in the setting of   cholangitis. Correlate with laboratory values. 2. Large amount of stool in the rectal vault. Correlate for constipation. 3. No acute findings in the thorax. XR CHEST PORTABLE   Final Result   1. No active pulmonary disease. PCP/SNF to follow up: Needs CMP in 2-3 weeks to check LFT's if elevated needs to f/u with GI    Disposition:  Long term care facility    Condition on d/c:  Stable     Discharge Instructions/Follow-up:  F/u with PCP within 1 week    Code Status:  Full Code     Activity: activity as tolerated    Diet: regular diet    Labs:  For convenience and continuity at follow-up the following most recent labs are provided:      CBC:    Lab Results   Component Value Date/Time    WBC 4.6 11/29/2022 04:36 AM    HGB 11.3 11/29/2022 04:36 AM    HCT 33.9 11/29/2022 04:36 AM    PLT 87 11/29/2022 04:36 AM       Renal:    Lab Results   Component Value Date/Time     11/29/2022 04:36 AM    K 3.5 11/29/2022 04:36 AM    K 4.1 11/27/2022 12:14 AM     11/29/2022 04:36 AM    CO2 29 11/29/2022 04:36 AM    BUN 5 11/29/2022 04:36 AM    CREATININE <0.5 11/29/2022 04:36 AM    CALCIUM 8.6 11/29/2022 04:36 AM    PHOS 4.1 10/06/2021 05:40 AM       Discharge Medications:     Current Discharge Medication List             Details   aspirin 81 MG EC tablet Take 81 mg by mouth daily      clonazePAM (KLONOPIN) 2 MG tablet Take 2 mg by mouth in the morning and 2 mg in the evening.       cyanocobalamin 100 MCG tablet Take 100 mcg by mouth daily      diclofenac sodium (VOLTAREN) 1 % GEL Apply 2 g topically in the morning, at noon, and at bedtime To left upper arm, back of neck, left knee      potassium bicarb-citric acid (EFFER-K) 20 MEQ TBEF effervescent tablet Take 1 tablet by mouth daily      vitamin D (ERGOCALCIFEROL) 1.25 MG (04147 UT) CAPS capsule Take 50,000 Units by mouth once a week On Monday      lansoprazole (PREVACID) 3 mg/mL oral suspension 30 mg by Per J Tube route every morning (before breakfast)      ondansetron (ZOFRAN) 4 MG tablet Take 4 mg by mouth every 6 hours as needed for Nausea or Vomiting      polyethylene glycol (MIRALAX) 17 g PACK packet Take 17 g by mouth 2 times daily      sennosides-docusate sodium (SENOKOT-S) 8.6-50 MG tablet Take 1 tablet by mouth in the morning and at bedtime      sertraline (ZOLOFT) 100 MG tablet Take 150 mg by mouth daily      sodium bicarbonate 4.2 % injection Infuse 5 mEq intravenously daily      acetaminophen-codeine (TYLENOL/CODEINE #3) 300-30 MG per tablet Take 1 tablet by mouth every 12 hours as needed for Pain.      metoprolol tartrate (LOPRESSOR) 25 MG tablet Take 1 tablet by mouth 2 times daily  Qty: 60 tablet, Refills: 1      levothyroxine (SYNTHROID) 50 MCG tablet Take 50 mcg by mouth Daily      amitriptyline (ELAVIL) 25 MG tablet Take 50 mg by mouth nightly      dantrolene (DANTRIUM) 100 MG capsule Take 200 mg by mouth 2 times daily      pregabalin (LYRICA) 200 MG capsule Take 200 mg by mouth 3 times daily. acetaminophen (TYLENOL) 160 MG/5ML solution Take 20.3 mLs by mouth every 4 hours as needed  Qty: 473 mL, Refills: 3             Time Spent on discharge is more than 30 minutes in the examination, evaluation, counseling and review of medications and discharge plan. Signed: Bre Monroe MD   11/30/2022      Thank you Pardeep Douglas for the opportunity to be involved in this patient's care. If you have any questions or concerns please feel free to contact me at [phone].

## 2022-11-30 NOTE — PROGRESS NOTES
Pt medicated per MAR. Pt gown and partial linen change. Pt repositioned. Pillow and wedge support placed per pt direction. Bed alarm on. Call light and bedside table within reach.

## 2022-12-01 LAB
BLOOD CULTURE, ROUTINE: NORMAL
BLOOD CULTURE, ROUTINE: NORMAL

## 2022-12-09 NOTE — PROGRESS NOTES
Physician Progress Note      PATIENT:               iMlagros Rivera  CSN #:                  [id number]  :                       1960  ADMIT DATE:       2022 11:43 PM  [address] DATE:        2022 7:02 PM  RESPONDING  PROVIDER #:        Janes Allan MD          QUERY TEXT:    Pt admitted with AMS, fevers and vomiting. Pt noted to have COVID-19. If   possible, please document in progress notes and discharge summary the present   on admission status of Sepsis:    The medical record reflects the following:  Risk Factors: Current admission for COVID-19  Clinical Indicators: VS- 100.7 (oral), 145, 49, 171/89. ...lactic acid 3.8, PLT   102,  [id number]  Treatment: IVF, Cefepime IV, Flagyl IV, Vanco IV, BC's    Thank Juanito Ag RN BSN CDS LJR  [email or web address]  Options provided:  -- Yes, Sepsis was present at the time of the order to admit to the hospital  -- No, Sepsis was not present on admission and developed during the inpatient   stay  -- Other - I will add my own diagnosis  -- Disagree - Not applicable / Not valid  -- Disagree - Clinically unable to determine / Unknown  -- Refer to Clinical Documentation Reviewer    PROVIDER RESPONSE TEXT:    Yes, Sepsis was present at the time of the order to admit to the hospital.    Query created by: Judge Valdovinos on 2022 6:36 AM      Electronically signed by:   Janes Allan MD 2022 11:14 AM

## 2023-12-16 ENCOUNTER — HOSPITAL ENCOUNTER (INPATIENT)
Age: 63
DRG: 207 | End: 2023-12-16
Attending: EMERGENCY MEDICINE | Admitting: HOSPITALIST
Payer: MEDICARE

## 2023-12-16 ENCOUNTER — APPOINTMENT (OUTPATIENT)
Dept: GENERAL RADIOLOGY | Age: 63
DRG: 207 | End: 2023-12-16
Payer: MEDICARE

## 2023-12-16 ENCOUNTER — APPOINTMENT (OUTPATIENT)
Dept: CT IMAGING | Age: 63
DRG: 207 | End: 2023-12-16
Payer: MEDICARE

## 2023-12-16 DIAGNOSIS — R41.82 ALTERED MENTAL STATUS, UNSPECIFIED ALTERED MENTAL STATUS TYPE: ICD-10-CM

## 2023-12-16 DIAGNOSIS — J96.02 ACUTE RESPIRATORY FAILURE WITH HYPOXIA AND HYPERCARBIA (HCC): ICD-10-CM

## 2023-12-16 DIAGNOSIS — J18.9 PNEUMONIA OF BOTH LOWER LOBES DUE TO INFECTIOUS ORGANISM: ICD-10-CM

## 2023-12-16 DIAGNOSIS — G80.9 CEREBRAL PALSY, UNSPECIFIED TYPE (HCC): ICD-10-CM

## 2023-12-16 DIAGNOSIS — J96.01 ACUTE RESPIRATORY FAILURE WITH HYPOXIA AND HYPERCARBIA (HCC): ICD-10-CM

## 2023-12-16 DIAGNOSIS — J18.9 PNEUMONIA OF BOTH LUNGS DUE TO INFECTIOUS ORGANISM, UNSPECIFIED PART OF LUNG: ICD-10-CM

## 2023-12-16 DIAGNOSIS — J96.01 ACUTE RESPIRATORY FAILURE WITH HYPOXIA AND HYPERCAPNIA (HCC): Primary | ICD-10-CM

## 2023-12-16 DIAGNOSIS — J96.02 ACUTE RESPIRATORY FAILURE WITH HYPOXIA AND HYPERCAPNIA (HCC): Primary | ICD-10-CM

## 2023-12-16 LAB
ALBUMIN SERPL-MCNC: 3.5 G/DL (ref 3.4–5)
ALBUMIN/GLOB SERPL: 1 {RATIO} (ref 1.1–2.2)
ALP SERPL-CCNC: 300 U/L (ref 40–129)
ALT SERPL-CCNC: 22 U/L (ref 10–40)
ANION GAP SERPL CALCULATED.3IONS-SCNC: 8 MMOL/L (ref 3–16)
AST SERPL-CCNC: 44 U/L (ref 15–37)
BASE EXCESS BLDV CALC-SCNC: 13.9 MMOL/L
BASOPHILS # BLD: 0.1 K/UL (ref 0–0.2)
BASOPHILS NFR BLD: 0.7 %
BILIRUB SERPL-MCNC: 0.7 MG/DL (ref 0–1)
BUN SERPL-MCNC: 7 MG/DL (ref 7–20)
CALCIUM SERPL-MCNC: 9 MG/DL (ref 8.3–10.6)
CHLORIDE SERPL-SCNC: 94 MMOL/L (ref 99–110)
CO2 BLDV-SCNC: 46 MMOL/L
CO2 SERPL-SCNC: 38 MMOL/L (ref 21–32)
COHGB MFR BLDV: 1.5 %
CREAT SERPL-MCNC: <0.5 MG/DL (ref 0.6–1.2)
DEPRECATED RDW RBC AUTO: 15.7 % (ref 12.4–15.4)
EOSINOPHIL # BLD: 0.2 K/UL (ref 0–0.6)
EOSINOPHIL NFR BLD: 1.6 %
GFR SERPLBLD CREATININE-BSD FMLA CKD-EPI: >60 ML/MIN/{1.73_M2}
GLUCOSE SERPL-MCNC: 114 MG/DL (ref 70–99)
HCO3 BLDV-SCNC: 44 MMOL/L (ref 23–29)
HCT VFR BLD AUTO: 45.1 % (ref 36–48)
HGB BLD-MCNC: 14.8 G/DL (ref 12–16)
LACTATE BLDV-SCNC: 1.3 MMOL/L (ref 0.4–1.9)
LYMPHOCYTES # BLD: 2 K/UL (ref 1–5.1)
LYMPHOCYTES NFR BLD: 17.8 %
MCH RBC QN AUTO: 31.7 PG (ref 26–34)
MCHC RBC AUTO-ENTMCNC: 32.8 G/DL (ref 31–36)
MCV RBC AUTO: 96.7 FL (ref 80–100)
METHGB MFR BLDV: 0.7 %
MONOCYTES # BLD: 0.8 K/UL (ref 0–1.3)
MONOCYTES NFR BLD: 7.1 %
NEUTROPHILS # BLD: 8.2 K/UL (ref 1.7–7.7)
NEUTROPHILS NFR BLD: 72.8 %
NT-PROBNP SERPL-MCNC: 83 PG/ML (ref 0–124)
O2 THERAPY: ABNORMAL
PCO2 BLDV: 77.1 MMHG (ref 40–50)
PH BLDV: 7.36 [PH] (ref 7.35–7.45)
PLATELET # BLD AUTO: 132 K/UL (ref 135–450)
PMV BLD AUTO: 10.4 FL (ref 5–10.5)
PO2 BLDV: <30 MMHG
POTASSIUM SERPL-SCNC: 3.8 MMOL/L (ref 3.5–5.1)
PROCALCITONIN SERPL IA-MCNC: 0.25 NG/ML (ref 0–0.15)
PROT SERPL-MCNC: 7 G/DL (ref 6.4–8.2)
RBC # BLD AUTO: 4.66 M/UL (ref 4–5.2)
SAO2 % BLDV: 49 %
SODIUM SERPL-SCNC: 140 MMOL/L (ref 136–145)
TROPONIN, HIGH SENSITIVITY: 21 NG/L (ref 0–14)
TROPONIN, HIGH SENSITIVITY: 23 NG/L (ref 0–14)
WBC # BLD AUTO: 11.3 K/UL (ref 4–11)

## 2023-12-16 PROCEDURE — 93005 ELECTROCARDIOGRAM TRACING: CPT | Performed by: EMERGENCY MEDICINE

## 2023-12-16 PROCEDURE — 94760 N-INVAS EAR/PLS OXIMETRY 1: CPT

## 2023-12-16 PROCEDURE — 6370000000 HC RX 637 (ALT 250 FOR IP): Performed by: EMERGENCY MEDICINE

## 2023-12-16 PROCEDURE — 84484 ASSAY OF TROPONIN QUANT: CPT

## 2023-12-16 PROCEDURE — 2700000000 HC OXYGEN THERAPY PER DAY

## 2023-12-16 PROCEDURE — 6360000002 HC RX W HCPCS: Performed by: HOSPITALIST

## 2023-12-16 PROCEDURE — 85025 COMPLETE CBC W/AUTO DIFF WBC: CPT

## 2023-12-16 PROCEDURE — 83605 ASSAY OF LACTIC ACID: CPT

## 2023-12-16 PROCEDURE — 1200000000 HC SEMI PRIVATE

## 2023-12-16 PROCEDURE — 87641 MR-STAPH DNA AMP PROBE: CPT

## 2023-12-16 PROCEDURE — 36415 COLL VENOUS BLD VENIPUNCTURE: CPT

## 2023-12-16 PROCEDURE — 94640 AIRWAY INHALATION TREATMENT: CPT

## 2023-12-16 PROCEDURE — 87040 BLOOD CULTURE FOR BACTERIA: CPT

## 2023-12-16 PROCEDURE — 83880 ASSAY OF NATRIURETIC PEPTIDE: CPT

## 2023-12-16 PROCEDURE — 99285 EMERGENCY DEPT VISIT HI MDM: CPT

## 2023-12-16 PROCEDURE — 6370000000 HC RX 637 (ALT 250 FOR IP): Performed by: HOSPITALIST

## 2023-12-16 PROCEDURE — 71250 CT THORAX DX C-: CPT

## 2023-12-16 PROCEDURE — 96374 THER/PROPH/DIAG INJ IV PUSH: CPT

## 2023-12-16 PROCEDURE — 2580000003 HC RX 258: Performed by: HOSPITALIST

## 2023-12-16 PROCEDURE — 71045 X-RAY EXAM CHEST 1 VIEW: CPT

## 2023-12-16 PROCEDURE — 82803 BLOOD GASES ANY COMBINATION: CPT

## 2023-12-16 PROCEDURE — 2580000003 HC RX 258: Performed by: EMERGENCY MEDICINE

## 2023-12-16 PROCEDURE — 6360000002 HC RX W HCPCS: Performed by: EMERGENCY MEDICINE

## 2023-12-16 PROCEDURE — 80053 COMPREHEN METABOLIC PANEL: CPT

## 2023-12-16 PROCEDURE — 84145 PROCALCITONIN (PCT): CPT

## 2023-12-16 RX ORDER — OMEPRAZOLE 20 MG/1
20 CAPSULE, DELAYED RELEASE ORAL 2 TIMES DAILY
Status: ON HOLD | COMMUNITY

## 2023-12-16 RX ORDER — SODIUM CHLORIDE 9 MG/ML
INJECTION, SOLUTION INTRAVENOUS PRN
Status: ACTIVE | OUTPATIENT
Start: 2023-12-16

## 2023-12-16 RX ORDER — LEVOTHYROXINE SODIUM 0.05 MG/1
50 TABLET ORAL DAILY
Status: DISPENSED | OUTPATIENT
Start: 2023-12-17

## 2023-12-16 RX ORDER — ACETAMINOPHEN 325 MG/1
650 TABLET ORAL EVERY 6 HOURS PRN
Status: ON HOLD | COMMUNITY

## 2023-12-16 RX ORDER — POLYETHYLENE GLYCOL 3350 17 G/17G
17 POWDER, FOR SOLUTION ORAL DAILY PRN
Status: ACTIVE | OUTPATIENT
Start: 2023-12-16

## 2023-12-16 RX ORDER — IPRATROPIUM BROMIDE AND ALBUTEROL SULFATE 2.5; .5 MG/3ML; MG/3ML
1 SOLUTION RESPIRATORY (INHALATION)
Status: COMPLETED | OUTPATIENT
Start: 2023-12-16 | End: 2023-12-16

## 2023-12-16 RX ORDER — AMITRIPTYLINE HYDROCHLORIDE 50 MG/1
50 TABLET, FILM COATED ORAL NIGHTLY
Status: DISPENSED | OUTPATIENT
Start: 2023-12-16

## 2023-12-16 RX ORDER — FUROSEMIDE 20 MG/1
40 TABLET ORAL DAILY
Status: ON HOLD | COMMUNITY

## 2023-12-16 RX ORDER — ENOXAPARIN SODIUM 100 MG/ML
40 INJECTION SUBCUTANEOUS DAILY
Status: DISCONTINUED | OUTPATIENT
Start: 2023-12-17 | End: 2023-12-21 | Stop reason: ALTCHOICE

## 2023-12-16 RX ORDER — MAGNESIUM SULFATE IN WATER 40 MG/ML
2000 INJECTION, SOLUTION INTRAVENOUS PRN
Status: DISPENSED | OUTPATIENT
Start: 2023-12-16

## 2023-12-16 RX ORDER — ALBUTEROL SULFATE 2.5 MG/3ML
2.5 SOLUTION RESPIRATORY (INHALATION)
Status: DISCONTINUED | OUTPATIENT
Start: 2023-12-16 | End: 2023-12-17

## 2023-12-16 RX ORDER — CYANOCOBALAMIN 1000 UG/ML
1000 INJECTION, SOLUTION INTRAMUSCULAR; SUBCUTANEOUS
Status: ON HOLD | COMMUNITY

## 2023-12-16 RX ORDER — POTASSIUM CHLORIDE 7.45 MG/ML
10 INJECTION INTRAVENOUS PRN
Status: DISCONTINUED | OUTPATIENT
Start: 2023-12-16 | End: 2023-12-22

## 2023-12-16 RX ORDER — ONDANSETRON 2 MG/ML
4 INJECTION INTRAMUSCULAR; INTRAVENOUS EVERY 6 HOURS PRN
Status: DISPENSED | OUTPATIENT
Start: 2023-12-16

## 2023-12-16 RX ORDER — ERGOCALCIFEROL (VITAMIN D2) 200 MCG/ML
6.25 DROPS ORAL WEEKLY
Status: ON HOLD | COMMUNITY

## 2023-12-16 RX ORDER — SODIUM CHLORIDE 0.9 % (FLUSH) 0.9 %
5-40 SYRINGE (ML) INJECTION EVERY 12 HOURS SCHEDULED
Status: ACTIVE | OUTPATIENT
Start: 2023-12-16

## 2023-12-16 RX ORDER — SODIUM CHLORIDE 0.9 % (FLUSH) 0.9 %
5-40 SYRINGE (ML) INJECTION PRN
Status: DISCONTINUED | OUTPATIENT
Start: 2023-12-16 | End: 2023-12-21

## 2023-12-16 RX ORDER — SODIUM CHLORIDE 9 MG/ML
INJECTION, SOLUTION INTRAVENOUS CONTINUOUS
Status: DISCONTINUED | OUTPATIENT
Start: 2023-12-16 | End: 2023-12-19

## 2023-12-16 RX ORDER — ONDANSETRON 4 MG/1
4 TABLET, ORALLY DISINTEGRATING ORAL EVERY 8 HOURS PRN
Status: ACTIVE | OUTPATIENT
Start: 2023-12-16

## 2023-12-16 RX ORDER — OYSTER SHELL CALCIUM WITH VITAMIN D 500; 200 MG/1; [IU]/1
1 TABLET, FILM COATED ORAL 2 TIMES DAILY
Status: ON HOLD | COMMUNITY

## 2023-12-16 RX ORDER — ACETAMINOPHEN 650 MG/1
650 SUPPOSITORY RECTAL EVERY 6 HOURS PRN
Status: DISPENSED | OUTPATIENT
Start: 2023-12-16

## 2023-12-16 RX ORDER — ACETAMINOPHEN 325 MG/1
650 TABLET ORAL EVERY 6 HOURS PRN
Status: DISPENSED | OUTPATIENT
Start: 2023-12-16

## 2023-12-16 RX ORDER — ASPIRIN 81 MG/1
81 TABLET ORAL DAILY
Status: DISCONTINUED | OUTPATIENT
Start: 2023-12-17 | End: 2023-12-27

## 2023-12-16 RX ORDER — ESTRADIOL 0.1 MG/G
2 CREAM VAGINAL DAILY
Status: ON HOLD | COMMUNITY

## 2023-12-16 RX ORDER — POTASSIUM CHLORIDE 20 MEQ/1
40 TABLET, EXTENDED RELEASE ORAL PRN
Status: DISCONTINUED | OUTPATIENT
Start: 2023-12-16 | End: 2023-12-22

## 2023-12-16 RX ORDER — POLYETHYLENE GLYCOL 3350 17 G/17G
17 POWDER, FOR SOLUTION ORAL 2 TIMES DAILY
Status: DISPENSED | OUTPATIENT
Start: 2023-12-16

## 2023-12-16 RX ORDER — DANTROLENE SODIUM 25 MG/1
200 CAPSULE ORAL 2 TIMES DAILY
Status: DISPENSED | OUTPATIENT
Start: 2023-12-16

## 2023-12-16 RX ORDER — GUAIFENESIN 600 MG/1
600 TABLET, EXTENDED RELEASE ORAL 2 TIMES DAILY PRN
Status: ON HOLD | COMMUNITY

## 2023-12-16 RX ORDER — UBIDECARENONE 75 MG
100 CAPSULE ORAL DAILY
Status: DISCONTINUED | OUTPATIENT
Start: 2023-12-16 | End: 2023-12-16

## 2023-12-16 RX ORDER — GUAIFENESIN/DEXTROMETHORPHAN 100-10MG/5
5 SYRUP ORAL EVERY 4 HOURS PRN
Status: DISCONTINUED | OUTPATIENT
Start: 2023-12-16 | End: 2023-12-22 | Stop reason: ALTCHOICE

## 2023-12-16 RX ADMIN — METOPROLOL TARTRATE 25 MG: 25 TABLET, FILM COATED ORAL at 21:47

## 2023-12-16 RX ADMIN — SODIUM CHLORIDE, PRESERVATIVE FREE 10 ML: 5 INJECTION INTRAVENOUS at 21:31

## 2023-12-16 RX ADMIN — SODIUM CHLORIDE: 9 INJECTION, SOLUTION INTRAVENOUS at 21:31

## 2023-12-16 RX ADMIN — DANTROLENE SODIUM 200 MG: 25 CAPSULE ORAL at 21:48

## 2023-12-16 RX ADMIN — CEFEPIME 2000 MG: 2 INJECTION, POWDER, FOR SOLUTION INTRAVENOUS at 17:48

## 2023-12-16 RX ADMIN — AMITRIPTYLINE HYDROCHLORIDE 50 MG: 50 TABLET, FILM COATED ORAL at 21:48

## 2023-12-16 RX ADMIN — ALBUTEROL SULFATE 2.5 MG: 2.5 SOLUTION RESPIRATORY (INHALATION) at 20:40

## 2023-12-16 RX ADMIN — POLYETHYLENE GLYCOL 3350 17 G: 17 POWDER, FOR SOLUTION ORAL at 21:49

## 2023-12-16 RX ADMIN — IPRATROPIUM BROMIDE AND ALBUTEROL SULFATE 1 DOSE: .5; 3 SOLUTION RESPIRATORY (INHALATION) at 15:34

## 2023-12-16 RX ADMIN — VANCOMYCIN HYDROCHLORIDE 1500 MG: 1.5 INJECTION, POWDER, LYOPHILIZED, FOR SOLUTION INTRAVENOUS at 21:32

## 2023-12-16 ASSESSMENT — PAIN - FUNCTIONAL ASSESSMENT: PAIN_FUNCTIONAL_ASSESSMENT: NONE - DENIES PAIN

## 2023-12-16 ASSESSMENT — LIFESTYLE VARIABLES
HOW OFTEN DO YOU HAVE A DRINK CONTAINING ALCOHOL: NEVER
HOW MANY STANDARD DRINKS CONTAINING ALCOHOL DO YOU HAVE ON A TYPICAL DAY: PATIENT DOES NOT DRINK

## 2023-12-16 NOTE — ED TRIAGE NOTES
Pt into ER from [facility] with c/c AMS. Altered Mental Status per [employer]. Pt A&O x 4 upon arrival to ER. Pt dx with covid 6 days ago.

## 2023-12-16 NOTE — H&P
V2.0  History and Physical      Name:  Shane Hernandez /Age/Sex: 1960  (65 y.o. female)   MRN & CSN:  [id number] & [id number] Encounter Date/Time: 2023 6:02 PM EST   Location:   PCP: Juan Pablo Vitale Day: 1    Assessment and Plan:   Shane Hernandez is a 65 y.o. female with a pmh of  who presents with Pneumonia, unspecified organism    Hospital Problems             Last Modified POA    * (Principal) Pneumonia, unspecified organism 2023 Yes       Right ML PNA  AMS  H/o chronic respiratory failure on oxygen via NC  H/o cerebral palsy   Recent covid 19 infection     Plan    Admit inpatient status  Telemetry monitering  Empiric IV antibiotics  Follow up Blood culture x 2  Oxygen  Duonebs prn  Robitussin prn for cough  Moniter labs    Diet : regular diet             Disposition:   Current Living situation: NF  Expected Disposition: NF  Estimated D/C: 2-3 days    Diet No diet orders on file   DVT Prophylaxis [x] Lovenox, []  Heparin, [] SCDs, [] Ambulation,  [] Eliquis, [] Xarelto, [] Coumadin   Code Status Prior   Surrogate Decision Maker/ POA      Personally reviewed Lab Studies and Imaging     Discussed management of the case with  ED who recommended hospital admission for PNA mangement        Imaging that was interpreted personally includes  CXR and results right sided pleural effusion    Drugs that require monitoring for toxicity include  IV Vancomycin and the method of monitoring was blood level monitering        History from:     Patient  Chart  ED    History of Present Illness:     Chief Complaint:   Shane Hernandez is a 65 y.o. female with pmh of  cerebral palsy  who was sent from [facility] with c/o AMS  Patient has h/o chronic respiratory failure and at baseline use 4 L oxygen via NC   Patient recently had covid 19 infection  No h/o chest pain or GI bleed or diarrhea or seizure     ED    CT chest s/o rigth ML PNA  Patent is alert,awake and communicating after 4 days of incubation. 11/27/2022 06:13 AM     Lab Results   Component Value Date/Time    BLOODCULT2 No Growth after 4 days of incubation. 09/28/2021 03:15 PM     Organism: No results found for: \"ORG\"    Imaging/Diagnostics Last 24 Hours   CT CHEST WO CONTRAST    Result Date: 12/16/2023  EXAMINATION: CT OF THE CHEST WITHOUT CONTRAST 12/16/2023 4:34 pm TECHNIQUE: CT of the chest was performed without the administration of intravenous contrast. Multiplanar reformatted images are provided for review. Automated exposure control, iterative reconstruction, and/or weight based adjustment of the mA/kV was utilized to reduce the radiation dose to as low as reasonably achievable. COMPARISON: November 27, 2022 HISTORY: ORDERING SYSTEM PROVIDED HISTORY: COVID [employer], AMS TECHNOLOGIST PROVIDED HISTORY: Reason for exam:->COVID 19, AMS Decision Support Exception - unselect if not a suspected or confirmed emergency medical condition->Emergency Medical Condition (MA) Reason for Exam: COVID 19, AMS FINDINGS: Mediastinum: No axillary or mediastinal adenopathy. Atherosclerotic calcifications involving the thoracic aorta and left anterior descending artery.   Great vessels are normal in position and size.  Cardiac chambers are unremarkable in appearance.  No pericardial effusion or pericardial thickening. Mild mural thickening of the esophagus. Lungs/pleura: Consolidation involving the posterior peripheral aspect of the right lower lobe.  Small left-sided pleural effusion.  Atelectasis or scarring in the lung bases.  Patchy infiltrates within the right middle lobe. Patchy infiltrate within the peripheral lateral aspect of the left upper lobe.  No pneumothorax.  Calcified granuloma in the right lower lobe. Central airways are patent.  Minimal secretions in the dependent aspects of the trachea. Upper Abdomen: Mild diffuse fatty infiltration liver.  Prior cholecystectomy. Prominence of the common bile duct which measures approximately 1.5

## 2023-12-16 NOTE — ED PROVIDER NOTES
[facility]     EMERGENCY DEPARTMENT ENCOUNTER            Pt Name: Olivia Marley   MRN: [id number]   [address] [facility] [place] 1960   Date of evaluation: 12/16/2023   Provider: Sim Roque MD   PCP: Nathan Weldon   Note Started: 3:16 PM EST 12/16/23          CHIEF COMPLAINT     Chief Complaint   Patient presents with    Altered Mental Status     Altered Mental Status per [facility] staff. Pt A&O x 4 upon arrival to ER. Pt dx with covid 6 days ago. HISTORY OF PRESENT ILLNESS:   History from : Patient, EMS, and Nursing home paperwork    Limitations to history : None     Olivia Marley is a 65 y.o. female who presents to the emergency room with EMS from her nursing home for evaluation of possible altered mental status as well as being positive for COVID-19. Patient has significant history of cerebral palsy and chronic respiratory failure on 4 L nasal cannula oxygen at baseline. Patient denies any complaints on arrival.  Denies any chest pain. States she has had a cough but is unable to get the mucus up when she coughs. Denies feeling short of breath. Denies abdominal pain. No nausea or vomiting. Patient has a G-tube in place which she states has been working normally. Denies any fevers. Nursing Notes were all reviewed and agreed with, or any disagreements were addressed in the HPI. REVIEW OF SYSTEMS :    Positives and Pertinent negatives as per HPI. MEDICAL HISTORY   has a past medical history of Cardiac arrest [facility]), Cerebral palsy ([address]), Closed anterior dislocation of humerus, Dysphagia, Infantile cerebral palsy, unspecified, and Irritable bowel syndrome.     Past Surgical History:   Procedure Laterality Date    NE ARTHRP ACETBLR/PROX FEM PROSTC AGRFT/ALGRFT Right     Hip Replacement, Total    NE ARTHRP KNE CONDYLE&PLATU MEDIAL&LAT COMPARTMENTS Left     Knee Replacement, Total    SHOULDER ARTHROPLASTY      Shoulder replacement x 2 to left      CURRENTMEDICATIONS       Current MAURICE.:                      RECEIVED :  12/17/23 10:04   CBC WITH AUTO DIFFERENTIAL - Abnormal; Notable for the following components:    WBC 11.3 (*)     RDW 15.7 (*)     Platelets 022 (*)     Neutrophils Absolute 8.2 (*)     All other components within normal limits   COMPREHENSIVE METABOLIC PANEL W/ REFLEX TO MG FOR LOW K - Abnormal; Notable for the following components:    Chloride 94 (*)     CO2 38 (*)     Glucose 114 (*)     Creatinine <0.5 (*)     Albumin/Globulin Ratio 1.0 (*)     Alkaline Phosphatase 300 (*)     AST 44 (*)     All other components within normal limits   TROPONIN - Abnormal; Notable for the following components:    Troponin, High Sensitivity 23 (*)     All other components within normal limits   TROPONIN - Abnormal; Notable for the following components:    Troponin, High Sensitivity 21 (*)     All other components within normal limits   BLOOD GAS, VENOUS - Abnormal; Notable for the following components:    pCO2, Taran 77.1 (*)     HCO3, Venous 44 (*)     All other components within normal limits   PROCALCITONIN - Abnormal; Notable for the following components:    Procalcitonin 0.25 (*)     All other components within normal limits   CBC WITH AUTO DIFFERENTIAL - Abnormal; Notable for the following components:    WBC 11.8 (*)     RDW 15.6 (*)     Platelets 926 (*)     MPV 11.3 (*)     Neutrophils Absolute 8.4 (*)     All other components within normal limits    Narrative:     Collection has been rescheduled by SOL at 12/17/2023 05:05 Reason:   Failed attempt at venipuncture   BASIC METABOLIC PANEL - Abnormal; Notable for the following components:    Creatinine <0.5 (*)     Calcium 8.1 (*)     All other components within normal limits    Narrative:     Collection has been rescheduled by Sanjay Swartz at 12/17/2023 05:05 Reason:   Failed attempt at venipuncture   CBC WITH AUTO DIFFERENTIAL - Abnormal; Notable for the following components:    RBC 3.85 (*)     Platelets 533 (*)     MPV 10.9 (*)     All

## 2023-12-16 NOTE — PROGRESS NOTES
Medication Reconciliation     List of medications patient is currently taking is complete. Source of information: 1. Conversation with patient at bedside                                      2. EPIC records      Allergies  Penicillins     Notes regarding home medications:  1. Paperwork sent with patient from [place] shows patient received all morning doses of her home medication prior to arrival in the ED. 2. Med list is up to date according to paperwork.     Pete Leo, Pharmacy Intern  12/16/2023 6:33 PM

## 2023-12-17 LAB
ANION GAP SERPL CALCULATED.3IONS-SCNC: 10 MMOL/L (ref 3–16)
BASOPHILS # BLD: 0.1 K/UL (ref 0–0.2)
BASOPHILS NFR BLD: 1.1 %
BUN SERPL-MCNC: 11 MG/DL (ref 7–20)
CALCIUM SERPL-MCNC: 8.1 MG/DL (ref 8.3–10.6)
CHLORIDE SERPL-SCNC: 101 MMOL/L (ref 99–110)
CO2 SERPL-SCNC: 31 MMOL/L (ref 21–32)
CREAT SERPL-MCNC: <0.5 MG/DL (ref 0.6–1.2)
DEPRECATED RDW RBC AUTO: 15.6 % (ref 12.4–15.4)
EKG ATRIAL RATE: 87 BPM
EKG DIAGNOSIS: NORMAL
EKG P AXIS: 42 DEGREES
EKG P-R INTERVAL: 146 MS
EKG Q-T INTERVAL: 366 MS
EKG QRS DURATION: 88 MS
EKG QTC CALCULATION (BAZETT): 440 MS
EKG R AXIS: 17 DEGREES
EKG T AXIS: 174 DEGREES
EKG VENTRICULAR RATE: 87 BPM
EOSINOPHIL # BLD: 0.1 K/UL (ref 0–0.6)
EOSINOPHIL NFR BLD: 1 %
GFR SERPLBLD CREATININE-BSD FMLA CKD-EPI: >60 ML/MIN/{1.73_M2}
GLUCOSE BLD-MCNC: 91 MG/DL (ref 70–99)
GLUCOSE SERPL-MCNC: 96 MG/DL (ref 70–99)
HCT VFR BLD AUTO: 39.7 % (ref 36–48)
HGB BLD-MCNC: 12.9 G/DL (ref 12–16)
LYMPHOCYTES # BLD: 1.8 K/UL (ref 1–5.1)
LYMPHOCYTES NFR BLD: 15.7 %
MCH RBC QN AUTO: 31.7 PG (ref 26–34)
MCHC RBC AUTO-ENTMCNC: 32.5 G/DL (ref 31–36)
MCV RBC AUTO: 97.6 FL (ref 80–100)
MONOCYTES # BLD: 1.3 K/UL (ref 0–1.3)
MONOCYTES NFR BLD: 11.4 %
MRSA DNA SPEC QL NAA+PROBE: NORMAL
NEUTROPHILS # BLD: 8.4 K/UL (ref 1.7–7.7)
NEUTROPHILS NFR BLD: 70.8 %
ORGANISM: ABNORMAL
PERFORMED ON: NORMAL
PLATELET # BLD AUTO: 114 K/UL (ref 135–450)
PMV BLD AUTO: 11.3 FL (ref 5–10.5)
POTASSIUM SERPL-SCNC: 3.7 MMOL/L (ref 3.5–5.1)
RBC # BLD AUTO: 4.06 M/UL (ref 4–5.2)
REPORT: NORMAL
RESP PATH DNA+RNA PNL NPH NAA+NON-PROBE: ABNORMAL
SODIUM SERPL-SCNC: 142 MMOL/L (ref 136–145)
WBC # BLD AUTO: 11.8 K/UL (ref 4–11)

## 2023-12-17 PROCEDURE — 1200000000 HC SEMI PRIVATE

## 2023-12-17 PROCEDURE — 94761 N-INVAS EAR/PLS OXIMETRY MLT: CPT

## 2023-12-17 PROCEDURE — 6370000000 HC RX 637 (ALT 250 FOR IP): Performed by: HOSPITALIST

## 2023-12-17 PROCEDURE — 85025 COMPLETE CBC W/AUTO DIFF WBC: CPT

## 2023-12-17 PROCEDURE — 0202U NFCT DS 22 TRGT SARS-COV-2: CPT

## 2023-12-17 PROCEDURE — 6360000002 HC RX W HCPCS: Performed by: HOSPITALIST

## 2023-12-17 PROCEDURE — 2580000003 HC RX 258: Performed by: HOSPITALIST

## 2023-12-17 PROCEDURE — 36415 COLL VENOUS BLD VENIPUNCTURE: CPT

## 2023-12-17 PROCEDURE — 2700000000 HC OXYGEN THERAPY PER DAY

## 2023-12-17 PROCEDURE — 6360000002 HC RX W HCPCS: Performed by: INTERNAL MEDICINE

## 2023-12-17 PROCEDURE — 2580000003 HC RX 258: Performed by: INTERNAL MEDICINE

## 2023-12-17 PROCEDURE — 94640 AIRWAY INHALATION TREATMENT: CPT

## 2023-12-17 PROCEDURE — 80048 BASIC METABOLIC PNL TOTAL CA: CPT

## 2023-12-17 PROCEDURE — C9113 INJ PANTOPRAZOLE SODIUM, VIA: HCPCS | Performed by: INTERNAL MEDICINE

## 2023-12-17 PROCEDURE — 93010 ELECTROCARDIOGRAM REPORT: CPT | Performed by: INTERNAL MEDICINE

## 2023-12-17 RX ORDER — ALBUTEROL SULFATE 2.5 MG/3ML
2.5 SOLUTION RESPIRATORY (INHALATION) EVERY 4 HOURS PRN
Status: DISPENSED | OUTPATIENT
Start: 2023-12-17

## 2023-12-17 RX ADMIN — CEFEPIME 2000 MG: 2 INJECTION, POWDER, FOR SOLUTION INTRAVENOUS at 05:47

## 2023-12-17 RX ADMIN — POLYETHYLENE GLYCOL 3350 17 G: 17 POWDER, FOR SOLUTION ORAL at 09:44

## 2023-12-17 RX ADMIN — SODIUM CHLORIDE, PRESERVATIVE FREE 10 ML: 5 INJECTION INTRAVENOUS at 21:50

## 2023-12-17 RX ADMIN — AMITRIPTYLINE HYDROCHLORIDE 50 MG: 50 TABLET, FILM COATED ORAL at 21:49

## 2023-12-17 RX ADMIN — LEVOTHYROXINE SODIUM 50 MCG: 0.05 TABLET ORAL at 05:46

## 2023-12-17 RX ADMIN — METOPROLOL TARTRATE 25 MG: 25 TABLET, FILM COATED ORAL at 21:49

## 2023-12-17 RX ADMIN — ASPIRIN 81 MG: 81 TABLET, COATED ORAL at 09:29

## 2023-12-17 RX ADMIN — ALBUTEROL SULFATE 2.5 MG: 2.5 SOLUTION RESPIRATORY (INHALATION) at 21:20

## 2023-12-17 RX ADMIN — CEFEPIME 2000 MG: 2 INJECTION, POWDER, FOR SOLUTION INTRAVENOUS at 21:51

## 2023-12-17 RX ADMIN — ENOXAPARIN SODIUM 40 MG: 100 INJECTION SUBCUTANEOUS at 09:43

## 2023-12-17 RX ADMIN — CEFEPIME 2000 MG: 2 INJECTION, POWDER, FOR SOLUTION INTRAVENOUS at 15:42

## 2023-12-17 RX ADMIN — SERTRALINE 150 MG: 100 TABLET, FILM COATED ORAL at 09:29

## 2023-12-17 RX ADMIN — DANTROLENE SODIUM 200 MG: 25 CAPSULE ORAL at 09:26

## 2023-12-17 RX ADMIN — VANCOMYCIN HYDROCHLORIDE 1500 MG: 1.5 INJECTION, POWDER, LYOPHILIZED, FOR SOLUTION INTRAVENOUS at 09:26

## 2023-12-17 RX ADMIN — DANTROLENE SODIUM 200 MG: 25 CAPSULE ORAL at 21:50

## 2023-12-17 RX ADMIN — ALBUTEROL SULFATE 2.5 MG: 2.5 SOLUTION RESPIRATORY (INHALATION) at 15:47

## 2023-12-17 RX ADMIN — ALBUTEROL SULFATE 2.5 MG: 2.5 SOLUTION RESPIRATORY (INHALATION) at 09:31

## 2023-12-17 RX ADMIN — SODIUM CHLORIDE, PRESERVATIVE FREE 40 MG: 5 INJECTION INTRAVENOUS at 12:34

## 2023-12-17 RX ADMIN — ALBUTEROL SULFATE 2.5 MG: 2.5 SOLUTION RESPIRATORY (INHALATION) at 11:33

## 2023-12-17 NOTE — PROGRESS NOTES
Patient admitted from ED. Alert and oriented. VSS. Patient on 4 L of O2 via nasal cannula. Patient settled in bed. Oriented to room and use of call light. Patient not able to use call light. Head to toe assessment done. Patient has a PEG tube. Peg Flushed. No issues noted. Bed in lowest position with wheels locked and bed alarm on. Care on going.

## 2023-12-17 NOTE — PROGRESS NOTES
4 Eyes Skin Assessment     NAME:  Lenny Meckel  YOB: 1960  MEDICAL RECORD NUMBER:  [id number]    The patient is being assessed for  Admission    I agree that at least one RN has performed a thorough Head to Toe Skin Assessment on the patient. ALL assessment sites listed below have been assessed. Areas assessed by both nurses:    Head, Face, Ears, Shoulders, Back, Chest, Arms, Elbows, Hands, Sacrum. Buttock, Coccyx, Ischium, Legs. Feet and Heels, and Under Medical Devices         Does the Patient have a Wound?  No noted wound(s)       Mauricio Prevention initiated by RN: Yes  Wound Care Orders initiated by RN: No    Pressure Injury (Stage 3,4, Unstageable, DTI, NWPT, and Complex wounds) if present, place Wound referral order by RN under : No    New Ostomies, if present place, Ostomy referral order under : No     Nurse 1 eSignature: Electronically signed by Malcom Campos RN on 65/83/90 at 4:56 AM EST    **SHARE this note so that the co-signing nurse can place an eSignature**    Nurse 2 eSignature: Electronically signed by Destiney Del Angel RN on 12/17/23 at 5:04 AM EST

## 2023-12-17 NOTE — PLAN OF CARE
Problem: Discharge Planning  Goal: Discharge to home or other facility with appropriate resources  Outcome: Progressing     Problem: Neurosensory - Adult  Goal: Achieves stable or improved neurological status  Outcome: Progressing     Problem: Respiratory - Adult  Goal: Achieves optimal ventilation and oxygenation  Outcome: Progressing     Problem: Skin/Tissue Integrity - Adult  Goal: Skin integrity remains intact  Outcome: Progressing     Problem: Musculoskeletal - Adult  Goal: Return mobility to safest level of function  Outcome: Progressing     Problem: Gastrointestinal - Adult  Goal: Minimal or absence of nausea and vomiting  Outcome: Progressing     Problem: Genitourinary - Adult  Goal: Absence of urinary retention  Outcome: Progressing

## 2023-12-17 NOTE — ED NOTES
Report called to 4W RN to assume care. All questions and concerns answered.       Mortimer Silos, RN  12/16/23 1945

## 2023-12-17 NOTE — CONSULTS
Consultation Note    Patient Name: Lilly Terrell  : 1960  Age: 62 y.o.     Admitting Physician: Chintan Mendes MD   Date of Admission: 2023  3:09 PM   Primary Care Physician: Tyson Hahn        Lilly Terrell is being seen at the request of Chintan Mendes MD for coffee-ground in G tube..    History of Present Illness:  62-year-old female with history of cerebral palsy, stays at [facility], admitted with change in mental status.  Patient was evaluated in the emergency room.  CT of the chest revealed changes consistent with pneumonia.  A GI consult was requested because of coffee-ground liquid was noted in the G-tube.  Hemoglobin on admission is 14.8.  Platelets 132.  Patient is on omeprazole 20 mg twice a day at the facility.  She is not on antiplatelet therapy or warfarin.  Patient denies abdominal pain.  She denies nausea or vomiting.  There are no reports of hematemesis, coffee-ground emesis.         Past Medical History:  Past Medical History:   Diagnosis Date    Cardiac arrest (HCC)     Cerebral palsy (HCC)     Closed anterior dislocation of humerus     Humeral dislocation    Dysphagia     Infantile cerebral palsy, unspecified     Cerebral palsy    Irritable bowel syndrome     Irritable bowel        Past Surgical History:  Past Surgical History:   Procedure Laterality Date    ME ARTHRP ACETBLR/PROX FEM PROSTC AGRFT/ALGRFT Right     Hip Replacement, Total    ME ARTHRP KNE CONDYLE&PLATU MEDIAL&LAT COMPARTMENTS Left     Knee Replacement, Total    SHOULDER ARTHROPLASTY      Shoulder replacement x 2 to left        Historical Medications:  Prior to Visit Medications    Medication Sig Taking? Authorizing Provider   acetaminophen (TYLENOL) 325 MG tablet Take 2 tablets by mouth every 6 hours as needed for Pain Yes Elena Hobbs MD   cyanocobalamin 1000 MCG/ML injection Inject 1 mL into the muscle every 30 days On the 4th of the month Yes Elena Hobbs MD  masses palpable.  G-tube site assessed.  G-tube appears to rotate freely.  Dark residue was noted in the proximal end of the G-tube.  Suction was applied.  No blood was aspirated.  Rectal: Deferred  Musculoskeletal: No pitting edema of the lower legs.  Neurological: Cerebral palsy      Recent Imaging:   CT CHEST WO CONTRAST  Narrative: EXAMINATION:  CT OF THE CHEST WITHOUT CONTRAST 12/16/2023 4:34 pm    TECHNIQUE:  CT of the chest was performed without the administration of intravenous  contrast. Multiplanar reformatted images are provided for review. Automated  exposure control, iterative reconstruction, and/or weight based adjustment of  the mA/kV was utilized to reduce the radiation dose to as low as reasonably  achievable.    COMPARISON:  November 27, 2022    HISTORY:  ORDERING SYSTEM PROVIDED HISTORY: COVID 19, AMS  TECHNOLOGIST PROVIDED HISTORY:  Reason for exam:->COVID 19, AMS  Decision Support Exception - unselect if not a suspected or confirmed  emergency medical condition->Emergency Medical Condition (MA)  Reason for Exam: COVID 19, AMS    FINDINGS:  Mediastinum: No axillary or mediastinal adenopathy.    Atherosclerotic calcifications involving the thoracic aorta and left anterior  descending artery.   Great vessels are normal in position and size.  Cardiac  chambers are unremarkable in appearance.  No pericardial effusion or  pericardial thickening.    Mild mural thickening of the esophagus.    Lungs/pleura: Consolidation involving the posterior peripheral aspect of the  right lower lobe.  Small left-sided pleural effusion.  Atelectasis or  scarring in the lung bases.  Patchy infiltrates within the right middle lobe.  Patchy infiltrate within the peripheral lateral aspect of the left upper  lobe.  No pneumothorax.  Calcified granuloma in the right lower lobe.  Central airways are patent.  Minimal secretions in the dependent aspects of  the trachea.    Upper Abdomen: Mild diffuse fatty infiltration liver.   Prior cholecystectomy. Prominence of the common bile duct which measures approximately 1.5 cm. Common bile duct is increased in size when compared to the prior examination. fat herniates through the esophageal hiatus into the chest.  Mild prominence  of the distal esophagus versus small hiatal hernia. Remainder of the imaged  upper abdominal organs are unremarkable in appearance. Soft Tissues/Bones: No acute osseous abnormality identified. Impression: Right middle lobe bilateral pulmonary infiltrates most prominent in the right  middle lobe. Area of consolidation related to atelectasis or infiltrate involving the  posterior aspect of the right upper lobe. Follow-up CT of the chest after  clinical/antibiotic therapy may be helpful to document stability or  resolution of this finding. Small left-sided pleural effusion. Mild increased dilatation of common bile duct. Hepatic steatosis. Additional findings noted above. XR CHEST PORTABLE  Narrative: EXAMINATION:  ONE XRAY VIEW OF THE CHEST    12/16/2023 3:16 pm    COMPARISON:  November 27, 2022    HISTORY:  ORDERING SYSTEM PROVIDED HISTORY: Shortness of Breath  TECHNOLOGIST PROVIDED HISTORY:  Reason for exam:->Shortness of Breath  Reason for Exam: sob    FINDINGS:  Small right-sided pleural effusion with fluid extending to the minor fissure. No confluent infiltrate or pneumothorax. Cardiac and mediastinal silhouettes  are within normal limits. No acute osseous abnormality identified. Impression: Small right-sided pleural effusions with fluid extending into the minor  fissure. No confluent infiltrate identified.        Labs:   Recent Labs     12/16/23  1531 12/17/23  0717   WBC 11.3*  --    HGB 14.8  --    *  --    ALKPHOS 300*  --    ALT 22  --    AST 44*  --    BILITOT 0.7  --    BUN 7 11   CREATININE <0.5* <0.5*    142   K 3.8 3.7   PROT 7.0  --    LABALBU 3.5  --         Assessment:    71-year-old female admitted with altered mental status and a diagnosis of pneumonia. GI consulted to evaluate coffee ground content in the G-tube. Patient is omeprazole 20 mg twice a day at the facility. Hemoglobin on admission at 14.8. No reports of hematemesis, bright red blood through the G-tube, or melena. Patient was started on pantoprazole. Patient with denies any abdominal pain. Examination of the abdomen is unremarkable. G-tube rotates freely. Fluid suctioned from the stomach is thin and yellow. No bloody aspirate or coffee-ground. Recommendation: Dark residue in the G-tube. No evidence of ongoing GI bleed. Aspirate of the G-tube is nonbloody and does not contain coffee-ground. Patient is on omeprazole. No further evaluation is needed. Will sign off. Call with questions. MD Zenobia Salinas    [phone]. Also available via [employer]    Please note that some or all of this record was generated using voice recognition software. If there are any questions about the content of this document, please contact the author as some errors in translation may have occurred.

## 2023-12-17 NOTE — ED NOTES
Report received from Ousmane Gonzáles, ED RN to hand-off care.       Radha Mahajan RN  12/16/23 3764

## 2023-12-17 NOTE — PLAN OF CARE
Problem: Discharge Planning  Goal: Discharge to home or other facility with appropriate resources  12/17/2023 1750 by Lesvia Santos RN  Outcome: Progressing  12/17/2023 0523 by Poppy Dickerson RN  Outcome: Progressing     Problem: Neurosensory - Adult  Goal: Achieves stable or improved neurological status  12/17/2023 1750 by Lesvia Santos RN  Outcome: Progressing  12/17/2023 0523 by Poppy Dickerson RN  Outcome: Progressing  Goal: Absence of seizures  Outcome: Progressing  Goal: Remains free of injury related to seizures activity  Outcome: Progressing  Goal: Achieves maximal functionality and self care  Outcome: Progressing     Problem: Respiratory - Adult  Goal: Achieves optimal ventilation and oxygenation  12/17/2023 1750 by Lesvia Santos RN  Outcome: Progressing  12/17/2023 0523 by Poppy Dickerson RN  Outcome: Progressing     Problem: Skin/Tissue Integrity - Adult  Goal: Skin integrity remains intact  12/17/2023 1750 by Lesvia Santos RN  Outcome: Progressing  12/17/2023 0523 by Poppy Dickerson RN  Outcome: Progressing  Goal: Incisions, wounds, or drain sites healing without S/S of infection  Outcome: Progressing  Goal: Oral mucous membranes remain intact  Outcome: Progressing     Problem: Musculoskeletal - Adult  Goal: Return mobility to safest level of function  12/17/2023 1750 by Lesvia Santos RN  Outcome: Progressing  12/17/2023 0523 by Poppy Dickerson RN  Outcome: Progressing  Goal: Maintain proper alignment of affected body part  Outcome: Progressing  Goal: Return ADL status to a safe level of function  Outcome: Progressing     Problem: Gastrointestinal - Adult  Goal: Minimal or absence of nausea and vomiting  12/17/2023 1750 by Lesvia Santos RN  Outcome: Progressing  12/17/2023 0523 by Poppy Dickerson RN  Outcome: Progressing  Goal: Maintains or returns to baseline bowel function  Outcome: Progressing  Goal: Maintains adequate nutritional intake  Outcome: Progressing     Problem: Genitourinary -  Adult  Goal: Absence of urinary retention  12/17/2023 1750 by Kirsten Anthony RN  Outcome: Progressing  12/17/2023 8138 by Louis Alfred RN  Outcome: Progressing     Problem: Safety - Adult  Goal: Free from fall injury  Outcome: Progressing     Problem: Skin/Tissue Integrity  Goal: Absence of new skin breakdown  Description: 1. Monitor for areas of redness and/or skin breakdown  2. Assess vascular access sites hourly  3. Every 4-6 hours minimum:  Change oxygen saturation probe site  4. Every 4-6 hours:  If on nasal continuous positive airway pressure, respiratory therapy assess nares and determine need for appliance change or resting period.   Outcome: Progressing

## 2023-12-17 NOTE — ED NOTES
This RN spoke with [facility] RN and given an updated plan of care.       Sarita Moore RN  12/16/23 1934

## 2023-12-18 PROCEDURE — 6370000000 HC RX 637 (ALT 250 FOR IP): Performed by: HOSPITALIST

## 2023-12-18 PROCEDURE — 2580000003 HC RX 258: Performed by: INTERNAL MEDICINE

## 2023-12-18 PROCEDURE — 36569 INSJ PICC 5 YR+ W/O IMAGING: CPT

## 2023-12-18 PROCEDURE — 6370000000 HC RX 637 (ALT 250 FOR IP): Performed by: FAMILY MEDICINE

## 2023-12-18 PROCEDURE — 92610 EVALUATE SWALLOWING FUNCTION: CPT

## 2023-12-18 PROCEDURE — C1751 CATH, INF, PER/CENT/MIDLINE: HCPCS

## 2023-12-18 PROCEDURE — 1200000000 HC SEMI PRIVATE

## 2023-12-18 PROCEDURE — 6360000002 HC RX W HCPCS: Performed by: HOSPITALIST

## 2023-12-18 PROCEDURE — 2580000003 HC RX 258: Performed by: HOSPITALIST

## 2023-12-18 RX ORDER — CEFDINIR 300 MG/1
600 CAPSULE ORAL ONCE
Status: COMPLETED | OUTPATIENT
Start: 2023-12-18 | End: 2023-12-18

## 2023-12-18 RX ORDER — SODIUM CHLORIDE 0.9 % (FLUSH) 0.9 %
5-40 SYRINGE (ML) INJECTION EVERY 12 HOURS SCHEDULED
Status: DISCONTINUED | OUTPATIENT
Start: 2023-12-18 | End: 2023-12-21

## 2023-12-18 RX ORDER — SODIUM CHLORIDE 0.9 % (FLUSH) 0.9 %
5-40 SYRINGE (ML) INJECTION PRN
Status: ACTIVE | OUTPATIENT
Start: 2023-12-18

## 2023-12-18 RX ORDER — LIDOCAINE HYDROCHLORIDE 10 MG/ML
5 INJECTION, SOLUTION EPIDURAL; INFILTRATION; INTRACAUDAL; PERINEURAL ONCE
Status: DISCONTINUED | OUTPATIENT
Start: 2023-12-18 | End: 2023-12-21

## 2023-12-18 RX ORDER — SODIUM CHLORIDE 9 MG/ML
25 INJECTION, SOLUTION INTRAVENOUS PRN
Status: ACTIVE | OUTPATIENT
Start: 2023-12-18

## 2023-12-18 RX ADMIN — METOPROLOL TARTRATE 25 MG: 25 TABLET, FILM COATED ORAL at 22:49

## 2023-12-18 RX ADMIN — CEFDINIR 600 MG: 300 CAPSULE ORAL at 09:23

## 2023-12-18 RX ADMIN — POLYETHYLENE GLYCOL 3350 17 G: 17 POWDER, FOR SOLUTION ORAL at 09:25

## 2023-12-18 RX ADMIN — METOPROLOL TARTRATE 25 MG: 25 TABLET, FILM COATED ORAL at 09:23

## 2023-12-18 RX ADMIN — CEFEPIME 2000 MG: 2 INJECTION, POWDER, FOR SOLUTION INTRAVENOUS at 17:19

## 2023-12-18 RX ADMIN — LEVOTHYROXINE SODIUM 50 MCG: 0.05 TABLET ORAL at 05:26

## 2023-12-18 RX ADMIN — DANTROLENE SODIUM 200 MG: 25 CAPSULE ORAL at 22:49

## 2023-12-18 RX ADMIN — AMITRIPTYLINE HYDROCHLORIDE 50 MG: 50 TABLET, FILM COATED ORAL at 22:49

## 2023-12-18 RX ADMIN — DANTROLENE SODIUM 200 MG: 25 CAPSULE ORAL at 09:24

## 2023-12-18 RX ADMIN — SERTRALINE 150 MG: 100 TABLET, FILM COATED ORAL at 09:23

## 2023-12-18 RX ADMIN — SODIUM CHLORIDE, PRESERVATIVE FREE 10 ML: 5 INJECTION INTRAVENOUS at 22:53

## 2023-12-18 RX ADMIN — ENOXAPARIN SODIUM 40 MG: 100 INJECTION SUBCUTANEOUS at 09:24

## 2023-12-18 RX ADMIN — CEFEPIME 2000 MG: 2 INJECTION, POWDER, FOR SOLUTION INTRAVENOUS at 22:56

## 2023-12-18 RX ADMIN — ASPIRIN 81 MG: 81 TABLET, COATED ORAL at 09:23

## 2023-12-18 RX ADMIN — SODIUM CHLORIDE: 9 INJECTION, SOLUTION INTRAVENOUS at 17:18

## 2023-12-18 NOTE — PROGRESS NOTES
Tube feed orders verified, site assessed, Clean dry and intact, tube feedings initiated at 25ml/hr. Water flush initiated at 135 ml/hr q4 hr. Pt tolerating feed well, no distress noted, no complains of Nausea. Tube feed to be advanced per pt tolerance.      Electronically signed by Roni Rivera RN on 12/18/23 at 1:10 PM EST

## 2023-12-18 NOTE — PROGRESS NOTES
Facility/Department: [address] MED SURG  Initial Assessment  DYSPHAGIA BEDSIDE SWALLOW EVALUATION     Patient: Anatoliy Pierre   : 1960   MRN: [id number]      Evaluation Date: 2023   Admitting Diagnosis:   Pneumonia, unspecified organism [J18.9]    Pain: Did not state ; endorses feeling sick/nauseous                                                      CHART REVIEW:  2023 admitted with c/o AMS  MD ADMISSION H&P HPI:  Anatoliy Pierre is a 65 y.o. female with pmh of  cerebral palsy  who was sent from [facility] with c/o AMS  Patient has h/o chronic respiratory failure and at baseline use 4 L oxygen via NC   Patient recently had covid 19 infection  No h/o chest pain or GI bleed or diarrhea or seizure     IMAGING:  CXR: 2023  IMPRESSION:  Small right-sided pleural effusions with fluid extending into the minor fissure. No confluent infiltrate identified. CT CHEST: 2023  IMPRESSION:  Right middle lobe bilateral pulmonary infiltrates most prominent in the right middle lobe. Area of consolidation related to atelectasis or infiltrate involving the posterior aspect of the right upper lobe. Follow-up CT of the chest after clinical/antibiotic therapy may be helpful to document stability or resolution of this finding. Small left-sided pleural effusion. Mild increased dilatation of common bile duct. Hepatic steatosis. Current Diet Level (prior to evaluation): NPO     Respiratory Status:   []Room Air   [x]O2 via nasal cannula - 2L   []Other:    Modified Barium Swallow Study: 2021  Moderate-severe oral stage dysphagia characterized by decreased mastication and decreased lingula manipulation/coordination. Prolonged, disorganized bolus formation and movement with all. Incomplete bolus formation/cohesion with all but adequate mastication with solids. Premature bolus loss to the e pharynx with all; decreased oral control exacerbates. Piecemeal swallow with all.    Oral residue

## 2023-12-18 NOTE — CARE COORDINATION
Case Management Assessment  Initial Evaluation    Date/Time of Evaluation: 12/18/2023 11:08 AM  Assessment Completed by: ANGY Borges    If patient is discharged prior to next notation, then this note serves as note for discharge by case management.    Patient Name: Lilly Terrell                   YOB: 1960  Diagnosis: Pneumonia, unspecified organism [J18.9]                   Date / Time: 12/16/2023  3:09 PM    Patient Admission Status: Inpatient   Readmission Risk (Low < 19, Mod (19-27), High > 27): Readmission Risk Score: 13.7    Current PCP: Tyson Hahn  PCP verified by CM? Yes    Chart Reviewed: Yes      History Provided by: Patient, Other (see comment) ([facility])  Patient Orientation: Alert and Oriented    Patient Cognition: Alert    Hospitalization in the last 30 days (Readmission):  No    If yes, Readmission Assessment in CM Navigator will be completed.    Advance Directives:      Code Status: Full Code   Patient's Primary Decision Maker is: Legal Next of Kin    Primary Decision Maker: Akshat Causey - Child - [phone]    Discharge Planning:    Patient lives with: Other (Comment) ([facility]) Type of Home: Long-Term Care  Primary Care Giver: Other (Comment) ([facility])  Patient Support Systems include: Other (Comment) ([facility])   Current Financial resources: Medicare  Current community resources: Other (Comment) ([facility])  Current services prior to admission: (P) Oxygen Therapy (4 lpm o2 at baseline)            Current DME:              Type of Home Care services:  (P) None    ADLS  Prior functional level: Assistance with the following:, Bathing, Dressing, Toileting, Cooking, Housework, Shopping, Mobility  Current functional level: Assistance with the following:, Bathing, Dressing, Toileting, Cooking, Housework, Shopping, Mobility    PT AM-PAC:   /24  OT AM-PAC:    /24    Family can provide assistance at DC: No ([facility])  Would you like Case Management to discuss the discharge plan with any other family members/significant others, and if so, who? Yes (Son, Akshat Causey)  Plans to Return to Present Housing: Yes  Other Identified Issues/Barriers to RETURNING to current housing: None  Potential Assistance needed at discharge: (P) N/A            Potential DME:  None  Patient expects to discharge to: (P) Long-term care  Plan for transportation at discharge: (P) Other (see comment) ([facility])    Financial    Payor: [employer] MEDICARE / Plan: [employer]ET[employer] MEDICARE ADVANTAGE HMO / Product Type: Medicare /     Does insurance require precert for SNF: Yes    Potential assistance Purchasing Medications: (P) No  Meds-to-Beds request:        [facility] - [place], [place] - [address] - P [phone] - F [phone] [address] [address]  Phone: [phone] Fax: [phone]      Notes:    Factors facilitating achievement of predicted outcomes: Family support, Caregiver support, Friend support, Motivated, and Pleasant    Barriers to discharge: None    Additional Case Management Notes: Pt is from [facility]. Pt is able to return at discharge. Pt will need BLS transport due to being on 4lpm o2 at baseline. Pt receives all medical equipment through [facility] and prescriptions are filled through the facility as well. No additional case management needs at this time.     The Plan for Transition of Care is related to the following treatment goals of Pneumonia, unspecified organism [J18.9]        The Patient and/or Patient Representative Agree with the Discharge Plan?  Yes    ANGY Borges  Case Management Department  Ph: [phone]     12/18/23 1052   Service Assessment   Patient Orientation Alert and Oriented   Cognition Alert   History Provided By Patient;Other (see  comment)  ([facility] Staff)   Primary Caregiver Other (Comment)  ([address] [place] [address] Staff)   Accompanied By/Relationship N/A   Support Systems Other (Comment)  ([address] [place] [address] Staff)   [address] is: Legal Next of Kin   PCP Verified by CM Yes   Last Visit to PCP Within last 3 months   Prior Functional Level Assistance with the following:;Bathing;Dressing; Toileting;Cooking;Housework; Shopping;Mobility   Current Functional Level Assistance with the following:;Bathing;Dressing; Toileting;Cooking;Housework; Shopping;Mobility   Can patient return to prior living arrangement Yes   Ability to make needs known: Good   Family able to assist with home care needs: No  ([facility])   Would you like for me to discuss the discharge plan with any other family members/significant others, and if so, who? Yes  (Son, Angelica Hale)   Financial Resources [employer] Other (Comment)  ([facility])   CM/SW Referral Other (see comment)  (discharge planning)   Discharge Planning   Type of [address] [place] [address] Other (Comment)  ([facility] Staff)   Current Services Prior To Admission Oxygen Therapy  (4 lpm o2 at baseline)   Potential Assistance Needed N/A   DME Ordered?  No   Potential Assistance Purchasing Medications No   Type of Home Care Services None   Patient expects to be discharged to: Long-term care   Services At/After Discharge   Transition of Care Consult (CM Consult) Discharge Planning   Services At/After Discharge Long term care   Mode of Transport at Discharge BLS   Confirm Follow Up Transport Other (see comment)  ([address] [place] [address] Staff)   Condition of Participation: Discharge Planning   The Plan for Transition of Care is related to the following treatment goals: strengthening     Electronically signed by ANGY Bernard on 12/18/2023 at 11:08 AM

## 2023-12-18 NOTE — PROGRESS NOTES
Patient has removed  2 IV's during the shift. Reached out to on call MD to inform of loss of access and to ask for antibiotics to be administered via PEG tube. Ultrasound RN attempted to get access but unable to at this time.        Electronically signed by Jenni Jaramillo RN on 12/18/2023 at 6:51 AM

## 2023-12-18 NOTE — PLAN OF CARE
safest level of function  12/17/2023 2230 by Christi Tobar RN  Outcome: Progressing  12/17/2023 1750 by Rosa M Hargrove RN  Outcome: Progressing  Goal: Maintain proper alignment of affected body part  12/17/2023 2230 by Christi Tobar RN  Outcome: Progressing  12/17/2023 1750 by Rosa M Hargrove RN  Outcome: Progressing  Goal: Return ADL status to a safe level of function  12/17/2023 2230 by Christi Tobar RN  Outcome: Progressing  12/17/2023 1750 by Rosa M Hargrove RN  Outcome: Progressing     Problem: Gastrointestinal - Adult  Goal: Minimal or absence of nausea and vomiting  12/17/2023 2230 by Christi Tobar RN  Outcome: Progressing  12/17/2023 1750 by Rosa M Hargrove RN  Outcome: Progressing  Goal: Maintains or returns to baseline bowel function  12/17/2023 2230 by Christi Tobar RN  Outcome: Progressing  12/17/2023 1750 by Rosa M Hargrove RN  Outcome: Progressing  Goal: Maintains adequate nutritional intake  12/17/2023 2230 by Christi Tobar RN  Outcome: Progressing  12/17/2023 1750 by Rosa M Hargrove RN  Outcome: Progressing     Problem: Genitourinary - Adult  Goal: Absence of urinary retention  12/17/2023 2230 by Christi Tobar RN  Outcome: Progressing  12/17/2023 1750 by Rosa M Hargrove RN  Outcome: Progressing     Problem: Safety - Adult  Goal: Free from fall injury  12/17/2023 2230 by Christi Tobar RN  Outcome: Progressing  12/17/2023 1750 by Rosa M Hargrove RN  Outcome: Progressing     Problem: Skin/Tissue Integrity  Goal: Absence of new skin breakdown  Description: 1. Monitor for areas of redness and/or skin breakdown  2. Assess vascular access sites hourly  3. Every 4-6 hours minimum:  Change oxygen saturation probe site  4. Every 4-6 hours:  If on nasal continuous positive airway pressure, respiratory therapy assess nares and determine need for appliance change or resting period.   12/17/2023 2230 by Christi Tobar RN  Outcome: Progressing  12/17/2023 1750 by Valentín Zuleta Candido Berumen, RN  Outcome: Progressing

## 2023-12-18 NOTE — CONSULTS
Comprehensive Nutrition Assessment    Type and Reason for Visit:  Consult    Nutrition Recommendations/Plan:   Start Jevity 1.5 at 25mL/hr cont. Increase rate by 10mL q4hrs until goal of 55mL/hr is achieved. Flush tube with 30mL q6hrs. - Can increase flushes to 135mL q4hrs if IV fluids are discontinued. Malnutrition Assessment:  Malnutrition Status: At risk for malnutrition (Comment) (12/18/23 0994)    Context:  Chronic Illness       Nutrition Assessment:    RD consult for tube feed order and management. Pt. admitted with Pneumonia. Recent Hx. of COVID 19. Currently NPO. Pt. has a PEG. MD noted coffee ground fluid in tube, GI was consulted and IV PPI started. See above for TF recs. Will monitor rate advancements and make adjustments as needed. Nutrition Related Findings:    Cr. < 0.5. LBM 12/17. Wound Type: None       Current Nutrition Intake & Therapies:    Average Meal Intake: NPO  Average Supplements Intake: NPO  Current Tube Feeding (TF) Orders:  Current TF & Flush Orders Provides: Jevity 1.5 @ 25mL/hr cont.; Flush 30mL q6hrs. Goal TF & Flush Orders Provides: Jevity 1.5 @ 55mL/hr cont.; Flush tube with 135mL q4hrs    Anthropometric Measures:  Height: 172.7 cm (5' 7.99\")  Ideal Body Weight (IBW): 140 lbs (64 kg)       Current Body Weight: 89.8 kg (197 lb 15.6 oz), 141.4 % IBW. Current BMI (kg/m2): 30.1                          BMI Categories: Obese Class 1 (BMI 30.0-34. 9)    Estimated Daily Nutrient Needs:  Energy Requirements Based On: Kcal/kg  Weight Used for Energy Requirements: Current  Energy (kcal/day): 3058-0591 (18-20kcal/kg)  Weight Used for Protein Requirements: Current  Protein (g/day): 90-108g/kg  Method Used for Fluid Requirements: 1 ml/kcal  Fluid (ml/day): NPO    Nutrition Diagnosis:   Altered GI function related to altered GI structure as evidenced by nutrition support - enteral nutrition  Swallowing difficulty related to biting/chewing (masticatory) difficulty as

## 2023-12-18 NOTE — PROGRESS NOTES
Arrived to place midline with bedside RN Toyin. Pre-procedure and timeout done with RN, discussed limitations of placement and allergies. Pt's basilic, brachial, and cephalic are all easily collapsible with no indication for a clot. Vein selected is large enough for catheter (please see image below). Pt tolerated sterile procedure well, with no difficulty accessing basilic vein, when accessed - blood was free flowing and non-pulsatile. Guidewire, introducer, and catheter went in smoothly. ML placement verification with blood return and flushes easily. OK to use ML. Post procedure - reorganized pt table, placed pt in lowest position, with call light and educated on line care. Reported off to bedside RN. If you have any questions please call number below and dispatch will direct you to the PICC RN that is on call.     [phone]

## 2023-12-18 NOTE — PROGRESS NOTES
V2.0  [facility] [place]      Name:  Susie Moody /Age/Sex: 1960  (65 y.o. female)   MRN & CSN:  [id number] & [id number] Encounter Date/Time: 2023 8:40 AM EST    Location:  [id number] PCP: Lazara Hurtado Day: 3    Assessment and Plan:   Susie Moody is a 65 y.o. female with medical history significant for cerebral palsy, hypothyroidism, hypertension, depression, chronic hypoxic respiratory failure on 4 L oxygen via nasal cannula at baseline who was admitted on 2023 with acute encephalopathy and right-sided pneumonia. Assessment/Plan :   1. Pneumonia on CT chest.  Procalcitonin 0.25. Blood cultures are pending. Respiratory panel is positive for COVID-19. Monitor CBC for resolution of leukocytosis. Consult speech therapy for swallowing evaluation. Nasal MRSA swab was negative so vancomycin was discontinued. Continue cefepime. Seen by speech therapy and rec commended modified barium swallow. 2.  Acute encephalopathy. Slowly improving. 3.  Chronic respiratory failure on 4 L oxygen via nasal cannula. 4.  Recent COVID-19 infection. Respiratory viral panel is still positive. Keep on droplet plus precaution. 5.  History of cerebral palsy. 6.  Depression. Continue Zoloft 150 mg daily and amitriptyline 50 mg daily. 7.  Hypothyroidism. Continue levothyroxine. 8.  Hypertension. Continue metoprolol 25 mg twice daily with hold parameters in place. 9.  Possible GI bleed. There is coffee-ground fluid in the PEG tube. Started on PPI. Seen by GI and did not recommend any further evaluation. 10.  Cardiomyopathy with EF of 40 to 45% on TTE from 2015. Will repeat. DVT prophylaxis:Lovenox     Disposition: 3 to 4 days. She is from a nursing home. Subjective:     Chief Complaint: Pneumonia and altered mental status    Torrey Turner is a 65 y.o. female who presents with pneumonia and altered mental status.   She remained afebrile but still tachycardic at times.  Still has some residual confusion.         Review of Systems:    As above     Objective:   No intake or output data in the 24 hours ending 12/18/23 0818       Vitals:   Vitals:    12/18/23 0424   BP: 123/66   Pulse: 89   Resp: 16   Temp: 98.3 °F (36.8 °C)   SpO2: 98%       Physical Exam:     General: Dry oral mucosa  Cardiovascular: Regular rate.  Respiratory: Clear to auscultation  Gastrointestinal: Soft, non tender, PEG tube in place with coffee-ground fluid  Genitourinary: no suprapubic tenderness  Musculoskeletal: No edema  Skin: warm, dry  Neuro: Awake but confused and weakness in all 4 extremities but worse in lower extremities.    Medications:   Medications:    cefdinir  600 mg PEG Tube Once    cefepime  2,000 mg IntraVENous Q8H    pantoprazole (PROTONIX) 40 mg in sodium chloride (PF) 0.9 % 10 mL injection  40 mg IntraVENous Daily    amitriptyline  50 mg Oral Nightly    aspirin  81 mg Oral Daily    dantrolene  200 mg Oral BID    levothyroxine  50 mcg Oral Daily    metoprolol tartrate  25 mg Oral BID    polyethylene glycol  17 g Oral BID    sertraline  150 mg Oral Daily    sodium chloride flush  5-40 mL IntraVENous 2 times per day    enoxaparin  40 mg SubCUTAneous Daily      Infusions:    sodium chloride 125 mL/hr at 12/17/23 1145    sodium chloride       PRN Meds: albuterol, 2.5 mg, Q4H PRN  ipratropium, 0.5 mg, Q4H PRN  sodium chloride flush, 5-40 mL, PRN  sodium chloride, , PRN  potassium chloride, 40 mEq, PRN   Or  potassium alternative oral replacement, 40 mEq, PRN   Or  potassium chloride, 10 mEq, PRN  magnesium sulfate, 2,000 mg, PRN  ondansetron, 4 mg, Q8H PRN   Or  ondansetron, 4 mg, Q6H PRN  polyethylene glycol, 17 g, Daily PRN  acetaminophen, 650 mg, Q6H PRN   Or  acetaminophen, 650 mg, Q6H PRN  guaiFENesin-dextromethorphan, 5 mL, Q4H PRN        Labs      Recent Results (from the past 24 hour(s))   Respiratory Panel, Molecular, with COVID-19  SYSTEM PROVIDED HISTORY: Shortness of Breath TECHNOLOGIST PROVIDED HISTORY: Reason for exam:->Shortness of Breath Reason for Exam: sob FINDINGS: Small right-sided pleural effusion with fluid extending to the minor fissure. No confluent infiltrate or pneumothorax. Cardiac and mediastinal silhouettes are within normal limits. No acute osseous abnormality identified. Small right-sided pleural effusions with fluid extending into the minor fissure. No confluent infiltrate identified.        Electronically signed by Brandt Ibarra MD on 12/18/2023 at 8:18 AM

## 2023-12-18 NOTE — PLAN OF CARE
Problem: Discharge Planning  Goal: Discharge to home or other facility with appropriate resources  12/18/2023 1126 by Hannah Hernandez RN  Outcome: Progressing  Flowsheets (Taken 12/18/2023 9975)  Discharge to home or other facility with appropriate resources:   Identify barriers to discharge with patient and caregiver   Arrange for needed discharge resources and transportation as appropriate  12/17/2023 2230 by Charissa Orantes RN  Outcome: Progressing     Problem: Neurosensory - Adult  Goal: Achieves stable or improved neurological status  12/18/2023 1126 by Hannah Hernandez RN  Outcome: Progressing  12/17/2023 2230 by Charissa Orantes RN  Outcome: Progressing  Goal: Absence of seizures  12/18/2023 1126 by Hannah Hernandez RN  Outcome: Progressing  12/17/2023 2230 by Charissa Orantes RN  Outcome: Progressing  Goal: Remains free of injury related to seizures activity  12/18/2023 1126 by Hannah Hernandez RN  Outcome: Progressing  12/17/2023 2230 by Charissa Orantes RN  Outcome: Progressing  Goal: Achieves maximal functionality and self care  12/18/2023 1126 by Hannah Hernandez RN  Outcome: Progressing  12/17/2023 2230 by Charissa Orantes RN  Outcome: Progressing     Problem: Respiratory - Adult  Goal: Achieves optimal ventilation and oxygenation  12/18/2023 1126 by Hannah Hernandez RN  Outcome: Progressing  12/17/2023 2230 by Charissa Orantes RN  Outcome: Progressing     Problem: Skin/Tissue Integrity - Adult  Goal: Skin integrity remains intact  12/18/2023 1126 by Hannah Hernandez RN  Outcome: Progressing  12/17/2023 2230 by Charissa Orantes RN  Outcome: Progressing  Flowsheets (Taken 12/17/2023 2229)  Skin Integrity Remains Intact:   Monitor for areas of redness and/or skin breakdown   Assess vascular access sites hourly  Goal: Incisions, wounds, or drain sites healing without S/S of infection  12/18/2023 1126 by Hannah Hernandez RN  Outcome: Progressing  12/17/2023 2230 by Charissa Orantes RN  Outcome: Progressing  Goal: Oral mucous membranes remain intact  12/18/2023 1126 by Toyin Pinto RN  Outcome: Progressing  12/17/2023 2230 by Claire Carl RN  Outcome: Progressing     Problem: Musculoskeletal - Adult  Goal: Return mobility to safest level of function  12/18/2023 1126 by Toyin Pinto RN  Outcome: Progressing  12/17/2023 2230 by Claire Carl RN  Outcome: Progressing  Goal: Maintain proper alignment of affected body part  12/18/2023 1126 by Toyin Pinto RN  Outcome: Progressing  12/17/2023 2230 by Claire Carl RN  Outcome: Progressing  Goal: Return ADL status to a safe level of function  12/18/2023 1126 by Toyin Pinto RN  Outcome: Progressing  12/17/2023 2230 by Claire Carl RN  Outcome: Progressing     Problem: Gastrointestinal - Adult  Goal: Minimal or absence of nausea and vomiting  12/18/2023 1126 by Toyin Pinto RN  Outcome: Progressing  12/17/2023 2230 by Claire Carl RN  Outcome: Progressing  Goal: Maintains or returns to baseline bowel function  12/18/2023 1126 by Toyin Pinto RN  Outcome: Progressing  12/17/2023 2230 by Claire Carl RN  Outcome: Progressing  Goal: Maintains adequate nutritional intake  12/18/2023 1126 by Toyin Pinto RN  Outcome: Progressing  12/17/2023 2230 by Claire Carl RN  Outcome: Progressing     Problem: Genitourinary - Adult  Goal: Absence of urinary retention  12/18/2023 1126 by Toyin Pinto RN  Outcome: Progressing  12/17/2023 2230 by Claire Carl RN  Outcome: Progressing     Problem: Safety - Adult  Goal: Free from fall injury  12/18/2023 1126 by Toyin Pinto RN  Outcome: Progressing  12/17/2023 2230 by Claire Carl RN  Outcome: Progressing     Problem: Skin/Tissue Integrity  Goal: Absence of new skin breakdown  Description: 1.  Monitor for areas of redness and/or skin breakdown  2.  Assess vascular access sites hourly  3.  Every 4-6 hours minimum:  Change oxygen saturation probe site  4.

## 2023-12-19 ENCOUNTER — APPOINTMENT (OUTPATIENT)
Dept: GENERAL RADIOLOGY | Age: 63
DRG: 207 | End: 2023-12-19
Payer: MEDICARE

## 2023-12-19 LAB
ANION GAP SERPL CALCULATED.3IONS-SCNC: 10 MMOL/L (ref 3–16)
BASOPHILS # BLD: 0.1 K/UL (ref 0–0.2)
BASOPHILS NFR BLD: 0.5 %
BUN SERPL-MCNC: 10 MG/DL (ref 7–20)
CALCIUM SERPL-MCNC: 7.8 MG/DL (ref 8.3–10.6)
CHLORIDE SERPL-SCNC: 102 MMOL/L (ref 99–110)
CO2 SERPL-SCNC: 29 MMOL/L (ref 21–32)
CREAT SERPL-MCNC: <0.5 MG/DL (ref 0.6–1.2)
DEPRECATED RDW RBC AUTO: 15.2 % (ref 12.4–15.4)
EOSINOPHIL # BLD: 0.1 K/UL (ref 0–0.6)
EOSINOPHIL NFR BLD: 0.6 %
EST. AVERAGE GLUCOSE BLD GHB EST-MCNC: 108.3 MG/DL
GFR SERPLBLD CREATININE-BSD FMLA CKD-EPI: >60 ML/MIN/{1.73_M2}
GLUCOSE BLD-MCNC: 167 MG/DL (ref 70–99)
GLUCOSE BLD-MCNC: 171 MG/DL (ref 70–99)
GLUCOSE BLD-MCNC: 182 MG/DL (ref 70–99)
GLUCOSE BLD-MCNC: 188 MG/DL (ref 70–99)
GLUCOSE BLD-MCNC: 201 MG/DL (ref 70–99)
GLUCOSE BLD-MCNC: 229 MG/DL (ref 70–99)
GLUCOSE SERPL-MCNC: 203 MG/DL (ref 70–99)
HBA1C MFR BLD: 5.4 %
HCT VFR BLD AUTO: 36.8 % (ref 36–48)
HGB BLD-MCNC: 12.3 G/DL (ref 12–16)
LYMPHOCYTES # BLD: 1.3 K/UL (ref 1–5.1)
LYMPHOCYTES NFR BLD: 13.3 %
MAGNESIUM SERPL-MCNC: 2.1 MG/DL (ref 1.8–2.4)
MCH RBC QN AUTO: 32 PG (ref 26–34)
MCHC RBC AUTO-ENTMCNC: 33.4 G/DL (ref 31–36)
MCV RBC AUTO: 95.6 FL (ref 80–100)
MONOCYTES # BLD: 1 K/UL (ref 0–1.3)
MONOCYTES NFR BLD: 10.3 %
NEUTROPHILS # BLD: 7.2 K/UL (ref 1.7–7.7)
NEUTROPHILS NFR BLD: 75.3 %
PERFORMED ON: ABNORMAL
PLATELET # BLD AUTO: 107 K/UL (ref 135–450)
PMV BLD AUTO: 10.9 FL (ref 5–10.5)
POTASSIUM SERPL-SCNC: 2.9 MMOL/L (ref 3.5–5.1)
POTASSIUM SERPL-SCNC: 3.9 MMOL/L (ref 3.5–5.1)
RBC # BLD AUTO: 3.85 M/UL (ref 4–5.2)
SODIUM SERPL-SCNC: 141 MMOL/L (ref 136–145)
WBC # BLD AUTO: 9.6 K/UL (ref 4–11)

## 2023-12-19 PROCEDURE — 2580000003 HC RX 258: Performed by: INTERNAL MEDICINE

## 2023-12-19 PROCEDURE — 84132 ASSAY OF SERUM POTASSIUM: CPT

## 2023-12-19 PROCEDURE — 2580000003 HC RX 258: Performed by: NURSE PRACTITIONER

## 2023-12-19 PROCEDURE — 6360000002 HC RX W HCPCS: Performed by: HOSPITALIST

## 2023-12-19 PROCEDURE — 36415 COLL VENOUS BLD VENIPUNCTURE: CPT

## 2023-12-19 PROCEDURE — 83036 HEMOGLOBIN GLYCOSYLATED A1C: CPT

## 2023-12-19 PROCEDURE — 83735 ASSAY OF MAGNESIUM: CPT

## 2023-12-19 PROCEDURE — 85025 COMPLETE CBC W/AUTO DIFF WBC: CPT

## 2023-12-19 PROCEDURE — 1200000000 HC SEMI PRIVATE

## 2023-12-19 PROCEDURE — 6360000002 HC RX W HCPCS: Performed by: NURSE PRACTITIONER

## 2023-12-19 PROCEDURE — 2700000000 HC OXYGEN THERAPY PER DAY

## 2023-12-19 PROCEDURE — 2500000003 HC RX 250 WO HCPCS: Performed by: NURSE PRACTITIONER

## 2023-12-19 PROCEDURE — 2580000003 HC RX 258: Performed by: HOSPITALIST

## 2023-12-19 PROCEDURE — 94761 N-INVAS EAR/PLS OXIMETRY MLT: CPT

## 2023-12-19 PROCEDURE — 6370000000 HC RX 637 (ALT 250 FOR IP): Performed by: HOSPITALIST

## 2023-12-19 PROCEDURE — 6360000002 HC RX W HCPCS: Performed by: INTERNAL MEDICINE

## 2023-12-19 PROCEDURE — 80048 BASIC METABOLIC PNL TOTAL CA: CPT

## 2023-12-19 PROCEDURE — C9113 INJ PANTOPRAZOLE SODIUM, VIA: HCPCS | Performed by: INTERNAL MEDICINE

## 2023-12-19 RX ORDER — METOCLOPRAMIDE HYDROCHLORIDE 5 MG/ML
10 INJECTION INTRAMUSCULAR; INTRAVENOUS ONCE
Status: COMPLETED | OUTPATIENT
Start: 2023-12-19 | End: 2023-12-19

## 2023-12-19 RX ORDER — TRAMADOL HYDROCHLORIDE 50 MG/1
50 TABLET ORAL ONCE
Status: DISCONTINUED | OUTPATIENT
Start: 2023-12-19 | End: 2023-12-22

## 2023-12-19 RX ORDER — 0.9 % SODIUM CHLORIDE 0.9 %
500 INTRAVENOUS SOLUTION INTRAVENOUS ONCE
Status: COMPLETED | OUTPATIENT
Start: 2023-12-20 | End: 2023-12-20

## 2023-12-19 RX ORDER — DEXTROSE MONOHYDRATE 100 MG/ML
INJECTION, SOLUTION INTRAVENOUS CONTINUOUS PRN
Status: ACTIVE | OUTPATIENT
Start: 2023-12-19

## 2023-12-19 RX ORDER — METOPROLOL TARTRATE 1 MG/ML
5 INJECTION, SOLUTION INTRAVENOUS ONCE
Status: COMPLETED | OUTPATIENT
Start: 2023-12-20 | End: 2023-12-19

## 2023-12-19 RX ADMIN — POTASSIUM CHLORIDE 10 MEQ: 7.46 INJECTION, SOLUTION INTRAVENOUS at 11:48

## 2023-12-19 RX ADMIN — METOCLOPRAMIDE 10 MG: 5 INJECTION, SOLUTION INTRAMUSCULAR; INTRAVENOUS at 23:43

## 2023-12-19 RX ADMIN — SODIUM CHLORIDE, PRESERVATIVE FREE 40 MG: 5 INJECTION INTRAVENOUS at 10:13

## 2023-12-19 RX ADMIN — SODIUM CHLORIDE 500 ML: 9 INJECTION, SOLUTION INTRAVENOUS at 23:48

## 2023-12-19 RX ADMIN — POTASSIUM CHLORIDE 10 MEQ: 7.46 INJECTION, SOLUTION INTRAVENOUS at 14:00

## 2023-12-19 RX ADMIN — CEFEPIME 2000 MG: 2 INJECTION, POWDER, FOR SOLUTION INTRAVENOUS at 06:26

## 2023-12-19 RX ADMIN — SERTRALINE 150 MG: 100 TABLET, FILM COATED ORAL at 10:11

## 2023-12-19 RX ADMIN — ASPIRIN 81 MG: 81 TABLET, COATED ORAL at 10:11

## 2023-12-19 RX ADMIN — METOPROLOL TARTRATE 25 MG: 25 TABLET, FILM COATED ORAL at 22:43

## 2023-12-19 RX ADMIN — SODIUM CHLORIDE, PRESERVATIVE FREE 10 ML: 5 INJECTION INTRAVENOUS at 23:22

## 2023-12-19 RX ADMIN — ENOXAPARIN SODIUM 40 MG: 100 INJECTION SUBCUTANEOUS at 10:11

## 2023-12-19 RX ADMIN — CEFEPIME 2000 MG: 2 INJECTION, POWDER, FOR SOLUTION INTRAVENOUS at 14:03

## 2023-12-19 RX ADMIN — SODIUM CHLORIDE, PRESERVATIVE FREE 10 ML: 5 INJECTION INTRAVENOUS at 22:43

## 2023-12-19 RX ADMIN — DANTROLENE SODIUM 200 MG: 25 CAPSULE ORAL at 10:11

## 2023-12-19 RX ADMIN — AMITRIPTYLINE HYDROCHLORIDE 50 MG: 50 TABLET, FILM COATED ORAL at 22:43

## 2023-12-19 RX ADMIN — METOPROLOL TARTRATE 25 MG: 25 TABLET, FILM COATED ORAL at 10:11

## 2023-12-19 RX ADMIN — LEVOTHYROXINE SODIUM 50 MCG: 0.05 TABLET ORAL at 06:34

## 2023-12-19 RX ADMIN — POTASSIUM CHLORIDE 10 MEQ: 7.46 INJECTION, SOLUTION INTRAVENOUS at 10:17

## 2023-12-19 RX ADMIN — POTASSIUM CHLORIDE 10 MEQ: 7.46 INJECTION, SOLUTION INTRAVENOUS at 15:03

## 2023-12-19 RX ADMIN — DANTROLENE SODIUM 200 MG: 25 CAPSULE ORAL at 22:43

## 2023-12-19 RX ADMIN — METOPROLOL TARTRATE 5 MG: 5 INJECTION INTRAVENOUS at 23:41

## 2023-12-19 RX ADMIN — CEFEPIME 2000 MG: 2 INJECTION, POWDER, FOR SOLUTION INTRAVENOUS at 22:43

## 2023-12-19 RX ADMIN — POTASSIUM CHLORIDE 10 MEQ: 7.46 INJECTION, SOLUTION INTRAVENOUS at 16:03

## 2023-12-19 RX ADMIN — ONDANSETRON 4 MG: 2 INJECTION INTRAMUSCULAR; INTRAVENOUS at 22:57

## 2023-12-19 RX ADMIN — POTASSIUM CHLORIDE 10 MEQ: 7.46 INJECTION, SOLUTION INTRAVENOUS at 17:18

## 2023-12-19 NOTE — PROGRESS NOTES
V2.0  [facility] [place]      Name:  Lilly Terrell /Age/Sex: 1960  (62 y.o. female)   MRN & CSN:  [id number] & [id number] Encounter Date/Time: 2023 8:40 AM EST    Location:  [id number] PCP: Tyson Hahn       [facility] Day: 4    Assessment and Plan:   Lilly Terrell is a 62 y.o. female with medical history significant for cerebral palsy, hypothyroidism, hypertension, depression, chronic hypoxic respiratory failure on 4 L oxygen via nasal cannula at baseline who was admitted on 2023 with acute encephalopathy and right-sided pneumonia.    Assessment/Plan :   1.  Pneumonia on CT chest.  Procalcitonin 0.25.  Blood cultures no growth.  Respiratory panel is positive for COVID-19.  Leukocytosis has resolved but this morning is febrile and tachycardic.  Consult speech therapy for swallowing evaluation.  Nasal MRSA swab was negative so vancomycin was discontinued.  Continue cefepime.  Seen by speech therapy and rec commended modified barium swallow.    2.  Acute encephalopathy with intermittent confusion. She is refusing labs and treatment. Has involuntary movement of mouth concerning for EPS and consulted psych.   Keep on aspiration and fall precaution.    3.  Chronic respiratory failure on 4 L oxygen via nasal cannula.    4.  Recent COVID-19 infection.  Respiratory viral panel is still positive.  Keep on droplet plus precaution.    5.  History of cerebral palsy.    6.  Depression.  Continue Zoloft 150 mg daily and amitriptyline 50 mg daily.  There is also concern for tardive dyskinesia and will consult psychiatry.    7.  Hypothyroidism.  Continue levothyroxine.    8.  Hypertension.  Continue metoprolol 25 mg twice daily with hold parameters in place.    9.  Possible GI bleed.  There is coffee-ground fluid in the PEG tube.  Started on PPI.  Seen by GI and did not recommend any further evaluation.    10.  Cardiomyopathy with EF of 40 to 45% on TTE from 2015.  Repeat TTE is  day    enoxaparin  40 mg SubCUTAneous Daily      Infusions:    sodium chloride      sodium chloride 125 mL/hr at 12/18/23 1718    sodium chloride       PRN Meds: sodium chloride flush, 5-40 mL, PRN  sodium chloride, 25 mL, PRN  albuterol, 2.5 mg, Q4H PRN  ipratropium, 0.5 mg, Q4H PRN  sodium chloride flush, 5-40 mL, PRN  sodium chloride, , PRN  potassium chloride, 40 mEq, PRN   Or  potassium alternative oral replacement, 40 mEq, PRN   Or  potassium chloride, 10 mEq, PRN  magnesium sulfate, 2,000 mg, PRN  ondansetron, 4 mg, Q8H PRN   Or  ondansetron, 4 mg, Q6H PRN  polyethylene glycol, 17 g, Daily PRN  acetaminophen, 650 mg, Q6H PRN   Or  acetaminophen, 650 mg, Q6H PRN  guaiFENesin-dextromethorphan, 5 mL, Q4H PRN        Labs      Recent Results (from the past 24 hour(s))   POCT Glucose    Collection Time: 12/19/23  2:19 AM   Result Value Ref Range    POC Glucose 167 (H) 70 - 99 mg/dl    Performed on ACCU-CHEK    POCT Glucose    Collection Time: 12/19/23  6:22 AM   Result Value Ref Range    POC Glucose 201 (H) 70 - 99 mg/dl    Performed on ACCU-CHEK    CBC with Auto Differential    Collection Time: 12/19/23  7:45 AM   Result Value Ref Range    WBC 9.6 4.0 - 11.0 K/uL    RBC 3.85 (L) 4.00 - 5.20 M/uL    Hemoglobin 12.3 12.0 - 16.0 g/dL    Hematocrit 36.8 36.0 - 48.0 %    MCV 95.6 80.0 - 100.0 fL    MCH 32.0 26.0 - 34.0 pg    MCHC 33.4 31.0 - 36.0 g/dL    RDW 15.2 12.4 - 15.4 %    Platelets 892 (L) 421 - 450 K/uL    MPV 10.9 (H) 5.0 - 10.5 fL    Neutrophils % 75.3 %    Lymphocytes % 13.3 %    Monocytes % 10.3 %    Eosinophils % 0.6 %    Basophils % 0.5 %    Neutrophils Absolute 7.2 1.7 - 7.7 K/uL    Lymphocytes Absolute 1.3 1.0 - 5.1 K/uL    Monocytes Absolute 1.0 0.0 - 1.3 K/uL    Eosinophils Absolute 0.1 0.0 - 0.6 K/uL    Basophils Absolute 0.1 0.0 - 0.2 K/uL   POCT Glucose    Collection Time: 12/19/23  8:28 AM   Result Value Ref Range    POC Glucose 229 (H) 70 - 99 mg/dl    Performed on ACCU-CHEK hernia. Remainder of the imaged upper abdominal organs are unremarkable in appearance. Soft Tissues/Bones: No acute osseous abnormality identified. Right middle lobe bilateral pulmonary infiltrates most prominent in the right middle lobe. Area of consolidation related to atelectasis or infiltrate involving the posterior aspect of the right upper lobe. Follow-up CT of the chest after clinical/antibiotic therapy may be helpful to document stability or resolution of this finding. Small left-sided pleural effusion. Mild increased dilatation of common bile duct. Hepatic steatosis. Additional findings noted above. XR CHEST PORTABLE    Result Date: 12/16/2023  EXAMINATION: ONE XRAY VIEW OF THE CHEST 12/16/2023 3:16 pm COMPARISON: November 27, 2022 HISTORY: ORDERING SYSTEM PROVIDED HISTORY: Shortness of Breath TECHNOLOGIST PROVIDED HISTORY: Reason for exam:->Shortness of Breath Reason for Exam: sob FINDINGS: Small right-sided pleural effusion with fluid extending to the minor fissure. No confluent infiltrate or pneumothorax. Cardiac and mediastinal silhouettes are within normal limits. No acute osseous abnormality identified. Small right-sided pleural effusions with fluid extending into the minor fissure. No confluent infiltrate identified.        Electronically signed by Aris Patiño MD on 12/19/2023 at 9:08 AM

## 2023-12-19 NOTE — PROGRESS NOTES
During evening medication pass patient informs nurse that she is in pain. Patient PEG tube was started today which was previously on hold since admission. Rate of tube feed increased to 45 ml/hr. Patient is crying due to pain. Assessed patient. Patient has a softball sized area to LLQ under PEG that patient states is painful. Patient's speech is slurred intermittently at baseline. Charge nurse is notified and comes to bedside to assess patient also. Patient states she is warm, blankets removed, heat in room adjusted. Tube feed is placed on hold. Informed on call NP via secure message, request provider to come put eyes on patient.        Electronically signed by Tasha Mari RN on 12/19/2023 at 1:06 AM

## 2023-12-19 NOTE — PROGRESS NOTES
Per Kristy Araiza, spoke with patient's son. Tube feed is restarted. New orders placed for one time dose of tramadol 50 mg.        Electronically signed by Britany Elkins RN on 12/19/2023 at 1:12 AM Vancomycin discontinued by Dr. Charles Singh

## 2023-12-19 NOTE — PROGRESS NOTES
NP Omari Davies is bedside to assess patient with RN. Per NP hold tube feed until she is able to assess chart.      Electronically signed by Christi Tobar RN on 12/19/2023 at 1:08 AM

## 2023-12-19 NOTE — PLAN OF CARE
Problem: Discharge Planning  Goal: Discharge to home or other facility with appropriate resources  12/19/2023 1414 by Aleksandra Campbell RN  Outcome: Progressing  12/19/2023 0101 by Claire Carl RN  Outcome: Progressing     Problem: Neurosensory - Adult  Goal: Achieves stable or improved neurological status  12/19/2023 1414 by Aleksandra Campbell RN  Outcome: Progressing  12/19/2023 0101 by Claire Carl RN  Outcome: Progressing  Goal: Absence of seizures  12/19/2023 1414 by Aleksandra Campbell RN  Outcome: Progressing  12/19/2023 0101 by Claire Carl RN  Outcome: Progressing  Goal: Remains free of injury related to seizures activity  12/19/2023 1414 by Aleksandra Campbell RN  Outcome: Progressing  12/19/2023 0101 by Claire Carl RN  Outcome: Progressing  Goal: Achieves maximal functionality and self care  12/19/2023 1414 by Aleksandra Campbell RN  Outcome: Progressing  12/19/2023 0101 by Claire Carl RN  Outcome: Progressing     Problem: Respiratory - Adult  Goal: Achieves optimal ventilation and oxygenation  12/19/2023 1414 by Aleksandra Campbell RN  Outcome: Progressing  12/19/2023 0101 by Claire Carl RN  Outcome: Progressing     Problem: Skin/Tissue Integrity - Adult  Goal: Skin integrity remains intact  12/19/2023 1414 by Aleksandra Campbell RN  Outcome: Progressing  12/19/2023 0101 by Claire Carl RN  Outcome: Progressing  Flowsheets (Taken 12/19/2023 0100)  Skin Integrity Remains Intact:   Monitor for areas of redness and/or skin breakdown   Assess vascular access sites hourly  Goal: Incisions, wounds, or drain sites healing without S/S of infection  12/19/2023 1414 by Aleksandra Campbell RN  Outcome: Progressing  12/19/2023 0101 by Claire Carl RN  Outcome: Progressing  Goal: Oral mucous membranes remain intact  12/19/2023 1414 by Aleksandra Campbell RN  Outcome: Progressing  12/19/2023 0101 by Claire Carl RN  Outcome: Progressing     Problem: Musculoskeletal - Adult  Goal: Return  mobility to safest level of function  12/19/2023 1414 by Sarah Ortiz RN  Outcome: Progressing  12/19/2023 0101 by Raysa Cárdenas RN  Outcome: Progressing  Goal: Maintain proper alignment of affected body part  12/19/2023 1414 by Sarah Ortiz RN  Outcome: Progressing  12/19/2023 0101 by Raysa Cárdenas RN  Outcome: Progressing  Goal: Return ADL status to a safe level of function  12/19/2023 1414 by Sarah Ortiz RN  Outcome: Progressing  12/19/2023 0101 by Raysa Cárdenas RN  Outcome: Progressing     Problem: Gastrointestinal - Adult  Goal: Minimal or absence of nausea and vomiting  12/19/2023 1414 by Sarah Ortiz RN  Outcome: Progressing  12/19/2023 0101 by Raysa Cárdenas RN  Outcome: Progressing  Goal: Maintains or returns to baseline bowel function  12/19/2023 1414 by Sarah Ortiz RN  Outcome: Progressing  12/19/2023 0101 by Raysa Cárdenas RN  Outcome: Progressing  Goal: Maintains adequate nutritional intake  12/19/2023 1414 by Sarah Ortiz RN  Outcome: Progressing  12/19/2023 0101 by Raysa Cárdenas RN  Outcome: Progressing     Problem: Genitourinary - Adult  Goal: Absence of urinary retention  12/19/2023 1414 by Sarah Ortiz RN  Outcome: Progressing  12/19/2023 0101 by Raysa Cárdenas RN  Outcome: Progressing     Problem: Safety - Adult  Goal: Free from fall injury  12/19/2023 1414 by Sarah Ortiz RN  Outcome: Progressing  12/19/2023 0101 by Raysa Cárdenas RN  Outcome: Progressing     Problem: Skin/Tissue Integrity  Goal: Absence of new skin breakdown  Description: 1. Monitor for areas of redness and/or skin breakdown  2. Assess vascular access sites hourly  3. Every 4-6 hours minimum:  Change oxygen saturation probe site  4. Every 4-6 hours:  If on nasal continuous positive airway pressure, respiratory therapy assess nares and determine need for appliance change or resting period.   12/19/2023 1414 by Sarah Ortiz RN  Outcome: Progressing  12/19/2023 0101 by Carmita Christina RN  Outcome: Progressing     Problem: Nutrition Deficit:  Goal: Optimize nutritional status  12/19/2023 1414 by Krystal Hull RN  Outcome: Progressing  12/19/2023 0101 by Carmita Christina RN  Outcome: Progressing

## 2023-12-19 NOTE — PLAN OF CARE
Problem: Discharge Planning  Goal: Discharge to home or other facility with appropriate resources  12/19/2023 0101 by Nick Gilliam RN  Outcome: Progressing  12/18/2023 1126 by Williams Walden RN  Outcome: Progressing  Flowsheets (Taken 12/18/2023 2250)  Discharge to home or other facility with appropriate resources:   Identify barriers to discharge with patient and caregiver   Arrange for needed discharge resources and transportation as appropriate     Problem: Neurosensory - Adult  Goal: Achieves stable or improved neurological status  12/19/2023 0101 by Nick Gilliam RN  Outcome: Progressing  12/18/2023 1126 by Williams Walden RN  Outcome: Progressing  Goal: Absence of seizures  12/19/2023 0101 by Nick Gilliam RN  Outcome: Progressing  12/18/2023 1126 by Williams Walden RN  Outcome: Progressing  Goal: Remains free of injury related to seizures activity  12/19/2023 0101 by Nick Gilliam RN  Outcome: Progressing  12/18/2023 1126 by Williams Walden RN  Outcome: Progressing  Goal: Achieves maximal functionality and self care  12/19/2023 0101 by Nick Gilliam RN  Outcome: Progressing  12/18/2023 1126 by Williams Walden RN  Outcome: Progressing     Problem: Respiratory - Adult  Goal: Achieves optimal ventilation and oxygenation  12/19/2023 0101 by Nick Gilliam RN  Outcome: Progressing  12/18/2023 1126 by Williams Walden RN  Outcome: Progressing     Problem: Skin/Tissue Integrity - Adult  Goal: Skin integrity remains intact  12/19/2023 0101 by Nick Gilliam RN  Outcome: Progressing  Flowsheets (Taken 12/19/2023 0100)  Skin Integrity Remains Intact:   Monitor for areas of redness and/or skin breakdown   Assess vascular access sites hourly  12/18/2023 1126 by Williams Walden RN  Outcome: Progressing  Goal: Incisions, wounds, or drain sites healing without S/S of infection  12/19/2023 0101 by Nick Gilliam RN  Outcome: Progressing  12/18/2023 1126 by Williams Walden RN  Outcome: Every 4-6 hours:  If on nasal continuous positive airway pressure, respiratory therapy assess nares and determine need for appliance change or resting period.  12/19/2023 0101 by Claire Carl RN  Outcome: Progressing  12/18/2023 1126 by Toyin Pinto RN  Outcome: Progressing     Problem: Nutrition Deficit:  Goal: Optimize nutritional status  12/19/2023 0101 by Claire Carl, RN  Outcome: Progressing  12/18/2023 1126 by Toyin Pinto RN  Outcome: Progressing

## 2023-12-19 NOTE — PROGRESS NOTES
SLP NOTE    MBS scheduled for 10:15 AM this date. SLP notified by RN of MD request to hold MBS this date d/t patient declining care this date. ST to re-attempt MBS 12/20/2023 unless otherwise notified. No clinical re-assessment this date d/t current status and high risk indicators for silent aspiration awaiting instrumental assessment for continued assessment. Thank you. Aleida Parsons [facility], [address], [id number]  Speech-Language Pathologist  Portable phone: [phone]

## 2023-12-20 ENCOUNTER — APPOINTMENT (OUTPATIENT)
Dept: GENERAL RADIOLOGY | Age: 63
DRG: 207 | End: 2023-12-20
Payer: MEDICARE

## 2023-12-20 ENCOUNTER — APPOINTMENT (OUTPATIENT)
Dept: ULTRASOUND IMAGING | Age: 63
DRG: 207 | End: 2023-12-20
Payer: MEDICARE

## 2023-12-20 PROBLEM — A41.9 SEPTIC SHOCK (HCC): Status: ACTIVE | Noted: 2023-12-20

## 2023-12-20 PROBLEM — Z97.8 ENDOTRACHEALLY INTUBATED: Status: ACTIVE | Noted: 2023-12-20

## 2023-12-20 PROBLEM — J12.82 PNEUMONIA DUE TO SEVERE ACUTE RESPIRATORY SYNDROME CORONAVIRUS 2 (SARS-COV-2): Status: ACTIVE | Noted: 2023-12-20

## 2023-12-20 PROBLEM — D72.828 NEUTROPHILIA: Status: ACTIVE | Noted: 2023-12-20

## 2023-12-20 PROBLEM — I21.4 NSTEMI (NON-ST ELEVATED MYOCARDIAL INFARCTION) (HCC): Status: ACTIVE | Noted: 2023-12-20

## 2023-12-20 PROBLEM — G80.9 CEREBRAL PALSY (HCC): Status: ACTIVE | Noted: 2023-12-20

## 2023-12-20 PROBLEM — U07.1 PNEUMONIA DUE TO SEVERE ACUTE RESPIRATORY SYNDROME CORONAVIRUS 2 (SARS-COV-2): Status: ACTIVE | Noted: 2023-12-20

## 2023-12-20 PROBLEM — R65.21 SEPTIC SHOCK (HCC): Status: ACTIVE | Noted: 2023-12-20

## 2023-12-20 PROBLEM — R50.9 HIGH FEVER: Status: ACTIVE | Noted: 2023-12-20

## 2023-12-20 PROBLEM — D72.9 NEUTROPHILIA: Status: ACTIVE | Noted: 2023-12-20

## 2023-12-20 PROBLEM — R79.89 ELEVATED PROCALCITONIN: Status: ACTIVE | Noted: 2023-12-20

## 2023-12-20 PROBLEM — E87.20 LACTIC ACID ACIDOSIS: Status: ACTIVE | Noted: 2023-12-20

## 2023-12-20 LAB
ALBUMIN SERPL-MCNC: 2.7 G/DL (ref 3.4–5)
ALBUMIN/GLOB SERPL: 0.9 {RATIO} (ref 1.1–2.2)
ALP SERPL-CCNC: 213 U/L (ref 40–129)
ALT SERPL-CCNC: 21 U/L (ref 10–40)
ANION GAP SERPL CALCULATED.3IONS-SCNC: 10 MMOL/L (ref 3–16)
ANION GAP SERPL CALCULATED.3IONS-SCNC: 16 MMOL/L (ref 3–16)
AST SERPL-CCNC: 51 U/L (ref 15–37)
BACTERIA BLD CULT ORG #2: NORMAL
BACTERIA BLD CULT: NORMAL
BASE EXCESS BLDA CALC-SCNC: -2.2 MMOL/L (ref -3–3)
BASOPHILS # BLD: 0.1 K/UL (ref 0–0.2)
BASOPHILS # BLD: 0.1 K/UL (ref 0–0.2)
BASOPHILS NFR BLD: 0.6 %
BASOPHILS NFR BLD: 0.7 %
BILIRUB SERPL-MCNC: 0.8 MG/DL (ref 0–1)
BUN SERPL-MCNC: 10 MG/DL (ref 7–20)
BUN SERPL-MCNC: 11 MG/DL (ref 7–20)
CALCIUM SERPL-MCNC: 7 MG/DL (ref 8.3–10.6)
CALCIUM SERPL-MCNC: 7.4 MG/DL (ref 8.3–10.6)
CHLORIDE SERPL-SCNC: 107 MMOL/L (ref 99–110)
CHLORIDE SERPL-SCNC: 107 MMOL/L (ref 99–110)
CO2 BLDA-SCNC: 24.4 MMOL/L
CO2 SERPL-SCNC: 17 MMOL/L (ref 21–32)
CO2 SERPL-SCNC: 26 MMOL/L (ref 21–32)
COHGB MFR BLDA: 0.9 % (ref 0–1.5)
CREAT SERPL-MCNC: <0.5 MG/DL (ref 0.6–1.2)
CREAT SERPL-MCNC: <0.5 MG/DL (ref 0.6–1.2)
CRP SERPL-MCNC: 38.9 MG/L (ref 0–5.1)
DEPRECATED RDW RBC AUTO: 16.1 % (ref 12.4–15.4)
DEPRECATED RDW RBC AUTO: 16.4 % (ref 12.4–15.4)
EKG ATRIAL RATE: 142 BPM
EKG ATRIAL RATE: 89 BPM
EKG DIAGNOSIS: NORMAL
EKG DIAGNOSIS: NORMAL
EKG P AXIS: 3 DEGREES
EKG P AXIS: 52 DEGREES
EKG P-R INTERVAL: 110 MS
EKG P-R INTERVAL: 128 MS
EKG Q-T INTERVAL: 234 MS
EKG Q-T INTERVAL: 420 MS
EKG QRS DURATION: 82 MS
EKG QRS DURATION: 90 MS
EKG QTC CALCULATION (BAZETT): 359 MS
EKG QTC CALCULATION (BAZETT): 511 MS
EKG R AXIS: -5 DEGREES
EKG R AXIS: 28 DEGREES
EKG T AXIS: -41 DEGREES
EKG T AXIS: -89 DEGREES
EKG VENTRICULAR RATE: 142 BPM
EKG VENTRICULAR RATE: 89 BPM
EOSINOPHIL # BLD: 0 K/UL (ref 0–0.6)
EOSINOPHIL # BLD: 0 K/UL (ref 0–0.6)
EOSINOPHIL NFR BLD: 0.1 %
EOSINOPHIL NFR BLD: 0.3 %
ERYTHROCYTE [SEDIMENTATION RATE] IN BLOOD BY WESTERGREN METHOD: 38 MM/HR (ref 0–30)
GFR SERPLBLD CREATININE-BSD FMLA CKD-EPI: >60 ML/MIN/{1.73_M2}
GFR SERPLBLD CREATININE-BSD FMLA CKD-EPI: >60 ML/MIN/{1.73_M2}
GLUCOSE BLD-MCNC: 160 MG/DL (ref 70–99)
GLUCOSE BLD-MCNC: 166 MG/DL (ref 70–99)
GLUCOSE BLD-MCNC: 197 MG/DL (ref 70–99)
GLUCOSE BLD-MCNC: 218 MG/DL (ref 70–99)
GLUCOSE SERPL-MCNC: 183 MG/DL (ref 70–99)
GLUCOSE SERPL-MCNC: 235 MG/DL (ref 70–99)
HCO3 BLDA-SCNC: 23.2 MMOL/L (ref 21–29)
HCT VFR BLD AUTO: 36.1 % (ref 36–48)
HCT VFR BLD AUTO: 38.4 % (ref 36–48)
HGB BLD-MCNC: 12 G/DL (ref 12–16)
HGB BLD-MCNC: 12.6 G/DL (ref 12–16)
HGB BLDA-MCNC: 12.5 G/DL (ref 12–16)
LACTATE BLD-SCNC: 2.8 MMOL/L (ref 0.4–2)
LACTATE BLDV-SCNC: 2.7 MMOL/L (ref 0.4–2)
LACTATE BLDV-SCNC: 6.8 MMOL/L (ref 0.4–1.9)
LYMPHOCYTES # BLD: 2 K/UL (ref 1–5.1)
LYMPHOCYTES # BLD: 2.6 K/UL (ref 1–5.1)
LYMPHOCYTES NFR BLD: 11.7 %
LYMPHOCYTES NFR BLD: 11.8 %
MCH RBC QN AUTO: 32 PG (ref 26–34)
MCH RBC QN AUTO: 32.2 PG (ref 26–34)
MCHC RBC AUTO-ENTMCNC: 32.7 G/DL (ref 31–36)
MCHC RBC AUTO-ENTMCNC: 33.3 G/DL (ref 31–36)
MCV RBC AUTO: 96.8 FL (ref 80–100)
MCV RBC AUTO: 97.8 FL (ref 80–100)
METHGB MFR BLDA: 0.4 %
MONOCYTES # BLD: 1.2 K/UL (ref 0–1.3)
MONOCYTES # BLD: 1.6 K/UL (ref 0–1.3)
MONOCYTES NFR BLD: 5.5 %
MONOCYTES NFR BLD: 9.4 %
NEUTROPHILS # BLD: 13.2 K/UL (ref 1.7–7.7)
NEUTROPHILS # BLD: 18.5 K/UL (ref 1.7–7.7)
NEUTROPHILS NFR BLD: 77.8 %
NEUTROPHILS NFR BLD: 82.1 %
NT-PROBNP SERPL-MCNC: 2179 PG/ML (ref 0–124)
O2 THERAPY: ABNORMAL
PCO2 BLDA: 41.3 MMHG (ref 35–45)
PERFORMED ON: ABNORMAL
PH BLDA: 7.36 [PH] (ref 7.35–7.45)
PLATELET # BLD AUTO: 120 K/UL (ref 135–450)
PLATELET # BLD AUTO: 133 K/UL (ref 135–450)
PMV BLD AUTO: 10.1 FL (ref 5–10.5)
PMV BLD AUTO: 10.8 FL (ref 5–10.5)
PNEUMONIA PANEL MOLECULAR: NORMAL
PO2 BLDA: 169 MMHG (ref 75–108)
POC SAMPLE TYPE: ABNORMAL
POTASSIUM SERPL-SCNC: 4 MMOL/L (ref 3.5–5.1)
POTASSIUM SERPL-SCNC: 4.4 MMOL/L (ref 3.5–5.1)
PROCALCITONIN SERPL IA-MCNC: 47.04 NG/ML (ref 0–0.15)
PROCALCITONIN SERPL IA-MCNC: 51.26 NG/ML (ref 0–0.15)
PROT SERPL-MCNC: 5.7 G/DL (ref 6.4–8.2)
RBC # BLD AUTO: 3.73 M/UL (ref 4–5.2)
RBC # BLD AUTO: 3.93 M/UL (ref 4–5.2)
SAO2 % BLDA: 99.7 %
SODIUM SERPL-SCNC: 140 MMOL/L (ref 136–145)
SODIUM SERPL-SCNC: 143 MMOL/L (ref 136–145)
TROPONIN, HIGH SENSITIVITY: 137 NG/L (ref 0–14)
TROPONIN, HIGH SENSITIVITY: 199 NG/L (ref 0–14)
WBC # BLD AUTO: 16.9 K/UL (ref 4–11)
WBC # BLD AUTO: 22.4 K/UL (ref 4–11)

## 2023-12-20 PROCEDURE — 99291 CRITICAL CARE FIRST HOUR: CPT | Performed by: INTERNAL MEDICINE

## 2023-12-20 PROCEDURE — 83880 ASSAY OF NATRIURETIC PEPTIDE: CPT

## 2023-12-20 PROCEDURE — 85652 RBC SED RATE AUTOMATED: CPT

## 2023-12-20 PROCEDURE — C9113 INJ PANTOPRAZOLE SODIUM, VIA: HCPCS | Performed by: INTERNAL MEDICINE

## 2023-12-20 PROCEDURE — 6360000002 HC RX W HCPCS: Performed by: HOSPITALIST

## 2023-12-20 PROCEDURE — 84145 PROCALCITONIN (PCT): CPT

## 2023-12-20 PROCEDURE — 02HV33Z INSERTION OF INFUSION DEVICE INTO SUPERIOR VENA CAVA, PERCUTANEOUS APPROACH: ICD-10-PCS | Performed by: HOSPITALIST

## 2023-12-20 PROCEDURE — 93005 ELECTROCARDIOGRAM TRACING: CPT | Performed by: NURSE PRACTITIONER

## 2023-12-20 PROCEDURE — 36415 COLL VENOUS BLD VENIPUNCTURE: CPT

## 2023-12-20 PROCEDURE — 94761 N-INVAS EAR/PLS OXIMETRY MLT: CPT

## 2023-12-20 PROCEDURE — 87205 SMEAR GRAM STAIN: CPT

## 2023-12-20 PROCEDURE — 2580000003 HC RX 258: Performed by: HOSPITALIST

## 2023-12-20 PROCEDURE — 87633 RESP VIRUS 12-25 TARGETS: CPT

## 2023-12-20 PROCEDURE — 83605 ASSAY OF LACTIC ACID: CPT

## 2023-12-20 PROCEDURE — C8929 TTE W OR WO FOL WCON,DOPPLER: HCPCS

## 2023-12-20 PROCEDURE — 2580000003 HC RX 258: Performed by: INTERNAL MEDICINE

## 2023-12-20 PROCEDURE — 6360000002 HC RX W HCPCS: Performed by: INTERNAL MEDICINE

## 2023-12-20 PROCEDURE — 31500 INSERT EMERGENCY AIRWAY: CPT | Performed by: HOSPITALIST

## 2023-12-20 PROCEDURE — 6370000000 HC RX 637 (ALT 250 FOR IP): Performed by: HOSPITALIST

## 2023-12-20 PROCEDURE — 87070 CULTURE OTHR SPECIMN AEROBIC: CPT

## 2023-12-20 PROCEDURE — 87040 BLOOD CULTURE FOR BACTERIA: CPT

## 2023-12-20 PROCEDURE — 2500000003 HC RX 250 WO HCPCS: Performed by: HOSPITALIST

## 2023-12-20 PROCEDURE — 4A133B1 MONITORING OF ARTERIAL PRESSURE, PERIPHERAL, PERCUTANEOUS APPROACH: ICD-10-PCS | Performed by: HOSPITALIST

## 2023-12-20 PROCEDURE — 71045 X-RAY EXAM CHEST 1 VIEW: CPT

## 2023-12-20 PROCEDURE — 2000000000 HC ICU R&B

## 2023-12-20 PROCEDURE — 0BH17EZ INSERTION OF ENDOTRACHEAL AIRWAY INTO TRACHEA, VIA NATURAL OR ARTIFICIAL OPENING: ICD-10-PCS | Performed by: HOSPITALIST

## 2023-12-20 PROCEDURE — 82803 BLOOD GASES ANY COMBINATION: CPT

## 2023-12-20 PROCEDURE — 6360000004 HC RX CONTRAST MEDICATION: Performed by: NURSE PRACTITIONER

## 2023-12-20 PROCEDURE — 76705 ECHO EXAM OF ABDOMEN: CPT

## 2023-12-20 PROCEDURE — 84484 ASSAY OF TROPONIN QUANT: CPT

## 2023-12-20 PROCEDURE — 87449 NOS EACH ORGANISM AG IA: CPT

## 2023-12-20 PROCEDURE — 94002 VENT MGMT INPAT INIT DAY: CPT

## 2023-12-20 PROCEDURE — APPNB15 APP NON BILLABLE TIME 0-15 MINS: Performed by: NURSE PRACTITIONER

## 2023-12-20 PROCEDURE — 99223 1ST HOSP IP/OBS HIGH 75: CPT | Performed by: NURSE PRACTITIONER

## 2023-12-20 PROCEDURE — 86140 C-REACTIVE PROTEIN: CPT

## 2023-12-20 PROCEDURE — 5A1955Z RESPIRATORY VENTILATION, GREATER THAN 96 CONSECUTIVE HOURS: ICD-10-PCS | Performed by: HOSPITALIST

## 2023-12-20 PROCEDURE — 4A133J1 MONITORING OF ARTERIAL PULSE, PERIPHERAL, PERCUTANEOUS APPROACH: ICD-10-PCS | Performed by: HOSPITALIST

## 2023-12-20 PROCEDURE — 80053 COMPREHEN METABOLIC PANEL: CPT

## 2023-12-20 PROCEDURE — 85025 COMPLETE CBC W/AUTO DIFF WBC: CPT

## 2023-12-20 PROCEDURE — 03HY32Z INSERTION OF MONITORING DEVICE INTO UPPER ARTERY, PERCUTANEOUS APPROACH: ICD-10-PCS | Performed by: HOSPITALIST

## 2023-12-20 PROCEDURE — 93010 ELECTROCARDIOGRAM REPORT: CPT | Performed by: INTERNAL MEDICINE

## 2023-12-20 PROCEDURE — 94760 N-INVAS EAR/PLS OXIMETRY 1: CPT

## 2023-12-20 PROCEDURE — 2700000000 HC OXYGEN THERAPY PER DAY

## 2023-12-20 RX ORDER — ROCURONIUM BROMIDE 10 MG/ML
90 INJECTION, SOLUTION INTRAVENOUS ONCE
Status: COMPLETED | OUTPATIENT
Start: 2023-12-20 | End: 2023-12-20

## 2023-12-20 RX ORDER — MIDAZOLAM HYDROCHLORIDE 1 MG/ML
4 INJECTION INTRAMUSCULAR; INTRAVENOUS ONCE
Status: COMPLETED | OUTPATIENT
Start: 2023-12-20 | End: 2023-12-20

## 2023-12-20 RX ORDER — SODIUM CHLORIDE, SODIUM LACTATE, POTASSIUM CHLORIDE, AND CALCIUM CHLORIDE .6; .31; .03; .02 G/100ML; G/100ML; G/100ML; G/100ML
1000 INJECTION, SOLUTION INTRAVENOUS ONCE
Status: COMPLETED | OUTPATIENT
Start: 2023-12-20 | End: 2023-12-20

## 2023-12-20 RX ORDER — MIDAZOLAM HYDROCHLORIDE 1 MG/ML
4 INJECTION INTRAMUSCULAR; INTRAVENOUS ONCE
Status: DISCONTINUED | OUTPATIENT
Start: 2023-12-20 | End: 2023-12-20

## 2023-12-20 RX ORDER — DEXAMETHASONE SODIUM PHOSPHATE 10 MG/ML
6 INJECTION, SOLUTION INTRAMUSCULAR; INTRAVENOUS EVERY 24 HOURS
Status: DISCONTINUED | OUTPATIENT
Start: 2023-12-20 | End: 2023-12-23

## 2023-12-20 RX ORDER — LINEZOLID 2 MG/ML
600 INJECTION, SOLUTION INTRAVENOUS EVERY 12 HOURS
Status: DISCONTINUED | OUTPATIENT
Start: 2023-12-20 | End: 2023-12-23

## 2023-12-20 RX ORDER — ETOMIDATE 2 MG/ML
20 INJECTION INTRAVENOUS ONCE
Status: COMPLETED | OUTPATIENT
Start: 2023-12-20 | End: 2023-12-20

## 2023-12-20 RX ORDER — SODIUM CHLORIDE, SODIUM LACTATE, POTASSIUM CHLORIDE, CALCIUM CHLORIDE 600; 310; 30; 20 MG/100ML; MG/100ML; MG/100ML; MG/100ML
INJECTION, SOLUTION INTRAVENOUS CONTINUOUS
Status: DISCONTINUED | OUTPATIENT
Start: 2023-12-20 | End: 2023-12-21

## 2023-12-20 RX ORDER — NOREPINEPHRINE BITARTRATE 0.06 MG/ML
1-100 INJECTION, SOLUTION INTRAVENOUS CONTINUOUS
Status: DISCONTINUED | OUTPATIENT
Start: 2023-12-20 | End: 2023-12-25

## 2023-12-20 RX ORDER — DEXMEDETOMIDINE HYDROCHLORIDE 4 UG/ML
.1-1.5 INJECTION, SOLUTION INTRAVENOUS CONTINUOUS
Status: DISPENSED | OUTPATIENT
Start: 2023-12-20

## 2023-12-20 RX ORDER — PROPOFOL 10 MG/ML
5-50 INJECTION, EMULSION INTRAVENOUS CONTINUOUS
Status: DISCONTINUED | OUTPATIENT
Start: 2023-12-20 | End: 2023-12-27

## 2023-12-20 RX ORDER — SODIUM CHLORIDE, SODIUM LACTATE, POTASSIUM CHLORIDE, AND CALCIUM CHLORIDE .6; .31; .03; .02 G/100ML; G/100ML; G/100ML; G/100ML
500 INJECTION, SOLUTION INTRAVENOUS ONCE
Status: COMPLETED | OUTPATIENT
Start: 2023-12-20 | End: 2023-12-20

## 2023-12-20 RX ADMIN — PHENYLEPHRINE HYDROCHLORIDE 300 MCG/MIN: 10 INJECTION INTRAVENOUS at 07:50

## 2023-12-20 RX ADMIN — SODIUM CHLORIDE, POTASSIUM CHLORIDE, SODIUM LACTATE AND CALCIUM CHLORIDE 500 ML: 600; 310; 30; 20 INJECTION, SOLUTION INTRAVENOUS at 08:30

## 2023-12-20 RX ADMIN — PROPOFOL 20 MCG/KG/MIN: 10 INJECTION, EMULSION INTRAVENOUS at 03:19

## 2023-12-20 RX ADMIN — ACETAMINOPHEN 650 MG: 650 SUPPOSITORY RECTAL at 00:59

## 2023-12-20 RX ADMIN — SODIUM CHLORIDE, POTASSIUM CHLORIDE, SODIUM LACTATE AND CALCIUM CHLORIDE 500 ML: 600; 310; 30; 20 INJECTION, SOLUTION INTRAVENOUS at 18:45

## 2023-12-20 RX ADMIN — MEROPENEM 1000 MG: 1 INJECTION, POWDER, FOR SOLUTION INTRAVENOUS at 11:11

## 2023-12-20 RX ADMIN — DANTROLENE SODIUM 200 MG: 25 CAPSULE ORAL at 10:20

## 2023-12-20 RX ADMIN — POLYETHYLENE GLYCOL 3350 17 G: 17 POWDER, FOR SOLUTION ORAL at 21:03

## 2023-12-20 RX ADMIN — SODIUM CHLORIDE, POTASSIUM CHLORIDE, SODIUM LACTATE AND CALCIUM CHLORIDE 989 ML: 600; 310; 30; 20 INJECTION, SOLUTION INTRAVENOUS at 05:10

## 2023-12-20 RX ADMIN — VASOPRESSIN 0.03 UNITS/MIN: 20 INJECTION INTRAVENOUS at 14:27

## 2023-12-20 RX ADMIN — ROCURONIUM BROMIDE 90 MG: 10 INJECTION, SOLUTION INTRAVENOUS at 01:29

## 2023-12-20 RX ADMIN — SERTRALINE 150 MG: 100 TABLET, FILM COATED ORAL at 09:55

## 2023-12-20 RX ADMIN — PHENYLEPHRINE HYDROCHLORIDE 225 MCG/MIN: 10 INJECTION INTRAVENOUS at 10:57

## 2023-12-20 RX ADMIN — POLYETHYLENE GLYCOL 3350 17 G: 17 POWDER, FOR SOLUTION ORAL at 10:17

## 2023-12-20 RX ADMIN — Medication 26 MCG/MIN: at 15:07

## 2023-12-20 RX ADMIN — LEVOTHYROXINE SODIUM 50 MCG: 0.05 TABLET ORAL at 10:19

## 2023-12-20 RX ADMIN — PROPOFOL 35 MCG/KG/MIN: 10 INJECTION, EMULSION INTRAVENOUS at 08:01

## 2023-12-20 RX ADMIN — SODIUM CHLORIDE, POTASSIUM CHLORIDE, SODIUM LACTATE AND CALCIUM CHLORIDE 500 ML: 600; 310; 30; 20 INJECTION, SOLUTION INTRAVENOUS at 06:23

## 2023-12-20 RX ADMIN — DEXAMETHASONE SODIUM PHOSPHATE 6 MG: 10 INJECTION, SOLUTION INTRAMUSCULAR; INTRAVENOUS at 05:14

## 2023-12-20 RX ADMIN — PROPOFOL 30 MCG/KG/MIN: 10 INJECTION, EMULSION INTRAVENOUS at 20:54

## 2023-12-20 RX ADMIN — SODIUM CHLORIDE, POTASSIUM CHLORIDE, SODIUM LACTATE AND CALCIUM CHLORIDE: 600; 310; 30; 20 INJECTION, SOLUTION INTRAVENOUS at 19:49

## 2023-12-20 RX ADMIN — PROPOFOL 30 MCG/KG/MIN: 10 INJECTION, EMULSION INTRAVENOUS at 15:34

## 2023-12-20 RX ADMIN — LINEZOLID 600 MG: 600 INJECTION, SOLUTION INTRAVENOUS at 21:16

## 2023-12-20 RX ADMIN — VASOPRESSIN 0.03 UNITS/MIN: 20 INJECTION INTRAVENOUS at 05:40

## 2023-12-20 RX ADMIN — AZITHROMYCIN MONOHYDRATE 500 MG: 500 INJECTION, POWDER, LYOPHILIZED, FOR SOLUTION INTRAVENOUS at 05:27

## 2023-12-20 RX ADMIN — SODIUM CHLORIDE, PRESERVATIVE FREE 40 MG: 5 INJECTION INTRAVENOUS at 09:55

## 2023-12-20 RX ADMIN — SODIUM CHLORIDE: 9 INJECTION, SOLUTION INTRAVENOUS at 05:21

## 2023-12-20 RX ADMIN — DANTROLENE SODIUM 200 MG: 25 CAPSULE ORAL at 22:03

## 2023-12-20 RX ADMIN — MIDAZOLAM 4 MG: 1 INJECTION INTRAMUSCULAR; INTRAVENOUS at 01:36

## 2023-12-20 RX ADMIN — ACETAMINOPHEN 650 MG: 325 TABLET ORAL at 17:35

## 2023-12-20 RX ADMIN — SODIUM CHLORIDE, POTASSIUM CHLORIDE, SODIUM LACTATE AND CALCIUM CHLORIDE 500 ML: 600; 310; 30; 20 INJECTION, SOLUTION INTRAVENOUS at 14:24

## 2023-12-20 RX ADMIN — PERFLUTREN 1.5 ML: 6.52 INJECTION, SUSPENSION INTRAVENOUS at 15:16

## 2023-12-20 RX ADMIN — ENOXAPARIN SODIUM 40 MG: 100 INJECTION SUBCUTANEOUS at 09:57

## 2023-12-20 RX ADMIN — MEROPENEM 1000 MG: 1 INJECTION, POWDER, FOR SOLUTION INTRAVENOUS at 18:47

## 2023-12-20 RX ADMIN — Medication 5 MCG/MIN: at 05:57

## 2023-12-20 RX ADMIN — AMITRIPTYLINE HYDROCHLORIDE 50 MG: 50 TABLET, FILM COATED ORAL at 21:03

## 2023-12-20 RX ADMIN — ETOMIDATE 20 MG: 2 INJECTION, SOLUTION INTRAVENOUS at 01:28

## 2023-12-20 RX ADMIN — PHENYLEPHRINE HYDROCHLORIDE 30 MCG/MIN: 10 INJECTION INTRAVENOUS at 04:06

## 2023-12-20 RX ADMIN — MEROPENEM 2000 MG: 1 INJECTION, POWDER, FOR SOLUTION INTRAVENOUS at 01:55

## 2023-12-20 ASSESSMENT — PULMONARY FUNCTION TESTS
PIF_VALUE: 32
PIF_VALUE: 25
PIF_VALUE: 29
PIF_VALUE: 36
PIF_VALUE: 27
PIF_VALUE: 30
PIF_VALUE: 31
PIF_VALUE: 29
PIF_VALUE: 35
PIF_VALUE: 33
PIF_VALUE: 32
PIF_VALUE: 29
PIF_VALUE: 29
PIF_VALUE: 34
PIF_VALUE: 32
PIF_VALUE: 29
PIF_VALUE: 30
PIF_VALUE: 33
PIF_VALUE: 28
PIF_VALUE: 29
PIF_VALUE: 31
PIF_VALUE: 30
PIF_VALUE: 28
PIF_VALUE: 28
PIF_VALUE: 33
PIF_VALUE: 32
PIF_VALUE: 34
PIF_VALUE: 32
PIF_VALUE: 28
PIF_VALUE: 34
PIF_VALUE: 30
PIF_VALUE: 32
PIF_VALUE: 37
PIF_VALUE: 38
PIF_VALUE: 28
PIF_VALUE: 28
PIF_VALUE: 30
PIF_VALUE: 33
PIF_VALUE: 32
PIF_VALUE: 29
PIF_VALUE: 33
PIF_VALUE: 32
PIF_VALUE: 30
PIF_VALUE: 26
PIF_VALUE: 30
PIF_VALUE: 32
PIF_VALUE: 32
PIF_VALUE: 24
PIF_VALUE: 29
PIF_VALUE: 33
PIF_VALUE: 32
PIF_VALUE: 28
PIF_VALUE: 28
PIF_VALUE: 34
PIF_VALUE: 29
PIF_VALUE: 34
PIF_VALUE: 29
PIF_VALUE: 33
PIF_VALUE: 29
PIF_VALUE: 28
PIF_VALUE: 33
PIF_VALUE: 30
PIF_VALUE: 29
PIF_VALUE: 26
PIF_VALUE: 31
PIF_VALUE: 29
PIF_VALUE: 29
PIF_VALUE: 30
PIF_VALUE: 29
PIF_VALUE: 30
PIF_VALUE: 35
PIF_VALUE: 30
PIF_VALUE: 37
PIF_VALUE: 31
PIF_VALUE: 34
PIF_VALUE: 32
PIF_VALUE: 34
PIF_VALUE: 28
PIF_VALUE: 34
PIF_VALUE: 35
PIF_VALUE: 34
PIF_VALUE: 32
PIF_VALUE: 30
PIF_VALUE: 33
PIF_VALUE: 36
PIF_VALUE: 29
PIF_VALUE: 28
PIF_VALUE: 31
PIF_VALUE: 32
PIF_VALUE: 30
PIF_VALUE: 34
PIF_VALUE: 29

## 2023-12-20 NOTE — PLAN OF CARE
Problem: Safety - Medical Restraint  Goal: Remains free of injury from restraints (Restraint for Interference with Medical Device)  Description: INTERVENTIONS:  1. Determine that other, less restrictive measures have been tried or would not be effective before applying the restraint  2. Evaluate the patient's condition at the time of restraint application  3. Inform patient/family regarding the reason for restraint  4.  Q2H: Monitor safety, psychosocial status, comfort, nutrition and hydration  Recent Flowsheet Documentation  Taken 12/20/2023 0610 by Victorina Haynes RN  Remains free of injury from restraints (restraint for interference with medical device):   Every 2 hours: Monitor safety, psychosocial status, comfort, nutrition and hydration   Inform patient/family regarding the reason for restraint   Evaluate the patient's condition at the time of restraint application   Determine that other, less restrictive measures have been tried or would not be effective before applying the restraint

## 2023-12-20 NOTE — PLAN OF CARE
Problem: Discharge Planning  Goal: Discharge to home or other facility with appropriate resources  12/20/2023 0125 by Marian Peabody, RN  Outcome: Progressing  12/19/2023 1414 by Carrington Pozo RN  Outcome: Progressing     Problem: Neurosensory - Adult  Goal: Achieves stable or improved neurological status  12/20/2023 0125 by Marian Peabody, RN  Outcome: Progressing  12/19/2023 1414 by Carrington Pozo RN  Outcome: Progressing  Goal: Absence of seizures  12/20/2023 0125 by Marian Peabody, RN  Outcome: Progressing  12/19/2023 1414 by Carrington Pozo RN  Outcome: Progressing  Goal: Remains free of injury related to seizures activity  12/20/2023 0125 by Marian Peabody, RN  Outcome: Progressing  12/19/2023 1414 by Carrington Pozo RN  Outcome: Progressing  Goal: Achieves maximal functionality and self care  12/20/2023 0125 by Marian Peabody, RN  Outcome: Progressing  12/19/2023 1414 by Carrington Pozo RN  Outcome: Progressing     Problem: Respiratory - Adult  Goal: Achieves optimal ventilation and oxygenation  12/20/2023 0125 by Marian Peabody, RN  Outcome: Progressing  12/19/2023 1414 by Carrington Pozo RN  Outcome: Progressing     Problem: Skin/Tissue Integrity - Adult  Goal: Skin integrity remains intact  12/20/2023 0125 by Marian Peabody, RN  Outcome: Progressing  12/19/2023 1414 by Carrington Pozo RN  Outcome: Progressing  Goal: Incisions, wounds, or drain sites healing without S/S of infection  12/20/2023 0125 by Marian Peabody, RN  Outcome: Progressing  12/19/2023 1414 by Carrington Pozo RN  Outcome: Progressing  Goal: Oral mucous membranes remain intact  12/20/2023 0125 by Marian Peabody, RN  Outcome: Progressing  12/19/2023 1414 by Carrington Pozo RN  Outcome: Progressing     Problem: Musculoskeletal - Adult  Goal: Return mobility to safest level of function  12/20/2023 0125 by Marian Peabody, RN  Outcome: Progressing  12/19/2023 1414 by Carrington Pozo RN  Outcome: Jerrell Cameron RN  Outcome: Progressing  12/19/2023 1414 by Ela Noe RN  Outcome: Progressing

## 2023-12-20 NOTE — PROGRESS NOTES
Attempted to see patient today. Psych was consulted for possible tardive dyskinsia. Patient is now vented, sedated. I will cancel the consult and if psych is needed in the future, please re-consult. Patient is not currently taking any antipsychotic that typically would cause TD symptoms. She is currently taking Sertraline 150 mg daily and Amitriptyline 50 mg qhs. Can continue those medications. Dash Chopra, CNP, [address] [facility]

## 2023-12-20 NOTE — PROGRESS NOTES
Call placed to Marlene Arana, patient's son, updated on patient status and patient's room number.       Markus Angeles RN

## 2023-12-20 NOTE — PROGRESS NOTES
V2.[place]    [facility] [place]      Name:  Tess Diaz /Age/Sex: 1960  (65 y.o. female)   MRN & CSN:  [id number] & [id number] Encounter Date/Time: 2023 5:40 AM EST   Location:  [id number]-6573 PCP: Gabrielle Kennedy     Attending: Bettina Correa MD       Hospital Day: 5    Assessment and Recommendations   Tess Diaz is a 65 y.o. female who presents with Pneumonia, unspecified organism    52-year-old female with history of cerebral palsy hypothyroidism hypertension depression chronic respiratory failure admitted to the hospital with acute encephalopathy and right-sided pneumonia, had increased need of oxygen more encephalopathy yesterday and this morning rapid response was called transferred to ICU intubated central line started  Plan:   Severe sepsis/septic shock, transfer to ICU, IV antibiotics IV fluid critical care consulted, currently on meropenem, white count up to 16.9, cultures ordered and pending  Acute respiratory failure with hypoxia and hypercapnia, intubated at this time critical care team consulted  Lactic acidosis with lactic acid up to 6.8 will repeat this morning  Acute encephalopathy, currently intubated  Recent COVID infection, still testing positive  Hypothyroidism on levothyroxine  Essential hypertension, relatively hypotensive hold p.o. medications  Coffee-ground content and PEG tube, started on PPI seen by GI  Cardiomyopathy with EF of 40 to 45% echo pending  Elevated troponin due to severe sepsis      Diet Diet NPO  ADULT TUBE FEEDING; PEG; Standard with Fiber; Continuous; 25; Yes; 10; Q 4 hours; 55; 30; Q 6 hours   DVT Prophylaxis [] Lovenox, []  Heparin, [] SCDs, [] Ambulation,  [] Eliquis, [] Xarelto  [] Coumadin   Code Status Full Code             Personally reviewed Lab Studies and Imaging     Discus critical sed management of the case with critical care team     Telemetry strip reviewed by myself sinus tachycardia        Drugs that require monitoring for

## 2023-12-20 NOTE — PROGRESS NOTES
4 Eyes Skin Assessment     NAME:  Windy Birch  YOB: 1960  MEDICAL RECORD NUMBER:  [id number]    The patient is being assessed for  Transfer to New Unit    I agree that at least one RN has performed a thorough Head to Toe Skin Assessment on the patient. ALL assessment sites listed below have been assessed. Areas assessed by both nurses:    Head, Face, Ears, Shoulders, Back, Chest, Arms, Elbows, Hands, Sacrum. Buttock, Coccyx, Ischium, Legs. Feet and Heels, and Under Medical Devices         Does the Patient have a Wound? Yes wound(s) were present on assessment.  LDA wound assessment was Initiated and completed by RN       Mauricio Prevention initiated by RN: Yes  Wound Care Orders initiated by RN: No    Pressure Injury (Stage 3,4, Unstageable, DTI, NWPT, and Complex wounds) if present, place Wound referral order by RN under : No    New Ostomies, if present place, Ostomy referral order under : No     Nurse 1 eSignature: Electronically signed by Carissa Quintana RN on 12/20/23 at 7:43 AM EST    **SHARE this note so that the co-signing nurse can place an eSignature**    Nurse 2 eSignature: {Esignature:[id number]}

## 2023-12-20 NOTE — PROGRESS NOTES
Attempted to call son, Nahomy Donnelly, no answer and unable to leave a voicemail at this time.     Susie De La Fuente RN

## 2023-12-20 NOTE — PROGRESS NOTES
Patient arrived from 2W w/ RN escort, labored breathing, minimally answers questions, SpO2 90% on non rebreather. Mottling noted around hips and underneath breasts. MD called to bedside.

## 2023-12-20 NOTE — CONSULTS
REASON FOR CONSULTATION/CC: Septic shock      Consult at request of Chidi Marshall MD     PCP: Fidencio Lai  Established Pulmonologist:  None    Chief Complaint   Patient presents with    Altered Mental Status     Altered Mental Status per [facility] staff. Pt A&O x 4 upon arrival to ER. Pt dx with covid 6 days ago. HISTORY OF PRESENT ILLNESS: Brianna Bates is a 65y.o. year old female with a history of cerebral palsy chronic musculoskeletal contractures, nursing home resident who presents with acutely altered mental status. Tested positive for COVID. Transferred to the ICU overnight increasing oxygen requirement required intubation. Possible aspiration pneumonitis/pneumonia from altered mental status. During my visit patient is intubated sedated on a provide HPI is obtained from bedside nurse report EMR. Patient is on multiple pressors with refractory shock despite IV fluid resuscitation. Past Medical History:   Diagnosis Date    Cardiac arrest ([address])     Cerebral palsy ([address])     Closed anterior dislocation of humerus     Humeral dislocation    Dysphagia     Infantile cerebral palsy, unspecified     Cerebral palsy    Irritable bowel syndrome     Irritable bowel         Past Surgical History:   Procedure Laterality Date    [place] ARTHRP ACETBLR/PROX FEM PROSTC AGRFT/ALGRFT Right     Hip Replacement, Total    [place] ARTHRP KNE CONDYLE&PLATU MEDIAL&LAT COMPARTMENTS Left     Knee Replacement, Total    SHOULDER ARTHROPLASTY      Shoulder replacement x 2 to left       Family Hx  family history is not on file. Unable to obtain due to mechanical ventilation  Social Hx   reports that she has never smoked.  She has never used smokeless tobacco.    Scheduled Meds:   meropenem  1,000 mg IntraVENous Q8H    azithromycin  500 mg IntraVENous Q24H    dexamethasone  6 mg IntraVENous Q24H    lactated ringers bolus  500 mL IntraVENous Once    traMADol  50 mg Oral Once    lidocaine 1 % injection  5 mL IntraDERmal Intubated and sedated        Data Reviewed:   LABS:  CBC:   Recent Labs     12/19/23  0745 12/20/23  0054 12/20/23  0747   WBC 9.6 16.9* 22.4*   HGB 12.3 12.6 12.0   HCT 36.8 38.4 36.1   MCV 95.6 97.8 96.8   * 133* 120*     BMP:   Recent Labs     12/19/23  0745 12/19/23  1904 12/20/23  0054     --  143   K 2.9* 3.9 4.4     --  107   CO2 29  --  26   BUN 10  --  10   CREATININE <0.5*  --  <0.5*     LIVER PROFILE: No results for input(s): \"AST\", \"ALT\", \"LIPASE\", \"AMYLASE\", \"ALB\", \"BILIDIR\", \"BILITOT\", \"ALKPHOS\" in the last 72 hours. PT/INR: No results for input(s): \"PROTIME\", \"INR\" in the last 72 hours. APTT: No results for input(s): \"APTT\" in the last 72 hours. UA:No results for input(s): \"NITRITE\", \"COLORU\", \"PHUR\", \"LABCAST\", \"WBCUA\", \"RBCUA\", \"MUCUS\", \"TRICHOMONAS\", \"YEAST\", \"BACTERIA\", \"CLARITYU\", \"SPECGRAV\", \"LEUKOCYTESUR\", \"UROBILINOGEN\", \"BILIRUBINUR\", \"BLOODU\", \"GLUCOSEU\", \"AMORPHOUS\" in the last 72 hours. Invalid input(s): \"KETONESU\"  Recent Labs     12/20/23  0335   PHART 7.358   KCZ2JIV 41.3   PO2ART 169.0*       Vent Information  Ventilator ID: [id number]  Equipment Changed: (S) HME  Ventilator Initiate: Yes  Vent Mode: AC/PRVC    Radiology Review:  Pertinent images / reports were reviewed as a part of this visit. Access  CVC   Arterial  Arterial Line 12/20/23 Left Radial (Active)   Number of days: 0          PICC             CVC        CVC Triple Lumen 12/20/23 Left Internal jugular (Active)   Central Line Being Utilized Yes 12/20/23 0600   Criteria for Appropriate Use Hemodynamically unstable, requiring monitoring lines, vasopressors, or volume resuscitation 12/20/23 0600   Site Assessment Clean, dry & intact 12/20/23 0600   Phlebitis Assessment No symptoms 12/20/23 0600   Infiltration Assessment 0 12/20/23 0600   Color/Movement/Sensation Capillary refill less than 3 sec 12/20/23 0600   Proximal Lumen Color/Status White;Blood return noted; Flushed;Capped 12/20/23 0600   Medial Lumen

## 2023-12-20 NOTE — PROGRESS NOTES
Fluid responsiveness test done with Flo[employer]. Results showed SV delta change of 13.5%. Updated Dr Solis Tran. Order received for 500ml LR bolus.  Dr Solis Tran also updated that pt's pupil size are now equal. OK to hold off on head CT

## 2023-12-20 NOTE — PROGRESS NOTES
Patient intubated with etomidate, kerri, and versed emergently at the bedside by Dr. Donta Wren. 7.5 ett secured at the 24 lip line with positive color change on etc02 detector and bilateral breath sounds.

## 2023-12-20 NOTE — PROGRESS NOTES
SLP NOTE    Chart reviewed and events noted. Discontinue skilled ST d/t medical downgrade and current respiratory status. If there is med team desire to consider PO diet after extubation, please re-order ST when/if appropriate. Thank you. Aleida Smart, Jimmy Rivera, #[id number]  Speech-Language Pathologist  Portable phone: [phone]

## 2023-12-20 NOTE — PROGRESS NOTES
PCA calls nurse to inform that patients VS are not wdl. VSS recheck by nurse. Patient is assessed. Patient is having increased work of breathing. Patient visibly shaking. Patient is stating 86 % on 9 L of oxygen bumped up from 4 L baseline. Informed charge nurse. Sent message to on call NP Idalmis that HR was not improving after injection of lopressor and reglan. Non rebreather placed on patient. Rapid response initiated.       Electronically signed by Kashif Pinon RN on 12/20/2023 at 1:32 AM

## 2023-12-20 NOTE — PROCEDURES
Arterial Catheter Insertion Procedure Note    Consent: Unable to be obtained due to the emergent nature of this procedure. Procedure: Insertion of Arterial Catheter    Indications:  Hypotension, Shock    Estimated Blood Loss: Minimal    Procedure Details   Informed consent was obtained for the procedure, including sedation. Risks of hemorrhage, arrhythmia, infection and adverse drug reaction were discussed. A time out was performed at 0640. Ultrasound used and Left Radial artery was noted to be patent. Collatoral flow was confirmed with an Manoj's Test.  Under sterile conditions the skin above the Left Radial artery was prepped with Chloraprep and covered with a sterile drape after donning mask, sterile gown, annd sterile gloves. A 22-gauge needle was inserted into the Left Radial artery. A guide wire was then passed easily through the needle which was advanced with no resistance. Good flash of blood returned. The catheter was advanced over the existing wire without resistance or complication and subsequently sutured into place after pressure tubing connected and waveform verified on monitor. Findings: There were no complications nor changes to vital signs. Patient tolerated procedure well.     Total time of procedure: 15 minutes

## 2023-12-20 NOTE — PROGRESS NOTES
CMU called to inform that patient has been running sinus tachy and jumped up to 165 but sustaining at 140. Message sent to on call NP Марина Reynaga via perfect serve at this time. Patient is hyperventilating stating she feels nauseous at this time. Pema Kitchen has already been administered previously in this shift with no relief per patient. New orders placed.        Electronically signed by Bruce Plaza RN on 12/20/2023 at 12:04 AM

## 2023-12-20 NOTE — CONSULTS
Infectious Diseases Inpatient Consult Note      Reason for Consult:  Septic shock and High fevers, COVID 19 PNA    Requesting Physician:  Sheyla Hull     Primary Care Physician:  Stacy Dailey    History Obtained From:  Epic     CHIEF COMPLAINT:     Chief Complaint   Patient presents with    Altered Mental Status     Altered Mental Status per [facility] staff. Pt A&O x 4 upon arrival to ER. Pt dx with covid 6 days ago. HISTORY OF PRESENT ILLNESS:  65 y.o. woman with a history of cerebral  palsy, dysphagia, on PEG tube feedings admitted to hospital secondary shortness of breath and change in mental status. She was tested positive for COVID-19. She is admitted from nursing facility its not clear the timing of COVID-19 positivity. In the hospital now developed respiratory distress high fever progressed to septic shock currently intubated transferred to ICU requiring pressor support. Tmax 105.8, currently on 100% FiO2. Rapid response was called earlier on 12/20/23, due to respiratory distress and now in the ICU on the ventilator chest x-ray indicating right middle and lower lobe pneumonia. Procalcitonin was not elevated admission but now it 51.6 lactic acid 6.8 creatinine 0.5 WBC 20.4 with left shift, blood cultures collected in process admission blood cultures negative MRSA probe negative.   Given ongoing septic shock with respiratory failure and pneumonia COVID-19 infection we are consulted for recommendations      Past Medical History:    Past Medical History:   Diagnosis Date    Cardiac arrest ([address])     Cerebral palsy ([address])     Closed anterior dislocation of humerus     Humeral dislocation    Dysphagia     Infantile cerebral palsy, unspecified     Cerebral palsy    Irritable bowel syndrome     Irritable bowel       Past Surgical History:    Past Surgical History:   Procedure Laterality Date    [place] ARTHRP ACETBLR/PROX FEM PROSTC AGRFT/ALGRFT Right     Hip Replacement, Total    SC ARTHRP KNE with other providers  Reviewed clinical lab tests  Reviewed radiology tests  Reviewed other diagnostic tests/interventions  Independent review of radiologic images  Independent review of  Microbiology cultures and other micro tests reviewed     Risk of Complications/Morbidity: High      Patient is critically ill and has a potentially life threatening infection that poses threat to life/bodily function. There is potential for worsening infection/ sudden clinically significant or life-threatening deterioration in the patient's condition without appropriate antimicrobial therapy. Complex medical decision making process was involved to select appropriate antimicrobial agents to reverse the cause of patient's severe infection/ illness. Antimicrobial therapy requires intensive monitoring for toxicity and frequent dose adjustments to prevent toxicity and permanent end-organ dysfunction . CC time : 33 min   Thanks for allowing me to participate in your patient's care please call me with any questions or concerns.     Dr. Forrest Barbour MD  [address] [place] [address] Physician  Phone: [phone]   Fax : [phone]

## 2023-12-20 NOTE — PROGRESS NOTES
Patient placed on a cooling blanket for her temp that continues to rise. Temp is currently 105.3 with a temp sensing abernathy.

## 2023-12-20 NOTE — CONSULTS
Comprehensive Nutrition Assessment    Type and Reason for Visit:  Consult    Nutrition Recommendations/Plan:   Adjust TF to Vital AF 1.2 w/ goal rate of 55 ml/hr + 1 prosource daily. Free water flush per provider      Malnutrition Assessment:  Malnutrition Status: At risk for malnutrition (Comment) (12/18/23 0911)    Context:  Chronic Illness       Nutrition Assessment:    Consult for TF order and management. Pt w/ PEG, Jevity 1.5 @ goal of 55 ml/hr ordered. Pt intubated today, sedated on 15.9 ml/hr propofol providing 420 kcals/day. On multiple pressors. Will adjust TF goal    Nutrition Related Findings:    197 glucose, 140 Na, 12/19 BM Wound Type: Stage I, Pressure Injury       Current Nutrition Intake & Therapies:    Average Meal Intake: NPO  Average Supplements Intake: NPO  Diet NPO  ADULT TUBE FEEDING; PEG; Standard with Fiber; Continuous; 25; Yes; 10; Q 4 hours; 55; 30; Q 6 hours  Current Tube Feeding (TF) Orders:  Goal TF & Flush Orders Provides: Vital AF 1.2 @ 55 ml/hr + 1 prosource (Calculated over 20 hrs provides 1100 ml total volume, 1320 kcals, 83 g protein, 892 ml free water + 80 kcals, 20 g protein)    Anthropometric Measures:  Height: 172.7 cm (5' 7.99\")  Ideal Body Weight (IBW): 140 lbs (64 kg)       Current Body Weight: 89.8 kg (197 lb 15.6 oz), 141.4 % IBW. Current BMI (kg/m2): 30.1                          BMI Categories: Obese Class 1 (BMI 30.0-34. 9)    Estimated Daily Nutrient Needs:  Energy Requirements Based On: Kcal/kg  Weight Used for Energy Requirements: Current  Energy (kcal/day): 5985-6471 (18-20kcal/kg)  Weight Used for Protein Requirements: Current  Protein (g/day): 90-108g/kg  Method Used for Fluid Requirements: 1 ml/kcal  Fluid (ml/day): NPO    Nutrition Diagnosis:   Inadequate oral intake related to impaired respiratory function as evidenced by intubation    Nutrition Interventions:   Food and/or Nutrient Delivery: Modify Tube Feeding  Nutrition Education/Counseling: Education not indicated  Coordination of Nutrition Care: Continue to monitor while inpatient       Goals:     Goals:  Tolerate nutrition support at goal rate       Nutrition Monitoring and Evaluation:   Behavioral-Environmental Outcomes: None Identified  Food/Nutrient Intake Outcomes: Enteral Nutrition Intake/Tolerance  Physical Signs/Symptoms Outcomes: Biochemical Data, Weight, Nutrition Focused Physical Findings, GI Status    Discharge Planning:    No discharge needs at this time     Gabriel Bosworth, [id number] [facility]  Contact: [phone]

## 2023-12-20 NOTE — PROGRESS NOTES
Messaged Dr. Den Salgado regarding continued hypotension despite fluid bolus and Neosynephrine. Will await response.     Kareem Martínez RN

## 2023-12-20 NOTE — PROGRESS NOTES
Dr Cruz Palomino at bedside. Updated on pt's status and findings of unequal pupil sizes. Order received for 500ml LR bolus and head CT when pt is more stable.

## 2023-12-20 NOTE — PROGRESS NOTES
I called this patients son to update him on this patients current medical concerns and that she is being transferred to ICU. I was unable to leave a message due to voice-mail was full. I did leave a message on her mothers voicemail to call ICU'S main number.

## 2023-12-21 LAB
ANION GAP SERPL CALCULATED.3IONS-SCNC: 11 MMOL/L (ref 3–16)
ANISOCYTOSIS BLD QL SMEAR: ABNORMAL
ANTI-XA UNFRAC HEPARIN: 0.17 IU/ML (ref 0.3–0.7)
ANTI-XA UNFRAC HEPARIN: >1.1 IU/ML (ref 0.3–0.7)
BASOPHILS # BLD: 0 K/UL (ref 0–0.2)
BASOPHILS NFR BLD: 0 %
BUN SERPL-MCNC: 10 MG/DL (ref 7–20)
CALCIUM SERPL-MCNC: 7.1 MG/DL (ref 8.3–10.6)
CHLORIDE SERPL-SCNC: 105 MMOL/L (ref 99–110)
CO2 SERPL-SCNC: 20 MMOL/L (ref 21–32)
CREAT SERPL-MCNC: <0.5 MG/DL (ref 0.6–1.2)
DACRYOCYTES BLD QL SMEAR: ABNORMAL
DEPRECATED RDW RBC AUTO: 16.1 % (ref 12.4–15.4)
DEPRECATED RDW RBC AUTO: 16.2 % (ref 12.4–15.4)
EOSINOPHIL # BLD: 0.2 K/UL (ref 0–0.6)
EOSINOPHIL NFR BLD: 1 %
GFR SERPLBLD CREATININE-BSD FMLA CKD-EPI: >60 ML/MIN/{1.73_M2}
GLUCOSE BLD-MCNC: 127 MG/DL (ref 70–99)
GLUCOSE BLD-MCNC: 148 MG/DL (ref 70–99)
GLUCOSE BLD-MCNC: 154 MG/DL (ref 70–99)
GLUCOSE BLD-MCNC: 159 MG/DL (ref 70–99)
GLUCOSE BLD-MCNC: 159 MG/DL (ref 70–99)
GLUCOSE BLD-MCNC: 188 MG/DL (ref 70–99)
GLUCOSE SERPL-MCNC: 145 MG/DL (ref 70–99)
HCT VFR BLD AUTO: 33.8 % (ref 36–48)
HCT VFR BLD AUTO: 35 % (ref 36–48)
HGB BLD-MCNC: 11.6 G/DL (ref 12–16)
HGB BLD-MCNC: 12 G/DL (ref 12–16)
LACTATE BLDV-SCNC: 2.1 MMOL/L (ref 0.4–2)
LACTATE BLDV-SCNC: 3 MMOL/L (ref 0.4–2)
LACTATE BLDV-SCNC: 3.1 MMOL/L (ref 0.4–2)
LACTATE BLDV-SCNC: 4.2 MMOL/L (ref 0.4–2)
LEGIONELLA AG UR QL: NORMAL
LYMPHOCYTES # BLD: 3 K/UL (ref 1–5.1)
LYMPHOCYTES NFR BLD: 12 %
MAGNESIUM SERPL-MCNC: 1.5 MG/DL (ref 1.8–2.4)
MCH RBC QN AUTO: 32.4 PG (ref 26–34)
MCH RBC QN AUTO: 32.7 PG (ref 26–34)
MCHC RBC AUTO-ENTMCNC: 34.2 G/DL (ref 31–36)
MCHC RBC AUTO-ENTMCNC: 34.2 G/DL (ref 31–36)
MCV RBC AUTO: 94.8 FL (ref 80–100)
MCV RBC AUTO: 95.5 FL (ref 80–100)
MONOCYTES # BLD: 1.2 K/UL (ref 0–1.3)
MONOCYTES NFR BLD: 5 %
NEUTROPHILS # BLD: 20.2 K/UL (ref 1.7–7.7)
NEUTROPHILS NFR BLD: 69 %
NEUTS BAND NFR BLD MANUAL: 13 % (ref 0–7)
NEUTS VAC BLD QL SMEAR: PRESENT
PERFORMED ON: ABNORMAL
PHOSPHATE SERPL-MCNC: 1.2 MG/DL (ref 2.5–4.9)
PLATELET # BLD AUTO: 72 K/UL (ref 135–450)
PLATELET # BLD AUTO: 86 K/UL (ref 135–450)
PLATELET BLD QL SMEAR: ABNORMAL
PMV BLD AUTO: 10.8 FL (ref 5–10.5)
PMV BLD AUTO: 11.1 FL (ref 5–10.5)
POIKILOCYTOSIS BLD QL SMEAR: ABNORMAL
POTASSIUM SERPL-SCNC: 4.4 MMOL/L (ref 3.5–5.1)
RBC # BLD AUTO: 3.56 M/UL (ref 4–5.2)
RBC # BLD AUTO: 3.66 M/UL (ref 4–5.2)
S PNEUM AG UR QL: NORMAL
SLIDE REVIEW: ABNORMAL
SODIUM SERPL-SCNC: 136 MMOL/L (ref 136–145)
WBC # BLD AUTO: 22.7 K/UL (ref 4–11)
WBC # BLD AUTO: 24.6 K/UL (ref 4–11)

## 2023-12-21 PROCEDURE — 2580000003 HC RX 258: Performed by: INTERNAL MEDICINE

## 2023-12-21 PROCEDURE — 83735 ASSAY OF MAGNESIUM: CPT

## 2023-12-21 PROCEDURE — 6360000002 HC RX W HCPCS: Performed by: INTERNAL MEDICINE

## 2023-12-21 PROCEDURE — 85025 COMPLETE CBC W/AUTO DIFF WBC: CPT

## 2023-12-21 PROCEDURE — 2500000003 HC RX 250 WO HCPCS: Performed by: HOSPITALIST

## 2023-12-21 PROCEDURE — 80048 BASIC METABOLIC PNL TOTAL CA: CPT

## 2023-12-21 PROCEDURE — 99291 CRITICAL CARE FIRST HOUR: CPT | Performed by: INTERNAL MEDICINE

## 2023-12-21 PROCEDURE — 2000000000 HC ICU R&B

## 2023-12-21 PROCEDURE — 6360000002 HC RX W HCPCS: Performed by: HOSPITALIST

## 2023-12-21 PROCEDURE — 83605 ASSAY OF LACTIC ACID: CPT

## 2023-12-21 PROCEDURE — 94003 VENT MGMT INPAT SUBQ DAY: CPT

## 2023-12-21 PROCEDURE — 2500000003 HC RX 250 WO HCPCS: Performed by: NURSE PRACTITIONER

## 2023-12-21 PROCEDURE — 2580000003 HC RX 258: Performed by: HOSPITALIST

## 2023-12-21 PROCEDURE — 85520 HEPARIN ASSAY: CPT

## 2023-12-21 PROCEDURE — 6370000000 HC RX 637 (ALT 250 FOR IP): Performed by: HOSPITALIST

## 2023-12-21 PROCEDURE — 37799 UNLISTED PX VASCULAR SURGERY: CPT

## 2023-12-21 PROCEDURE — 85027 COMPLETE CBC AUTOMATED: CPT

## 2023-12-21 PROCEDURE — 94761 N-INVAS EAR/PLS OXIMETRY MLT: CPT

## 2023-12-21 PROCEDURE — APPNB15 APP NON BILLABLE TIME 0-15 MINS: Performed by: NURSE PRACTITIONER

## 2023-12-21 PROCEDURE — 2580000003 HC RX 258: Performed by: NURSE PRACTITIONER

## 2023-12-21 PROCEDURE — C9113 INJ PANTOPRAZOLE SODIUM, VIA: HCPCS | Performed by: INTERNAL MEDICINE

## 2023-12-21 PROCEDURE — 84100 ASSAY OF PHOSPHORUS: CPT

## 2023-12-21 PROCEDURE — 2700000000 HC OXYGEN THERAPY PER DAY

## 2023-12-21 RX ORDER — HEPARIN SODIUM 1000 [USP'U]/ML
80 INJECTION, SOLUTION INTRAVENOUS; SUBCUTANEOUS ONCE
Status: COMPLETED | OUTPATIENT
Start: 2023-12-21 | End: 2023-12-21

## 2023-12-21 RX ORDER — SODIUM CHLORIDE, SODIUM LACTATE, POTASSIUM CHLORIDE, AND CALCIUM CHLORIDE .6; .31; .03; .02 G/100ML; G/100ML; G/100ML; G/100ML
500 INJECTION, SOLUTION INTRAVENOUS ONCE
Status: COMPLETED | OUTPATIENT
Start: 2023-12-21 | End: 2023-12-21

## 2023-12-21 RX ORDER — CALCIUM GLUCONATE 20 MG/ML
1000 INJECTION, SOLUTION INTRAVENOUS ONCE
Status: COMPLETED | OUTPATIENT
Start: 2023-12-21 | End: 2023-12-21

## 2023-12-21 RX ORDER — HEPARIN SODIUM 1000 [USP'U]/ML
40 INJECTION, SOLUTION INTRAVENOUS; SUBCUTANEOUS PRN
Status: DISCONTINUED | OUTPATIENT
Start: 2023-12-21 | End: 2023-12-21 | Stop reason: CLARIF

## 2023-12-21 RX ORDER — HEPARIN SODIUM 10000 [USP'U]/100ML
0-4000 INJECTION, SOLUTION INTRAVENOUS CONTINUOUS
Status: DISCONTINUED | OUTPATIENT
Start: 2023-12-21 | End: 2023-12-25

## 2023-12-21 RX ORDER — HEPARIN SODIUM 1000 [USP'U]/ML
80 INJECTION, SOLUTION INTRAVENOUS; SUBCUTANEOUS PRN
Status: DISCONTINUED | OUTPATIENT
Start: 2023-12-21 | End: 2023-12-21 | Stop reason: CLARIF

## 2023-12-21 RX ORDER — DOBUTAMINE HYDROCHLORIDE 200 MG/100ML
5 INJECTION INTRAVENOUS CONTINUOUS
Status: DISCONTINUED | OUTPATIENT
Start: 2023-12-21 | End: 2023-12-26

## 2023-12-21 RX ADMIN — MAGNESIUM SULFATE HEPTAHYDRATE 2000 MG: 40 INJECTION, SOLUTION INTRAVENOUS at 05:35

## 2023-12-21 RX ADMIN — DANTROLENE SODIUM 200 MG: 25 CAPSULE ORAL at 21:28

## 2023-12-21 RX ADMIN — PROPOFOL 15 MCG/KG/MIN: 10 INJECTION, EMULSION INTRAVENOUS at 05:47

## 2023-12-21 RX ADMIN — SODIUM CHLORIDE, PRESERVATIVE FREE 10 ML: 5 INJECTION INTRAVENOUS at 09:26

## 2023-12-21 RX ADMIN — PROPOFOL 20 MCG/KG/MIN: 10 INJECTION, EMULSION INTRAVENOUS at 12:03

## 2023-12-21 RX ADMIN — POLYETHYLENE GLYCOL 3350 17 G: 17 POWDER, FOR SOLUTION ORAL at 09:16

## 2023-12-21 RX ADMIN — MEROPENEM 1000 MG: 1 INJECTION, POWDER, FOR SOLUTION INTRAVENOUS at 18:05

## 2023-12-21 RX ADMIN — Medication 21 MCG/MIN: at 16:28

## 2023-12-21 RX ADMIN — LINEZOLID 600 MG: 600 INJECTION, SOLUTION INTRAVENOUS at 20:04

## 2023-12-21 RX ADMIN — HEPARIN SODIUM 7900 UNITS: 1000 INJECTION INTRAVENOUS; SUBCUTANEOUS at 13:50

## 2023-12-21 RX ADMIN — SODIUM CHLORIDE, POTASSIUM CHLORIDE, SODIUM LACTATE AND CALCIUM CHLORIDE 500 ML: 600; 310; 30; 20 INJECTION, SOLUTION INTRAVENOUS at 10:38

## 2023-12-21 RX ADMIN — MEROPENEM 1000 MG: 1 INJECTION, POWDER, FOR SOLUTION INTRAVENOUS at 11:48

## 2023-12-21 RX ADMIN — CALCIUM GLUCONATE 1000 MG: 20 INJECTION, SOLUTION INTRAVENOUS at 10:05

## 2023-12-21 RX ADMIN — SODIUM CHLORIDE, PRESERVATIVE FREE 40 MG: 5 INJECTION INTRAVENOUS at 09:26

## 2023-12-21 RX ADMIN — PROPOFOL 20 MCG/KG/MIN: 10 INJECTION, EMULSION INTRAVENOUS at 20:06

## 2023-12-21 RX ADMIN — AZITHROMYCIN MONOHYDRATE 500 MG: 500 INJECTION, POWDER, LYOPHILIZED, FOR SOLUTION INTRAVENOUS at 05:28

## 2023-12-21 RX ADMIN — SODIUM CHLORIDE, POTASSIUM CHLORIDE, SODIUM LACTATE AND CALCIUM CHLORIDE: 600; 310; 30; 20 INJECTION, SOLUTION INTRAVENOUS at 05:00

## 2023-12-21 RX ADMIN — SERTRALINE 150 MG: 100 TABLET, FILM COATED ORAL at 09:17

## 2023-12-21 RX ADMIN — SODIUM CHLORIDE, PRESERVATIVE FREE 10 ML: 5 INJECTION INTRAVENOUS at 19:57

## 2023-12-21 RX ADMIN — AMITRIPTYLINE HYDROCHLORIDE 50 MG: 50 TABLET, FILM COATED ORAL at 21:28

## 2023-12-21 RX ADMIN — DOBUTAMINE HYDROCHLORIDE 2.5 MCG/KG/MIN: 200 INJECTION INTRAVENOUS at 14:04

## 2023-12-21 RX ADMIN — POLYETHYLENE GLYCOL 3350 17 G: 17 POWDER, FOR SOLUTION ORAL at 21:28

## 2023-12-21 RX ADMIN — DANTROLENE SODIUM 200 MG: 25 CAPSULE ORAL at 09:16

## 2023-12-21 RX ADMIN — HEPARIN SODIUM 1780 UNITS/HR: 10000 INJECTION, SOLUTION INTRAVENOUS at 14:00

## 2023-12-21 RX ADMIN — LINEZOLID 600 MG: 600 INJECTION, SOLUTION INTRAVENOUS at 09:31

## 2023-12-21 RX ADMIN — DEXAMETHASONE SODIUM PHOSPHATE 6 MG: 10 INJECTION, SOLUTION INTRAMUSCULAR; INTRAVENOUS at 05:28

## 2023-12-21 RX ADMIN — VASOPRESSIN 0.03 UNITS/MIN: 20 INJECTION INTRAVENOUS at 01:37

## 2023-12-21 RX ADMIN — MEROPENEM 1000 MG: 1 INJECTION, POWDER, FOR SOLUTION INTRAVENOUS at 02:50

## 2023-12-21 RX ADMIN — Medication 11 MCG/MIN: at 04:36

## 2023-12-21 RX ADMIN — SODIUM PHOSPHATE, MONOBASIC, MONOHYDRATE AND SODIUM PHOSPHATE, DIBASIC, ANHYDROUS 20 MMOL: 142; 276 INJECTION, SOLUTION INTRAVENOUS at 06:41

## 2023-12-21 RX ADMIN — LEVOTHYROXINE SODIUM 50 MCG: 0.05 TABLET ORAL at 05:37

## 2023-12-21 RX ADMIN — ENOXAPARIN SODIUM 40 MG: 100 INJECTION SUBCUTANEOUS at 09:26

## 2023-12-21 ASSESSMENT — PULMONARY FUNCTION TESTS
PIF_VALUE: 29
PIF_VALUE: 29
PIF_VALUE: 30
PIF_VALUE: 29
PIF_VALUE: 33
PIF_VALUE: 28
PIF_VALUE: 37
PIF_VALUE: 30
PIF_VALUE: 29
PIF_VALUE: 28
PIF_VALUE: 29
PIF_VALUE: 32
PIF_VALUE: 32
PIF_VALUE: 35
PIF_VALUE: 28
PIF_VALUE: 26
PIF_VALUE: 27
PIF_VALUE: 30
PIF_VALUE: 36
PIF_VALUE: 32
PIF_VALUE: 29
PIF_VALUE: 31
PIF_VALUE: 28
PIF_VALUE: 29
PIF_VALUE: 33
PIF_VALUE: 26
PIF_VALUE: 32
PIF_VALUE: 30
PIF_VALUE: 28
PIF_VALUE: 31
PIF_VALUE: 29
PIF_VALUE: 28
PIF_VALUE: 39
PIF_VALUE: 32
PIF_VALUE: 29
PIF_VALUE: 28
PIF_VALUE: 31
PIF_VALUE: 32
PIF_VALUE: 31
PIF_VALUE: 29
PIF_VALUE: 32
PIF_VALUE: 29
PIF_VALUE: 34
PIF_VALUE: 31
PIF_VALUE: 29
PIF_VALUE: 31
PIF_VALUE: 29
PIF_VALUE: 33
PIF_VALUE: 36
PIF_VALUE: 32
PIF_VALUE: 30
PIF_VALUE: 37
PIF_VALUE: 29
PIF_VALUE: 31
PIF_VALUE: 38
PIF_VALUE: 32
PIF_VALUE: 38
PIF_VALUE: 31
PIF_VALUE: 32
PIF_VALUE: 34
PIF_VALUE: 29
PIF_VALUE: 34
PIF_VALUE: 29
PIF_VALUE: 31
PIF_VALUE: 38
PIF_VALUE: 31

## 2023-12-21 NOTE — PROGRESS NOTES
[facility] [place]      Name:  Desiree Laureano /Age/Sex: 1960  (65 y.o. female)   MRN & CSN:  [id number] & [id number] Encounter Date/Time: 2023 8:49 AM EST   Location:  K8R-9562 PCP: aGbby Alexis MD         Summary     Hospital Day: 6    Ms. Desiree Laureano is a 65 y.o. female who was admitted on 2023 with a chief complaint of     Chief Complaint   Patient presents with    Altered Mental Status     Altered Mental Status per [facility] staff. Pt A&O x 4 upon arrival to ER. Pt dx with covid 6 days ago. Assessment and Recommendations     Severe sepsis/septic shock: Continue care in ICU, continue antibiotics (azithromycin + meropenem + linezolid). Continue IV fluids. WBC count still escalating, now 24.6. UA was negative, BC NGTD. Acute respiratory failure with hypoxia and hypercapnia: Currently intubated, continue to monitor, critical care following    Lactic acidosis: Improving, now 4.2-was 6.8    Acute metabolic encephalopathy: Currently intubated, cerebral palsy with baseline    Recent COVID infection: Likely contributing to current picture    Hypothyroidism: Continue levothyroxine    Essential hypertension: Continue to hold p.o. antihypertensives, patient is relatively hypotensive    UGIB: Coffee-ground content in PEG tube, continue PPI, was evaluated by GI    Cardiomyopathy: Echo pending    Nonischemic myocardial injury: Elevated troponins secondary to sepsis, unlikely ACS. Cardiology following, will evaluate for ischemia once sepsis improves.         Diet Diet NPO  ADULT TUBE FEEDING; PEG; Peptide Based; Continuous; 15; Yes; 10; Q 4 hours; 55; 30; Q 4 hours; Protein; Prosource x1 daily   DVT Prophylaxis [] Lovenox, []  Heparin, [] SCDs, [] Ambulation,  [] Eliquis, [] Xarelto  [] Coumadin   Code Status Full Code   Disposition From: ECF  Expected Disposition: ECF  Estimated Date of Discharge: 48 to 72 hours  Patient requires Recent Labs     12/20/23  1745 12/21/23  0348   LACTA 2.7* 4.2*     BNP:   Recent Labs     12/20/23  0054   PROBNP 2,179*     UA:  Lab Results   Component Value Date/Time    NITRU Negative 11/27/2022 12:14 AM    COLORU DARK YELLOW 11/27/2022 12:14 AM    PHUR 7.0 11/27/2022 12:14 AM    LABCAST 0-1 Hyaline 05/06/2015 01:35 PM    WBCUA 3-5 11/27/2022 12:14 AM    RBCUA 11-20 11/27/2022 12:14 AM    BACTERIA 4+ 11/27/2022 12:14 AM    CLARITYU CLOUDY 11/27/2022 12:14 AM    SPECGRAV 1.021 11/27/2022 12:14 AM    LEUKOCYTESUR TRACE 11/27/2022 12:14 AM    UROBILINOGEN 1.0 11/27/2022 12:14 AM    BILIRUBINUR Negative 11/27/2022 12:14 AM    BLOODU TRACE 11/27/2022 12:14 AM    GLUCOSEU Negative 11/27/2022 12:14 AM    KETUA Negative 11/27/2022 12:14 AM    AMORPHOUS 2+ 05/06/2015 01:35 PM     Urine Cultures: No results found for: \"LABURIN\"  Blood Cultures:   Lab Results   Component Value Date/Time    [facility]  12/20/2023 12:53 AM     No Growth to date. Any change in status will be called. Lab Results   Component Value Date/Time    BLOODCULT2  12/20/2023 12:53 AM     No Growth to date. Any change in status will be called. Organism:   Lab Results   Component Value Date/Time    ORG SARS CoV 2  by PCR 12/17/2023 09:53 AM       Imaging    All imaging has been personally reviewed by me. US ABDOMEN LIMITED Specify organ? LIVER    Result Date: 12/20/2023  EXAMINATION: RIGHT UPPER QUADRANT ULTRASOUND 12/20/2023 6:17 pm COMPARISON: None. HISTORY: ORDERING SYSTEM PROVIDED HISTORY: elevated alk phos TECHNOLOGIST PROVIDED HISTORY: Reason for exam:->elevated alk phos Specify organ?->LIVER FINDINGS: LIVER:  Liver demonstrates abnormal echotexture but no evidence of hepatomegaly. There is fatty infiltration but no focal disease. BILIARY SYSTEM:  Gallbladder contracted and difficult to evaluate. Within the limitation of the study the gallbladder is unremarkable without evidence of pericholecystic fluid, wall thickening or stones.   Negative

## 2023-12-21 NOTE — PROGRESS NOTES
Infectious Diseases Inpatient Progress Note    CHIEF COMPLAINT:     COVID-19 pneumonia  Septic shock  Cerebral palsy  Procalcitonin elevation  Lactic acidosis  Respiratory failure requiring ventilator  WBC elevated  Fever        HISTORY OF PRESENT ILLNESS:  65 y.o. woman with a history of cerebral  palsy, dysphagia, on PEG tube feedings admitted to hospital secondary shortness of breath and change in mental status. She was tested positive for COVID-19. She is admitted from nursing facility its not clear the timing of COVID-19 positivity. In the hospital now developed respiratory distress high fever progressed to septic shock currently intubated transferred to ICU requiring pressor support. Tmax 105.8, currently on 100% FiO2. Rapid response was called earlier on 12/20/23, due to respiratory distress and now in the ICU on the ventilator chest x-ray indicating right middle and lower lobe pneumonia. Procalcitonin was not elevated admission but now it 51.6 lactic acid 6.8 creatinine 0.5 WBC 20.4 with left shift, blood cultures collected in process admission blood cultures negative MRSA probe negative.   Given ongoing septic shock with respiratory failure and pneumonia COVID-19 infection we are consulted for recommendations    Interval History : Currently remains critically  ill on the ventilator in the ICU requiring pressor support fever seems to be trending down lactic acidosis persist but improving creatinine is 0.5 tolerating antibiotic  okay, blood cultures in process so far negative    Past Medical History:    Past Medical History:   Diagnosis Date    Cardiac arrest ([address])     Cerebral palsy (HCC)     Closed anterior dislocation of humerus     Humeral dislocation    Dysphagia     Infantile cerebral palsy, unspecified     Cerebral palsy    Irritable bowel syndrome     Irritable bowel       Past Surgical History:    Past Surgical History:   Procedure Laterality Date    PA ARTHRP ACETBLR/PROX FEM PROSTC mcg/min (12/21/23 0436)    lactated ringers IV soln 100 mL/hr at 12/21/23 0500    dextrose      sodium chloride      sodium chloride Stopped (12/20/23 7831)       PRN Meds:  glucose, dextrose bolus **OR** dextrose bolus, glucagon (rDNA), dextrose, sodium chloride flush, sodium chloride, albuterol, ipratropium, sodium chloride, potassium chloride **OR** potassium alternative oral replacement **OR** potassium chloride, magnesium sulfate, ondansetron **OR** ondansetron, polyethylene glycol, acetaminophen **OR** acetaminophen, guaiFENesin-dextromethorphan    Imaging:   US ABDOMEN LIMITED Specify organ? LIVER   Final Result   Fatty liver. Gallbladder contracted but no obvious abnormality. Nonvisualization of the common bile duct. No evidence of ascites. XR CHEST PORTABLE   Final Result   1. Endotracheal tube 4.4 cm above the jairo. XR CHEST PORTABLE   Final Result   New left mid and lower lung airspace disease most consistent with pneumonia. Suspect mild right perihilar disease in addition to the previously noted   atelectasis. CT CHEST WO CONTRAST   Final Result   Right middle lobe bilateral pulmonary infiltrates most prominent in the right   middle lobe. Area of consolidation related to atelectasis or infiltrate involving the   posterior aspect of the right upper lobe. Follow-up CT of the chest after   clinical/antibiotic therapy may be helpful to document stability or   resolution of this finding. Small left-sided pleural effusion. Mild increased dilatation of common bile duct. Hepatic steatosis. Additional findings noted above. XR CHEST PORTABLE   Final Result   Small right-sided pleural effusions with fluid extending into the minor   fissure. No confluent infiltrate identified. All pertinent images and reports for the current Hospitalization were reviewed by me.     IMPRESSION:    Patient Active Problem List   Diagnosis Code    Cardiac pertinent results from current hospitalization and care every where were reviewed by me as a part of the consultation.    PLAN :  Cont IV Meropenem x 1 gm X q  8 hrs  Cont IV AZITHROMYCin for atypical organisms  Trend Procal still high   Blood cx in process still high   Trend Lactic acid  On Dexamethasone x 6 mg daily   CT chest pattern more favor aspiration PNA vs Bacterial PNA  Given NH status and risk for MDRO   Cont IV Linezolid x 600 mg Q 12 hr for gram +Ve coverage and MRSA   MRSA probe -ve but rsk for infection given NH status and res failure     Discussed with patient/Family and Nursing  d/w Mother at bed side     Medical Decision Making:  The following items were considered in medical decision making:  Discussion of patient care with other providers  Reviewed clinical lab tests  Reviewed radiology tests  Reviewed other diagnostic tests/interventions  Independent review of radiologic images  Independent review of  Microbiology cultures and other micro tests reviewed     Risk of Complications/Morbidity: High      Patient is critically ill and has a potentially life threatening infection that poses threat to life/bodily function.   There is potential for worsening infection/ sudden clinically significant or life-threatening deterioration in the patient's condition without appropriate antimicrobial therapy.  Complex medical decision making process was involved to select appropriate antimicrobial agents to reverse the cause of patient's severe infection/ illness.  Antimicrobial therapy requires intensive monitoring for toxicity and frequent dose adjustments to prevent toxicity and permanent end-organ dysfunction .       CC time : 32 min   Thanks for allowing me to participate in your patient's care please call me with any questions or concerns.    Dr. Calderon Vidales MD  Infectious Disease  [facility]  Phone: [phone]   Fax : [phone]

## 2023-12-21 NOTE — PROGRESS NOTES
Clinical Pharmacy Note  Heparin Dosing Consult    Sourav White is a 65 y.o. female ordered heparin per VTE/DVT/PE Nomogram by Dr. Aurelia Wood. Lab Results   Component Value Date/Time    APTT 27.7 05/06/2015 01:28 PM     Lab Results   Component Value Date/Time    HGB 11.6 12/21/2023 01:50 PM    HCT 33.8 12/21/2023 01:50 PM    PLT 72 12/21/2023 01:50 PM    INR 1.16 05/20/2015 05:30 AM       Ht Readings from Last 1 Encounters:   12/18/23 1.727 m (5' 7.99\")        Wt Readings from Last 1 Encounters:   12/21/23 98.7 kg (217 lb 9.5 oz)        Assessment/Plan:  Initial bolus: 7900 units  Initial infusion rate: 1780 units/hr  Next anti-Xa: 2000 12/21/23    Pharmacy will continue to monitor adjust heparin based on anti-Xa results using nomogram below:     VTE/DVT/PE Heparin Nomogram     Initial Bolus: 80 units/kg Max Bolus: 10,000 units       Initial Rate: 18 units/kg/hr Max Initial Rate: 2,100 units/hr     anti-Xa Bolus Titration   < 0.1 Heparin 80 units/kg bolus Increase drip by 4 units/kg/hr   0.1 - 0.29 Heparin 40 units/kg bolus Increase drip by 2 units/kg/hr   0.3 - 0.7 No Bolus No Change   0.71 - 0.8 No Bolus Decrease drip by 1 units/kg/hr   0.81 - 0.99 No Bolus Decrease drip by 2 units/kg/hr   > 1 Hold Heparin for 1 hour Decrease drip by 3 units/kg/hr       Obtain anti-Xa 6 hours after initial bolus and 6 hours after any dose change until two consecutive therapeutic anti-Xa levels are achieved - then daily.

## 2023-12-21 NOTE — PLAN OF CARE
Problem: Discharge Planning  Goal: Discharge to home or other facility with appropriate resources  Outcome: Progressing  Flowsheets (Taken 12/20/2023 2045)  Discharge to home or other facility with appropriate resources:   Identify barriers to discharge with patient and caregiver   Arrange for needed discharge resources and transportation as appropriate   Identify discharge learning needs (meds, wound care, etc)     Problem: Neurosensory - Adult  Goal: Achieves stable or improved neurological status  Outcome: Progressing  Flowsheets (Taken 12/20/2023 2045)  Achieves stable or improved neurological status:   Assess for and report changes in neurological status   Initiate measures to prevent increased intracranial pressure   Maintain blood pressure and fluid volume within ordered parameters to optimize cerebral perfusion and minimize risk of hemorrhage   Monitor temperature, glucose, and sodium.  Initiate appropriate interventions as ordered     Problem: Neurosensory - Adult  Goal: Achieves maximal functionality and self care  Outcome: Progressing  Flowsheets (Taken 12/20/2023 2045)  Achieves maximal functionality and self care:   Monitor swallowing and airway patency with patient fatigue and changes in neurological status   Encourage and assist patient to increase activity and self care with guidance from physical therapy/occupational therapy     Problem: Respiratory - Adult  Goal: Achieves optimal ventilation and oxygenation  Outcome: Progressing  Flowsheets (Taken 12/20/2023 2045)  Achieves optimal ventilation and oxygenation:   Assess for changes in respiratory status   Assess for changes in mentation and behavior   Position to facilitate oxygenation and minimize respiratory effort   Oxygen supplementation based on oxygen saturation or arterial blood gases   Assess the need for suctioning and aspirate as needed   Assess and instruct to report shortness of breath or any respiratory difficulty   Respiratory therapy

## 2023-12-21 NOTE — PROGRESS NOTES
Message sent to Dr Berlin Hurst regarding pt's minimal urine output.  Orders received for LR bolus followed by continuous LR infusion (see new orders)

## 2023-12-21 NOTE — PROGRESS NOTES
Fluid responsiveness test done per Dr. Author Ann request. SV delta change of 12.8%. Dr. Author Ann notified via [employer].

## 2023-12-21 NOTE — PROGRESS NOTES
1: Initiated SAT trial. Propofol decreased by half the current dose. See MAR for details. 4184: Patient passed SAT trial. Patient opens eyes and will follow commands in her upper extremities. Patient is contracted in her lower extremities. Patient remained in NSR with RR in the 20s with no evidence of accessory muscle use. Will continue with SAT trial as patient tolerates.

## 2023-12-21 NOTE — PLAN OF CARE
Problem: Discharge Planning  Goal: Discharge to home or other facility with appropriate resources  12/21/2023 1257 by Yessi Wilson RN  Outcome: Progressing  Flowsheets (Taken 12/21/2023 0845)  Discharge to home or other facility with appropriate resources: Identify barriers to discharge with patient and caregiver     Problem: Neurosensory - Adult  Goal: Achieves stable or improved neurological status  12/21/2023 1257 by Yessi iWlson RN  Outcome: Progressing  Flowsheets (Taken 12/21/2023 0845)  Achieves stable or improved neurological status: Assess for and report changes in neurological status     Problem: Neurosensory - Adult  Goal: Absence of seizures  Outcome: Progressing     Problem: Neurosensory - Adult  Goal: Remains free of injury related to seizures activity  Outcome: Progressing     Problem: Neurosensory - Adult  Goal: Achieves maximal functionality and self care  12/21/2023 1257 by Yessi Wilson RN  Outcome: Progressing  Flowsheets (Taken 12/21/2023 0845)  Achieves maximal functionality and self care: Monitor swallowing and airway patency with patient fatigue and changes in neurological status     Problem: Respiratory - Adult  Goal: Achieves optimal ventilation and oxygenation  12/21/2023 1257 by Yessi Wilson RN  Outcome: Progressing  Flowsheets (Taken 12/21/2023 0845)  Achieves optimal ventilation and oxygenation:   Assess for changes in respiratory status   Assess for changes in mentation and behavior     Problem: Skin/Tissue Integrity - Adult  Goal: Skin integrity remains intact  12/21/2023 1257 by Yessi Wilson RN  Outcome: Progressing  Flowsheets (Taken 12/21/2023 0845)  Skin Integrity Remains Intact: Monitor for areas of redness and/or skin breakdown     Problem: Skin/Tissue Integrity - Adult  Goal: Incisions, wounds, or drain sites healing without S/S of infection  12/21/2023 1257 by Yessi Wilson RN  Outcome: Progressing  Flowsheets (Taken 12/21/2023 0845)  Incisions, Wounds, or Kolks, Valerie, RN  Outcome: Progressing  Flowsheets (Taken 12/21/2023 0845)  Maintains adequate nutritional intake: Monitor intake and output, weight and lab values     Problem: Genitourinary - Adult  Goal: Absence of urinary retention  Outcome: Progressing     Problem: Cardiovascular - Adult  Goal: Maintains optimal cardiac output and hemodynamic stability  Outcome: Progressing     Problem: Infection - Adult  Goal: Absence of infection at discharge  Outcome: Progressing  Flowsheets (Taken 12/21/2023 0845)  Absence of infection at discharge: Assess and monitor for signs and symptoms of infection     Problem: Safety - Adult  Goal: Free from fall injury  12/21/2023 1257 by Valerie Villagomez RN  Outcome: Progressing     Problem: Skin/Tissue Integrity  Goal: Absence of new skin breakdown  Description: 1.  Monitor for areas of redness and/or skin breakdown  2.  Assess vascular access sites hourly  3.  Every 4-6 hours minimum:  Change oxygen saturation probe site  4.  Every 4-6 hours:  If on nasal continuous positive airway pressure, respiratory therapy assess nares and determine need for appliance change or resting period.  12/21/2023 1257 by Valerie Villagomez RN  Outcome: Progressing     Problem: Nutrition Deficit:  Goal: Optimize nutritional status  Outcome: Progressing     Problem: Safety - Medical Restraint  Goal: Remains free of injury from restraints (Restraint for Interference with Medical Device)  Description: INTERVENTIONS:  1. Determine that other, less restrictive measures have been tried or would not be effective before applying the restraint  2. Evaluate the patient's condition at the time of restraint application  3. Inform patient/family regarding the reason for restraint  4. Q2H: Monitor safety, psychosocial status, comfort, nutrition and hydration  12/21/2023 1257 by Valerie Villagomez RN  Outcome: Progressing     Problem: Pain  Goal: Verbalizes/displays adequate comfort level or baseline comfort

## 2023-12-22 LAB
ANION GAP SERPL CALCULATED.3IONS-SCNC: 13 MMOL/L (ref 3–16)
ANTI-XA UNFRAC HEPARIN: 0.46 IU/ML (ref 0.3–0.7)
ANTI-XA UNFRAC HEPARIN: 0.58 IU/ML (ref 0.3–0.7)
ANTI-XA UNFRAC HEPARIN: 0.74 IU/ML (ref 0.3–0.7)
BACTERIA SPEC RESP CULT: NORMAL
BASE EXCESS BLDA CALC-SCNC: 1 MMOL/L (ref -3–3)
BASE EXCESS BLDV CALC-SCNC: 1.8 MMOL/L
BASOPHILS # BLD: 0 K/UL (ref 0–0.2)
BASOPHILS NFR BLD: 0.1 %
BUN SERPL-MCNC: 8 MG/DL (ref 7–20)
CA-I BLD-SCNC: 1.07 MMOL/L (ref 1.12–1.32)
CALCIUM SERPL-MCNC: 7.5 MG/DL (ref 8.3–10.6)
CHLORIDE SERPL-SCNC: 104 MMOL/L (ref 99–110)
CO2 BLDA-SCNC: 26.2 MMOL/L
CO2 BLDV-SCNC: 27 MMOL/L
CO2 SERPL-SCNC: 23 MMOL/L (ref 21–32)
COHGB MFR BLDA: 1.3 % (ref 0–1.5)
COHGB MFR BLDV: 1.5 %
CREAT SERPL-MCNC: <0.5 MG/DL (ref 0.6–1.2)
DEPRECATED RDW RBC AUTO: 16.1 % (ref 12.4–15.4)
EOSINOPHIL # BLD: 0 K/UL (ref 0–0.6)
EOSINOPHIL NFR BLD: 0.1 %
GFR SERPLBLD CREATININE-BSD FMLA CKD-EPI: >60 ML/MIN/{1.73_M2}
GLUCOSE BLD-MCNC: 187 MG/DL (ref 70–99)
GLUCOSE BLD-MCNC: 189 MG/DL (ref 70–99)
GLUCOSE BLD-MCNC: 210 MG/DL (ref 70–99)
GLUCOSE BLD-MCNC: 221 MG/DL (ref 70–99)
GLUCOSE SERPL-MCNC: 204 MG/DL (ref 70–99)
GRAM STN SPEC: NORMAL
HCO3 BLDA-SCNC: 25.1 MMOL/L (ref 21–29)
HCO3 BLDV-SCNC: 26 MMOL/L (ref 23–29)
HCT VFR BLD AUTO: 34.4 % (ref 36–48)
HGB BLD-MCNC: 11.6 G/DL (ref 12–16)
HGB BLDA-MCNC: 11.2 G/DL (ref 12–16)
LACTATE BLDV-SCNC: 2.9 MMOL/L (ref 0.4–2)
LACTATE BLDV-SCNC: 2.9 MMOL/L (ref 0.4–2)
LACTATE BLDV-SCNC: 3 MMOL/L (ref 0.4–2)
LACTATE BLDV-SCNC: 3.4 MMOL/L (ref 0.4–2)
LYMPHOCYTES # BLD: 1.6 K/UL (ref 1–5.1)
LYMPHOCYTES NFR BLD: 7.7 %
MAGNESIUM SERPL-MCNC: 2.2 MG/DL (ref 1.8–2.4)
MCH RBC QN AUTO: 31.8 PG (ref 26–34)
MCHC RBC AUTO-ENTMCNC: 33.6 G/DL (ref 31–36)
MCV RBC AUTO: 94.7 FL (ref 80–100)
METHGB MFR BLDA: 0.6 %
METHGB MFR BLDV: 0.5 %
MONOCYTES # BLD: 1 K/UL (ref 0–1.3)
MONOCYTES NFR BLD: 4.9 %
NEUTROPHILS # BLD: 18 K/UL (ref 1.7–7.7)
NEUTROPHILS NFR BLD: 87.2 %
O2 THERAPY: ABNORMAL
O2 THERAPY: ABNORMAL
PCO2 BLDA: 37.2 MMHG (ref 35–45)
PCO2 BLDV: 38.1 MMHG (ref 40–50)
PERFORMED ON: ABNORMAL
PH BLDA: 7.44 [PH] (ref 7.35–7.45)
PH BLDV: 7.43 [PH] (ref 7.35–7.45)
PH BLDV: 7.44 [PH] (ref 7.35–7.45)
PHOSPHATE SERPL-MCNC: 1.2 MG/DL (ref 2.5–4.9)
PLATELET # BLD AUTO: 75 K/UL (ref 135–450)
PMV BLD AUTO: 11.1 FL (ref 5–10.5)
PO2 BLDA: <30 MMHG (ref 75–108)
PO2 BLDV: 31 MMHG
POTASSIUM SERPL-SCNC: 3 MMOL/L (ref 3.5–5.1)
PROCALCITONIN SERPL IA-MCNC: 12.97 NG/ML (ref 0–0.15)
RBC # BLD AUTO: 3.63 M/UL (ref 4–5.2)
SAO2 % BLDA: 62.9 %
SAO2 % BLDV: 68 %
SODIUM SERPL-SCNC: 140 MMOL/L (ref 136–145)
WBC # BLD AUTO: 20.6 K/UL (ref 4–11)

## 2023-12-22 PROCEDURE — 2000000000 HC ICU R&B

## 2023-12-22 PROCEDURE — 6360000002 HC RX W HCPCS: Performed by: STUDENT IN AN ORGANIZED HEALTH CARE EDUCATION/TRAINING PROGRAM

## 2023-12-22 PROCEDURE — 83605 ASSAY OF LACTIC ACID: CPT

## 2023-12-22 PROCEDURE — 2580000003 HC RX 258: Performed by: NURSE PRACTITIONER

## 2023-12-22 PROCEDURE — 94761 N-INVAS EAR/PLS OXIMETRY MLT: CPT

## 2023-12-22 PROCEDURE — 84100 ASSAY OF PHOSPHORUS: CPT

## 2023-12-22 PROCEDURE — 82330 ASSAY OF CALCIUM: CPT

## 2023-12-22 PROCEDURE — 85025 COMPLETE CBC W/AUTO DIFF WBC: CPT

## 2023-12-22 PROCEDURE — 6360000002 HC RX W HCPCS: Performed by: NURSE PRACTITIONER

## 2023-12-22 PROCEDURE — 2500000003 HC RX 250 WO HCPCS: Performed by: HOSPITALIST

## 2023-12-22 PROCEDURE — 99291 CRITICAL CARE FIRST HOUR: CPT | Performed by: INTERNAL MEDICINE

## 2023-12-22 PROCEDURE — 2580000003 HC RX 258: Performed by: INTERNAL MEDICINE

## 2023-12-22 PROCEDURE — 6360000002 HC RX W HCPCS: Performed by: HOSPITALIST

## 2023-12-22 PROCEDURE — 6370000000 HC RX 637 (ALT 250 FOR IP): Performed by: HOSPITALIST

## 2023-12-22 PROCEDURE — 2500000003 HC RX 250 WO HCPCS: Performed by: NURSE PRACTITIONER

## 2023-12-22 PROCEDURE — 6360000002 HC RX W HCPCS: Performed by: INTERNAL MEDICINE

## 2023-12-22 PROCEDURE — 2580000003 HC RX 258: Performed by: HOSPITALIST

## 2023-12-22 PROCEDURE — 94003 VENT MGMT INPAT SUBQ DAY: CPT

## 2023-12-22 PROCEDURE — 82803 BLOOD GASES ANY COMBINATION: CPT

## 2023-12-22 PROCEDURE — 2700000000 HC OXYGEN THERAPY PER DAY

## 2023-12-22 PROCEDURE — 84145 PROCALCITONIN (PCT): CPT

## 2023-12-22 PROCEDURE — APPNB15 APP NON BILLABLE TIME 0-15 MINS: Performed by: NURSE PRACTITIONER

## 2023-12-22 PROCEDURE — 37799 UNLISTED PX VASCULAR SURGERY: CPT

## 2023-12-22 PROCEDURE — 83735 ASSAY OF MAGNESIUM: CPT

## 2023-12-22 PROCEDURE — 85520 HEPARIN ASSAY: CPT

## 2023-12-22 PROCEDURE — 80048 BASIC METABOLIC PNL TOTAL CA: CPT

## 2023-12-22 PROCEDURE — 6370000000 HC RX 637 (ALT 250 FOR IP): Performed by: INTERNAL MEDICINE

## 2023-12-22 PROCEDURE — C9113 INJ PANTOPRAZOLE SODIUM, VIA: HCPCS | Performed by: INTERNAL MEDICINE

## 2023-12-22 RX ORDER — MIDAZOLAM HYDROCHLORIDE 1 MG/ML
1 INJECTION INTRAMUSCULAR; INTRAVENOUS ONCE
Status: COMPLETED | OUTPATIENT
Start: 2023-12-22 | End: 2023-12-22

## 2023-12-22 RX ORDER — POTASSIUM CHLORIDE 29.8 MG/ML
20 INJECTION INTRAVENOUS PRN
Status: DISPENSED | OUTPATIENT
Start: 2023-12-22

## 2023-12-22 RX ORDER — INSULIN LISPRO 100 [IU]/ML
0-8 INJECTION, SOLUTION INTRAVENOUS; SUBCUTANEOUS
Status: DISCONTINUED | OUTPATIENT
Start: 2023-12-22 | End: 2023-12-22

## 2023-12-22 RX ORDER — INSULIN LISPRO 100 [IU]/ML
0-8 INJECTION, SOLUTION INTRAVENOUS; SUBCUTANEOUS EVERY 4 HOURS
Status: DISPENSED | OUTPATIENT
Start: 2023-12-22

## 2023-12-22 RX ORDER — INSULIN LISPRO 100 [IU]/ML
0-4 INJECTION, SOLUTION INTRAVENOUS; SUBCUTANEOUS NIGHTLY
Status: DISCONTINUED | OUTPATIENT
Start: 2023-12-22 | End: 2023-12-22

## 2023-12-22 RX ADMIN — SODIUM CHLORIDE, PRESERVATIVE FREE 10 ML: 5 INJECTION INTRAVENOUS at 20:03

## 2023-12-22 RX ADMIN — DANTROLENE SODIUM 200 MG: 25 CAPSULE ORAL at 20:06

## 2023-12-22 RX ADMIN — MIDAZOLAM 1 MG: 1 INJECTION INTRAMUSCULAR; INTRAVENOUS at 10:17

## 2023-12-22 RX ADMIN — SODIUM CHLORIDE, PRESERVATIVE FREE 40 MG: 5 INJECTION INTRAVENOUS at 08:07

## 2023-12-22 RX ADMIN — LINEZOLID 600 MG: 600 INJECTION, SOLUTION INTRAVENOUS at 08:06

## 2023-12-22 RX ADMIN — AZITHROMYCIN MONOHYDRATE 500 MG: 500 INJECTION, POWDER, LYOPHILIZED, FOR SOLUTION INTRAVENOUS at 05:24

## 2023-12-22 RX ADMIN — HEPARIN SODIUM 1480 UNITS/HR: 10000 INJECTION, SOLUTION INTRAVENOUS at 01:18

## 2023-12-22 RX ADMIN — POLYETHYLENE GLYCOL 3350 17 G: 17 POWDER, FOR SOLUTION ORAL at 21:35

## 2023-12-22 RX ADMIN — INSULIN LISPRO 2 UNITS: 100 INJECTION, SOLUTION INTRAVENOUS; SUBCUTANEOUS at 12:10

## 2023-12-22 RX ADMIN — PROPOFOL 20 MCG/KG/MIN: 10 INJECTION, EMULSION INTRAVENOUS at 06:52

## 2023-12-22 RX ADMIN — SODIUM CHLORIDE, PRESERVATIVE FREE 10 ML: 5 INJECTION INTRAVENOUS at 08:07

## 2023-12-22 RX ADMIN — HEPARIN SODIUM 1380 UNITS/HR: 10000 INJECTION, SOLUTION INTRAVENOUS at 20:43

## 2023-12-22 RX ADMIN — ASPIRIN 81 MG: 81 TABLET, COATED ORAL at 07:52

## 2023-12-22 RX ADMIN — Medication 21 MCG/MIN: at 01:16

## 2023-12-22 RX ADMIN — DANTROLENE SODIUM 200 MG: 25 CAPSULE ORAL at 07:51

## 2023-12-22 RX ADMIN — POLYETHYLENE GLYCOL 3350 17 G: 17 POWDER, FOR SOLUTION ORAL at 07:52

## 2023-12-22 RX ADMIN — DEXAMETHASONE SODIUM PHOSPHATE 6 MG: 10 INJECTION, SOLUTION INTRAMUSCULAR; INTRAVENOUS at 05:25

## 2023-12-22 RX ADMIN — AMITRIPTYLINE HYDROCHLORIDE 50 MG: 50 TABLET, FILM COATED ORAL at 20:06

## 2023-12-22 RX ADMIN — POTASSIUM PHOSPHATE 20 MMOL: 236; 224 INJECTION, SOLUTION INTRAVENOUS at 09:34

## 2023-12-22 RX ADMIN — POTASSIUM CHLORIDE 20 MEQ: 29.8 INJECTION, SOLUTION INTRAVENOUS at 07:51

## 2023-12-22 RX ADMIN — MEROPENEM 1000 MG: 1 INJECTION, POWDER, FOR SOLUTION INTRAVENOUS at 18:04

## 2023-12-22 RX ADMIN — SERTRALINE 150 MG: 100 TABLET, FILM COATED ORAL at 07:52

## 2023-12-22 RX ADMIN — LINEZOLID 600 MG: 600 INJECTION, SOLUTION INTRAVENOUS at 20:01

## 2023-12-22 RX ADMIN — LEVOTHYROXINE SODIUM 50 MCG: 0.05 TABLET ORAL at 05:27

## 2023-12-22 RX ADMIN — MEROPENEM 1000 MG: 1 INJECTION, POWDER, FOR SOLUTION INTRAVENOUS at 02:35

## 2023-12-22 RX ADMIN — PROPOFOL 15 MCG/KG/MIN: 10 INJECTION, EMULSION INTRAVENOUS at 16:31

## 2023-12-22 RX ADMIN — POTASSIUM CHLORIDE 20 MEQ: 29.8 INJECTION, SOLUTION INTRAVENOUS at 06:41

## 2023-12-22 RX ADMIN — POTASSIUM CHLORIDE 20 MEQ: 29.8 INJECTION, SOLUTION INTRAVENOUS at 09:35

## 2023-12-22 RX ADMIN — MEROPENEM 1000 MG: 1 INJECTION, POWDER, FOR SOLUTION INTRAVENOUS at 12:03

## 2023-12-22 ASSESSMENT — PULMONARY FUNCTION TESTS
PIF_VALUE: 31
PIF_VALUE: 30
PIF_VALUE: 27
PIF_VALUE: 28
PIF_VALUE: 30
PIF_VALUE: 28
PIF_VALUE: 29
PIF_VALUE: 33
PIF_VALUE: 29
PIF_VALUE: 30
PIF_VALUE: 31
PIF_VALUE: 30
PIF_VALUE: 29
PIF_VALUE: 21
PIF_VALUE: 28
PIF_VALUE: 31
PIF_VALUE: 26
PIF_VALUE: 29
PIF_VALUE: 29
PIF_VALUE: 27
PIF_VALUE: 28
PIF_VALUE: 29
PIF_VALUE: 32
PIF_VALUE: 27
PIF_VALUE: 28
PIF_VALUE: 27
PIF_VALUE: 30
PIF_VALUE: 29
PIF_VALUE: 30
PIF_VALUE: 29
PIF_VALUE: 37
PIF_VALUE: 29
PIF_VALUE: 27
PIF_VALUE: 30
PIF_VALUE: 30
PIF_VALUE: 35
PIF_VALUE: 30
PIF_VALUE: 31
PIF_VALUE: 31
PIF_VALUE: 34
PIF_VALUE: 29
PIF_VALUE: 35
PIF_VALUE: 28
PIF_VALUE: 30
PIF_VALUE: 31
PIF_VALUE: 30
PIF_VALUE: 27
PIF_VALUE: 31
PIF_VALUE: 27
PIF_VALUE: 28
PIF_VALUE: 29
PIF_VALUE: 28
PIF_VALUE: 31
PIF_VALUE: 29
PIF_VALUE: 25
PIF_VALUE: 31
PIF_VALUE: 29
PIF_VALUE: 30
PIF_VALUE: 21
PIF_VALUE: 27
PIF_VALUE: 27
PIF_VALUE: 21
PIF_VALUE: 28
PIF_VALUE: 27
PIF_VALUE: 32
PIF_VALUE: 29
PIF_VALUE: 29
PIF_VALUE: 28
PIF_VALUE: 31
PIF_VALUE: 32
PIF_VALUE: 30
PIF_VALUE: 29
PIF_VALUE: 28
PIF_VALUE: 34
PIF_VALUE: 26
PIF_VALUE: 31
PIF_VALUE: 29
PIF_VALUE: 27
PIF_VALUE: 31
PIF_VALUE: 29
PIF_VALUE: 33
PIF_VALUE: 29
PIF_VALUE: 30
PIF_VALUE: 38
PIF_VALUE: 27
PIF_VALUE: 32
PIF_VALUE: 31
PIF_VALUE: 28
PIF_VALUE: 32
PIF_VALUE: 35
PIF_VALUE: 30
PIF_VALUE: 28
PIF_VALUE: 32
PIF_VALUE: 28
PIF_VALUE: 29
PIF_VALUE: 28
PIF_VALUE: 32
PIF_VALUE: 27
PIF_VALUE: 29
PIF_VALUE: 29
PIF_VALUE: 31
PIF_VALUE: 30
PIF_VALUE: 29
PIF_VALUE: 32
PIF_VALUE: 36
PIF_VALUE: 30
PIF_VALUE: 32
PIF_VALUE: 35
PIF_VALUE: 29
PIF_VALUE: 21
PIF_VALUE: 29
PIF_VALUE: 30
PIF_VALUE: 33
PIF_VALUE: 27
PIF_VALUE: 27
PIF_VALUE: 30
PIF_VALUE: 31
PIF_VALUE: 31
PIF_VALUE: 36

## 2023-12-22 NOTE — PROGRESS NOTES
4 Eyes Skin Assessment     NAME:  Liana Meza  YOB: 1960  MEDICAL RECORD NUMBER:  [id number]    The patient is being assessed for  Shift Handoff    I agree that at least one RN has performed a thorough Head to Toe Skin Assessment on the patient. ALL assessment sites listed below have been assessed. Areas assessed by both nurses:    Head, Face, Ears, Shoulders, Back, Chest, Arms, Elbows, Hands, Sacrum. Buttock, Coccyx, Ischium, Legs. Feet and Heels, and Under Medical Devices         Does the Patient have a Wound?  No noted wound(s)       Mauricio Prevention initiated by RN: Yes  Wound Care Orders initiated by RN: No    Pressure Injury (Stage 3,4, Unstageable, DTI, NWPT, and Complex wounds) if present, place Wound referral order by RN under : No    New Ostomies, if present place, Ostomy referral order under : No     Nurse 1 eSignature: Electronically signed by Simón Harrington RN on 12/22/23 at 6:08 AM EST    **SHARE this note so that the co-signing nurse can place an eSignature**    Nurse 2 eSignature: Electronically signed by Tomer Ortega RN on 12/22/23 at 6:53 PM EST

## 2023-12-22 NOTE — PROGRESS NOTES
2094: sedation stopped for SAT  1006: Dr. Abimbola Garcia rounding, vent switched to SBT. Pt tachypneic.  Orders received to give IV versed  [phone]: Pt still tachypneic, perfectserve sent to Dr. Abimbola Garcia, no new orders received  200: RT made aware to switch pt's vent setting back

## 2023-12-22 NOTE — PROGRESS NOTES
Clinical Pharmacy Note  Heparin Dosing       Lab Results   Component Value Date/Time    APTT 27.7 05/06/2015 01:28 PM     Lab Results   Component Value Date/Time    HGB 11.6 12/21/2023 01:50 PM    HCT 33.8 12/21/2023 01:50 PM    PLT 72 12/21/2023 01:50 PM    INR 1.16 05/20/2015 05:30 AM       Current Infusion Rate: 1780 units/hr  Current anti-Xa level: >1.10    Plan:  HOLD for 1 hour  Rate: Decrease 1480 units/hr  Next anti-Xa level: 0400  12/22/23    Pharmacy will continue to monitor and adjust based on anti-Xa results.     Teena Reid, [address] [place] [address]  12/21/2023 9:35 PM

## 2023-12-22 NOTE — PLAN OF CARE
Problem: Discharge Planning  Goal: Discharge to home or other facility with appropriate resources  Outcome: Progressing  Flowsheets (Taken 12/21/2023 1945)  Discharge to home or other facility with appropriate resources:   Identify barriers to discharge with patient and caregiver   Arrange for needed discharge resources and transportation as appropriate   Identify discharge learning needs (meds, wound care, etc)     Problem: Neurosensory - Adult  Goal: Achieves stable or improved neurological status  Outcome: Progressing  Flowsheets (Taken 12/21/2023 1945)  Achieves stable or improved neurological status:   Assess for and report changes in neurological status   Initiate measures to prevent increased intracranial pressure   Maintain blood pressure and fluid volume within ordered parameters to optimize cerebral perfusion and minimize risk of hemorrhage   Monitor temperature, glucose, and sodium.  Initiate appropriate interventions as ordered     Problem: Neurosensory - Adult  Goal: Achieves maximal functionality and self care  Outcome: Progressing  Flowsheets (Taken 12/21/2023 1945)  Achieves maximal functionality and self care:   Monitor swallowing and airway patency with patient fatigue and changes in neurological status   Encourage and assist patient to increase activity and self care with guidance from physical therapy/occupational therapy     Problem: Respiratory - Adult  Goal: Achieves optimal ventilation and oxygenation  Outcome: Progressing  Flowsheets (Taken 12/21/2023 1945)  Achieves optimal ventilation and oxygenation:   Assess for changes in respiratory status   Assess for changes in mentation and behavior   Position to facilitate oxygenation and minimize respiratory effort   Oxygen supplementation based on oxygen saturation or arterial blood gases   Assess the need for suctioning and aspirate as needed   Respiratory therapy support as indicated     Problem: Skin/Tissue Integrity - Adult  Goal: Skin integrity remains intact  Outcome: Progressing  Flowsheets (Taken 12/21/2023 1945)  Skin Integrity Remains Intact:   Monitor for areas of redness and/or skin breakdown   Assess vascular access sites hourly     Problem: Skin/Tissue Integrity - Adult  Goal: Incisions, wounds, or drain sites healing without S/S of infection  Outcome: Progressing  Flowsheets (Taken 12/21/2023 1945)  Incisions, Wounds, or Drain Sites Healing Without Sign and Symptoms of Infection:   ADMISSION and DAILY: Assess and document risk factors for pressure ulcer development   TWICE DAILY: Assess and document skin integrity   TWICE DAILY: Assess and document dressing/incision, wound bed, drain sites and surrounding tissue   Initiate pressure ulcer prevention bundle as indicated     Problem: Skin/Tissue Integrity - Adult  Goal: Oral mucous membranes remain intact  Outcome: Progressing  Flowsheets (Taken 12/21/2023 1945)  Oral Mucous Membranes Remain Intact:   Assess oral mucosa and hygiene practices   Implement preventative oral hygiene regimen     Problem: Musculoskeletal - Adult  Goal: Return mobility to safest level of function  Outcome: Progressing  Flowsheets (Taken 12/21/2023 1945)  Return Mobility to Safest Level of Function:   Assist with transfers and ambulation using safe patient handling equipment as needed   Apply continuous passive motion per provider or physical therapy orders to increase flexion toward goal     Problem: Musculoskeletal - Adult  Goal: Return ADL status to a safe level of function  Outcome: Progressing  Flowsheets (Taken 12/21/2023 1945)  Return ADL Status to a Safe Level of Function: Administer medication as ordered     Problem: Gastrointestinal - Adult  Goal: Minimal or absence of nausea and vomiting  Outcome: Progressing  Flowsheets (Taken 12/21/2023 1945)  Minimal or absence of nausea and vomiting: Provide nonpharmacologic comfort measures as appropriate     Problem: Gastrointestinal - Adult  Goal: Maintains or  returns to baseline bowel function  Outcome: Progressing  Flowsheets (Taken 12/21/2023 1945)  Maintains or returns to baseline bowel function:   Assess bowel function   Administer ordered medications as needed     Problem: Gastrointestinal - Adult  Goal: Maintains adequate nutritional intake  Outcome: Progressing  Flowsheets (Taken 12/21/2023 1945)  Maintains adequate nutritional intake: Monitor intake and output, weight and lab values     Problem: Safety - Adult  Goal: Free from fall injury  Outcome: Progressing  Flowsheets (Taken 12/21/2023 2568)  Free From Fall Injury: Instruct family/caregiver on patient safety     Problem: Skin/Tissue Integrity  Goal: Absence of new skin breakdown  Description: 1.  Monitor for areas of redness and/or skin breakdown  2.  Assess vascular access sites hourly  3.  Every 4-6 hours minimum:  Change oxygen saturation probe site  4.  Every 4-6 hours:  If on nasal continuous positive airway pressure, respiratory therapy assess nares and determine need for appliance change or resting period.  Outcome: Progressing     Problem: Safety - Medical Restraint  Goal: Remains free of injury from restraints (Restraint for Interference with Medical Device)  Description: INTERVENTIONS:  1. Determine that other, less restrictive measures have been tried or would not be effective before applying the restraint  2. Evaluate the patient's condition at the time of restraint application  3. Inform patient/family regarding the reason for restraint  4. Q2H: Monitor safety, psychosocial status, comfort, nutrition and hydration  Outcome: Progressing  Flowsheets (Taken 12/20/2023 0610 by Kaylene Mcdowell, RN)  Remains free of injury from restraints (restraint for interference with medical device):   Every 2 hours: Monitor safety, psychosocial status, comfort, nutrition and hydration   Inform patient/family regarding the reason for restraint   Evaluate the patient's condition at the time of restraint application

## 2023-12-22 NOTE — PROGRESS NOTES
[facility] [place]      Name:  Shane Hernandez /Age/Sex: 1960  (65 y.o. female)   MRN & CSN:  [id number] & [id number] Encounter Date/Time: 2023 8:49 AM EST   Location:  [id number] PCP: Mo Crocker MD         Summary     Hospital Day: 7    Ms. Shane Hernandez is a 65 y.o. female who was admitted on 2023 with a chief complaint of     Chief Complaint   Patient presents with    Altered Mental Status     Altered Mental Status per [facility] staff. Pt A&O x 4 upon arrival to ER. Pt dx with covid 6 days ago. Assessment and Recommendations     Severe sepsis/septic shock: Continue care in ICU, continue antibiotics (azithromycin + meropenem + linezolid). Continue IV fluids, only on levophed for BP support now. WBC count better, now 20.6 - was 24.6. UA was negative, BC NGTD x 4 days. Acute respiratory failure with hypoxia and hypercapnia: Currently intubated with vent support, continue to monitor, critical care following    Acute HFrEF with cardiomyopathy: ECHO 2023 showed EF of 20% with akinetic distal left ventricle. On dobutamine and low-dose heparin, cardiology is following - considering cath. Repeat echo early next week. Elevated troponins: ACS versus nonischemic myocardial injury secondary to sepsis.   As outlined above cardiology will evaluate for ischemia once sepsis improves    Lactic acidosis: Lactic acid 2.9 - 3.0, was 4.2 and 6.8 previously    Acute metabolic encephalopathy: Currently intubated, cerebral palsy with baseline    Recent COVID infection: Possible source for sepsis    Hypothyroidism: Continue levothyroxine    Essential hypertension: Continue to hold p.o. antihypertensives, patient is relatively hypotensive    UGIB: Coffee-ground content in PEG tube, continue PPI, was evaluated by GI          Diet Diet NPO  ADULT TUBE FEEDING; PEG; Peptide Based; Continuous; 15; Yes; 10; Q 4 hours; 55; 30; Q 4 hours; Protein;

## 2023-12-22 NOTE — PROGRESS NOTES
Clinical Pharmacy Note  Heparin Dosing       Lab Results   Component Value Date/Time    APTT 27.7 05/06/2015 01:28 PM     Lab Results   Component Value Date/Time    HGB 11.6 12/22/2023 04:05 AM    HCT 34.4 12/22/2023 04:05 AM    PLT 75 12/22/2023 04:05 AM    INR 1.16 05/20/2015 05:30 AM       Current Infusion Rate: 1100 units/hr    Plan:  Rate: 1100 units/hr  Next anti-Xa level: 1800 12/22/23    Pharmacy will continue to monitor and adjust based on anti-Xa results.

## 2023-12-22 NOTE — PROGRESS NOTES
Clinical Pharmacy Note  Heparin Dosing       Lab Results   Component Value Date/Time    APTT 27.7 05/06/2015 01:28 PM     Lab Results   Component Value Date/Time    HGB 11.6 12/22/2023 04:05 AM    HCT 34.4 12/22/2023 04:05 AM    PLT 75 12/22/2023 04:05 AM    INR 1.16 05/20/2015 05:30 AM       Current Infusion Rate: 5065 units/hr  Current anti-Xa level: 0.74    Plan:  Rate: Decrease 1380 units/hr  Next anti-Xa level: 1100 on 12/22/23    Pharmacy will continue to monitor and adjust based on anti-Xa results.     Rocío Wakefield, [facility]  12/22/2023 4:55 AM

## 2023-12-22 NOTE — PLAN OF CARE
Problem: Discharge Planning  Goal: Discharge to home or other facility with appropriate resources  12/22/2023 0951 by Magen Dewey RN  Outcome: Progressing  Flowsheets (Taken 12/22/2023 0730)  Discharge to home or other facility with appropriate resources: Identify barriers to discharge with patient and caregiver     Problem: Neurosensory - Adult  Goal: Achieves stable or improved neurological status  12/22/2023 0951 by Magen Dewey RN  Outcome: Progressing  Flowsheets (Taken 12/22/2023 0730)  Achieves stable or improved neurological status: Assess for and report changes in neurological status     Problem: Neurosensory - Adult  Goal: Absence of seizures  Outcome: Progressing     Problem: Neurosensory - Adult  Goal: Remains free of injury related to seizures activity  Outcome: Progressing     Problem: Neurosensory - Adult  Goal: Achieves maximal functionality and self care  12/22/2023 0951 by Magen Dewey RN  Outcome: Progressing  Flowsheets (Taken 12/22/2023 0730)  Achieves maximal functionality and self care: Monitor swallowing and airway patency with patient fatigue and changes in neurological status     Problem: Respiratory - Adult  Goal: Achieves optimal ventilation and oxygenation  12/22/2023 0951 by Magen Dewey RN  Outcome: Progressing  Flowsheets (Taken 12/22/2023 0730)  Achieves optimal ventilation and oxygenation:   Assess for changes in respiratory status   Assess for changes in mentation and behavior     Problem: Skin/Tissue Integrity - Adult  Goal: Skin integrity remains intact  12/22/2023 0951 by Magen Dewey RN  Outcome: Progressing  Flowsheets (Taken 12/22/2023 0730)  Skin Integrity Remains Intact: Monitor for areas of redness and/or skin breakdown     Problem: Skin/Tissue Integrity - Adult  Goal: Incisions, wounds, or drain sites healing without S/S of infection  12/22/2023 0951 by Magen Dewey RN  Outcome: Progressing  Flowsheets (Taken 12/22/2023 0730)  Incisions, Wounds, or application  3. Inform patient/family regarding the reason for restraint  4. Q2H: Monitor safety, psychosocial status, comfort, nutrition and hydration  12/22/2023 0951 by Valerie Villagomez RN  Outcome: Progressing     Problem: Pain  Goal: Verbalizes/displays adequate comfort level or baseline comfort level  12/22/2023 0951 by Valerie Villagomez RN  Outcome: Progressing     Problem: Decision Making  Goal: Pt/Family able to effectively weigh alternatives and participate in decision making related to treatment and care  Description: INTERVENTIONS:  1. Determine when there are differences between patient's view, family's view, and healthcare provider's view of condition  2. Facilitate patient and family articulation of goals for care  3. Help patient and family identify pros/cons of alternative solutions  4. Provide information as requested by patient/family  5. Respect patient/family right to receive or not to receive information  6. Serve as a liaison between patient and family and health care team  7. Initiate Consults from Ethics, Palliative Care or initiate Family Care Conference as is appropriate  Outcome: Progressing  Flowsheets (Taken 12/22/2023 0130)  Patient/family able to effectively weigh alternatives and participate in decision making related to treatment and care: Determine when there are differences between patient's view, family's view, and healthcare provider's view of condition

## 2023-12-22 NOTE — PROGRESS NOTES
Clinical Pharmacy Note  Heparin Dosing       Lab Results   Component Value Date/Time    APTT 27.7 05/06/2015 01:28 PM     Lab Results   Component Value Date/Time    HGB 11.6 12/22/2023 04:05 AM    HCT 34.4 12/22/2023 04:05 AM    PLT 75 12/22/2023 04:05 AM    INR 1.16 05/20/2015 05:30 AM       Current Infusion Rate: 1380 units/hr    Plan:  Rate: Continue at 1380 units/hr  Next anti-Xa level: 0600 12/23/23    Pharmacy will continue to monitor and adjust based on anti-Xa results.      Jim Klein RPH, PharmD, BCPS  12/22/2023 6:50 PM

## 2023-12-22 NOTE — PROGRESS NOTES
A-line leaking around dressing throughout shift and waveform dampened. Dressing charged earlier in day. This RN tried multiple times to reposition pt's arm to get an accurate BP reading. Blood return sluggish. A-line Dc'd and Vaseline dressing applied to site.  BP cuff applied

## 2023-12-23 ENCOUNTER — APPOINTMENT (OUTPATIENT)
Dept: GENERAL RADIOLOGY | Age: 63
DRG: 207 | End: 2023-12-23
Payer: MEDICARE

## 2023-12-23 LAB
ALBUMIN SERPL-MCNC: 2.4 G/DL (ref 3.4–5)
ALP SERPL-CCNC: 328 U/L (ref 40–129)
ALT SERPL-CCNC: 28 U/L (ref 10–40)
ANION GAP SERPL CALCULATED.3IONS-SCNC: 7 MMOL/L (ref 3–16)
ANTI-XA UNFRAC HEPARIN: 0.32 IU/ML (ref 0.3–0.7)
AST SERPL-CCNC: 52 U/L (ref 15–37)
BASOPHILS # BLD: 0.1 K/UL (ref 0–0.2)
BASOPHILS NFR BLD: 0.5 %
BILIRUB DIRECT SERPL-MCNC: 0.6 MG/DL (ref 0–0.3)
BILIRUB INDIRECT SERPL-MCNC: 0.1 MG/DL (ref 0–1)
BILIRUB SERPL-MCNC: 0.7 MG/DL (ref 0–1)
BUN SERPL-MCNC: 11 MG/DL (ref 7–20)
CALCIUM SERPL-MCNC: 7.4 MG/DL (ref 8.3–10.6)
CHLORIDE SERPL-SCNC: 107 MMOL/L (ref 99–110)
CO2 SERPL-SCNC: 26 MMOL/L (ref 21–32)
CREAT SERPL-MCNC: <0.5 MG/DL (ref 0.6–1.2)
DEPRECATED RDW RBC AUTO: 16.3 % (ref 12.4–15.4)
EOSINOPHIL # BLD: 0 K/UL (ref 0–0.6)
EOSINOPHIL NFR BLD: 0.1 %
GFR SERPLBLD CREATININE-BSD FMLA CKD-EPI: >60 ML/MIN/{1.73_M2}
GLUCOSE BLD-MCNC: 180 MG/DL (ref 70–99)
GLUCOSE BLD-MCNC: 181 MG/DL (ref 70–99)
GLUCOSE BLD-MCNC: 209 MG/DL (ref 70–99)
GLUCOSE BLD-MCNC: 220 MG/DL (ref 70–99)
GLUCOSE BLD-MCNC: 241 MG/DL (ref 70–99)
GLUCOSE BLD-MCNC: 256 MG/DL (ref 70–99)
GLUCOSE SERPL-MCNC: 189 MG/DL (ref 70–99)
HCT VFR BLD AUTO: 30.3 % (ref 36–48)
HGB BLD-MCNC: 10.3 G/DL (ref 12–16)
LACTATE BLDV-SCNC: 2 MMOL/L (ref 0.4–2)
LACTATE BLDV-SCNC: 2.2 MMOL/L (ref 0.4–2)
LACTATE BLDV-SCNC: 2.7 MMOL/L (ref 0.4–2)
LACTATE BLDV-SCNC: 3.2 MMOL/L (ref 0.4–2)
LYMPHOCYTES # BLD: 1.5 K/UL (ref 1–5.1)
LYMPHOCYTES NFR BLD: 10.7 %
MAGNESIUM SERPL-MCNC: 2.2 MG/DL (ref 1.8–2.4)
MCH RBC QN AUTO: 32.3 PG (ref 26–34)
MCHC RBC AUTO-ENTMCNC: 34 G/DL (ref 31–36)
MCV RBC AUTO: 94.9 FL (ref 80–100)
MONOCYTES # BLD: 0.4 K/UL (ref 0–1.3)
MONOCYTES NFR BLD: 2.8 %
NEUTROPHILS # BLD: 12 K/UL (ref 1.7–7.7)
NEUTROPHILS NFR BLD: 85.9 %
PERFORMED ON: ABNORMAL
PHOSPHATE SERPL-MCNC: 1.2 MG/DL (ref 2.5–4.9)
PHOSPHATE SERPL-MCNC: 2.3 MG/DL (ref 2.5–4.9)
PLATELET # BLD AUTO: 64 K/UL (ref 135–450)
PMV BLD AUTO: 10.8 FL (ref 5–10.5)
POTASSIUM SERPL-SCNC: 3.5 MMOL/L (ref 3.5–5.1)
POTASSIUM SERPL-SCNC: 3.6 MMOL/L (ref 3.5–5.1)
PROCALCITONIN SERPL IA-MCNC: 6.41 NG/ML (ref 0–0.15)
PROT SERPL-MCNC: 4.9 G/DL (ref 6.4–8.2)
RBC # BLD AUTO: 3.19 M/UL (ref 4–5.2)
SODIUM SERPL-SCNC: 140 MMOL/L (ref 136–145)
WBC # BLD AUTO: 14 K/UL (ref 4–11)

## 2023-12-23 PROCEDURE — 2500000003 HC RX 250 WO HCPCS: Performed by: HOSPITALIST

## 2023-12-23 PROCEDURE — 2580000003 HC RX 258: Performed by: HOSPITALIST

## 2023-12-23 PROCEDURE — 6360000002 HC RX W HCPCS: Performed by: INTERNAL MEDICINE

## 2023-12-23 PROCEDURE — 6360000002 HC RX W HCPCS: Performed by: HOSPITALIST

## 2023-12-23 PROCEDURE — 84100 ASSAY OF PHOSPHORUS: CPT

## 2023-12-23 PROCEDURE — 99231 SBSQ HOSP IP/OBS SF/LOW 25: CPT | Performed by: INTERNAL MEDICINE

## 2023-12-23 PROCEDURE — C9113 INJ PANTOPRAZOLE SODIUM, VIA: HCPCS | Performed by: INTERNAL MEDICINE

## 2023-12-23 PROCEDURE — A4216 STERILE WATER/SALINE, 10 ML: HCPCS | Performed by: INTERNAL MEDICINE

## 2023-12-23 PROCEDURE — 84132 ASSAY OF SERUM POTASSIUM: CPT

## 2023-12-23 PROCEDURE — 94761 N-INVAS EAR/PLS OXIMETRY MLT: CPT

## 2023-12-23 PROCEDURE — 94003 VENT MGMT INPAT SUBQ DAY: CPT

## 2023-12-23 PROCEDURE — 6360000002 HC RX W HCPCS: Performed by: STUDENT IN AN ORGANIZED HEALTH CARE EDUCATION/TRAINING PROGRAM

## 2023-12-23 PROCEDURE — 84145 PROCALCITONIN (PCT): CPT

## 2023-12-23 PROCEDURE — 99291 CRITICAL CARE FIRST HOUR: CPT | Performed by: INTERNAL MEDICINE

## 2023-12-23 PROCEDURE — 6370000000 HC RX 637 (ALT 250 FOR IP): Performed by: HOSPITALIST

## 2023-12-23 PROCEDURE — 36592 COLLECT BLOOD FROM PICC: CPT

## 2023-12-23 PROCEDURE — 83735 ASSAY OF MAGNESIUM: CPT

## 2023-12-23 PROCEDURE — 2580000003 HC RX 258: Performed by: INTERNAL MEDICINE

## 2023-12-23 PROCEDURE — 80076 HEPATIC FUNCTION PANEL: CPT

## 2023-12-23 PROCEDURE — 85025 COMPLETE CBC W/AUTO DIFF WBC: CPT

## 2023-12-23 PROCEDURE — 83605 ASSAY OF LACTIC ACID: CPT

## 2023-12-23 PROCEDURE — 2000000000 HC ICU R&B

## 2023-12-23 PROCEDURE — 85520 HEPARIN ASSAY: CPT

## 2023-12-23 PROCEDURE — 80048 BASIC METABOLIC PNL TOTAL CA: CPT

## 2023-12-23 PROCEDURE — 2700000000 HC OXYGEN THERAPY PER DAY

## 2023-12-23 PROCEDURE — 6370000000 HC RX 637 (ALT 250 FOR IP): Performed by: INTERNAL MEDICINE

## 2023-12-23 PROCEDURE — 71045 X-RAY EXAM CHEST 1 VIEW: CPT

## 2023-12-23 RX ORDER — FUROSEMIDE 10 MG/ML
40 INJECTION INTRAMUSCULAR; INTRAVENOUS ONCE
Status: COMPLETED | OUTPATIENT
Start: 2023-12-23 | End: 2023-12-23

## 2023-12-23 RX ADMIN — POTASSIUM CHLORIDE 20 MEQ: 29.8 INJECTION, SOLUTION INTRAVENOUS at 06:45

## 2023-12-23 RX ADMIN — PROPOFOL 30 MCG/KG/MIN: 10 INJECTION, EMULSION INTRAVENOUS at 11:43

## 2023-12-23 RX ADMIN — PROPOFOL 15 MCG/KG/MIN: 10 INJECTION, EMULSION INTRAVENOUS at 02:41

## 2023-12-23 RX ADMIN — INSULIN LISPRO 2 UNITS: 100 INJECTION, SOLUTION INTRAVENOUS; SUBCUTANEOUS at 16:07

## 2023-12-23 RX ADMIN — HEPARIN SODIUM 1380 UNITS/HR: 10000 INJECTION, SOLUTION INTRAVENOUS at 13:51

## 2023-12-23 RX ADMIN — PROPOFOL 35 MCG/KG/MIN: 10 INJECTION, EMULSION INTRAVENOUS at 15:51

## 2023-12-23 RX ADMIN — SODIUM CHLORIDE, PRESERVATIVE FREE 40 MG: 5 INJECTION INTRAVENOUS at 08:46

## 2023-12-23 RX ADMIN — POTASSIUM CHLORIDE 20 MEQ: 29.8 INJECTION, SOLUTION INTRAVENOUS at 05:41

## 2023-12-23 RX ADMIN — FUROSEMIDE 40 MG: 10 INJECTION, SOLUTION INTRAMUSCULAR; INTRAVENOUS at 11:33

## 2023-12-23 RX ADMIN — SODIUM CHLORIDE, PRESERVATIVE FREE 10 ML: 5 INJECTION INTRAVENOUS at 20:15

## 2023-12-23 RX ADMIN — DANTROLENE SODIUM 200 MG: 25 CAPSULE ORAL at 20:22

## 2023-12-23 RX ADMIN — MEROPENEM 1000 MG: 1 INJECTION, POWDER, FOR SOLUTION INTRAVENOUS at 02:34

## 2023-12-23 RX ADMIN — DOBUTAMINE HYDROCHLORIDE 2.5 MCG/KG/MIN: 200 INJECTION INTRAVENOUS at 00:50

## 2023-12-23 RX ADMIN — MEROPENEM 1000 MG: 1 INJECTION, POWDER, FOR SOLUTION INTRAVENOUS at 18:29

## 2023-12-23 RX ADMIN — MEROPENEM 1000 MG: 1 INJECTION, POWDER, FOR SOLUTION INTRAVENOUS at 11:47

## 2023-12-23 RX ADMIN — INSULIN LISPRO 4 UNITS: 100 INJECTION, SOLUTION INTRAVENOUS; SUBCUTANEOUS at 11:31

## 2023-12-23 RX ADMIN — LINEZOLID 600 MG: 600 INJECTION, SOLUTION INTRAVENOUS at 09:03

## 2023-12-23 RX ADMIN — SODIUM PHOSPHATE, MONOBASIC, MONOHYDRATE AND SODIUM PHOSPHATE, DIBASIC, ANHYDROUS 30 MMOL: 142; 276 INJECTION, SOLUTION INTRAVENOUS at 06:40

## 2023-12-23 RX ADMIN — PROPOFOL 35 MCG/KG/MIN: 10 INJECTION, EMULSION INTRAVENOUS at 21:47

## 2023-12-23 RX ADMIN — DANTROLENE SODIUM 200 MG: 25 CAPSULE ORAL at 11:07

## 2023-12-23 RX ADMIN — INSULIN LISPRO 2 UNITS: 100 INJECTION, SOLUTION INTRAVENOUS; SUBCUTANEOUS at 20:14

## 2023-12-23 RX ADMIN — AZITHROMYCIN MONOHYDRATE 500 MG: 500 INJECTION, POWDER, LYOPHILIZED, FOR SOLUTION INTRAVENOUS at 05:32

## 2023-12-23 RX ADMIN — LEVOTHYROXINE SODIUM 50 MCG: 0.05 TABLET ORAL at 05:35

## 2023-12-23 RX ADMIN — DEXAMETHASONE SODIUM PHOSPHATE 6 MG: 10 INJECTION, SOLUTION INTRAMUSCULAR; INTRAVENOUS at 05:32

## 2023-12-23 RX ADMIN — SODIUM CHLORIDE, PRESERVATIVE FREE 10 ML: 5 INJECTION INTRAVENOUS at 08:48

## 2023-12-23 RX ADMIN — AMITRIPTYLINE HYDROCHLORIDE 50 MG: 50 TABLET, FILM COATED ORAL at 20:23

## 2023-12-23 ASSESSMENT — PULMONARY FUNCTION TESTS
PIF_VALUE: 31
PIF_VALUE: 39
PIF_VALUE: 35
PIF_VALUE: 28
PIF_VALUE: 35
PIF_VALUE: 37
PIF_VALUE: 30
PIF_VALUE: 28
PIF_VALUE: 33
PIF_VALUE: 31
PIF_VALUE: 34
PIF_VALUE: 31
PIF_VALUE: 31
PIF_VALUE: 27
PIF_VALUE: 32
PIF_VALUE: 35
PIF_VALUE: 34
PIF_VALUE: 36
PIF_VALUE: 38
PIF_VALUE: 27
PIF_VALUE: 31
PIF_VALUE: 29
PIF_VALUE: 28
PIF_VALUE: 31
PIF_VALUE: 31
PIF_VALUE: 40
PIF_VALUE: 27
PIF_VALUE: 39
PIF_VALUE: 28
PIF_VALUE: 32
PIF_VALUE: 33
PIF_VALUE: 34
PIF_VALUE: 30
PIF_VALUE: 34
PIF_VALUE: 32
PIF_VALUE: 29
PIF_VALUE: 33
PIF_VALUE: 33
PIF_VALUE: 32
PIF_VALUE: 34
PIF_VALUE: 32
PIF_VALUE: 30
PIF_VALUE: 31
PIF_VALUE: 33
PIF_VALUE: 38
PIF_VALUE: 31
PIF_VALUE: 35
PIF_VALUE: 28
PIF_VALUE: 29
PIF_VALUE: 30
PIF_VALUE: 29
PIF_VALUE: 33
PIF_VALUE: 32
PIF_VALUE: 38
PIF_VALUE: 30
PIF_VALUE: 39
PIF_VALUE: 38
PIF_VALUE: 28
PIF_VALUE: 38
PIF_VALUE: 34
PIF_VALUE: 27
PIF_VALUE: 31
PIF_VALUE: 34
PIF_VALUE: 35
PIF_VALUE: 27
PIF_VALUE: 34
PIF_VALUE: 39
PIF_VALUE: 32
PIF_VALUE: 31
PIF_VALUE: 31
PIF_VALUE: 33
PIF_VALUE: 31
PIF_VALUE: 38
PIF_VALUE: 28
PIF_VALUE: 29
PIF_VALUE: 27
PIF_VALUE: 39
PIF_VALUE: 31
PIF_VALUE: 32
PIF_VALUE: 32
PIF_VALUE: 29
PIF_VALUE: 31
PIF_VALUE: 31
PIF_VALUE: 30
PIF_VALUE: 32
PIF_VALUE: 31
PIF_VALUE: 29
PIF_VALUE: 32
PIF_VALUE: 29
PIF_VALUE: 33
PIF_VALUE: 33
PIF_VALUE: 38
PIF_VALUE: 32
PIF_VALUE: 33
PIF_VALUE: 30
PIF_VALUE: 34
PIF_VALUE: 31
PIF_VALUE: 29
PIF_VALUE: 32

## 2023-12-23 NOTE — PROGRESS NOTES
Clinical Pharmacy Note  Heparin Dosing       Lab Results   Component Value Date/Time    APTT 27.7 05/06/2015 01:28 PM     Lab Results   Component Value Date/Time    HGB 10.3 12/23/2023 03:58 AM    HCT 30.3 12/23/2023 03:58 AM    PLT 64 12/23/2023 03:58 AM    INR 1.16 05/20/2015 05:30 AM       Current Infusion Rate: 1380 units/hr  Anti-Xa level: 0.32    Plan:  Rate: Continue at 1380 units/hr  Next anti-Xa level: 0600 12/24/23    Pharmacy will continue to monitor and adjust based on anti-Xa results.      Paulette Harmon [facility], PharmD, BCPS  12/23/2023 6:19 AM

## 2023-12-23 NOTE — PROGRESS NOTES
[facility] Progress Note      Name:  Lilly Terrell /Age/Sex: 1960  (62 y.o. female)   MRN & CSN:  [id number] & [id number] Encounter Date/Time: 2023 8:49 AM EST   Location:  [id number]-1511 PCP: Tyson Hahn     Attending:Krystal Schultz MD         Summary     Hospital Day: 8    Ms. Lilly Terrell is a 62 y.o. female who was admitted on 2023 with a chief complaint of     Chief Complaint   Patient presents with    Altered Mental Status     Altered Mental Status per [facility] staff.  Pt A&O x 4 upon arrival to ER.  Pt dx with covid 6 days ago.         Assessment and Recommendations     Severe sepsis/septic shock: Continue care in ICU, continue azithromycin + meropenem.  Linezolid was discontinued by ID.  WBC trending down, now 14 (was 20.6).  UA was negative, BC NGTD x 4 days.  MRSA screen was negative    Acute respiratory failure with hypoxia and hypercapnia: Currently trached with vent support, continue to monitor, critical care following    Acute HFrEF with cardiomyopathy: ECHO 2023 showed EF of 20% with akinetic distal left ventricle.  Continue dobutamine and low-dose heparin, cardiology is following - considering cath.  Repeat echo early next week.    Elevated troponins: ACS versus nonischemic myocardial injury secondary to sepsis.  As outlined above cardiology will evaluate for ischemia once sepsis improves    Lactic acidosis: Improved, now 2.2    Acute metabolic encephalopathy: Currently intubated, patient with cerebral palsy    Recent COVID infection: Discontinue isolation and Decadron    Hypothyroidism: Continue levothyroxine    Essential hypertension: Continue to hold p.o. antihypertensives, patient is relatively hypotensive    UGIB: Coffee-ground content in PEG tube, continue PPI, was evaluated by GI          Diet Diet NPO  ADULT TUBE FEEDING; PEG; Peptide Based; Continuous; 15; Yes; 10; Q 4 hours; 55; 30; Q 4 hours; Protein; Prosource x1 daily   DVT Prophylaxis []  \"TSH\"  Troponin: No results found for: \"TROPONINT\"  Lactic Acid:   Recent Labs     12/22/23  1802 12/22/23  2310 12/23/23  0358   LACTA 3.0* 3.4* 3.2*       BNP:   No results for input(s): \"PROBNP\" in the last 72 hours. UA:  Lab Results   Component Value Date/Time    NITRU Negative 11/27/2022 12:14 AM    COLORU DARK YELLOW 11/27/2022 12:14 AM    PHUR 7.0 11/27/2022 12:14 AM    LABCAST 0-1 Hyaline 05/06/2015 01:35 PM    WBCUA 3-5 11/27/2022 12:14 AM    RBCUA 11-20 11/27/2022 12:14 AM    BACTERIA 4+ 11/27/2022 12:14 AM    CLARITYU CLOUDY 11/27/2022 12:14 AM    SPECGRAV 1.021 11/27/2022 12:14 AM    LEUKOCYTESUR TRACE 11/27/2022 12:14 AM    UROBILINOGEN 1.0 11/27/2022 12:14 AM    BILIRUBINUR Negative 11/27/2022 12:14 AM    BLOODU TRACE 11/27/2022 12:14 AM    GLUCOSEU Negative 11/27/2022 12:14 AM    KETUA Negative 11/27/2022 12:14 AM    AMORPHOUS 2+ 05/06/2015 01:35 PM     Urine Cultures: No results found for: \"LABURIN\"  Blood Cultures:   Lab Results   Component Value Date/Time    [facility]  12/20/2023 12:53 AM     No Growth to date. Any change in status will be called. Lab Results   Component Value Date/Time    BLOODCULT2  12/20/2023 12:53 AM     No Growth to date. Any change in status will be called. Organism:   Lab Results   Component Value Date/Time    ORG SARS CoV 2  by PCR 12/17/2023 09:53 AM       Imaging    All imaging has been personally reviewed by me. US ABDOMEN LIMITED Specify organ? LIVER    Result Date: 12/20/2023  EXAMINATION: RIGHT UPPER QUADRANT ULTRASOUND 12/20/2023 6:17 pm COMPARISON: None. HISTORY: ORDERING SYSTEM PROVIDED HISTORY: elevated alk phos TECHNOLOGIST PROVIDED HISTORY: Reason for exam:->elevated alk phos Specify organ?->LIVER FINDINGS: LIVER:  Liver demonstrates abnormal echotexture but no evidence of hepatomegaly. There is fatty infiltration but no focal disease. BILIARY SYSTEM:  Gallbladder contracted and difficult to evaluate.   Within the limitation of the study the gallbladder is unremarkable without evidence of pericholecystic fluid, wall thickening or stones. Negative sonographic Ojeda's sign. Common bile duct not visualized. RIGHT KIDNEY: The right kidney is grossly unremarkable without evidence of hydronephrosis. PANCREAS:  Visualized portions of the pancreas are unremarkable. OTHER: No evidence of right upper quadrant ascites. Fatty liver. Gallbladder contracted but no obvious abnormality. Nonvisualization of the common bile duct. No evidence of ascites. XR CHEST PORTABLE    Result Date: 12/20/2023  EXAMINATION: ONE XRAY VIEW OF THE CHEST 12/20/2023 1:34 am COMPARISON: 12/20/2023 HISTORY: ORDERING SYSTEM PROVIDED HISTORY: intubation TECHNOLOGIST PROVIDED HISTORY: Reason for exam:->intubation Reason for Exam: ett & line placement FINDINGS: The endotracheal tube terminates 4.4 cm above the jairo. The left internal jugular catheter terminates in the left brachiocephalic vein. There is worsening right airspace disease due to multifocal pneumonia. The cardiac silhouette is stable. There is no pneumothorax or pleural effusion. Status post left shoulder arthroplasty. 1. Endotracheal tube 4.4 cm above the jairo. XR CHEST PORTABLE    Result Date: 12/20/2023  EXAMINATION: ONE XRAY VIEW OF THE CHEST 12/20/2023 12:37 am COMPARISON: 12/16/2023 HISTORY: ORDERING SYSTEM PROVIDED HISTORY: hypoxia, tachycardia TECHNOLOGIST PROVIDED HISTORY: Reason for exam:->hypoxia, tachycardia Reason for Exam: hypoxia, tachycardia FINDINGS: There is new patchy airspace disease in the left mid and lower lung. There is also mild disease suspected in the right perihilar region in addition to the previous noted atelectasis. There is trace right pleural fluid with blunting of the costophrenic angle. No pneumothorax. Heart size is within normal limits. No acute bone finding. New left mid and lower lung airspace disease most consistent with pneumonia.  Suspect mild right perihilar

## 2023-12-23 NOTE — PROGRESS NOTES
4 Eyes Skin Assessment     NAME:  Yasmany Harrington  YOB: 1960  MEDICAL RECORD NUMBER:  [id number]    The patient is being assessed for  Shift Handoff    I agree that at least one RN has performed a thorough Head to Toe Skin Assessment on the patient. ALL assessment sites listed below have been assessed. Areas assessed by both nurses:    Head, Face, Ears, Shoulders, Back, Chest, Arms, Elbows, Hands, Sacrum. Buttock, Coccyx, Ischium, Legs. Feet and Heels, and Under Medical Devices         Does the Patient have a Wound?  No noted wound(s)       Mauricio Prevention initiated by RN: Yes  Wound Care Orders initiated by RN: No    Pressure Injury (Stage 3,4, Unstageable, DTI, NWPT, and Complex wounds) if present, place Wound referral order by RN under : No    New Ostomies, if present place, Ostomy referral order under : No     Nurse 1 eSignature: Electronically signed by Nemo De La Cruz RN on 12/23/23 at 6:22 AM EST    **SHARE this note so that the co-signing nurse can place an eSignature**    Nurse 2 eSignature: {Esignature:[id number]}

## 2023-12-23 NOTE — PLAN OF CARE
Problem: Discharge Planning  Goal: Discharge to home or other facility with appropriate resources  12/23/2023 0923 by Luisa Yoon RN  Outcome: Progressing  12/23/2023 0458 by Jenny Cuba RN  Outcome: Progressing  Flowsheets (Taken 12/22/2023 1930)  Discharge to home or other facility with appropriate resources:   Identify barriers to discharge with patient and caregiver   Arrange for needed discharge resources and transportation as appropriate   Identify discharge learning needs (meds, wound care, etc)     Problem: Neurosensory - Adult  Goal: Achieves stable or improved neurological status  12/23/2023 0923 by Luisa Yoon RN  Outcome: Progressing  12/23/2023 0458 by Jenny Cuba RN  Outcome: Progressing  Flowsheets (Taken 12/22/2023 1930)  Achieves stable or improved neurological status:   Assess for and report changes in neurological status   Initiate measures to prevent increased intracranial pressure   Maintain blood pressure and fluid volume within ordered parameters to optimize cerebral perfusion and minimize risk of hemorrhage   Monitor temperature, glucose, and sodium. Initiate appropriate interventions as ordered  Goal: Absence of seizures  Outcome: Progressing  Goal: Remains free of injury related to seizures activity  Outcome: Progressing  Goal: Achieves maximal functionality and self care  12/23/2023 0923 by Luisa Yoon RN  Outcome: Progressing  12/23/2023 0458 by Jenny Cuba RN  Outcome: Progressing  Flowsheets (Taken 12/22/2023 1930)  Achieves maximal functionality and self care: Monitor swallowing and airway patency with patient fatigue and changes in neurological status     Problem: Respiratory - Adult  Goal: Achieves optimal ventilation and oxygenation  12/23/2023 0923 by Luisa Yoon RN  Outcome: Progressing  12/23/2023 0458 by Jenny Cuba RN  Outcome: Progressing  Flowsheets (Taken 12/22/2023 1930)  Achieves optimal ventilation and  RN  Outcome: Progressing  Flowsheets (Taken 12/22/2023 1930)  Return Mobility to Safest Level of Function:   Assist with transfers and ambulation using safe patient handling equipment as needed   Apply continuous passive motion per provider or physical therapy orders to increase flexion toward goal   Instruct patient/family in ordered activity level  Goal: Maintain proper alignment of affected body part  Outcome: Progressing  Goal: Return ADL status to a safe level of function  12/23/2023 0923 by Taqueria Rae RN  Outcome: Progressing  12/23/2023 0458 by Josefa Fan RN  Outcome: Progressing  Flowsheets (Taken 12/22/2023 1930)  Return ADL Status to a Safe Level of Function: Administer medication as ordered     Problem: Gastrointestinal - Adult  Goal: Minimal or absence of nausea and vomiting  12/23/2023 0923 by Taqueria Rae RN  Outcome: Progressing  12/23/2023 0458 by Joseaf Fan RN  Outcome: Progressing  Flowsheets (Taken 12/22/2023 1930)  Minimal or absence of nausea and vomiting:   Provide nonpharmacologic comfort measures as appropriate   Nutrition consult to assist patient with adequate nutrition and appropriate food choices  Goal: Maintains or returns to baseline bowel function  12/23/2023 0923 by Taqueria Rae RN  Outcome: Progressing  12/23/2023 0458 by Josefa Fan RN  Outcome: Progressing  [address] (Taken 12/22/2023 1930)  Maintains or returns to baseline bowel function:   Assess bowel function   Administer ordered medications as needed   Nutrition consult to assist patient with appropriate food choices  Goal: Maintains adequate nutritional intake  12/23/2023 0923 by Taqueria Rae RN  Outcome: Progressing  12/23/2023 0458 by Josefa Fan RN  Outcome: Progressing  Flowsheets (Taken 12/22/2023 1930)  Maintains adequate nutritional intake: Monitor intake and output, weight and lab values     Problem: Genitourinary - Adult  Goal: Absence of urinary reason for restraint   Every 2 hours: Monitor safety, psychosocial status, comfort, nutrition and hydration     Problem: Pain  Goal: Verbalizes/displays adequate comfort level or baseline comfort level  12/23/2023 0923 by Judit Wasserman RN  Outcome: Progressing  12/23/2023 0458 by Armond Rea RN  Outcome: Progressing  Flowsheets (Taken 12/22/2023 1930)  Verbalizes/displays adequate comfort level or baseline comfort level: Assess pain using appropriate pain scale     Problem: Cardiovascular - Adult  Goal: Maintains optimal cardiac output and hemodynamic stability  12/23/2023 0923 by Judit Wasserman RN  Outcome: Progressing  12/23/2023 0458 by Armond Rea RN  Outcome: Progressing  Flowsheets (Taken 12/22/2023 1930)  Maintains optimal cardiac output and hemodynamic stability:   Monitor blood pressure and heart rate   Monitor urine output and notify Licensed Independent Practitioner for values outside of normal range   Assess for signs of decreased cardiac output     Problem: Infection - Adult  Goal: Absence of infection at discharge  12/23/2023 0923 by Judit Wasserman RN  Outcome: Progressing  12/23/2023 0458 by Armond Rea RN  Outcome: Progressing  Flowsheets (Taken 12/22/2023 1930)  Absence of infection at discharge:   Assess and monitor for signs and symptoms of infection   Monitor lab/diagnostic results   Monitor all insertion sites i.e., indwelling lines, tubes and drains   Monitor endotracheal (as able) and nasal secretions for changes in amount and color   [place] appropriate cooling/warming therapies per order   Administer medications as ordered     Problem: Decision Making  Goal: Pt/Family able to effectively weigh alternatives and participate in decision making related to treatment and care  Description: INTERVENTIONS:  1. Determine when there are differences between patient's view, family's view, and healthcare provider's view of condition  2.  Facilitate patient and

## 2023-12-23 NOTE — PROGRESS NOTES
Dr. Cesar Muñiz notified of patient's urine output being 280 mL for entire shift. Waiting for response at this time.

## 2023-12-23 NOTE — PLAN OF CARE
Problem: Discharge Planning  Goal: Discharge to home or other facility with appropriate resources  Outcome: Progressing  Flowsheets (Taken 12/22/2023 1930)  Discharge to home or other facility with appropriate resources:   Identify barriers to discharge with patient and caregiver   Arrange for needed discharge resources and transportation as appropriate   Identify discharge learning needs (meds, wound care, etc)     Problem: Neurosensory - Adult  Goal: Achieves stable or improved neurological status  Outcome: Progressing  Flowsheets (Taken 12/22/2023 1930)  Achieves stable or improved neurological status:   Assess for and report changes in neurological status   Initiate measures to prevent increased intracranial pressure   Maintain blood pressure and fluid volume within ordered parameters to optimize cerebral perfusion and minimize risk of hemorrhage   Monitor temperature, glucose, and sodium.  Initiate appropriate interventions as ordered     Problem: Neurosensory - Adult  Goal: Achieves maximal functionality and self care  Outcome: Progressing  Flowsheets (Taken 12/22/2023 1930)  Achieves maximal functionality and self care: Monitor swallowing and airway patency with patient fatigue and changes in neurological status     Problem: Respiratory - Adult  Goal: Achieves optimal ventilation and oxygenation  Outcome: Progressing  Flowsheets (Taken 12/22/2023 1930)  Achieves optimal ventilation and oxygenation:   Assess for changes in respiratory status   Assess for changes in mentation and behavior   Position to facilitate oxygenation and minimize respiratory effort   Oxygen supplementation based on oxygen saturation or arterial blood gases   Assess the need for suctioning and aspirate as needed   Assess and instruct to report shortness of breath or any respiratory difficulty   Respiratory therapy support as indicated     Problem: Skin/Tissue Integrity - Adult  Goal: Skin integrity remains intact  Outcome: with adequate nutrition and appropriate food choices     Problem: Gastrointestinal - Adult  Goal: Maintains or returns to baseline bowel function  Outcome: Progressing  Flowsheets (Taken 12/22/2023 1930)  Maintains or returns to baseline bowel function:   Assess bowel function   Administer ordered medications as needed   Nutrition consult to assist patient with appropriate food choices     Problem: Gastrointestinal - Adult  Goal: Maintains adequate nutritional intake  Outcome: Progressing  Flowsheets (Taken 12/22/2023 1930)  Maintains adequate nutritional intake: Monitor intake and output, weight and lab values     Problem: Safety - Adult  Goal: Free from fall injury  Outcome: Progressing  Flowsheets (Taken 12/21/2023 0448)  Free From Fall Injury: Instruct family/caregiver on patient safety     Problem: Skin/Tissue Integrity  Goal: Absence of new skin breakdown  Description: 1.  Monitor for areas of redness and/or skin breakdown  2.  Assess vascular access sites hourly  3.  Every 4-6 hours minimum:  Change oxygen saturation probe site  4.  Every 4-6 hours:  If on nasal continuous positive airway pressure, respiratory therapy assess nares and determine need for appliance change or resting period.  Outcome: Progressing     Problem: Safety - Medical Restraint  Goal: Remains free of injury from restraints (Restraint for Interference with Medical Device)  Description: INTERVENTIONS:  1. Determine that other, less restrictive measures have been tried or would not be effective before applying the restraint  2. Evaluate the patient's condition at the time of restraint application  3. Inform patient/family regarding the reason for restraint  4. Q2H: Monitor safety, psychosocial status, comfort, nutrition and hydration  Outcome: Progressing  Flowsheets (Taken 12/23/2023 0448)  Remains free of injury from restraints (restraint for interference with medical device):   Determine that other, less restrictive measures have been

## 2023-12-24 ENCOUNTER — APPOINTMENT (OUTPATIENT)
Dept: GENERAL RADIOLOGY | Age: 63
DRG: 207 | End: 2023-12-24
Payer: MEDICARE

## 2023-12-24 PROBLEM — A41.9 SEPTIC SHOCK (HCC): Status: ACTIVE | Noted: 2023-12-24

## 2023-12-24 PROBLEM — R65.21 SEPTIC SHOCK (HCC): Status: ACTIVE | Noted: 2023-12-24

## 2023-12-24 LAB
ANION GAP SERPL CALCULATED.3IONS-SCNC: 7 MMOL/L (ref 3–16)
ANTI-XA UNFRAC HEPARIN: 0.29 IU/ML (ref 0.3–0.7)
ANTI-XA UNFRAC HEPARIN: 0.36 IU/ML (ref 0.3–0.7)
ANTI-XA UNFRAC HEPARIN: 0.53 IU/ML (ref 0.3–0.7)
BACTERIA BLD CULT ORG #2: NORMAL
BACTERIA BLD CULT: NORMAL
BUN SERPL-MCNC: 12 MG/DL (ref 7–20)
CALCIUM SERPL-MCNC: 7.2 MG/DL (ref 8.3–10.6)
CHLORIDE SERPL-SCNC: 107 MMOL/L (ref 99–110)
CO2 SERPL-SCNC: 28 MMOL/L (ref 21–32)
CREAT SERPL-MCNC: <0.5 MG/DL (ref 0.6–1.2)
DEPRECATED RDW RBC AUTO: 17.4 % (ref 12.4–15.4)
GFR SERPLBLD CREATININE-BSD FMLA CKD-EPI: >60 ML/MIN/{1.73_M2}
GLUCOSE BLD-MCNC: 151 MG/DL (ref 70–99)
GLUCOSE BLD-MCNC: 170 MG/DL (ref 70–99)
GLUCOSE BLD-MCNC: 171 MG/DL (ref 70–99)
GLUCOSE BLD-MCNC: 174 MG/DL (ref 70–99)
GLUCOSE BLD-MCNC: 197 MG/DL (ref 70–99)
GLUCOSE BLD-MCNC: 210 MG/DL (ref 70–99)
GLUCOSE SERPL-MCNC: 207 MG/DL (ref 70–99)
HCT VFR BLD AUTO: 29.4 % (ref 36–48)
HGB BLD-MCNC: 9.7 G/DL (ref 12–16)
LACTATE BLDV-SCNC: 2.3 MMOL/L (ref 0.4–2)
LACTATE BLDV-SCNC: 2.4 MMOL/L (ref 0.4–2)
MCH RBC QN AUTO: 32.6 PG (ref 26–34)
MCHC RBC AUTO-ENTMCNC: 32.9 G/DL (ref 31–36)
MCV RBC AUTO: 99.3 FL (ref 80–100)
PERFORMED ON: ABNORMAL
PLATELET # BLD AUTO: 70 K/UL (ref 135–450)
PMV BLD AUTO: 11.3 FL (ref 5–10.5)
POTASSIUM SERPL-SCNC: 3.6 MMOL/L (ref 3.5–5.1)
RBC # BLD AUTO: 2.96 M/UL (ref 4–5.2)
SODIUM SERPL-SCNC: 142 MMOL/L (ref 136–145)
WBC # BLD AUTO: 11.1 K/UL (ref 4–11)

## 2023-12-24 PROCEDURE — C9113 INJ PANTOPRAZOLE SODIUM, VIA: HCPCS | Performed by: INTERNAL MEDICINE

## 2023-12-24 PROCEDURE — 94760 N-INVAS EAR/PLS OXIMETRY 1: CPT

## 2023-12-24 PROCEDURE — 6370000000 HC RX 637 (ALT 250 FOR IP): Performed by: HOSPITALIST

## 2023-12-24 PROCEDURE — 71045 X-RAY EXAM CHEST 1 VIEW: CPT

## 2023-12-24 PROCEDURE — 85520 HEPARIN ASSAY: CPT

## 2023-12-24 PROCEDURE — 2000000000 HC ICU R&B

## 2023-12-24 PROCEDURE — A4216 STERILE WATER/SALINE, 10 ML: HCPCS | Performed by: INTERNAL MEDICINE

## 2023-12-24 PROCEDURE — 2580000003 HC RX 258: Performed by: INTERNAL MEDICINE

## 2023-12-24 PROCEDURE — 6360000002 HC RX W HCPCS: Performed by: STUDENT IN AN ORGANIZED HEALTH CARE EDUCATION/TRAINING PROGRAM

## 2023-12-24 PROCEDURE — 6360000002 HC RX W HCPCS: Performed by: HOSPITALIST

## 2023-12-24 PROCEDURE — 6360000002 HC RX W HCPCS: Performed by: INTERNAL MEDICINE

## 2023-12-24 PROCEDURE — 2580000003 HC RX 258: Performed by: HOSPITALIST

## 2023-12-24 PROCEDURE — 76937 US GUIDE VASCULAR ACCESS: CPT

## 2023-12-24 PROCEDURE — 99291 CRITICAL CARE FIRST HOUR: CPT | Performed by: INTERNAL MEDICINE

## 2023-12-24 PROCEDURE — 85027 COMPLETE CBC AUTOMATED: CPT

## 2023-12-24 PROCEDURE — 6370000000 HC RX 637 (ALT 250 FOR IP): Performed by: INTERNAL MEDICINE

## 2023-12-24 PROCEDURE — 83605 ASSAY OF LACTIC ACID: CPT

## 2023-12-24 PROCEDURE — 80048 BASIC METABOLIC PNL TOTAL CA: CPT

## 2023-12-24 PROCEDURE — 36569 INSJ PICC 5 YR+ W/O IMAGING: CPT

## 2023-12-24 PROCEDURE — C1751 CATH, INF, PER/CENT/MIDLINE: HCPCS

## 2023-12-24 PROCEDURE — 2700000000 HC OXYGEN THERAPY PER DAY

## 2023-12-24 PROCEDURE — 36592 COLLECT BLOOD FROM PICC: CPT

## 2023-12-24 PROCEDURE — 94003 VENT MGMT INPAT SUBQ DAY: CPT

## 2023-12-24 PROCEDURE — 99232 SBSQ HOSP IP/OBS MODERATE 35: CPT | Performed by: NURSE PRACTITIONER

## 2023-12-24 RX ORDER — LIDOCAINE HYDROCHLORIDE 10 MG/ML
5 INJECTION, SOLUTION EPIDURAL; INFILTRATION; INTRACAUDAL; PERINEURAL ONCE
Status: ACTIVE | OUTPATIENT
Start: 2023-12-24

## 2023-12-24 RX ORDER — FUROSEMIDE 10 MG/ML
40 INJECTION INTRAMUSCULAR; INTRAVENOUS DAILY
Status: DISCONTINUED | OUTPATIENT
Start: 2023-12-24 | End: 2023-12-25

## 2023-12-24 RX ORDER — HEPARIN SODIUM 1000 [USP'U]/ML
3900 INJECTION, SOLUTION INTRAVENOUS; SUBCUTANEOUS ONCE
Status: COMPLETED | OUTPATIENT
Start: 2023-12-24 | End: 2023-12-24

## 2023-12-24 RX ORDER — SODIUM CHLORIDE 9 MG/ML
25 INJECTION, SOLUTION INTRAVENOUS PRN
Status: ACTIVE | OUTPATIENT
Start: 2023-12-24

## 2023-12-24 RX ORDER — SODIUM CHLORIDE 0.9 % (FLUSH) 0.9 %
5-40 SYRINGE (ML) INJECTION PRN
Status: DISCONTINUED | OUTPATIENT
Start: 2023-12-24 | End: 2023-12-26

## 2023-12-24 RX ORDER — SODIUM CHLORIDE 0.9 % (FLUSH) 0.9 %
5-40 SYRINGE (ML) INJECTION EVERY 12 HOURS SCHEDULED
Status: ACTIVE | OUTPATIENT
Start: 2023-12-24

## 2023-12-24 RX ADMIN — DANTROLENE SODIUM 200 MG: 25 CAPSULE ORAL at 08:00

## 2023-12-24 RX ADMIN — SODIUM CHLORIDE, PRESERVATIVE FREE 10 ML: 5 INJECTION INTRAVENOUS at 08:00

## 2023-12-24 RX ADMIN — PROPOFOL 30 MCG/KG/MIN: 10 INJECTION, EMULSION INTRAVENOUS at 09:41

## 2023-12-24 RX ADMIN — SODIUM CHLORIDE, PRESERVATIVE FREE 10 ML: 5 INJECTION INTRAVENOUS at 20:23

## 2023-12-24 RX ADMIN — PROPOFOL 30 MCG/KG/MIN: 10 INJECTION, EMULSION INTRAVENOUS at 04:57

## 2023-12-24 RX ADMIN — PROPOFOL 40 MCG/KG/MIN: 10 INJECTION, EMULSION INTRAVENOUS at 16:12

## 2023-12-24 RX ADMIN — DOBUTAMINE HYDROCHLORIDE 2.5 MCG/KG/MIN: 200 INJECTION INTRAVENOUS at 17:33

## 2023-12-24 RX ADMIN — SERTRALINE 150 MG: 100 TABLET, FILM COATED ORAL at 08:00

## 2023-12-24 RX ADMIN — SODIUM CHLORIDE, PRESERVATIVE FREE 40 MG: 5 INJECTION INTRAVENOUS at 07:59

## 2023-12-24 RX ADMIN — FUROSEMIDE 40 MG: 10 INJECTION, SOLUTION INTRAMUSCULAR; INTRAVENOUS at 07:59

## 2023-12-24 RX ADMIN — INSULIN LISPRO 2 UNITS: 100 INJECTION, SOLUTION INTRAVENOUS; SUBCUTANEOUS at 00:33

## 2023-12-24 RX ADMIN — HEPARIN SODIUM 1430 UNITS/HR: 10000 INJECTION, SOLUTION INTRAVENOUS at 18:28

## 2023-12-24 RX ADMIN — MEROPENEM 1000 MG: 1 INJECTION, POWDER, FOR SOLUTION INTRAVENOUS at 03:05

## 2023-12-24 RX ADMIN — HEPARIN SODIUM 1430 UNITS/HR: 10000 INJECTION, SOLUTION INTRAVENOUS at 08:19

## 2023-12-24 RX ADMIN — AZITHROMYCIN MONOHYDRATE 500 MG: 500 INJECTION, POWDER, LYOPHILIZED, FOR SOLUTION INTRAVENOUS at 04:54

## 2023-12-24 RX ADMIN — AMITRIPTYLINE HYDROCHLORIDE 50 MG: 50 TABLET, FILM COATED ORAL at 20:12

## 2023-12-24 RX ADMIN — INSULIN LISPRO 2 UNITS: 100 INJECTION, SOLUTION INTRAVENOUS; SUBCUTANEOUS at 08:00

## 2023-12-24 RX ADMIN — MEROPENEM 1000 MG: 1 INJECTION, POWDER, FOR SOLUTION INTRAVENOUS at 18:30

## 2023-12-24 RX ADMIN — LEVOTHYROXINE SODIUM 50 MCG: 0.05 TABLET ORAL at 06:11

## 2023-12-24 RX ADMIN — SODIUM CHLORIDE, PRESERVATIVE FREE 10 ML: 5 INJECTION INTRAVENOUS at 20:24

## 2023-12-24 RX ADMIN — DANTROLENE SODIUM 200 MG: 25 CAPSULE ORAL at 20:12

## 2023-12-24 RX ADMIN — HEPARIN SODIUM 3900 UNITS: 1000 INJECTION INTRAVENOUS; SUBCUTANEOUS at 06:23

## 2023-12-24 RX ADMIN — PROPOFOL 40 MCG/KG/MIN: 10 INJECTION, EMULSION INTRAVENOUS at 20:08

## 2023-12-24 RX ADMIN — MEROPENEM 1000 MG: 1 INJECTION, POWDER, FOR SOLUTION INTRAVENOUS at 11:39

## 2023-12-24 ASSESSMENT — PULMONARY FUNCTION TESTS
PIF_VALUE: 32
PIF_VALUE: 29
PIF_VALUE: 30
PIF_VALUE: 32
PIF_VALUE: 30
PIF_VALUE: 34
PIF_VALUE: 36
PIF_VALUE: 27
PIF_VALUE: 29
PIF_VALUE: 34
PIF_VALUE: 27
PIF_VALUE: 28
PIF_VALUE: 28
PIF_VALUE: 30
PIF_VALUE: 34
PIF_VALUE: 30
PIF_VALUE: 28
PIF_VALUE: 29
PIF_VALUE: 28
PIF_VALUE: 29
PIF_VALUE: 29
PIF_VALUE: 32
PIF_VALUE: 33
PIF_VALUE: 30
PIF_VALUE: 39
PIF_VALUE: 34
PIF_VALUE: 33
PIF_VALUE: 39
PIF_VALUE: 36
PIF_VALUE: 31
PIF_VALUE: 30
PIF_VALUE: 39
PIF_VALUE: 29
PIF_VALUE: 29
PIF_VALUE: 30
PIF_VALUE: 29
PIF_VALUE: 38
PIF_VALUE: 26
PIF_VALUE: 25
PIF_VALUE: 33
PIF_VALUE: 31
PIF_VALUE: 30
PIF_VALUE: 34
PIF_VALUE: 33
PIF_VALUE: 29
PIF_VALUE: 29
PIF_VALUE: 27
PIF_VALUE: 29
PIF_VALUE: 34
PIF_VALUE: 30
PIF_VALUE: 28
PIF_VALUE: 31
PIF_VALUE: 36
PIF_VALUE: 29
PIF_VALUE: 30
PIF_VALUE: 28
PIF_VALUE: 27
PIF_VALUE: 36
PIF_VALUE: 30
PIF_VALUE: 28
PIF_VALUE: 27
PIF_VALUE: 29
PIF_VALUE: 29
PIF_VALUE: 26
PIF_VALUE: 28
PIF_VALUE: 34
PIF_VALUE: 40
PIF_VALUE: 35
PIF_VALUE: 30
PIF_VALUE: 28
PIF_VALUE: 33
PIF_VALUE: 27
PIF_VALUE: 29
PIF_VALUE: 29
PIF_VALUE: 32
PIF_VALUE: 31
PIF_VALUE: 38
PIF_VALUE: 26
PIF_VALUE: 32
PIF_VALUE: 27
PIF_VALUE: 32
PIF_VALUE: 29
PIF_VALUE: 29
PIF_VALUE: 28
PIF_VALUE: 38
PIF_VALUE: 34
PIF_VALUE: 31
PIF_VALUE: 30
PIF_VALUE: 33
PIF_VALUE: 32
PIF_VALUE: 30
PIF_VALUE: 28
PIF_VALUE: 29
PIF_VALUE: 28
PIF_VALUE: 29
PIF_VALUE: 27
PIF_VALUE: 33

## 2023-12-24 NOTE — PROGRESS NOTES
Comprehensive Nutrition Assessment    Type and Reason for Visit:  Reassess    Nutrition Recommendations/Plan:   Continue Vital 1.2 (peptide based) at goal rate of 55 ml/hr  Continue water flush 30 ml every 4 hours, recommend increasing if Na rises greater than 145 mmol/L  Continue Prosource protein pouch once per day, appreciate documentation in flow sheets  May need additional insulin if BG continue greater than 200 mg/dl     Malnutrition Assessment:  Malnutrition Status:  At risk for malnutrition (Comment) (12/18/23 0911)    Context:  Chronic Illness     Findings of the 6 clinical characteristics of malnutrition:  Energy Intake:  No significant decrease in energy intake  Weight Loss:  No significant weight loss     Body Fat Loss:  No significant body fat loss     Muscle Mass Loss:  No significant muscle mass loss    Fluid Accumulation:  Unable to assess     Strength:  Not Performed    Nutrition Assessment:    Follow up:  TF adjusted from Jevity 1.5 to Vital 1.2 AF infusing at goal rate of 55 ml/hr on 12/20 with GI complications earlier this admission.  Patient receiving Prosource protein modular, appreciate documentation by nursing in flow sheets.  Propofol at 30 mcg/kg/min providing 465 calories from fat when not interrupted.  Pressors off.    Nutrition Related Findings:    BG greater than 200 mg/dl; last BM on 12/24 Wound Type: Stage I, Pressure Injury       Current Nutrition Intake & Therapies:    Average Meal Intake: NPO  Average Supplements Intake: NPO  Diet NPO  ADULT TUBE FEEDING; PEG; Peptide Based; Continuous; 15; Yes; 10; Q 4 hours; 55; 30; Q 4 hours; Protein; Prosource x1 daily  Current Tube Feeding (TF) Orders:  Current TF & Flush Orders Provides: same as below  Goal TF & Flush Orders Provides: Vital AF 1.2 @ 55 ml/hr + 1 prosource (Calculated over 20 hrs provides 1100 ml total volume, 1320 kcals, 83 g protein, 892 ml free water + 80 kcals, 20 g protein)    Anthropometric Measures:  Height: 172.7  cm (5' 7.99\")  Ideal Body Weight (IBW): 140 lbs (64 kg)       Current Body Weight: 89.8 kg (197 lb 15.6 oz), 141.4 % IBW. Current BMI (kg/m2): 30.1                          BMI Categories: Obese Class 1 (BMI 30.0-34. 9)    Estimated Daily Nutrient Needs:  Energy Requirements Based On: Kcal/kg  Weight Used for Energy Requirements: Current  Energy (kcal/day): 8528-7234 (18-20kcal/kg)  Weight Used for Protein Requirements: Current  Protein (g/day): 90-108g/kg  Method Used for Fluid Requirements: 1 ml/kcal  Fluid (ml/day): NPO    Nutrition Diagnosis:   Inadequate oral intake related to impaired respiratory function as evidenced by intubation    Nutrition Interventions:   Food and/or Nutrient Delivery: Modify Tube Feeding  Nutrition Education/Counseling: Education not indicated  Coordination of Nutrition Care: Continue to monitor while inpatient       Goals:     Goals:  Tolerate nutrition support at goal rate       Nutrition Monitoring and Evaluation:   Behavioral-Environmental Outcomes: None Identified  Food/Nutrient Intake Outcomes: Enteral Nutrition Intake/Tolerance  Physical Signs/Symptoms Outcomes: Biochemical Data, Weight, Nutrition Focused Physical Findings, GI Status    Discharge Planning:    No discharge needs at this time     KANDY KENNEDY RD, LD  Contact: call [phone] for persona cell if RD needed

## 2023-12-24 NOTE — PLAN OF CARE
Problem: Discharge Planning  Goal: Discharge to home or other facility with appropriate resources  Outcome: Progressing     Problem: Neurosensory - Adult  Goal: Achieves stable or improved neurological status  Outcome: Progressing  Goal: Absence of seizures  Outcome: Progressing  Goal: Remains free of injury related to seizures activity  Outcome: Progressing  Goal: Achieves maximal functionality and self care  Outcome: Progressing     Problem: Respiratory - Adult  Goal: Achieves optimal ventilation and oxygenation  Outcome: Progressing     Problem: Skin/Tissue Integrity - Adult  Goal: Skin integrity remains intact  Outcome: Progressing  Goal: Incisions, wounds, or drain sites healing without S/S of infection  Outcome: Progressing  Goal: Oral mucous membranes remain intact  Outcome: Progressing     Problem: Musculoskeletal - Adult  Goal: Return mobility to safest level of function  Outcome: Progressing  Goal: Maintain proper alignment of affected body part  Outcome: Progressing  Goal: Return ADL status to a safe level of function  Outcome: Progressing     Problem: Gastrointestinal - Adult  Goal: Minimal or absence of nausea and vomiting  Outcome: Progressing  Goal: Maintains or returns to baseline bowel function  Outcome: Progressing  Flowsheets (Taken 12/24/2023 0800)  Maintains or returns to baseline bowel function: Assess bowel function  Goal: Maintains adequate nutritional intake  Outcome: Progressing  Flowsheets (Taken 12/24/2023 0800)  Maintains adequate nutritional intake: Monitor intake and output, weight and lab values     Problem: Genitourinary - Adult  Goal: Absence of urinary retention  Outcome: Progressing     Problem: Safety - Adult  Goal: Free from fall injury  Outcome: Progressing     Problem: Skin/Tissue Integrity  Goal: Absence of new skin breakdown  Description: 1.  Monitor for areas of redness and/or skin breakdown  2.  Assess vascular access sites hourly  3.  Every 4-6 hours minimum:  Change  alternatives and participate in decision making related to treatment and care  Description: INTERVENTIONS:  1. Determine when there are differences between patient's view, family's view, and healthcare provider's view of condition  2. Facilitate patient and family articulation of goals for care  3. Help patient and family identify pros/cons of alternative solutions  4. Provide information as requested by patient/family  5. Respect patient/family right to receive or not to receive information  6. Serve as a liaison between patient and family and health care team  7.  Initiate Consults from Ethics, Palliative Care or initiate [phone] as is appropriate  Outcome: Progressing

## 2023-12-24 NOTE — PROGRESS NOTES
Arrived to place PICC line with bedside RN Rey Riley. Pre-procedure and timeout done with RN, discussed limitations of placement and allergies. Pt's basilic, brachial, and cephalic are all easily collapsible with no indication for a clot. Vein selected is large enough for catheter. Pt tolerated sterile procedure well, with no difficulty accessing basilic vein, when accessed - blood was free flowing and non-pulsatile. Guidewire, introducer, and catheter went in smoothly. PICC tip verified with ECG but runs of Vtach and PVC's noted chest Xray ordered for verification of placement   PICC being verified with XRAY, please do not use until radiologist states the tip lies within the SVC or Cavoatrial junction. If the PICC is not in the correct position please call [facility] [phone] to inform the PICC RN. [place] use new IV tubing when connecting to the newly placed central line. Post procedure - reorganized pt table, placed pt in lowest position, with call light and educated on line care. Reported off to bedside RN.

## 2023-12-24 NOTE — PROGRESS NOTES
Clinical Pharmacy Note  Heparin Dosing       Lab Results   Component Value Date/Time    APTT 27.7 05/06/2015 01:28 PM     Lab Results   Component Value Date/Time    HGB 10.3 12/23/2023 03:58 AM    HCT 30.3 12/23/2023 03:58 AM    PLT 64 12/23/2023 03:58 AM    INR 1.16 05/20/2015 05:30 AM       Current Infusion Rate: 1380 units/hr  Anti-Xa level: 0.29    Plan:  Bolus: 3900 units  Rate: increase to 1430 units/hr  Next anti-Xa level: 1230 12/24/23    Pharmacy will continue to monitor and adjust based on anti-Xa results.      Elisabeth Jean [facility], PharmD, BCPS  12/24/2023 6:11 AM

## 2023-12-24 NOTE — PROGRESS NOTES
V2.0    [facility] [place]      Name:  Lilly Terrell /Age/Sex: 1960  (62 y.o. female)   MRN & CSN:  [id number] & [id number] Encounter Date/Time: 2023 7:02 AM EST   Location:  [id number] PCP: Tyson Hahn     Attending:Reece Tan MD       Hospital Day: 9    Assessment and Recommendations   Lilly Terrell is a 62 y.o. female who presents with Pneumonia, unspecified organism      62-year-old female with history of cerebral palsy hypothyroidism hypertension depression chronic respiratory failure admitted to the hospital with acute encephalopathy and right-sided pneumonia, had increased need of oxygen more encephalopathy and was transferred to ICU and intubated.  Plan:   Severe sepsis/septic shock, IV antibiotics IV fluid critical care consulted and following, ID consulted and following, keep on meropenem per ID recommendations, still on pressors.  Acute respiratory failure with hypoxia and hypercapnia, intubated at this time critical care team consulted  Lactic acidosis with lactic acidosis improved  Acute encephalopathy  Recent COVID infection, still testing positive no further management or treatment recommended per ID.  Hypothyroidism on levothyroxine  Essential hypertension, relatively hypotensive hold p.o. medications  Coffee-ground content and PEG tube, started on PPI seen by GI  Cardiomyopathy potential acute systolic congestive heart failure, EF 20%, akinetic distal left ventricle, cardiology following low-dose heparin, dobutamine, on dobutamine received IV Lasix  Elevated troponin due to severe sepsis and possibly non-STEMI type II cardiology following will follow on further recommendations if any procedure will be recommended           Diet Diet NPO  ADULT TUBE FEEDING; PEG; Peptide Based; Continuous; 15; Yes; 10; Q 4 hours; 55; 30; Q 4 hours; Protein; Prosource x1 daily   DVT Prophylaxis [] Lovenox, []  Heparin, [] SCDs, [] Ambulation,  [] Eliquis, [] Xarelto  []  Intubated  Capillary Refill: Brisk, 3 seconds, normal   Peripheral Pulses: +2 palpable, equal bilaterally       Medications:   Medications:    insulin lispro  0-8 Units SubCUTAneous Q4H    meropenem  1,000 mg IntraVENous Q8H    pantoprazole (PROTONIX) 40 mg in sodium chloride (PF) 0.9 % 10 mL injection  40 mg IntraVENous Daily    amitriptyline  50 mg Oral Nightly    aspirin  81 mg Oral Daily    dantrolene  200 mg Oral BID    levothyroxine  50 mcg Oral Daily    [Held by provider] metoprolol tartrate  25 mg Oral BID    polyethylene glycol  17 g Oral BID    sertraline  150 mg Oral Daily    sodium chloride flush  5-40 mL IntraVENous 2 times per day      Infusions:    DOBUTamine 2.5 mcg/kg/min (12/24/23 0611)    heparin (PORCINE) Infusion 1,430 Units/hr (12/24/23 0624)    propofol 30 mcg/kg/min (12/24/23 0611)    phenylephrine (ALTON-SYNEPHRINE) 50 mg in sodium chloride 0.9 % 250 mL infusion Stopped (12/20/23 1534)    dexmedetomidine      norepinephrine Stopped (12/23/23 0401)    dextrose      sodium chloride      sodium chloride Stopped (12/20/23 0527)     PRN Meds: potassium chloride, 20 mEq, PRN  glucose, 4 tablet, PRN  dextrose bolus, 125 mL, PRN   Or  dextrose bolus, 250 mL, PRN  glucagon (rDNA), 1 mg, PRN  dextrose, , Continuous PRN  sodium chloride flush, 5-40 mL, PRN  sodium chloride, 25 mL, PRN  albuterol, 2.5 mg, Q4H PRN  ipratropium, 0.5 mg, Q4H PRN  sodium chloride, , PRN  magnesium sulfate, 2,000 mg, PRN  ondansetron, 4 mg, Q8H PRN   Or  ondansetron, 4 mg, Q6H PRN  polyethylene glycol, 17 g, Daily PRN  acetaminophen, 650 mg, Q6H PRN   Or  acetaminophen, 650 mg, Q6H PRN        Labs and Imaging   XR CHEST PORTABLE    Result Date: 12/23/2023  EXAMINATION: ONE XRAY VIEW OF THE CHEST 12/23/2023 10:33 am COMPARISON: 12/20/2023 radiograph HISTORY: ORDERING SYSTEM PROVIDED HISTORY: hypoxemia TECHNOLOGIST PROVIDED HISTORY: Reason for exam:->hypoxemia Reason for Exam: hypoxemia FINDINGS: Endotracheal tube and central  silhouette is stable. There is no pneumothorax or pleural effusion. Status post left shoulder arthroplasty. 1. Endotracheal tube 4.4 cm above the jairo. XR CHEST PORTABLE    Result Date: 12/20/2023  EXAMINATION: ONE XRAY VIEW OF THE CHEST 12/20/2023 12:37 am COMPARISON: 12/16/2023 HISTORY: ORDERING SYSTEM PROVIDED HISTORY: hypoxia, tachycardia TECHNOLOGIST PROVIDED HISTORY: Reason for exam:->hypoxia, tachycardia Reason for Exam: hypoxia, tachycardia FINDINGS: There is new patchy airspace disease in the left mid and lower lung. There is also mild disease suspected in the right perihilar region in addition to the previous noted atelectasis. There is trace right pleural fluid with blunting of the costophrenic angle. No pneumothorax. Heart size is within normal limits. No acute bone finding. New left mid and lower lung airspace disease most consistent with pneumonia. Suspect mild right perihilar disease in addition to the previously noted atelectasis. CBC:   Recent Labs     12/21/23  1350 12/22/23  0405 12/23/23  0358   WBC 22.7* 20.6* 14.0*   HGB 11.6* 11.6* 10.3*   PLT 72* 75* 64*     BMP:    Recent Labs     12/22/23  0405 12/23/23  0358 12/23/23  1729 12/24/23  0530    140  --  142   K 3.0* 3.5 3.6 3.6    107  --  107   CO2 23 26  --  28   BUN 8 11  --  12   CREATININE <0.5* <0.5*  --  <0.5*   GLUCOSE 204* 189*  --  207*     Hepatic:   Recent Labs     12/23/23  1123   AST 52*   ALT 28   BILITOT 0.7   ALKPHOS 328*     Lipids:   Lab Results   Component Value Date/Time    TRIG 217 05/11/2015 05:30 AM     Hemoglobin A1C:   Lab Results   Component Value Date/Time    LABA1C 5.4 12/19/2023 07:45 AM     TSH: No results found for: \"TSH\"  Troponin: No results found for: \"TROPONINT\"  Lactic Acid:   Recent Labs     12/23/23  1729 12/23/23  2330 12/24/23  0530   LACTA 2.7* 2.0 2.4*     BNP: No results for input(s): \"PROBNP\" in the last 72 hours.   UA:  Lab Results   Component Value Date/Time

## 2023-12-24 NOTE — PROGRESS NOTES
Clinical Pharmacy Note  Heparin Dosing       Lab Results   Component Value Date/Time    APTT 27.7 05/06/2015 01:28 PM     Lab Results   Component Value Date/Time    HGB 9.7 12/24/2023 05:30 AM    HCT 29.4 12/24/2023 05:30 AM    PLT 70 12/24/2023 05:30 AM    INR 1.16 05/20/2015 05:30 AM       Current Infusion Rate: 1430 units/hr  Anti Xa 0.53    Plan:  Rate: continue 1430 units/hr  Next anti-Xa level: 1800 12/24/23    Pharmacy will continue to monitor and adjust based on anti-Xa results.   Neena Birch RPH

## 2023-12-25 LAB
ANION GAP SERPL CALCULATED.3IONS-SCNC: 6 MMOL/L (ref 3–16)
ANION GAP SERPL CALCULATED.3IONS-SCNC: 8 MMOL/L (ref 3–16)
ANTI-XA UNFRAC HEPARIN: 0.33 IU/ML (ref 0.3–0.7)
BASOPHILS # BLD: 0.1 K/UL (ref 0–0.2)
BASOPHILS NFR BLD: 0.8 %
BUN SERPL-MCNC: 10 MG/DL (ref 7–20)
BUN SERPL-MCNC: 13 MG/DL (ref 7–20)
CALCIUM SERPL-MCNC: 7.2 MG/DL (ref 8.3–10.6)
CALCIUM SERPL-MCNC: 7.6 MG/DL (ref 8.3–10.6)
CHLORIDE SERPL-SCNC: 101 MMOL/L (ref 99–110)
CHLORIDE SERPL-SCNC: 107 MMOL/L (ref 99–110)
CO2 SERPL-SCNC: 30 MMOL/L (ref 21–32)
CO2 SERPL-SCNC: 32 MMOL/L (ref 21–32)
CREAT SERPL-MCNC: <0.5 MG/DL (ref 0.6–1.2)
CREAT SERPL-MCNC: <0.5 MG/DL (ref 0.6–1.2)
DEPRECATED RDW RBC AUTO: 16.3 % (ref 12.4–15.4)
EOSINOPHIL # BLD: 0.1 K/UL (ref 0–0.6)
EOSINOPHIL NFR BLD: 1 %
GFR SERPLBLD CREATININE-BSD FMLA CKD-EPI: >60 ML/MIN/{1.73_M2}
GFR SERPLBLD CREATININE-BSD FMLA CKD-EPI: >60 ML/MIN/{1.73_M2}
GLUCOSE BLD-MCNC: 151 MG/DL (ref 70–99)
GLUCOSE BLD-MCNC: 156 MG/DL (ref 70–99)
GLUCOSE BLD-MCNC: 167 MG/DL (ref 70–99)
GLUCOSE BLD-MCNC: 191 MG/DL (ref 70–99)
GLUCOSE BLD-MCNC: 206 MG/DL (ref 70–99)
GLUCOSE SERPL-MCNC: 155 MG/DL (ref 70–99)
GLUCOSE SERPL-MCNC: 185 MG/DL (ref 70–99)
HCT VFR BLD AUTO: 28.2 % (ref 36–48)
HGB BLD-MCNC: 9.7 G/DL (ref 12–16)
LYMPHOCYTES # BLD: 2.3 K/UL (ref 1–5.1)
LYMPHOCYTES NFR BLD: 23.8 %
MAGNESIUM SERPL-MCNC: 1.9 MG/DL (ref 1.8–2.4)
MCH RBC QN AUTO: 32.7 PG (ref 26–34)
MCHC RBC AUTO-ENTMCNC: 34.2 G/DL (ref 31–36)
MCV RBC AUTO: 95.6 FL (ref 80–100)
MONOCYTES # BLD: 0.4 K/UL (ref 0–1.3)
MONOCYTES NFR BLD: 4.1 %
NEUTROPHILS # BLD: 6.8 K/UL (ref 1.7–7.7)
NEUTROPHILS NFR BLD: 70.3 %
PERFORMED ON: ABNORMAL
PLATELET # BLD AUTO: 67 K/UL (ref 135–450)
PMV BLD AUTO: 10.8 FL (ref 5–10.5)
POTASSIUM SERPL-SCNC: 3.2 MMOL/L (ref 3.5–5.1)
POTASSIUM SERPL-SCNC: 3.5 MMOL/L (ref 3.5–5.1)
RBC # BLD AUTO: 2.95 M/UL (ref 4–5.2)
SODIUM SERPL-SCNC: 141 MMOL/L (ref 136–145)
SODIUM SERPL-SCNC: 143 MMOL/L (ref 136–145)
WBC # BLD AUTO: 9.7 K/UL (ref 4–11)

## 2023-12-25 PROCEDURE — 6360000002 HC RX W HCPCS: Performed by: INTERNAL MEDICINE

## 2023-12-25 PROCEDURE — 83735 ASSAY OF MAGNESIUM: CPT

## 2023-12-25 PROCEDURE — 99291 CRITICAL CARE FIRST HOUR: CPT | Performed by: INTERNAL MEDICINE

## 2023-12-25 PROCEDURE — 6370000000 HC RX 637 (ALT 250 FOR IP): Performed by: HOSPITALIST

## 2023-12-25 PROCEDURE — 85025 COMPLETE CBC W/AUTO DIFF WBC: CPT

## 2023-12-25 PROCEDURE — 85520 HEPARIN ASSAY: CPT

## 2023-12-25 PROCEDURE — 6360000002 HC RX W HCPCS: Performed by: HOSPITALIST

## 2023-12-25 PROCEDURE — 36592 COLLECT BLOOD FROM PICC: CPT

## 2023-12-25 PROCEDURE — 80048 BASIC METABOLIC PNL TOTAL CA: CPT

## 2023-12-25 PROCEDURE — 2580000003 HC RX 258: Performed by: HOSPITALIST

## 2023-12-25 PROCEDURE — C9113 INJ PANTOPRAZOLE SODIUM, VIA: HCPCS | Performed by: INTERNAL MEDICINE

## 2023-12-25 PROCEDURE — 6370000000 HC RX 637 (ALT 250 FOR IP)

## 2023-12-25 PROCEDURE — 6360000002 HC RX W HCPCS: Performed by: STUDENT IN AN ORGANIZED HEALTH CARE EDUCATION/TRAINING PROGRAM

## 2023-12-25 PROCEDURE — 94003 VENT MGMT INPAT SUBQ DAY: CPT

## 2023-12-25 PROCEDURE — 2580000003 HC RX 258: Performed by: INTERNAL MEDICINE

## 2023-12-25 PROCEDURE — 2500000003 HC RX 250 WO HCPCS: Performed by: HOSPITALIST

## 2023-12-25 PROCEDURE — 2500000003 HC RX 250 WO HCPCS: Performed by: INTERNAL MEDICINE

## 2023-12-25 PROCEDURE — 6370000000 HC RX 637 (ALT 250 FOR IP): Performed by: INTERNAL MEDICINE

## 2023-12-25 PROCEDURE — 94761 N-INVAS EAR/PLS OXIMETRY MLT: CPT

## 2023-12-25 PROCEDURE — 99233 SBSQ HOSP IP/OBS HIGH 50: CPT | Performed by: INTERNAL MEDICINE

## 2023-12-25 PROCEDURE — 2000000000 HC ICU R&B

## 2023-12-25 RX ORDER — CHLORHEXIDINE GLUCONATE ORAL RINSE 1.2 MG/ML
15 SOLUTION DENTAL 2 TIMES DAILY
Status: DISPENSED | OUTPATIENT
Start: 2023-12-25

## 2023-12-25 RX ORDER — FUROSEMIDE 10 MG/ML
40 INJECTION INTRAMUSCULAR; INTRAVENOUS 2 TIMES DAILY
Status: DISCONTINUED | OUTPATIENT
Start: 2023-12-25 | End: 2023-12-25

## 2023-12-25 RX ORDER — NOREPINEPHRINE BITARTRATE 0.06 MG/ML
1-100 INJECTION, SOLUTION INTRAVENOUS CONTINUOUS
Status: DISPENSED | OUTPATIENT
Start: 2023-12-25

## 2023-12-25 RX ORDER — ENOXAPARIN SODIUM 100 MG/ML
1 INJECTION SUBCUTANEOUS 2 TIMES DAILY
Status: DISPENSED | OUTPATIENT
Start: 2023-12-25

## 2023-12-25 RX ADMIN — CHLORHEXIDINE GLUCONATE 0.12% ORAL RINSE 15 ML: 1.2 LIQUID ORAL at 20:32

## 2023-12-25 RX ADMIN — FUROSEMIDE 40 MG: 10 INJECTION, SOLUTION INTRAMUSCULAR; INTRAVENOUS at 09:10

## 2023-12-25 RX ADMIN — MEROPENEM 1000 MG: 1 INJECTION, POWDER, FOR SOLUTION INTRAVENOUS at 18:01

## 2023-12-25 RX ADMIN — PROPOFOL 45 MCG/KG/MIN: 10 INJECTION, EMULSION INTRAVENOUS at 09:09

## 2023-12-25 RX ADMIN — PROPOFOL 45 MCG/KG/MIN: 10 INJECTION, EMULSION INTRAVENOUS at 00:55

## 2023-12-25 RX ADMIN — PROPOFOL 20 MCG/KG/MIN: 10 INJECTION, EMULSION INTRAVENOUS at 16:06

## 2023-12-25 RX ADMIN — SODIUM CHLORIDE, PRESERVATIVE FREE 10 ML: 5 INJECTION INTRAVENOUS at 20:16

## 2023-12-25 RX ADMIN — MEROPENEM 1000 MG: 1 INJECTION, POWDER, FOR SOLUTION INTRAVENOUS at 03:19

## 2023-12-25 RX ADMIN — FUROSEMIDE 10 MG/HR: 10 INJECTION, SOLUTION INTRAMUSCULAR; INTRAVENOUS at 10:20

## 2023-12-25 RX ADMIN — SERTRALINE 150 MG: 100 TABLET, FILM COATED ORAL at 09:10

## 2023-12-25 RX ADMIN — DOBUTAMINE HYDROCHLORIDE 5 MCG/KG/MIN: 200 INJECTION INTRAVENOUS at 17:47

## 2023-12-25 RX ADMIN — ASPIRIN 81 MG: 81 TABLET, COATED ORAL at 09:10

## 2023-12-25 RX ADMIN — Medication 5 MCG/MIN: at 16:06

## 2023-12-25 RX ADMIN — POTASSIUM CHLORIDE 20 MEQ: 29.8 INJECTION, SOLUTION INTRAVENOUS at 07:36

## 2023-12-25 RX ADMIN — PROPOFOL 50 MCG/KG/MIN: 10 INJECTION, EMULSION INTRAVENOUS at 06:00

## 2023-12-25 RX ADMIN — INSULIN LISPRO 2 UNITS: 100 INJECTION, SOLUTION INTRAVENOUS; SUBCUTANEOUS at 12:31

## 2023-12-25 RX ADMIN — PROPOFOL 15 MCG/KG/MIN: 10 INJECTION, EMULSION INTRAVENOUS at 23:58

## 2023-12-25 RX ADMIN — AMITRIPTYLINE HYDROCHLORIDE 50 MG: 50 TABLET, FILM COATED ORAL at 20:32

## 2023-12-25 RX ADMIN — POTASSIUM CHLORIDE 20 MEQ: 29.8 INJECTION, SOLUTION INTRAVENOUS at 20:56

## 2023-12-25 RX ADMIN — LEVOTHYROXINE SODIUM 50 MCG: 0.05 TABLET ORAL at 06:32

## 2023-12-25 RX ADMIN — ENOXAPARIN SODIUM 100 MG: 100 INJECTION SUBCUTANEOUS at 10:42

## 2023-12-25 RX ADMIN — DEXMEDETOMIDINE HYDROCHLORIDE 0.2 MCG/KG/HR: 400 INJECTION INTRAVENOUS at 10:15

## 2023-12-25 RX ADMIN — DANTROLENE SODIUM 200 MG: 25 CAPSULE ORAL at 09:13

## 2023-12-25 RX ADMIN — ENOXAPARIN SODIUM 100 MG: 100 INJECTION SUBCUTANEOUS at 20:32

## 2023-12-25 RX ADMIN — SODIUM CHLORIDE, PRESERVATIVE FREE 40 MG: 5 INJECTION INTRAVENOUS at 09:10

## 2023-12-25 RX ADMIN — MEROPENEM 1000 MG: 1 INJECTION, POWDER, FOR SOLUTION INTRAVENOUS at 10:28

## 2023-12-25 RX ADMIN — POTASSIUM CHLORIDE 20 MEQ: 29.8 INJECTION, SOLUTION INTRAVENOUS at 06:21

## 2023-12-25 RX ADMIN — POTASSIUM CHLORIDE 20 MEQ: 29.8 INJECTION, SOLUTION INTRAVENOUS at 22:04

## 2023-12-25 RX ADMIN — DANTROLENE SODIUM 200 MG: 25 CAPSULE ORAL at 20:37

## 2023-12-25 ASSESSMENT — PULMONARY FUNCTION TESTS
PIF_VALUE: 28
PIF_VALUE: 29
PIF_VALUE: 27
PIF_VALUE: 28
PIF_VALUE: 30
PIF_VALUE: 25
PIF_VALUE: 29
PIF_VALUE: 28
PIF_VALUE: 29
PIF_VALUE: 24
PIF_VALUE: 27
PIF_VALUE: 29
PIF_VALUE: 29
PIF_VALUE: 27
PIF_VALUE: 26
PIF_VALUE: 30
PIF_VALUE: 30
PIF_VALUE: 29
PIF_VALUE: 26
PIF_VALUE: 27
PIF_VALUE: 26
PIF_VALUE: 29
PIF_VALUE: 32
PIF_VALUE: 33
PIF_VALUE: 29
PIF_VALUE: 33
PIF_VALUE: 28
PIF_VALUE: 33
PIF_VALUE: 27
PIF_VALUE: 27
PIF_VALUE: 26
PIF_VALUE: 25
PIF_VALUE: 29
PIF_VALUE: 25
PIF_VALUE: 27
PIF_VALUE: 24
PIF_VALUE: 27
PIF_VALUE: 34
PIF_VALUE: 25
PIF_VALUE: 28
PIF_VALUE: 29
PIF_VALUE: 27
PIF_VALUE: 25
PIF_VALUE: 28
PIF_VALUE: 27
PIF_VALUE: 26
PIF_VALUE: 28
PIF_VALUE: 37
PIF_VALUE: 29
PIF_VALUE: 24
PIF_VALUE: 29
PIF_VALUE: 30
PIF_VALUE: 27
PIF_VALUE: 39
PIF_VALUE: 28
PIF_VALUE: 29
PIF_VALUE: 28
PIF_VALUE: 27
PIF_VALUE: 31
PIF_VALUE: 26
PIF_VALUE: 33
PIF_VALUE: 23
PIF_VALUE: 27
PIF_VALUE: 30
PIF_VALUE: 32
PIF_VALUE: 24
PIF_VALUE: 28
PIF_VALUE: 31
PIF_VALUE: 25
PIF_VALUE: 28
PIF_VALUE: 28
PIF_VALUE: 26
PIF_VALUE: 35
PIF_VALUE: 29
PIF_VALUE: 26
PIF_VALUE: 25
PIF_VALUE: 28
PIF_VALUE: 23
PIF_VALUE: 27
PIF_VALUE: 26
PIF_VALUE: 27
PIF_VALUE: 25
PIF_VALUE: 29
PIF_VALUE: 30
PIF_VALUE: 31
PIF_VALUE: 28
PIF_VALUE: 32
PIF_VALUE: 33
PIF_VALUE: 32
PIF_VALUE: 30
PIF_VALUE: 33
PIF_VALUE: 29
PIF_VALUE: 26
PIF_VALUE: 25
PIF_VALUE: 28

## 2023-12-25 NOTE — PLAN OF CARE
Problem: Discharge Planning  Goal: Discharge to home or other facility with appropriate resources  Outcome: Progressing     Problem: Neurosensory - Adult  Goal: Achieves stable or improved neurological status  Outcome: Progressing  Goal: Absence of seizures  Outcome: Progressing  Goal: Remains free of injury related to seizures activity  Outcome: Progressing  Goal: Achieves maximal functionality and self care  Outcome: Progressing     Problem: Respiratory - Adult  Goal: Achieves optimal ventilation and oxygenation  Outcome: Progressing     Problem: Skin/Tissue Integrity - Adult  Goal: Skin integrity remains intact  Outcome: Progressing  Goal: Incisions, wounds, or drain sites healing without S/S of infection  Outcome: Progressing  Goal: Oral mucous membranes remain intact  Outcome: Progressing     Problem: Musculoskeletal - Adult  Goal: Return mobility to safest level of function  Outcome: Progressing  Goal: Maintain proper alignment of affected body part  Outcome: Progressing  Goal: Return ADL status to a safe level of function  Outcome: Progressing     Problem: Gastrointestinal - Adult  Goal: Minimal or absence of nausea and vomiting  Outcome: Progressing  Goal: Maintains or returns to baseline bowel function  Outcome: Progressing  Goal: Maintains adequate nutritional intake  Outcome: Progressing     Problem: Genitourinary - Adult  Goal: Absence of urinary retention  Outcome: Progressing     Problem: Cardiovascular - Adult  Goal: Maintains optimal cardiac output and hemodynamic stability  Outcome: Progressing     Problem: Infection - Adult  Goal: Absence of infection at discharge  Outcome: Progressing     Problem: Safety - Adult  Goal: Free from fall injury  Outcome: Progressing     Problem: Skin/Tissue Integrity  Goal: Absence of new skin breakdown  Description: 1.  Monitor for areas of redness and/or skin breakdown  2.  Assess vascular access sites hourly  3.  Every 4-6 hours minimum:  Change oxygen saturation  probe site  4. Every 4-6 hours:  If on nasal continuous positive airway pressure, respiratory therapy assess nares and determine need for appliance change or resting period. Outcome: Progressing     Problem: Nutrition Deficit:  Goal: Optimize nutritional status  Outcome: Progressing     Problem: Safety - Medical Restraint  Goal: Remains free of injury from restraints (Restraint for Interference with Medical Device)  Description: INTERVENTIONS:  1. Determine that other, less restrictive measures have been tried or would not be effective before applying the restraint  2. Evaluate the patient's condition at the time of restraint application  3. Inform patient/family regarding the reason for restraint  4. Q2H: Monitor safety, psychosocial status, comfort, nutrition and hydration  Outcome: Progressing     Problem: Pain  Goal: Verbalizes/displays adequate comfort level or baseline comfort level  Outcome: Progressing     Problem: Decision Making  Goal: Pt/Family able to effectively weigh alternatives and participate in decision making related to treatment and care  Description: INTERVENTIONS:  1. Determine when there are differences between patient's view, family's view, and healthcare provider's view of condition  2. Facilitate patient and family articulation of goals for care  3. Help patient and family identify pros/cons of alternative solutions  4. Provide information as requested by patient/family  5. Respect patient/family right to receive or not to receive information  6. Serve as a liaison between patient and family and health care team  7.  Initiate Consults from Ethics, Palliative Care or initiate [phone] as is appropriate  Outcome: Progressing

## 2023-12-25 NOTE — PROGRESS NOTES
Clinical Pharmacy Note  Heparin Dosing       Lab Results   Component Value Date/Time    APTT 27.7 05/06/2015 01:28 PM     Lab Results   Component Value Date/Time    HGB 9.7 12/24/2023 05:30 AM    HCT 29.4 12/24/2023 05:30 AM    PLT 70 12/24/2023 05:30 AM    INR 1.16 05/20/2015 05:30 AM       Current Infusion Rate: 1430 units/hr  Anti Xa 0.36    Plan:  Rate: Continue 1430 units/hr  Next anti-Xa level: 0600 12/2/23    Pharmacy will continue to monitor and adjust based on anti-Xa results.     Julia Sharma, [address] [place] [address]  12/24/2023 8:02 PM

## 2023-12-25 NOTE — PROGRESS NOTES
V2.0    [facility] [place]      Name:  Lilly Terrell /Age/Sex: 1960  (62 y.o. female)   MRN & CSN:  [id number] & [id number] Encounter Date/Time: 2023 2:17 PM EST   Location:  [id number] PCP: Tyson Hahn     Attending:Vinny Fernández MD       Hospital Day: 10    Assessment and Recommendations   62-year-old male with past medical history of cerebral palsy chronic muscular skeletal contractures, ECF resident presented with altered mental status and tested positive for COVID 19 pneumonia    Sepsis with septic shock secondary to COVID-19 pneumonia  Acute hypoxemic respiratory failure with SpO2 of less than 90 on room air  Possible aspiration pneumonia  COVID-19 infection  Lactic acidosis  Acute encephalopathy  Hypothyroidism  Hypertension  Coffee-ground content with PEG tube  Acute systolic congestive heart failure with EF of 20%    Plan:     Appreciate pulmonary critical care eval and recommendation  Continue on Precedex drip, propofol drip, dobutamine drip and Lasix drip per protocol  Appreciate cardiology follow-up  Continue with IV Merrem and follow-up cultures  Hold home metoprolol because soft BP  IV fluid  Supportive care  Vent management per critical care  Labs in a.m.            Comment: Please note this report has been produced using speech recognition software and may contain errors related to that system including errors in grammar, punctuation, and spelling, as well as words and phrases that may be inappropriate. If there are any questions or concerns please feel free to contact the dictating provider for clarification.   Diet Diet NPO  ADULT TUBE FEEDING; PEG; Peptide Based; Continuous; 15; Yes; 10; Q 4 hours; 55; 30; Q 4 hours; Protein; Prosource x1 daily   DVT Prophylaxis [] Lovenox, []  Heparin, [x] SCDs, [] Ambulation,  [] Eliquis, [] Xarelto   Code Status Full Code   Disposition From: Home  Expected Disposition: ECF  Estimated Date of Discharge:      Surrogate Decision  for exam:->hypoxia, tachycardia Reason for Exam: hypoxia, tachycardia FINDINGS: There is new patchy airspace disease in the left mid and lower lung.  There is also mild disease suspected in the right perihilar region in addition to the previous noted atelectasis.  There is trace right pleural fluid with blunting of the costophrenic angle.  No pneumothorax.  Heart size is within normal limits.  No acute bone finding.     New left mid and lower lung airspace disease most consistent with pneumonia. Suspect mild right perihilar disease in addition to the previously noted atelectasis.       CBC:   Recent Labs     12/23/23  0358 12/24/23  0530 12/25/23  0400   WBC 14.0* 11.1* 9.7   HGB 10.3* 9.7* 9.7*   PLT 64* 70* 67*     BMP:    Recent Labs     12/23/23  0358 12/23/23  1729 12/24/23  0530 12/25/23  0400     --  142 143   K 3.5 3.6 3.6 3.5     --  107 107   CO2 26  --  28 30   BUN 11  --  12 13   CREATININE <0.5*  --  <0.5* <0.5*   GLUCOSE 189*  --  207* 155*     Hepatic:   Recent Labs     12/23/23  1123   AST 52*   ALT 28   BILITOT 0.7   ALKPHOS 328*     Lipids:   Lab Results   Component Value Date/Time    TRIG 217 05/11/2015 05:30 AM     Hemoglobin A1C:   Lab Results   Component Value Date/Time    LABA1C 5.4 12/19/2023 07:45 AM     TSH: No results found for: \"TSH\"  Troponin: No results found for: \"TROPONINT\"  Lactic Acid:   Recent Labs     12/23/23  2330 12/24/23  0530 12/24/23  1215   LACTA 2.0 2.4* 2.3*     BNP: No results for input(s): \"PROBNP\" in the last 72 hours.  UA:  Lab Results   Component Value Date/Time    NITRU Negative 11/27/2022 12:14 AM    COLORU DARK YELLOW 11/27/2022 12:14 AM    PHUR 7.0 11/27/2022 12:14 AM    LABCAST 0-1 Hyaline 05/06/2015 01:35 PM    WBCUA 3-5 11/27/2022 12:14 AM    RBCUA 11-20 11/27/2022 12:14 AM    BACTERIA 4+ 11/27/2022 12:14 AM    CLARITYU CLOUDY 11/27/2022 12:14 AM    SPECGRAV 1.021 11/27/2022 12:14 AM    LEUKOCYTESUR TRACE 11/27/2022 12:14 AM    UROBILINOGEN 1.0  11/27/2022 12:14 AM    BILIRUBINUR Negative 11/27/2022 12:14 AM    BLOODU TRACE 11/27/2022 12:14 AM    GLUCOSEU Negative 11/27/2022 12:14 AM    KETUA Negative 11/27/2022 12:14 AM    AMORPHOUS 2+ 05/06/2015 01:35 PM     Urine Cultures: No results found for: \"LABURIN\"  Blood Cultures:   Lab Results   Component Value Date/Time    BC No Growth after 4 days of incubation. 12/20/2023 12:53 AM     Lab Results   Component Value Date/Time    BLOODCULT2 No Growth after 4 days of incubation.  12/20/2023 12:53 AM     Organism:   Lab Results   Component Value Date/Time    ORG SARS CoV 2  by PCR 12/17/2023 09:53 AM         Electronically signed by Brooks Barber MD on 12/25/2023 at 2:17 PM

## 2023-12-25 NOTE — PROGRESS NOTES
Clinical Pharmacy Note  Heparin Dosing       Lab Results   Component Value Date/Time    APTT 27.7 05/06/2015 01:28 PM     Lab Results   Component Value Date/Time    HGB 9.7 12/25/2023 04:00 AM    HCT 28.2 12/25/2023 04:00 AM    PLT 67 12/25/2023 04:00 AM    INR 1.16 05/20/2015 05:30 AM       Current Infusion Rate: 1430 units/hr  Anti Xa 0.33    Plan:  Rate: Continue 1430 units/hr  Next anti-Xa level: 0600 12/26/23    Pharmacy will continue to monitor and adjust based on anti-Xa results.     Shonda Merino [facility]  12/25/2023 5:58 AM

## 2023-12-26 LAB
ALBUMIN SERPL-MCNC: 2.3 G/DL (ref 3.4–5)
ALBUMIN/GLOB SERPL: 0.8 {RATIO} (ref 1.1–2.2)
ALP SERPL-CCNC: 315 U/L (ref 40–129)
ALT SERPL-CCNC: 47 U/L (ref 10–40)
ANION GAP SERPL CALCULATED.3IONS-SCNC: 5 MMOL/L (ref 3–16)
ANTI-XA UNFRAC HEPARIN: 0.59 IU/ML (ref 0.3–0.7)
AST SERPL-CCNC: 66 U/L (ref 15–37)
BASOPHILS # BLD: 0.1 K/UL (ref 0–0.2)
BASOPHILS NFR BLD: 0.9 %
BILIRUB SERPL-MCNC: 0.7 MG/DL (ref 0–1)
BUN SERPL-MCNC: 11 MG/DL (ref 7–20)
CALCIUM SERPL-MCNC: 7.4 MG/DL (ref 8.3–10.6)
CHLORIDE SERPL-SCNC: 101 MMOL/L (ref 99–110)
CO2 SERPL-SCNC: 33 MMOL/L (ref 21–32)
CREAT SERPL-MCNC: <0.5 MG/DL (ref 0.6–1.2)
DEPRECATED RDW RBC AUTO: 15.9 % (ref 12.4–15.4)
EOSINOPHIL # BLD: 0.1 K/UL (ref 0–0.6)
EOSINOPHIL NFR BLD: 1 %
GFR SERPLBLD CREATININE-BSD FMLA CKD-EPI: >60 ML/MIN/{1.73_M2}
GLUCOSE BLD-MCNC: 160 MG/DL (ref 70–99)
GLUCOSE BLD-MCNC: 164 MG/DL (ref 70–99)
GLUCOSE BLD-MCNC: 167 MG/DL (ref 70–99)
GLUCOSE BLD-MCNC: 168 MG/DL (ref 70–99)
GLUCOSE BLD-MCNC: 172 MG/DL (ref 70–99)
GLUCOSE BLD-MCNC: 172 MG/DL (ref 70–99)
GLUCOSE BLD-MCNC: 178 MG/DL (ref 70–99)
GLUCOSE SERPL-MCNC: 185 MG/DL (ref 70–99)
HCT VFR BLD AUTO: 27.3 % (ref 36–48)
HGB BLD-MCNC: 9.3 G/DL (ref 12–16)
LYMPHOCYTES # BLD: 2 K/UL (ref 1–5.1)
LYMPHOCYTES NFR BLD: 17.8 %
MAGNESIUM SERPL-MCNC: 1.8 MG/DL (ref 1.8–2.4)
MCH RBC QN AUTO: 32.6 PG (ref 26–34)
MCHC RBC AUTO-ENTMCNC: 34 G/DL (ref 31–36)
MCV RBC AUTO: 95.9 FL (ref 80–100)
MONOCYTES # BLD: 0.7 K/UL (ref 0–1.3)
MONOCYTES NFR BLD: 6.4 %
NEUTROPHILS # BLD: 8.3 K/UL (ref 1.7–7.7)
NEUTROPHILS NFR BLD: 73.9 %
PERFORMED ON: ABNORMAL
PLATELET # BLD AUTO: 67 K/UL (ref 135–450)
PMV BLD AUTO: 11.1 FL (ref 5–10.5)
POTASSIUM SERPL-SCNC: 3.6 MMOL/L (ref 3.5–5.1)
POTASSIUM SERPL-SCNC: 3.7 MMOL/L (ref 3.5–5.1)
PROT SERPL-MCNC: 5.2 G/DL (ref 6.4–8.2)
RBC # BLD AUTO: 2.85 M/UL (ref 4–5.2)
SODIUM SERPL-SCNC: 139 MMOL/L (ref 136–145)
WBC # BLD AUTO: 11.3 K/UL (ref 4–11)

## 2023-12-26 PROCEDURE — 2580000003 HC RX 258: Performed by: INTERNAL MEDICINE

## 2023-12-26 PROCEDURE — 6360000002 HC RX W HCPCS: Performed by: INTERNAL MEDICINE

## 2023-12-26 PROCEDURE — C9113 INJ PANTOPRAZOLE SODIUM, VIA: HCPCS | Performed by: INTERNAL MEDICINE

## 2023-12-26 PROCEDURE — 2700000000 HC OXYGEN THERAPY PER DAY

## 2023-12-26 PROCEDURE — 2000000000 HC ICU R&B

## 2023-12-26 PROCEDURE — 84132 ASSAY OF SERUM POTASSIUM: CPT

## 2023-12-26 PROCEDURE — 2580000003 HC RX 258: Performed by: HOSPITALIST

## 2023-12-26 PROCEDURE — 2500000003 HC RX 250 WO HCPCS: Performed by: HOSPITALIST

## 2023-12-26 PROCEDURE — 94760 N-INVAS EAR/PLS OXIMETRY 1: CPT

## 2023-12-26 PROCEDURE — 6360000002 HC RX W HCPCS: Performed by: NURSE PRACTITIONER

## 2023-12-26 PROCEDURE — 99233 SBSQ HOSP IP/OBS HIGH 50: CPT | Performed by: INTERNAL MEDICINE

## 2023-12-26 PROCEDURE — 2580000003 HC RX 258: Performed by: NURSE PRACTITIONER

## 2023-12-26 PROCEDURE — 6370000000 HC RX 637 (ALT 250 FOR IP)

## 2023-12-26 PROCEDURE — 80053 COMPREHEN METABOLIC PANEL: CPT

## 2023-12-26 PROCEDURE — 6370000000 HC RX 637 (ALT 250 FOR IP): Performed by: HOSPITALIST

## 2023-12-26 PROCEDURE — 2500000003 HC RX 250 WO HCPCS: Performed by: NURSE PRACTITIONER

## 2023-12-26 PROCEDURE — APPNB15 APP NON BILLABLE TIME 0-15 MINS: Performed by: NURSE PRACTITIONER

## 2023-12-26 PROCEDURE — 85520 HEPARIN ASSAY: CPT

## 2023-12-26 PROCEDURE — 6360000002 HC RX W HCPCS: Performed by: HOSPITALIST

## 2023-12-26 PROCEDURE — 99291 CRITICAL CARE FIRST HOUR: CPT | Performed by: INTERNAL MEDICINE

## 2023-12-26 PROCEDURE — 83735 ASSAY OF MAGNESIUM: CPT

## 2023-12-26 PROCEDURE — 85025 COMPLETE CBC W/AUTO DIFF WBC: CPT

## 2023-12-26 PROCEDURE — 94003 VENT MGMT INPAT SUBQ DAY: CPT

## 2023-12-26 RX ORDER — DOBUTAMINE HYDROCHLORIDE 200 MG/100ML
2.5-1 INJECTION INTRAVENOUS CONTINUOUS
Status: DISCONTINUED | OUTPATIENT
Start: 2023-12-26 | End: 2023-12-27

## 2023-12-26 RX ORDER — MAGNESIUM SULFATE IN WATER 40 MG/ML
2000 INJECTION, SOLUTION INTRAVENOUS ONCE
Status: COMPLETED | OUTPATIENT
Start: 2023-12-26 | End: 2023-12-26

## 2023-12-26 RX ORDER — POTASSIUM CHLORIDE 29.8 MG/ML
20 INJECTION INTRAVENOUS ONCE
Status: COMPLETED | OUTPATIENT
Start: 2023-12-26 | End: 2023-12-26

## 2023-12-26 RX ORDER — CALCIUM GLUCONATE 20 MG/ML
1000 INJECTION, SOLUTION INTRAVENOUS ONCE
Status: COMPLETED | OUTPATIENT
Start: 2023-12-26 | End: 2023-12-26

## 2023-12-26 RX ADMIN — DANTROLENE SODIUM 200 MG: 25 CAPSULE ORAL at 10:04

## 2023-12-26 RX ADMIN — CHLORHEXIDINE GLUCONATE 0.12% ORAL RINSE 15 ML: 1.2 LIQUID ORAL at 19:37

## 2023-12-26 RX ADMIN — SERTRALINE 150 MG: 100 TABLET, FILM COATED ORAL at 10:03

## 2023-12-26 RX ADMIN — MAGNESIUM SULFATE HEPTAHYDRATE 2000 MG: 40 INJECTION, SOLUTION INTRAVENOUS at 13:16

## 2023-12-26 RX ADMIN — DANTROLENE SODIUM 200 MG: 25 CAPSULE ORAL at 19:49

## 2023-12-26 RX ADMIN — DEXMEDETOMIDINE HYDROCHLORIDE 0.4 MCG/KG/HR: 400 INJECTION INTRAVENOUS at 03:50

## 2023-12-26 RX ADMIN — SODIUM CHLORIDE, PRESERVATIVE FREE 10 ML: 5 INJECTION INTRAVENOUS at 19:38

## 2023-12-26 RX ADMIN — FUROSEMIDE 5 MG/HR: 10 INJECTION, SOLUTION INTRAMUSCULAR; INTRAVENOUS at 10:32

## 2023-12-26 RX ADMIN — CALCIUM GLUCONATE 1000 MG: 20 INJECTION, SOLUTION INTRAVENOUS at 11:44

## 2023-12-26 RX ADMIN — AMITRIPTYLINE HYDROCHLORIDE 50 MG: 50 TABLET, FILM COATED ORAL at 19:49

## 2023-12-26 RX ADMIN — ENOXAPARIN SODIUM 100 MG: 100 INJECTION SUBCUTANEOUS at 19:49

## 2023-12-26 RX ADMIN — SODIUM CHLORIDE, PRESERVATIVE FREE 40 MG: 5 INJECTION INTRAVENOUS at 10:03

## 2023-12-26 RX ADMIN — MEROPENEM 1000 MG: 1 INJECTION, POWDER, FOR SOLUTION INTRAVENOUS at 02:43

## 2023-12-26 RX ADMIN — PROPOFOL 30 MCG/KG/MIN: 10 INJECTION, EMULSION INTRAVENOUS at 05:22

## 2023-12-26 RX ADMIN — CHLORHEXIDINE GLUCONATE 0.12% ORAL RINSE 15 ML: 1.2 LIQUID ORAL at 09:00

## 2023-12-26 RX ADMIN — DOBUTAMINE HYDROCHLORIDE 5 MCG/KG/MIN: 200 INJECTION INTRAVENOUS at 11:20

## 2023-12-26 RX ADMIN — MEROPENEM 1000 MG: 1 INJECTION, POWDER, FOR SOLUTION INTRAVENOUS at 21:55

## 2023-12-26 RX ADMIN — MEROPENEM 1000 MG: 1 INJECTION, POWDER, FOR SOLUTION INTRAVENOUS at 10:15

## 2023-12-26 RX ADMIN — ENOXAPARIN SODIUM 100 MG: 100 INJECTION SUBCUTANEOUS at 10:03

## 2023-12-26 RX ADMIN — POTASSIUM CHLORIDE 20 MEQ: 29.8 INJECTION, SOLUTION INTRAVENOUS at 10:37

## 2023-12-26 RX ADMIN — LEVOTHYROXINE SODIUM 50 MCG: 0.05 TABLET ORAL at 10:03

## 2023-12-26 RX ADMIN — DEXMEDETOMIDINE HYDROCHLORIDE 0.4 MCG/KG/HR: 400 INJECTION INTRAVENOUS at 15:13

## 2023-12-26 ASSESSMENT — PULMONARY FUNCTION TESTS
PIF_VALUE: 31
PIF_VALUE: 26
PIF_VALUE: 31
PIF_VALUE: 25
PIF_VALUE: 25
PIF_VALUE: 27
PIF_VALUE: 25
PIF_VALUE: 31
PIF_VALUE: 30
PIF_VALUE: 31
PIF_VALUE: 25
PIF_VALUE: 28
PIF_VALUE: 25
PIF_VALUE: 27
PIF_VALUE: 31
PIF_VALUE: 28
PIF_VALUE: 31
PIF_VALUE: 31
PIF_VALUE: 29
PIF_VALUE: 31
PIF_VALUE: 30
PIF_VALUE: 31
PIF_VALUE: 26
PIF_VALUE: 31
PIF_VALUE: 26
PIF_VALUE: 26
PIF_VALUE: 31
PIF_VALUE: 29
PIF_VALUE: 25
PIF_VALUE: 31
PIF_VALUE: 30
PIF_VALUE: 31
PIF_VALUE: 25
PIF_VALUE: 26
PIF_VALUE: 31
PIF_VALUE: 26
PIF_VALUE: 31
PIF_VALUE: 26
PIF_VALUE: 31
PIF_VALUE: 32
PIF_VALUE: 25
PIF_VALUE: 33
PIF_VALUE: 31
PIF_VALUE: 27
PIF_VALUE: 31
PIF_VALUE: 28
PIF_VALUE: 28
PIF_VALUE: 32
PIF_VALUE: 31
PIF_VALUE: 26
PIF_VALUE: 31

## 2023-12-26 ASSESSMENT — PAIN SCALES - GENERAL
PAINLEVEL_OUTOF10: 0

## 2023-12-26 NOTE — PROGRESS NOTES
V2.0    [facility] [place]      Name:  Vanessa Newman /Age/Sex: 1960  (65 y.o. female)   MRN & CSN:  [id number] & [id number] Encounter Date/Time: 2023 2:17 PM EST   Location:  [id number] PCP: Sandip Hernandez MD       Hospital Day: 11    Assessment and Recommendations   62-year-old male with past medical history of cerebral palsy chronic muscular skeletal contractures, ECF resident presented with altered mental status and tested positive for COVID 19 pneumonia    Sepsis with septic shock secondary to COVID-19 pneumonia  Acute hypoxemic respiratory failure with SpO2 of less than 90 on room air  Possible aspiration pneumonia  COVID-19 infection  Lactic acidosis  Acute encephalopathy  Hypothyroidism  Hypertension  Coffee-ground content with PEG tube  Acute systolic congestive heart failure with EF of 20%    Plan:     Patient intubated continue vent management per critical care  Continue on Precedex drip and propofol drip for sedation,   Continue with dobutamine drip and Lasix drip per protocol  Continue full dose anticoagulation with Lovenox 1 mg subcu twice daily  Appreciate cardiology follow-up  Will discontinue IV Merrem day 8 and follow-up cultures  Hold home metoprolol because soft BP  Supportive care  Appreciate critical care and cardiology follow-up  Labs in a.m. Comment: Please note this report has been produced using speech recognition software and may contain errors related to that system including errors in grammar, punctuation, and spelling, as well as words and phrases that may be inappropriate. If there are any questions or concerns please feel free to contact the dictating provider for clarification.    Diet Diet NPO  ADULT TUBE FEEDING; PEG; Peptide Based; Continuous; 15; Yes; 10; Q 4 hours; 55; 30; Q 4 hours; Protein; Prosource x1 daily   DVT Prophylaxis [x] Lovenox, []  Heparin, [x] SCDs, [] Ambulation,  [] Eliquis, [] Xarelto   Code placement FINDINGS: ETT tip is 6.2 cm above the jairo.  Left PICC catheter tip overlies the lower SVC.  Left central catheter tip overlies the upper SVC. Cardiac silhouette is within normal range.  Bibasilar airspace opacities and small left pleural effusion.  No visible pneumothorax.     1. Left PICC catheter tip overlies the lower SVC. 2. Bibasilar airspace opacities and small left pleural effusion.  If patient has clinical symptoms of infection, differential would include pneumonia. In the absence of infectious symptoms, atelectasis can have the same appearance.     XR CHEST PORTABLE    Result Date: 12/24/2023  EXAMINATION: ONE XRAY VIEW OF THE CHEST 12/24/2023 2:13 pm COMPARISON: December 23, 2023 HISTORY: ORDERING SYSTEM PROVIDED HISTORY: picc placement TECHNOLOGIST PROVIDED HISTORY: Reason for exam:->picc placement Reason for Exam: picc line placement FINDINGS: ET tube tip 5.8 cm above the jairo.  Left PICC catheter tip overlies the right atrium.  Left IJ catheter tip overlies the mid SVC. Perihilar vasculature is indistinct.  Bibasilar opacity, left greater than right with small left pleural effusion.  No pneumothorax.     1. Left PICC catheter tip overlies the right atrium. 2. Bibasilar opacity, left greater than right with small left pleural effusion.  If patient has clinical symptoms of infection, differential would include pneumonia. In the absence of infectious symptoms, atelectasis can have the same appearance.     XR CHEST PORTABLE    Result Date: 12/23/2023  EXAMINATION: ONE XRAY VIEW OF THE CHEST 12/23/2023 10:33 am COMPARISON: 12/20/2023 radiograph HISTORY: ORDERING SYSTEM PROVIDED HISTORY: hypoxemia TECHNOLOGIST PROVIDED HISTORY: Reason for exam:->hypoxemia Reason for Exam: hypoxemia FINDINGS: Endotracheal tube and central line stable.  Heart and mediastinum are normal. Developing multifocal airspace opacities in the left mid and lower lung zone and in the right mid and upper lung zones.  Prior

## 2023-12-26 NOTE — PROGRESS NOTES
Infectious Diseases Inpatient Progress Note    CHIEF COMPLAINT:     COVID-19 pneumonia  Septic shock  Cerebral palsy  Procalcitonin elevation  Lactic acidosis  Respiratory failure requiring ventilator  WBC elevated  Fever        HISTORY OF PRESENT ILLNESS:  65 y.o. woman with a history of cerebral  palsy, dysphagia, on PEG tube feedings admitted to hospital secondary shortness of breath and change in mental status. She was tested positive for COVID-19. She is admitted from nursing facility its not clear the timing of COVID-19 positivity. In the hospital now developed respiratory distress high fever progressed to septic shock currently intubated transferred to ICU requiring pressor support. Tmax 105.8, currently on 100% FiO2. Rapid response was called earlier on 12/20/23, due to respiratory distress and now in the ICU on the ventilator chest x-ray indicating right middle and lower lobe pneumonia. Procalcitonin was not elevated admission but now it 51.6 lactic acid 6.8 creatinine 0.5 WBC 20.4 with left shift, blood cultures collected in process admission blood cultures negative MRSA probe negative.   Given ongoing septic shock with respiratory failure and pneumonia COVID-19 infection we are consulted for recommendations    Interval History : Remains on the vent intubated on dobutamine and sedated on the vent her   mother  is at bed side d/w  her in detail , WBC elevated noted and procal slow trend down    Past Medical History:    Past Medical History:   Diagnosis Date    Cardiac arrest ([address])     Cerebral palsy ([address])     Closed anterior dislocation of humerus     Humeral dislocation    Dysphagia     Infantile cerebral palsy, unspecified     Cerebral palsy    Irritable bowel syndrome     Irritable bowel       Past Surgical History:    Past Surgical History:   Procedure Laterality Date    ND ARTHRP ACETBLR/PROX FEM PROSTC AGRFT/ALGRFT Right     Hip Replacement, Total    ND ARTHRP KNE CONDYLE&PLATU MEDIAL&LAT Meds:  sodium chloride, potassium chloride, glucose, dextrose bolus **OR** dextrose bolus, glucagon (rDNA), dextrose, sodium chloride flush, sodium chloride, albuterol, ipratropium, sodium chloride, magnesium sulfate, ondansetron **OR** ondansetron, polyethylene glycol, acetaminophen **OR** acetaminophen    Imaging:   XR CHEST PORTABLE   Final Result   1. Left PICC catheter tip overlies the lower SVC.   2. Bibasilar airspace opacities and small left pleural effusion.  If patient   has clinical symptoms of infection, differential would include pneumonia. In   the absence of infectious symptoms, atelectasis can have the same appearance.         XR CHEST PORTABLE   Preliminary Result   1. Left PICC catheter tip overlies the right atrium.   2. Bibasilar opacity, left greater than right with small left pleural   effusion.  If patient has clinical symptoms of infection, differential would   include pneumonia. In the absence of infectious symptoms, atelectasis can   have the same appearance.         XR CHEST PORTABLE   Final Result   Developing multifocal airspace opacities which may represent viral pattern of   pneumonia.  ARDS or pulmonary edema not excluded.         US ABDOMEN LIMITED Specify organ? LIVER   Final Result   Fatty liver.  Gallbladder contracted but no obvious abnormality.   Nonvisualization of the common bile duct.  No evidence of ascites.         XR CHEST PORTABLE   Final Result   1. Endotracheal tube 4.4 cm above the jairo.         XR CHEST PORTABLE   Final Result   New left mid and lower lung airspace disease most consistent with pneumonia.      Suspect mild right perihilar disease in addition to the previously noted   atelectasis.         CT CHEST WO CONTRAST   Final Result   Right middle lobe bilateral pulmonary infiltrates most prominent in the right   middle lobe.      Area of consolidation related to atelectasis or infiltrate involving the   posterior aspect of the right upper lobe.  Follow-up CT

## 2023-12-26 NOTE — DISCHARGE INSTRUCTIONS
Extra Heart Failure sites:     https://Pops. com/publication/?q=066831   --- this is American Heart Association interactive Healthier Living with Heart Failure guidebook. Please click hyperlink or copy / paste link into search bar. Use your mouse to scroll through the pages. Lots of information about weight monitoring, diet tips, activity, meds, etc    HF Fountain brandie  -- this is a free smart phone brandie available for iPhone and Android download. Use your phone to track sodium / fluid intake, zone tool symptom tracking, weights, medications, etc. Click on this hyperlink  HF Fountain Brandie   for QR code for easy download. DASH (Dietary Approach to Stop Hypertension) diet --  [email or web address].no -- this diet is a flexible eating plan that promotes heart healthy eating style. Click on hyperlink or copy / paste link into search bar. Lots of low sodium recipes and tips.     [email or web address]  -- more free recipes

## 2023-12-26 NOTE — PLAN OF CARE
Problem: Discharge Planning  Goal: Discharge to home or other facility with appropriate resources  12/25/2023 2154 by Alvarado Alarcon RN  Outcome: Progressing  Flowsheets (Taken 12/25/2023 2000)  Discharge to home or other facility with appropriate resources:   Identify barriers to discharge with patient and caregiver   Arrange for needed discharge resources and transportation as appropriate   Identify discharge learning needs (meds, wound care, etc)     Problem: Neurosensory - Adult  Goal: Achieves stable or improved neurological status  12/25/2023 2154 by Alvarado Alarcon RN  Outcome: Progressing  [address] (Taken 12/25/2023 2000)  Achieves stable or improved neurological status:   Assess for and report changes in neurological status   Initiate measures to prevent increased intracranial pressure     Problem: Neurosensory - Adult  Goal: Absence of seizures  12/25/2023 2154 by Alvarado Alarcon RN  Outcome: Progressing  Flowsheets (Taken 12/25/2023 2000)  Absence of seizures: Monitor for seizure activity.   If seizure occurs, document type and location of movements and any associated apnea     Problem: Neurosensory - Adult  Goal: Remains free of injury related to seizures activity  12/25/2023 2154 by Alvarado Alarcon RN  Outcome: Progressing  Flowsheets (Taken 12/25/2023 2000)  Remains free of injury related to seizure activity:   Monitor patient for seizure activity, document and report duration and description of seizure to Licensed Independent Practitioner   Maintain airway, patient safety  and administer oxygen as ordered     Problem: Neurosensory - Adult  Goal: Achieves maximal functionality and self care  12/25/2023 2154 by Alvarado Alarcon RN  Outcome: Progressing  Flowsheets (Taken 12/25/2023 2000)  Achieves maximal functionality and self care: Monitor swallowing and airway patency with patient fatigue and changes in neurological status     Problem: Respiratory - Adult  Goal: Achieves optimal ventilation and skin breakdown  2.  Assess vascular access sites hourly  3.  Every 4-6 hours minimum:  Change oxygen saturation probe site  4.  Every 4-6 hours:  If on nasal continuous positive airway pressure, respiratory therapy assess nares and determine need for appliance change or resting period.  12/25/2023 2154 by Gloria Tai RN  Outcome: Progressing     Problem: Nutrition Deficit:  Goal: Optimize nutritional status  12/25/2023 2154 by Gloria Tai RN  Outcome: Progressing     Problem: Safety - Medical Restraint  Goal: Remains free of injury from restraints (Restraint for Interference with Medical Device)  Description: INTERVENTIONS:  1. Determine that other, less restrictive measures have been tried or would not be effective before applying the restraint  2. Evaluate the patient's condition at the time of restraint application  3. Inform patient/family regarding the reason for restraint  4. Q2H: Monitor safety, psychosocial status, comfort, nutrition and hydration  12/25/2023 2154 by Gloria Tai RN  Outcome: Progressing     Problem: Pain  Goal: Verbalizes/displays adequate comfort level or baseline comfort level  12/25/2023 2154 by Gloria Tai RN  Outcome: Progressing     Problem: Cardiovascular - Adult  Goal: Maintains optimal cardiac output and hemodynamic stability  12/25/2023 2154 by Gloria Tai RN  Outcome: Progressing  Flowsheets (Taken 12/25/2023 2000)  Maintains optimal cardiac output and hemodynamic stability:   Monitor blood pressure and heart rate   Monitor urine output and notify Licensed Independent Practitioner for values outside of normal range     Problem: Infection - Adult  Goal: Absence of infection at discharge  12/25/2023 2154 by Gloria Tia RN  Outcome: Progressing  Flowsheets (Taken 12/25/2023 2000)  Absence of infection at discharge:   Assess and monitor for signs and symptoms of infection   Monitor lab/diagnostic results   Monitor all insertion sites i.e., indwelling lines, tubes and

## 2023-12-27 LAB
ALBUMIN SERPL-MCNC: 2.6 G/DL (ref 3.4–5)
ALBUMIN/GLOB SERPL: 0.9 {RATIO} (ref 1.1–2.2)
ALP SERPL-CCNC: 339 U/L (ref 40–129)
ALT SERPL-CCNC: 59 U/L (ref 10–40)
ANION GAP SERPL CALCULATED.3IONS-SCNC: 8 MMOL/L (ref 3–16)
ANISOCYTOSIS BLD QL SMEAR: ABNORMAL
AST SERPL-CCNC: 84 U/L (ref 15–37)
BASOPHILS # BLD: 0 K/UL (ref 0–0.2)
BASOPHILS NFR BLD: 0 %
BILIRUB SERPL-MCNC: 0.7 MG/DL (ref 0–1)
BUN SERPL-MCNC: 8 MG/DL (ref 7–20)
CALCIUM SERPL-MCNC: 8 MG/DL (ref 8.3–10.6)
CHLORIDE SERPL-SCNC: 99 MMOL/L (ref 99–110)
CO2 SERPL-SCNC: 32 MMOL/L (ref 21–32)
CREAT SERPL-MCNC: <0.5 MG/DL (ref 0.6–1.2)
CRP SERPL-MCNC: 74.8 MG/L (ref 0–5.1)
DEPRECATED RDW RBC AUTO: 15.8 % (ref 12.4–15.4)
EOSINOPHIL # BLD: 0.3 K/UL (ref 0–0.6)
EOSINOPHIL NFR BLD: 2 %
GFR SERPLBLD CREATININE-BSD FMLA CKD-EPI: >60 ML/MIN/{1.73_M2}
GLUCOSE BLD-MCNC: 150 MG/DL (ref 70–99)
GLUCOSE BLD-MCNC: 159 MG/DL (ref 70–99)
GLUCOSE BLD-MCNC: 180 MG/DL (ref 70–99)
GLUCOSE BLD-MCNC: 181 MG/DL (ref 70–99)
GLUCOSE BLD-MCNC: 201 MG/DL (ref 70–99)
GLUCOSE SERPL-MCNC: 155 MG/DL (ref 70–99)
HCT VFR BLD AUTO: 27.5 % (ref 36–48)
HGB BLD-MCNC: 9.2 G/DL (ref 12–16)
LACTATE BLDV-SCNC: 1.4 MMOL/L (ref 0.4–2)
LYMPHOCYTES # BLD: 2.7 K/UL (ref 1–5.1)
LYMPHOCYTES NFR BLD: 20 %
MAGNESIUM SERPL-MCNC: 1.9 MG/DL (ref 1.8–2.4)
MCH RBC QN AUTO: 32.1 PG (ref 26–34)
MCHC RBC AUTO-ENTMCNC: 33.5 G/DL (ref 31–36)
MCV RBC AUTO: 95.9 FL (ref 80–100)
MONOCYTES # BLD: 0.5 K/UL (ref 0–1.3)
MONOCYTES NFR BLD: 4 %
MYELOCYTES NFR BLD MANUAL: 4 %
NEUTROPHILS # BLD: 10.1 K/UL (ref 1.7–7.7)
NEUTROPHILS NFR BLD: 65 %
NEUTS BAND NFR BLD MANUAL: 5 % (ref 0–7)
NEUTS VAC BLD QL SMEAR: PRESENT
NRBC BLD-RTO: 3 /100 WBC
PERFORMED ON: ABNORMAL
PHOSPHATE SERPL-MCNC: 3.1 MG/DL (ref 2.5–4.9)
PLATELET # BLD AUTO: 72 K/UL (ref 135–450)
PLATELET BLD QL SMEAR: ABNORMAL
PMV BLD AUTO: 10.3 FL (ref 5–10.5)
POLYCHROMASIA BLD QL SMEAR: ABNORMAL
POTASSIUM SERPL-SCNC: 3.6 MMOL/L (ref 3.5–5.1)
PROCALCITONIN SERPL IA-MCNC: 1.38 NG/ML (ref 0–0.15)
PROCALCITONIN SERPL IA-MCNC: 1.45 NG/ML (ref 0–0.15)
PROT SERPL-MCNC: 5.5 G/DL (ref 6.4–8.2)
RBC # BLD AUTO: 2.87 M/UL (ref 4–5.2)
SODIUM SERPL-SCNC: 139 MMOL/L (ref 136–145)
WBC # BLD AUTO: 13.7 K/UL (ref 4–11)

## 2023-12-27 PROCEDURE — 83605 ASSAY OF LACTIC ACID: CPT

## 2023-12-27 PROCEDURE — 6360000002 HC RX W HCPCS: Performed by: NURSE PRACTITIONER

## 2023-12-27 PROCEDURE — 6370000000 HC RX 637 (ALT 250 FOR IP): Performed by: HOSPITALIST

## 2023-12-27 PROCEDURE — 2580000003 HC RX 258: Performed by: INTERNAL MEDICINE

## 2023-12-27 PROCEDURE — 99291 CRITICAL CARE FIRST HOUR: CPT | Performed by: INTERNAL MEDICINE

## 2023-12-27 PROCEDURE — 6360000002 HC RX W HCPCS: Performed by: INTERNAL MEDICINE

## 2023-12-27 PROCEDURE — 2580000003 HC RX 258: Performed by: HOSPITALIST

## 2023-12-27 PROCEDURE — 36592 COLLECT BLOOD FROM PICC: CPT

## 2023-12-27 PROCEDURE — 2500000003 HC RX 250 WO HCPCS: Performed by: HOSPITALIST

## 2023-12-27 PROCEDURE — 93308 TTE F-UP OR LMTD: CPT

## 2023-12-27 PROCEDURE — 2580000003 HC RX 258: Performed by: NURSE PRACTITIONER

## 2023-12-27 PROCEDURE — 94760 N-INVAS EAR/PLS OXIMETRY 1: CPT

## 2023-12-27 PROCEDURE — 6370000000 HC RX 637 (ALT 250 FOR IP): Performed by: INTERNAL MEDICINE

## 2023-12-27 PROCEDURE — 6370000000 HC RX 637 (ALT 250 FOR IP)

## 2023-12-27 PROCEDURE — 6370000000 HC RX 637 (ALT 250 FOR IP): Performed by: NURSE PRACTITIONER

## 2023-12-27 PROCEDURE — APPNB15 APP NON BILLABLE TIME 0-15 MINS: Performed by: NURSE PRACTITIONER

## 2023-12-27 PROCEDURE — 80053 COMPREHEN METABOLIC PANEL: CPT

## 2023-12-27 PROCEDURE — C9113 INJ PANTOPRAZOLE SODIUM, VIA: HCPCS | Performed by: INTERNAL MEDICINE

## 2023-12-27 PROCEDURE — 86140 C-REACTIVE PROTEIN: CPT

## 2023-12-27 PROCEDURE — 83735 ASSAY OF MAGNESIUM: CPT

## 2023-12-27 PROCEDURE — 84100 ASSAY OF PHOSPHORUS: CPT

## 2023-12-27 PROCEDURE — 2500000003 HC RX 250 WO HCPCS: Performed by: NURSE PRACTITIONER

## 2023-12-27 PROCEDURE — 94640 AIRWAY INHALATION TREATMENT: CPT

## 2023-12-27 PROCEDURE — 2000000000 HC ICU R&B

## 2023-12-27 PROCEDURE — 84145 PROCALCITONIN (PCT): CPT

## 2023-12-27 PROCEDURE — 85025 COMPLETE CBC W/AUTO DIFF WBC: CPT

## 2023-12-27 PROCEDURE — 94003 VENT MGMT INPAT SUBQ DAY: CPT

## 2023-12-27 PROCEDURE — 2700000000 HC OXYGEN THERAPY PER DAY

## 2023-12-27 PROCEDURE — 99232 SBSQ HOSP IP/OBS MODERATE 35: CPT | Performed by: NURSE PRACTITIONER

## 2023-12-27 RX ORDER — ASPIRIN 81 MG/1
81 TABLET, CHEWABLE ORAL DAILY
Status: DISPENSED | OUTPATIENT
Start: 2023-12-27

## 2023-12-27 RX ORDER — MAGNESIUM SULFATE IN WATER 40 MG/ML
2000 INJECTION, SOLUTION INTRAVENOUS ONCE
Status: COMPLETED | OUTPATIENT
Start: 2023-12-27 | End: 2023-12-27

## 2023-12-27 RX ORDER — POTASSIUM CHLORIDE 29.8 MG/ML
20 INJECTION INTRAVENOUS ONCE
Status: COMPLETED | OUTPATIENT
Start: 2023-12-27 | End: 2023-12-27

## 2023-12-27 RX ORDER — DOBUTAMINE HYDROCHLORIDE 200 MG/100ML
2.5-1 INJECTION INTRAVENOUS CONTINUOUS
Status: DISCONTINUED | OUTPATIENT
Start: 2023-12-27 | End: 2023-12-28

## 2023-12-27 RX ORDER — CALCIUM GLUCONATE 20 MG/ML
1000 INJECTION, SOLUTION INTRAVENOUS ONCE
Status: COMPLETED | OUTPATIENT
Start: 2023-12-27 | End: 2023-12-27

## 2023-12-27 RX ADMIN — SODIUM CHLORIDE, PRESERVATIVE FREE 10 ML: 5 INJECTION INTRAVENOUS at 21:00

## 2023-12-27 RX ADMIN — MEROPENEM 1000 MG: 1 INJECTION, POWDER, FOR SOLUTION INTRAVENOUS at 20:46

## 2023-12-27 RX ADMIN — LEVOTHYROXINE SODIUM 50 MCG: 0.05 TABLET ORAL at 06:14

## 2023-12-27 RX ADMIN — CHLORHEXIDINE GLUCONATE 0.12% ORAL RINSE 15 ML: 1.2 LIQUID ORAL at 08:18

## 2023-12-27 RX ADMIN — SERTRALINE 150 MG: 100 TABLET, FILM COATED ORAL at 08:18

## 2023-12-27 RX ADMIN — MAGNESIUM SULFATE HEPTAHYDRATE 2000 MG: 40 INJECTION, SOLUTION INTRAVENOUS at 11:05

## 2023-12-27 RX ADMIN — CHLORHEXIDINE GLUCONATE 0.12% ORAL RINSE 15 ML: 1.2 LIQUID ORAL at 21:00

## 2023-12-27 RX ADMIN — CALCIUM GLUCONATE 1000 MG: 20 INJECTION, SOLUTION INTRAVENOUS at 09:44

## 2023-12-27 RX ADMIN — ASPIRIN 81 MG: 81 TABLET, CHEWABLE ORAL at 09:45

## 2023-12-27 RX ADMIN — ENOXAPARIN SODIUM 100 MG: 100 INJECTION SUBCUTANEOUS at 09:20

## 2023-12-27 RX ADMIN — SODIUM CHLORIDE, PRESERVATIVE FREE 40 MG: 5 INJECTION INTRAVENOUS at 08:14

## 2023-12-27 RX ADMIN — DEXMEDETOMIDINE HYDROCHLORIDE 0.4 MCG/KG/HR: 400 INJECTION INTRAVENOUS at 02:48

## 2023-12-27 RX ADMIN — FUROSEMIDE 5 MG/HR: 10 INJECTION, SOLUTION INTRAMUSCULAR; INTRAVENOUS at 22:13

## 2023-12-27 RX ADMIN — DANTROLENE SODIUM 200 MG: 25 CAPSULE ORAL at 08:21

## 2023-12-27 RX ADMIN — DOBUTAMINE HYDROCHLORIDE 2.5 MCG/KG/MIN: 200 INJECTION INTRAVENOUS at 09:20

## 2023-12-27 RX ADMIN — SODIUM CHLORIDE, PRESERVATIVE FREE 10 ML: 5 INJECTION INTRAVENOUS at 08:16

## 2023-12-27 RX ADMIN — DANTROLENE SODIUM 200 MG: 25 CAPSULE ORAL at 20:48

## 2023-12-27 RX ADMIN — ALBUTEROL SULFATE 2.5 MG: 2.5 SOLUTION RESPIRATORY (INHALATION) at 08:29

## 2023-12-27 RX ADMIN — MEROPENEM 1000 MG: 1 INJECTION, POWDER, FOR SOLUTION INTRAVENOUS at 14:04

## 2023-12-27 RX ADMIN — DEXMEDETOMIDINE HYDROCHLORIDE 0.6 MCG/KG/HR: 400 INJECTION INTRAVENOUS at 20:48

## 2023-12-27 RX ADMIN — SODIUM CHLORIDE, PRESERVATIVE FREE 10 ML: 5 INJECTION INTRAVENOUS at 08:15

## 2023-12-27 RX ADMIN — AMITRIPTYLINE HYDROCHLORIDE 50 MG: 50 TABLET, FILM COATED ORAL at 20:48

## 2023-12-27 RX ADMIN — FUROSEMIDE 5 MG/HR: 10 INJECTION, SOLUTION INTRAMUSCULAR; INTRAVENOUS at 04:00

## 2023-12-27 RX ADMIN — DEXMEDETOMIDINE HYDROCHLORIDE 0.6 MCG/KG/HR: 400 INJECTION INTRAVENOUS at 12:58

## 2023-12-27 RX ADMIN — INSULIN LISPRO 2 UNITS: 100 INJECTION, SOLUTION INTRAVENOUS; SUBCUTANEOUS at 16:13

## 2023-12-27 RX ADMIN — DOBUTAMINE HYDROCHLORIDE 5 MCG/KG/MIN: 200 INJECTION INTRAVENOUS at 04:01

## 2023-12-27 RX ADMIN — SODIUM CHLORIDE, PRESERVATIVE FREE 10 ML: 5 INJECTION INTRAVENOUS at 20:59

## 2023-12-27 RX ADMIN — MEROPENEM 1000 MG: 1 INJECTION, POWDER, FOR SOLUTION INTRAVENOUS at 06:14

## 2023-12-27 RX ADMIN — POTASSIUM CHLORIDE 20 MEQ: 29.8 INJECTION, SOLUTION INTRAVENOUS at 09:40

## 2023-12-27 RX ADMIN — ENOXAPARIN SODIUM 100 MG: 100 INJECTION SUBCUTANEOUS at 20:46

## 2023-12-27 ASSESSMENT — PULMONARY FUNCTION TESTS
PIF_VALUE: 31
PIF_VALUE: 32
PIF_VALUE: 31
PIF_VALUE: 32
PIF_VALUE: 31
PIF_VALUE: 23
PIF_VALUE: 31
PIF_VALUE: 32
PIF_VALUE: 31
PIF_VALUE: 32
PIF_VALUE: 31
PIF_VALUE: 23
PIF_VALUE: 31
PIF_VALUE: 32
PIF_VALUE: 31
PIF_VALUE: 23
PIF_VALUE: 31
PIF_VALUE: 32
PIF_VALUE: 31
PIF_VALUE: 31
PIF_VALUE: 32
PIF_VALUE: 23
PIF_VALUE: 23
PIF_VALUE: 31
PIF_VALUE: 23
PIF_VALUE: 31

## 2023-12-27 ASSESSMENT — PAIN SCALES - GENERAL
PAINLEVEL_OUTOF10: 0

## 2023-12-27 NOTE — PROGRESS NOTES
1020  back on previous settings from [facility] with ps of 17 cm per V. V.O Dr Alexandra Villarreal

## 2023-12-27 NOTE — PLAN OF CARE
Problem: Discharge Planning  Goal: Discharge to home or other facility with appropriate resources  Outcome: Progressing  Flowsheets (Taken 12/26/2023 1930)  Discharge to home or other facility with appropriate resources:   Identify barriers to discharge with patient and caregiver   Arrange for needed discharge resources and transportation as appropriate   Identify discharge learning needs (meds, wound care, etc)   Arrange for interpreters to assist at discharge as needed     Problem: Neurosensory - Adult  Goal: Achieves stable or improved neurological status  Outcome: Progressing  Flowsheets (Taken 12/26/2023 1930)  Achieves stable or improved neurological status:   Assess for and report changes in neurological status   Initiate measures to prevent increased intracranial pressure   Maintain blood pressure and fluid volume within ordered parameters to optimize cerebral perfusion and minimize risk of hemorrhage   Monitor temperature, glucose, and sodium. Initiate appropriate interventions as ordered  Goal: Absence of seizures  Outcome: Progressing  Flowsheets (Taken 12/26/2023 1930)  Absence of seizures:   Monitor for seizure activity.   If seizure occurs, document type and location of movements and any associated apnea   Support airway/breathing, administer oxygen as needed  Goal: Remains free of injury related to seizures activity  Outcome: Progressing  Flowsheets (Taken 12/26/2023 1930)  Remains free of injury related to seizure activity:   Maintain airway, patient safety  and administer oxygen as ordered   Monitor patient for seizure activity, document and report duration and description of seizure to Licensed Independent Practitioner  Goal: Achieves maximal functionality and self care  Outcome: Progressing  Flowsheets (Taken 12/26/2023 1930)  Achieves maximal functionality and self care:   Monitor swallowing and airway patency with patient fatigue and changes in neurological status   Encourage and assist patient protect limb and body alignment per provider's orders  Goal: Return ADL status to a safe level of function  Outcome: Progressing  Flowsheets (Taken 12/26/2023 1930)  Return ADL Status to a Safe Level of Function:   Assess activities of daily living deficits and provide assistive devices as needed   Assist and instruct patient to increase activity and self care as tolerated     Problem: Gastrointestinal - Adult  Goal: Minimal or absence of nausea and vomiting  Outcome: Progressing  Flowsheets (Taken 12/26/2023 1930)  Minimal or absence of nausea and vomiting:   Maintain NPO status until nausea and vomiting are resolved   Administer IV fluids as ordered to ensure adequate hydration   Nasogastric tube to low intermittent suction as ordered   Administer ordered antiemetic medications as needed   Provide nonpharmacologic comfort measures as appropriate   Nutrition consult to assist patient with adequate nutrition and appropriate food choices  Goal: Maintains or returns to baseline bowel function  Outcome: Progressing  Flowsheets (Taken 12/26/2023 1930)  Maintains or returns to baseline bowel function:   Assess bowel function   Encourage oral fluids to ensure adequate hydration   Administer IV fluids as ordered to ensure adequate hydration   Administer ordered medications as needed  Goal: Maintains adequate nutritional intake  Outcome: Progressing  Flowsheets (Taken 12/26/2023 1930)  Maintains adequate nutritional intake:   Monitor percentage of each meal consumed   Identify factors contributing to decreased intake, treat as appropriate   Assist with meals as needed   Monitor intake and output, weight and lab values     Problem: Genitourinary - Adult  Goal: Absence of urinary retention  Outcome: Progressing  Flowsheets (Taken 12/26/2023 1930)  Absence of urinary retention:   Assess patient’s ability to void and empty bladder   Monitor intake/output and perform bladder scan as needed   Place urinary catheter per Licensed  Initiate Consults from Ethics, Palliative Care or initiate Family Care Conference as is appropriate  Outcome: Progressing  Flowsheets (Taken 12/26/2023 1930)  Patient/family able to effectively weigh alternatives and participate in decision making related to treatment and care:   Determine when there are differences between patient's view, family's view, and healthcare provider's view of condition   Respect patient/family right to receive or not to receive information     Problem: ABCDS Injury Assessment  Goal: Absence of physical injury  Outcome: Progressing

## 2023-12-27 NOTE — PROGRESS NOTES
V2.0    [facility] [place]      Name:  Lilly Terrell /Age/Sex: 1960  (62 y.o. female)   MRN & CSN:  [id number] & [id number] Encounter Date/Time: 2023 2:17 PM EST   Location:  [id number] PCP: Tyson Hahn     Attending:Vinny Fernández MD       Hospital Day: 12    Assessment and Recommendations   62-year-old male with past medical history of cerebral palsy chronic muscular skeletal contractures, ECF resident presented with altered mental status and tested positive for COVID 19 pneumonia    Sepsis with septic shock secondary to COVID-19 pneumonia  Acute hypoxemic respiratory failure with SpO2 of less than 90 on room air  Possible aspiration pneumonia  COVID-19 infection  Lactic acidosis  Acute encephalopathy  Hypothyroidism  Hypertension  Coffee-ground content with PEG tube  Acute systolic congestive heart failure with EF of 20%    Plan:     Still intubated on vent appreciate pulmonary critical care follow-up and management  Continue pressors per protocol and as tolerated--- Levophed and dobutamine  Continue full dose anticoagulation with Lovenox 1 mg subcu twice daily  Appreciate cardiology follow-up  Hold home metoprolol because soft BP  Supportive care  Appreciate critical care and cardiology follow-up  Labs in a.m.            Comment: Please note this report has been produced using speech recognition software and may contain errors related to that system including errors in grammar, punctuation, and spelling, as well as words and phrases that may be inappropriate. If there are any questions or concerns please feel free to contact the dictating provider for clarification.   Diet Diet NPO  ADULT TUBE FEEDING; PEG; Peptide Based; Continuous; 15; Yes; 10; Q 4 hours; 55; 30; Q 4 hours; Protein; Prosource x1 daily   DVT Prophylaxis [x] Lovenox, []  Heparin, [x] SCDs, [] Ambulation,  [] Eliquis, [] Xarelto   Code Status Full Code   Disposition From: Home  Expected Disposition: ECF  Estimated  tip overlies the upper SVC. Cardiac silhouette is within normal range.  Bibasilar airspace opacities and small left pleural effusion.  No visible pneumothorax.     1. Left PICC catheter tip overlies the lower SVC. 2. Bibasilar airspace opacities and small left pleural effusion.  If patient has clinical symptoms of infection, differential would include pneumonia. In the absence of infectious symptoms, atelectasis can have the same appearance.     XR CHEST PORTABLE    Result Date: 12/24/2023  EXAMINATION: ONE XRAY VIEW OF THE CHEST 12/24/2023 2:13 pm COMPARISON: December 23, 2023 HISTORY: ORDERING SYSTEM PROVIDED HISTORY: picc placement TECHNOLOGIST PROVIDED HISTORY: Reason for exam:->picc placement Reason for Exam: picc line placement FINDINGS: ET tube tip 5.8 cm above the jairo.  Left PICC catheter tip overlies the right atrium.  Left IJ catheter tip overlies the mid SVC. Perihilar vasculature is indistinct.  Bibasilar opacity, left greater than right with small left pleural effusion.  No pneumothorax.     1. Left PICC catheter tip overlies the right atrium. 2. Bibasilar opacity, left greater than right with small left pleural effusion.  If patient has clinical symptoms of infection, differential would include pneumonia. In the absence of infectious symptoms, atelectasis can have the same appearance.     XR CHEST PORTABLE    Result Date: 12/23/2023  EXAMINATION: ONE XRAY VIEW OF THE CHEST 12/23/2023 10:33 am COMPARISON: 12/20/2023 radiograph HISTORY: ORDERING SYSTEM PROVIDED HISTORY: hypoxemia TECHNOLOGIST PROVIDED HISTORY: Reason for exam:->hypoxemia Reason for Exam: hypoxemia FINDINGS: Endotracheal tube and central line stable.  Heart and mediastinum are normal. Developing multifocal airspace opacities in the left mid and lower lung zone and in the right mid and upper lung zones.  Prior left shoulder arthroplasty. No acute skeletal finding     Developing multifocal airspace opacities which may represent viral  ORDERING SYSTEM PROVIDED HISTORY: hypoxia, tachycardia TECHNOLOGIST PROVIDED HISTORY: Reason for exam:->hypoxia, tachycardia Reason for Exam: hypoxia, tachycardia FINDINGS: There is new patchy airspace disease in the left mid and lower lung. There is also mild disease suspected in the right perihilar region in addition to the previous noted atelectasis. There is trace right pleural fluid with blunting of the costophrenic angle. No pneumothorax. Heart size is within normal limits. No acute bone finding. New left mid and lower lung airspace disease most consistent with pneumonia. Suspect mild right perihilar disease in addition to the previously noted atelectasis. CBC:   Recent Labs     12/25/23  0400 12/26/23  0404 12/27/23  0434   WBC 9.7 11.3* 13.7*   HGB 9.7* 9.3* 9.2*   PLT 67* 67* 72*       BMP:    Recent Labs     12/25/23  1800 12/26/23  0030 12/26/23  0404 12/27/23  0434     --  139 139   K 3.2* 3.7 3.6 3.6     --  101 99   CO2 32  --  33* 32   BUN 10  --  11 8   CREATININE <0.5*  --  <0.5* <0.5*   GLUCOSE 185*  --  185* 155*       Hepatic:   Recent Labs     12/26/23  0404 12/27/23  0434   AST 66* 84*   ALT 47* 59*   BILITOT 0.7 0.7   ALKPHOS 315* 339*       Lipids:   Lab Results   Component Value Date/Time    TRIG 217 05/11/2015 05:30 AM     Hemoglobin A1C:   Lab Results   Component Value Date/Time    LABA1C 5.4 12/19/2023 07:45 AM     TSH: No results found for: \"TSH\"  Troponin: No results found for: \"TROPONINT\"  Lactic Acid:   Recent Labs     12/27/23  0420   LACTA 1.4       BNP: No results for input(s): \"PROBNP\" in the last 72 hours.   UA:  Lab Results   Component Value Date/Time    NITRU Negative 11/27/2022 12:14 AM    COLORU DARK YELLOW 11/27/2022 12:14 AM    PHUR 7.0 11/27/2022 12:14 AM    LABCAST 0-1 Hyaline 05/06/2015 01:35 PM    WBCUA 3-5 11/27/2022 12:14 AM    RBCUA 11-20 11/27/2022 12:14 AM    BACTERIA 4+ 11/27/2022 12:14 AM    CLARITYU CLOUDY 11/27/2022 12:14 AM

## 2023-12-27 NOTE — PLAN OF CARE
Problem: Neurosensory - Adult  Goal: Absence of seizures  Outcome: Progressing     Problem: Respiratory - Adult  Goal: Achieves optimal ventilation and oxygenation  Outcome: Progressing     Problem: Skin/Tissue Integrity - Adult  Goal: Skin integrity remains intact  Outcome: Progressing  Goal: Oral mucous membranes remain intact  Outcome: Progressing     Problem: Musculoskeletal - Adult  Goal: Return mobility to safest level of function  Outcome: Progressing  Goal: Return ADL status to a safe level of function  Outcome: Progressing     Problem: Gastrointestinal - Adult  Goal: Maintains adequate nutritional intake  Outcome: Progressing

## 2023-12-27 NOTE — PROGRESS NOTES
Infectious Diseases Inpatient Progress Note    CHIEF COMPLAINT:     COVID-19 pneumonia  Septic shock  Cerebral palsy  Procalcitonin elevation  Lactic acidosis  Respiratory failure requiring ventilator  WBC elevated  Fever        HISTORY OF PRESENT ILLNESS:  62 y.o. woman with a history of cerebral  palsy, dysphagia, on PEG tube feedings admitted to hospital secondary shortness of breath and change in mental status.  She was tested positive for COVID-19.  She is admitted from nursing facility its not clear the timing of COVID-19 positivity.  In the hospital now developed respiratory distress high fever progressed to septic shock currently intubated transferred to ICU requiring pressor support.  Tmax 105.8, currently on 100% FiO2.  Rapid response was called earlier on 12/20/23, due to respiratory distress and now in the ICU on the ventilator chest x-ray indicating right middle and lower lobe pneumonia.  Procalcitonin was not elevated admission but now it 51.6 lactic acid 6.8 creatinine 0.5 WBC 20.4 with left shift, blood cultures collected in process admission blood cultures negative MRSA probe negative.  Given ongoing septic shock with respiratory failure and pneumonia COVID-19 infection we are consulted for recommendations    Interval History : Remains on the vent intubated on dobutamine and Norepinephrine+sedated on the vent WBC still elevated and Procal slow improvement -  Mother at bed side during rounds        Past Medical History:    Past Medical History:   Diagnosis Date    Cardiac arrest (HCC)     Cerebral palsy (HCC)     Closed anterior dislocation of humerus     Humeral dislocation    Dysphagia     Infantile cerebral palsy, unspecified     Cerebral palsy    Irritable bowel syndrome     Irritable bowel       Past Surgical History:    Past Surgical History:   Procedure Laterality Date    [place] ARTHRP ACETBLR/PROX FEM PROSTC AGRFT/ALGRFT Right     Hip Replacement, Total    [place] ARTHRP KNE CONDYLE&PLATU  failure with hypoxia and hypercarbia (HCC)  J96.01     J96.02       5. Pneumonia of both lower lobes due to infectious organism  J18.9          Septic shock  Rt lower lobe PNA  Aspiration PNA  HAP  Covid 19 pna  High fevers T max  105  Procal elevation   WBC elevation  BNP elevation  Lactic acidosis resolving   Blood cx in process -ve   PNA panel -ve  Resp failure on the ventilator  PEG tube in place  Cerebral palsy  MRSA probe =ve  COVID 19 PCR +Ve on 12/17/23 on this admit-   H/O COVID 19 in 2022   Thrombocytopenia    She remains critically ill in ICU was on   pressor support for BP due to on going septic shock her procal was not elevated on admit and now  elevated  since she developed high fevers on 12/20/23 followed by resp failure, and WBC elevation, Procal elevation indicating acute bacterial PNA process or Blood stream infection and cx in process  so far negative     Wbc slow trend down procal improving but remains intubated on the vent from septic shock and resp failure    Resp cx negative so far and will trend Lactic acid improving   and PROCAL for monitoring slow trend down     Hopefully able to wean from the vent soon but still very ill d/w Mother at bed side        Labs, Microbiology, Radiology and pertinent results from current hospitalization and care every where were reviewed by me as a part of the consultation. PLAN :  Cont IV Meropenem x 1 gm X q  8 hrs  Completed IV AZITHROMYCin for atypical organisms course   Trend Procal still high check tomorrow  Blood cx  -ve  Trend Lactic acid check tomorrow   On Dexamethasone x 6 mg daily  d/c on 12/23  CT chest pattern more favor aspiration PNA vs Bacterial PNA  Given NH status and risk for MDRO   MRSA probe -ve   Watch platelets    Discussed with patient/Family and Nursing  d/w Mother at bed side     Medical Decision Making:   The following items were considered in medical decision making:  Discussion of patient care with other providers  Reviewed clinical lab tests  Reviewed radiology tests  Reviewed other diagnostic tests/interventions  Independent review of radiologic images  Independent review of  Microbiology cultures and other micro tests reviewed     Risk of Complications/Morbidity: High      Patient is critically ill and has a potentially life threatening infection that poses threat to life/bodily function.   There is potential for worsening infection/ sudden clinically significant or life-threatening deterioration in the patient's condition without appropriate antimicrobial therapy.  Complex medical decision making process was involved to select appropriate antimicrobial agents to reverse the cause of patient's severe infection/ illness.  Antimicrobial therapy requires intensive monitoring for toxicity and frequent dose adjustments to prevent toxicity and permanent end-organ dysfunction .          Thanks for allowing me to participate in your patient's care please call me with any questions or concerns.    Dr. Calderon Vidales MD  Infectious Disease  [facility] Physician  Phone: [phone]   Fax : [phone]

## 2023-12-27 NOTE — PROGRESS NOTES
SAT not completed this evening d/t pt programed to be on a PEEP of 10. This does not meet SAT criteria. Pt will remain off propofol and on precedex as tolerated.

## 2023-12-28 LAB
ALBUMIN SERPL-MCNC: 2.7 G/DL (ref 3.4–5)
ALBUMIN/GLOB SERPL: 1 {RATIO} (ref 1.1–2.2)
ALP SERPL-CCNC: 340 U/L (ref 40–129)
ALT SERPL-CCNC: 57 U/L (ref 10–40)
ANION GAP SERPL CALCULATED.3IONS-SCNC: 8 MMOL/L (ref 3–16)
ANISOCYTOSIS BLD QL SMEAR: ABNORMAL
AST SERPL-CCNC: 66 U/L (ref 15–37)
BASOPHILS # BLD: 0 K/UL (ref 0–0.2)
BASOPHILS NFR BLD: 0 %
BILIRUB SERPL-MCNC: 0.7 MG/DL (ref 0–1)
BUN SERPL-MCNC: 8 MG/DL (ref 7–20)
CALCIUM SERPL-MCNC: 8.4 MG/DL (ref 8.3–10.6)
CHLORIDE SERPL-SCNC: 97 MMOL/L (ref 99–110)
CO2 SERPL-SCNC: 34 MMOL/L (ref 21–32)
CREAT SERPL-MCNC: <0.5 MG/DL (ref 0.6–1.2)
DEPRECATED RDW RBC AUTO: 15.2 % (ref 12.4–15.4)
EOSINOPHIL # BLD: 0.3 K/UL (ref 0–0.6)
EOSINOPHIL NFR BLD: 2 %
GFR SERPLBLD CREATININE-BSD FMLA CKD-EPI: >60 ML/MIN/{1.73_M2}
GLUCOSE BLD-MCNC: 149 MG/DL (ref 70–99)
GLUCOSE BLD-MCNC: 164 MG/DL (ref 70–99)
GLUCOSE BLD-MCNC: 165 MG/DL (ref 70–99)
GLUCOSE BLD-MCNC: 168 MG/DL (ref 70–99)
GLUCOSE BLD-MCNC: 169 MG/DL (ref 70–99)
GLUCOSE BLD-MCNC: 169 MG/DL (ref 70–99)
GLUCOSE BLD-MCNC: 180 MG/DL (ref 70–99)
GLUCOSE SERPL-MCNC: 166 MG/DL (ref 70–99)
HCT VFR BLD AUTO: 27.3 % (ref 36–48)
HGB BLD-MCNC: 9.3 G/DL (ref 12–16)
LYMPHOCYTES # BLD: 2 K/UL (ref 1–5.1)
LYMPHOCYTES NFR BLD: 16 %
MAGNESIUM SERPL-MCNC: 2 MG/DL (ref 1.8–2.4)
MCH RBC QN AUTO: 32.6 PG (ref 26–34)
MCHC RBC AUTO-ENTMCNC: 34 G/DL (ref 31–36)
MCV RBC AUTO: 96 FL (ref 80–100)
METAMYELOCYTES NFR BLD MANUAL: 2 %
MICROCYTES BLD QL SMEAR: ABNORMAL
MONOCYTES # BLD: 0.9 K/UL (ref 0–1.3)
MONOCYTES NFR BLD: 7 %
NEUTROPHILS # BLD: 9.5 K/UL (ref 1.7–7.7)
NEUTROPHILS NFR BLD: 67 %
NEUTS BAND NFR BLD MANUAL: 3 % (ref 0–7)
NEUTS VAC BLD QL SMEAR: PRESENT
PERFORMED ON: ABNORMAL
PHOSPHATE SERPL-MCNC: 3.8 MG/DL (ref 2.5–4.9)
PLATELET # BLD AUTO: 71 K/UL (ref 135–450)
PMV BLD AUTO: 10.6 FL (ref 5–10.5)
POLYCHROMASIA BLD QL SMEAR: ABNORMAL
POTASSIUM SERPL-SCNC: 3.5 MMOL/L (ref 3.5–5.1)
POTASSIUM SERPL-SCNC: 3.8 MMOL/L (ref 3.5–5.1)
PROMYELOCYTES NFR BLD MANUAL: 3 %
PROT SERPL-MCNC: 5.4 G/DL (ref 6.4–8.2)
RBC # BLD AUTO: 2.85 M/UL (ref 4–5.2)
SODIUM SERPL-SCNC: 139 MMOL/L (ref 136–145)
WBC # BLD AUTO: 12.6 K/UL (ref 4–11)

## 2023-12-28 PROCEDURE — 6360000002 HC RX W HCPCS: Performed by: INTERNAL MEDICINE

## 2023-12-28 PROCEDURE — 85025 COMPLETE CBC W/AUTO DIFF WBC: CPT

## 2023-12-28 PROCEDURE — 99291 CRITICAL CARE FIRST HOUR: CPT | Performed by: INTERNAL MEDICINE

## 2023-12-28 PROCEDURE — 6370000000 HC RX 637 (ALT 250 FOR IP)

## 2023-12-28 PROCEDURE — 2580000003 HC RX 258: Performed by: HOSPITALIST

## 2023-12-28 PROCEDURE — 2500000003 HC RX 250 WO HCPCS: Performed by: HOSPITALIST

## 2023-12-28 PROCEDURE — 83735 ASSAY OF MAGNESIUM: CPT

## 2023-12-28 PROCEDURE — 6360000002 HC RX W HCPCS: Performed by: NURSE PRACTITIONER

## 2023-12-28 PROCEDURE — C9113 INJ PANTOPRAZOLE SODIUM, VIA: HCPCS | Performed by: INTERNAL MEDICINE

## 2023-12-28 PROCEDURE — 2000000000 HC ICU R&B

## 2023-12-28 PROCEDURE — 94640 AIRWAY INHALATION TREATMENT: CPT

## 2023-12-28 PROCEDURE — 2580000003 HC RX 258: Performed by: INTERNAL MEDICINE

## 2023-12-28 PROCEDURE — 84100 ASSAY OF PHOSPHORUS: CPT

## 2023-12-28 PROCEDURE — 2580000003 HC RX 258: Performed by: NURSE PRACTITIONER

## 2023-12-28 PROCEDURE — 2700000000 HC OXYGEN THERAPY PER DAY

## 2023-12-28 PROCEDURE — 94003 VENT MGMT INPAT SUBQ DAY: CPT

## 2023-12-28 PROCEDURE — 80053 COMPREHEN METABOLIC PANEL: CPT

## 2023-12-28 PROCEDURE — 6370000000 HC RX 637 (ALT 250 FOR IP): Performed by: HOSPITALIST

## 2023-12-28 PROCEDURE — 6370000000 HC RX 637 (ALT 250 FOR IP): Performed by: NURSE PRACTITIONER

## 2023-12-28 PROCEDURE — 6370000000 HC RX 637 (ALT 250 FOR IP): Performed by: INTERNAL MEDICINE

## 2023-12-28 PROCEDURE — 84132 ASSAY OF SERUM POTASSIUM: CPT

## 2023-12-28 PROCEDURE — 6360000002 HC RX W HCPCS: Performed by: STUDENT IN AN ORGANIZED HEALTH CARE EDUCATION/TRAINING PROGRAM

## 2023-12-28 PROCEDURE — 2500000003 HC RX 250 WO HCPCS: Performed by: INTERNAL MEDICINE

## 2023-12-28 PROCEDURE — 99233 SBSQ HOSP IP/OBS HIGH 50: CPT | Performed by: NURSE PRACTITIONER

## 2023-12-28 PROCEDURE — 99233 SBSQ HOSP IP/OBS HIGH 50: CPT | Performed by: INTERNAL MEDICINE

## 2023-12-28 PROCEDURE — APPNB15 APP NON BILLABLE TIME 0-15 MINS: Performed by: NURSE PRACTITIONER

## 2023-12-28 PROCEDURE — 94761 N-INVAS EAR/PLS OXIMETRY MLT: CPT

## 2023-12-28 RX ORDER — PREGABALIN 100 MG/1
200 CAPSULE ORAL 3 TIMES DAILY
Status: DISPENSED | OUTPATIENT
Start: 2023-12-28

## 2023-12-28 RX ORDER — CLONAZEPAM 1 MG/1
2 TABLET ORAL 2 TIMES DAILY
Status: DISPENSED | OUTPATIENT
Start: 2023-12-28

## 2023-12-28 RX ADMIN — DOBUTAMINE HYDROCHLORIDE 2.5 MCG/KG/MIN: 200 INJECTION INTRAVENOUS at 07:28

## 2023-12-28 RX ADMIN — CHLORHEXIDINE GLUCONATE 0.12% ORAL RINSE 15 ML: 1.2 LIQUID ORAL at 20:36

## 2023-12-28 RX ADMIN — CHLORHEXIDINE GLUCONATE 0.12% ORAL RINSE 15 ML: 1.2 LIQUID ORAL at 08:17

## 2023-12-28 RX ADMIN — FUROSEMIDE 5 MG/HR: 10 INJECTION, SOLUTION INTRAMUSCULAR; INTRAVENOUS at 14:08

## 2023-12-28 RX ADMIN — DANTROLENE SODIUM 200 MG: 25 CAPSULE ORAL at 08:23

## 2023-12-28 RX ADMIN — ENOXAPARIN SODIUM 100 MG: 100 INJECTION SUBCUTANEOUS at 20:32

## 2023-12-28 RX ADMIN — ACETAMINOPHEN 650 MG: 325 TABLET ORAL at 18:02

## 2023-12-28 RX ADMIN — AMITRIPTYLINE HYDROCHLORIDE 50 MG: 50 TABLET, FILM COATED ORAL at 20:34

## 2023-12-28 RX ADMIN — SODIUM CHLORIDE, PRESERVATIVE FREE 40 MG: 5 INJECTION INTRAVENOUS at 08:17

## 2023-12-28 RX ADMIN — POTASSIUM CHLORIDE 20 MEQ: 29.8 INJECTION, SOLUTION INTRAVENOUS at 08:34

## 2023-12-28 RX ADMIN — SERTRALINE 150 MG: 100 TABLET, FILM COATED ORAL at 08:22

## 2023-12-28 RX ADMIN — MEROPENEM 1000 MG: 1 INJECTION, POWDER, FOR SOLUTION INTRAVENOUS at 22:03

## 2023-12-28 RX ADMIN — SODIUM CHLORIDE, PRESERVATIVE FREE 10 ML: 5 INJECTION INTRAVENOUS at 20:34

## 2023-12-28 RX ADMIN — Medication 5 MCG/MIN: at 11:06

## 2023-12-28 RX ADMIN — ALBUTEROL SULFATE 2.5 MG: 2.5 SOLUTION RESPIRATORY (INHALATION) at 11:53

## 2023-12-28 RX ADMIN — SODIUM CHLORIDE, PRESERVATIVE FREE 10 ML: 5 INJECTION INTRAVENOUS at 08:18

## 2023-12-28 RX ADMIN — LEVOTHYROXINE SODIUM 50 MCG: 0.05 TABLET ORAL at 06:27

## 2023-12-28 RX ADMIN — MEROPENEM 1000 MG: 1 INJECTION, POWDER, FOR SOLUTION INTRAVENOUS at 14:07

## 2023-12-28 RX ADMIN — CLONAZEPAM 2 MG: 1 TABLET ORAL at 09:33

## 2023-12-28 RX ADMIN — DEXMEDETOMIDINE HYDROCHLORIDE 0.6 MCG/KG/HR: 400 INJECTION INTRAVENOUS at 07:30

## 2023-12-28 RX ADMIN — POTASSIUM CHLORIDE 20 MEQ: 29.8 INJECTION, SOLUTION INTRAVENOUS at 07:27

## 2023-12-28 RX ADMIN — SODIUM CHLORIDE, PRESERVATIVE FREE 10 ML: 5 INJECTION INTRAVENOUS at 20:35

## 2023-12-28 RX ADMIN — DANTROLENE SODIUM 200 MG: 25 CAPSULE ORAL at 20:33

## 2023-12-28 RX ADMIN — PREGABALIN 200 MG: 100 CAPSULE ORAL at 09:33

## 2023-12-28 RX ADMIN — ASPIRIN 81 MG: 81 TABLET, CHEWABLE ORAL at 08:22

## 2023-12-28 RX ADMIN — MEROPENEM 1000 MG: 1 INJECTION, POWDER, FOR SOLUTION INTRAVENOUS at 06:26

## 2023-12-28 RX ADMIN — DEXMEDETOMIDINE HYDROCHLORIDE 0.6 MCG/KG/HR: 400 INJECTION INTRAVENOUS at 03:17

## 2023-12-28 RX ADMIN — PREGABALIN 200 MG: 100 CAPSULE ORAL at 20:34

## 2023-12-28 RX ADMIN — DEXMEDETOMIDINE HYDROCHLORIDE 0.4 MCG/KG/HR: 400 INJECTION INTRAVENOUS at 17:50

## 2023-12-28 RX ADMIN — ENOXAPARIN SODIUM 100 MG: 100 INJECTION SUBCUTANEOUS at 08:32

## 2023-12-28 ASSESSMENT — PULMONARY FUNCTION TESTS
PIF_VALUE: 31
PIF_VALUE: 20
PIF_VALUE: 31
PIF_VALUE: 21
PIF_VALUE: 25
PIF_VALUE: 20
PIF_VALUE: 21
PIF_VALUE: 20
PIF_VALUE: 25
PIF_VALUE: 25
PIF_VALUE: 26
PIF_VALUE: 25
PIF_VALUE: 26
PIF_VALUE: 26
PIF_VALUE: 20
PIF_VALUE: 26
PIF_VALUE: 25
PIF_VALUE: 31
PIF_VALUE: 26
PIF_VALUE: 31
PIF_VALUE: 20
PIF_VALUE: 26
PIF_VALUE: 20
PIF_VALUE: 26
PIF_VALUE: 20
PIF_VALUE: 31
PIF_VALUE: 21
PIF_VALUE: 26
PIF_VALUE: 32
PIF_VALUE: 31
PIF_VALUE: 21
PIF_VALUE: 20
PIF_VALUE: 27
PIF_VALUE: 31
PIF_VALUE: 25
PIF_VALUE: 26
PIF_VALUE: 21
PIF_VALUE: 25
PIF_VALUE: 26
PIF_VALUE: 31
PIF_VALUE: 26
PIF_VALUE: 25
PIF_VALUE: 26
PIF_VALUE: 25
PIF_VALUE: 31
PIF_VALUE: 21
PIF_VALUE: 26
PIF_VALUE: 25
PIF_VALUE: 31
PIF_VALUE: 20
PIF_VALUE: 26
PIF_VALUE: 26
PIF_VALUE: 32
PIF_VALUE: 20
PIF_VALUE: 31
PIF_VALUE: 27
PIF_VALUE: 26
PIF_VALUE: 20
PIF_VALUE: 25
PIF_VALUE: 31
PIF_VALUE: 20
PIF_VALUE: 26

## 2023-12-28 ASSESSMENT — PAIN SCALES - GENERAL
PAINLEVEL_OUTOF10: 0

## 2023-12-28 NOTE — PLAN OF CARE
Problem: Discharge Planning  Goal: Discharge to home or other facility with appropriate resources  Outcome: Progressing  Flowsheets (Taken 12/27/2023 2000)  Discharge to home or other facility with appropriate resources:   Arrange for needed discharge resources and transportation as appropriate   Identify barriers to discharge with patient and caregiver   Identify discharge learning needs (meds, wound care, etc)   Arrange for interpreters to assist at discharge as needed   Refer to discharge planning if patient needs post-hospital services based on physician order or complex needs related to functional status, cognitive ability or social support system     Problem: Neurosensory - Adult  Goal: Achieves stable or improved neurological status  Outcome: Progressing  Flowsheets (Taken 12/27/2023 2000)  Achieves stable or improved neurological status: Maintain blood pressure and fluid volume within ordered parameters to optimize cerebral perfusion and minimize risk of hemorrhage  Goal: Absence of seizures  12/28/2023 0646 by Melany Phillips RN  Outcome: Progressing  Flowsheets (Taken 12/27/2023 2000)  Absence of seizures:   Monitor for seizure activity.   If seizure occurs, document type and location of movements and any associated apnea   If seizure occurs, turn head to side and suction secretions as needed   Administer anticonvulsants as ordered   Support airway/breathing, administer oxygen as needed   Diagnostic studies as ordered  12/27/2023 1733 by Anthony Sanchez RN  Outcome: Progressing  Goal: Remains free of injury related to seizures activity  Outcome: Progressing  Flowsheets (Taken 12/27/2023 2000)  Remains free of injury related to seizure activity:   Maintain airway, patient safety  and administer oxygen as ordered   Monitor patient for seizure activity, document and report duration and description of seizure to Licensed Independent Practitioner   If seizure occurs, turn patient to side and suction secretions as Independent Practitioner for values outside of normal range   Assess for signs of decreased cardiac output   Administer fluid and/or volume expanders as ordered   Administer vasoactive medications as ordered     Problem: Infection - Adult  Goal: Absence of infection at discharge  Outcome: Progressing  Flowsheets (Taken 12/27/2023 2000)  Absence of infection at discharge:   Assess and monitor for signs and symptoms of infection   Monitor lab/diagnostic results   Monitor all insertion sites i.e., indwelling lines, tubes and drains   Monitor endotracheal (as able) and nasal secretions for changes in amount and color   [place] appropriate cooling/warming therapies per order   Instruct and encourage patient and family to use good hand hygiene technique   Identify and instruct in appropriate isolation precautions for identified infection/condition   Administer medications as ordered     Problem: Decision Making  Goal: Pt/Family able to effectively weigh alternatives and participate in decision making related to treatment and care  Description: INTERVENTIONS:  1. Determine when there are differences between patient's view, family's view, and healthcare provider's view of condition  2. Facilitate patient and family articulation of goals for care  3. Help patient and family identify pros/cons of alternative solutions  4. Provide information as requested by patient/family  5. Respect patient/family right to receive or not to receive information  6. Serve as a liaison between patient and family and health care team  7. Initiate Consults from Ethics, Palliative Care or initiate Family Care Conference as is appropriate  Outcome: Progressing  Flowsheets (Taken 12/27/2023 2000)  Patient/family able to effectively weigh alternatives and participate in decision making related to treatment and care:   Determine when there are differences between patient's view, family's view, and healthcare provider's view of condition   Facilitate

## 2023-12-28 NOTE — DISCHARGE INSTR - COC
Continuity of Care Form    Patient Name: Fabiana Rizo   :  1960  MRN:  [id number]    Admit date:  2023  Discharge date:  ***    Code Status Order: Full Code   Advance Directives:     Admitting Physician:  Austin Fischer MD  PCP: Damir Dougherty    Discharging Nurse: [facility] Unit/Room#: P5I-0081/2110-01  Discharging Unit Phone Number: ***    Emergency Contact:   Extended Emergency Contact Information  Primary Emergency Contact: [address] Phone: [phone]  Mobile Phone: [phone]  Relation: Child  Secondary Emergency Contact: [address] Phone: [phone]  Relation: Parent    Past Surgical History:  Past Surgical History:   Procedure Laterality Date    [place] ARTHRP ACETBLR/PROX FEM PROSTC AGRFT/ALGRFT Right     Hip Replacement, Total    [place] ARTHRP KNE CONDYLE&PLATU MEDIAL&LAT COMPARTMENTS Left     Knee Replacement, Total    SHOULDER ARTHROPLASTY      Shoulder replacement x 2 to left       Immunization History:   Immunization History   Administered Date(s) Administered    COVID-19, PFIZER PURPLE top, DILUTE for use, (age 15 y+), 30mcg/0.3mL 2021, 2021, 2022, 2022       Active Problems:  Patient Active Problem List   Diagnosis Code    Cardiac arrest ([address]) I46.9    Acute respiratory failure ([address]) J96.00    Encephalopathy G93.40    Ventricular fibrillation (HCC) I49.01    Hypokalemia E87.6    Hypoxic encephalopathy (HCC) G93.1    Acidosis E87.20    Cardiogenic shock (HCC) R57.0    Prolonged QT interval R94.31    Fluid overload E87.70    Aspiration pneumonia ([address]) J69.0    Cardiomyopathy ([address]) I42.9    Pulmonary edema J81.1    Acute type 1 respiratory failure ([address]) J96.01    Acute respiratory failure with hypoxia and hypercapnia (HCC) J96.01, J96.02    Aspiration pneumonitis (HCC) J69.0    Choking V43.381E    Metabolic encephalopathy B98.93    Cholangitis K83.09    Pneumonia, unspecified organism J18.9    NSTEMI (non-ST elevated elevated myocardial infarction) ([facility]) I21.4    Pneumonia due to severe acute respiratory syndrome coronavirus 2 (SARS-CoV-2) U07.1, J12.82    High fever R50.9    Neutrophilia D72.9    Endotracheally intubated Z97.8    Elevated procalcitonin R79.89    Cerebral palsy (HCC) G80.9    Lactic acid acidosis E87.20    Sepsis (HCC) A41.9    Septic shock (HCC) A41.9, R65.21       Isolation/Infection:   Isolation            No Isolation          Patient Infection Status       Infection Onset Added Last Indicated Last Indicated By Review Planned Expiration Resolved Resolved By    None active    Resolved    COVID-19 23 Respiratory Panel, Molecular, with COVID-19 (Restricted: peds pts or suitable admitted adults)   23 Jostin Han    COVID-19 22 COVID-19, Rapid   22 Infection                        Nurse Assessment:  Last Vital Signs: /66   Pulse 95   Temp 99.2 °F (37.3 °C) (Bladder)   Resp 14   Ht 1.727 m (5' 7.99\")   Wt 95.7 kg (210 lb 15.7 oz)   SpO2 97%   BMI 32.09 kg/m²     Last documented pain score (0-10 scale): Pain Level: 0  Last Weight:   Wt Readings from Last 1 Encounters:   23 95.7 kg (210 lb 15.7 oz)     Mental Status:  oriented, alert, and able to concentrate and follow conversation    IV Access:  - None    Nursing Mobility/ADLs:  Walking   Dependent  Transfer  Dependent  Bathing  Dependent  Dressing  Dependent  Toileting  Dependent  Feeding  Dependent  Med Admin  Dependent  Med Delivery   crushed    Wound Care Documentation and Therapy:        Elimination:  Continence:   Bowel: No  Bladder: No  Urinary Catheter: Removal Date 24    Colostomy/Ileostomy/Ileal Conduit: No       Date of Last BM: 24    Intake/Output Summary (Last 24 hours) at 2023 0916  Last data filed at 2023 0800  Gross per 24 hour   Intake 3034.03 ml   Output 6430 ml   Net -3395.97 ml     I/O last 3 completed shifts:  In: 4286.1

## 2023-12-28 NOTE — PROGRESS NOTES
Infectious Diseases Inpatient Progress Note    CHIEF COMPLAINT:     COVID-19 pneumonia  Septic shock  Cerebral palsy  Procalcitonin elevation  Lactic acidosis  Respiratory failure requiring ventilator  WBC elevated  Fever        HISTORY OF PRESENT ILLNESS:  62 y.o. woman with a history of cerebral  palsy, dysphagia, on PEG tube feedings admitted to hospital secondary shortness of breath and change in mental status.  She was tested positive for COVID-19.  She is admitted from nursing facility its not clear the timing of COVID-19 positivity.  In the hospital now developed respiratory distress high fever progressed to septic shock currently intubated transferred to ICU requiring pressor support.  Tmax 105.8, currently on 100% FiO2.  Rapid response was called earlier on 12/20/23, due to respiratory distress and now in the ICU on the ventilator chest x-ray indicating right middle and lower lobe pneumonia.  Procalcitonin was not elevated admission but now it 51.6 lactic acid 6.8 creatinine 0.5 WBC 20.4 with left shift, blood cultures collected in process admission blood cultures negative MRSA probe negative.  Given ongoing septic shock with respiratory failure and pneumonia COVID-19 infection we are consulted for recommendations    Interval History : Remains on the vent intubated on pressor support and WBC mild elevation noted and tolerating IV ABX ok and more awake on rounds able to nod during conversation - mother at bed side           Past Medical History:    Past Medical History:   Diagnosis Date    Cardiac arrest (HCC)     Cerebral palsy (HCC)     Closed anterior dislocation of humerus     Humeral dislocation    Dysphagia     Infantile cerebral palsy, unspecified     Cerebral palsy    Irritable bowel syndrome     Irritable bowel       Past Surgical History:    Past Surgical History:   Procedure Laterality Date    [place] ARTHRP ACETBLR/PROX FEM PROSTC AGRFT/ALGRFT Right     Hip Replacement, Total    [place] ARTHRP ELLA  distress, ++  pallor, no icterus   Skin: warm and dry, no rash or erythema  Head: normocephalic and atraumatic  Eyes: pupils equal, round, and reactive to light, conjunctivae normal  ENT: tympanic membrane, external ear and ear canal normal bilaterally, nose without deformity, nasal mucosa and turbinates normal without polyps  Neck: supple and non-tender without mass, no thyromegaly  no cervical lymphadenopathy  Pulmonary/Chest: Rt base crepts++ - no wheezes, rales or rhonchi, normal air movement, in  respiratory distress  Cardiovascular: normal rate, regular rhythm, normal S1 and S2, no murmurs, rubs, clicks, or gallops, no carotid bruits  Abdomen: soft, non-tender, non-distended, normal bowel sounds, no masses or organomegaly PEG tube+   Extremities: no cyanosis, clubbing or edema  Musculoskeletal: normal range of motion, no joint swelling, deformity or tenderness  Integumentary: No rashes, no abnormal skin lesions, no petechiae  Neurologic: spastic lower extremities from Cerebral palsy+    Lines: PICC  Pagan+     DATA:    CBC:   Lab Results   Component Value Date    WBC 12.6 (H) 12/28/2023    HGB 9.3 (L) 12/28/2023    HCT 27.3 (L) 12/28/2023    MCV 96.0 12/28/2023    PLT 71 (L) 12/28/2023     RENAL:   Lab Results   Component Value Date    CREATININE <0.5 (L) 12/28/2023    BUN 8 12/28/2023     12/28/2023    K 3.5 12/28/2023    CL 97 (L) 12/28/2023    CO2 34 (H) 12/28/2023     SED RATE:   Lab Results   Component Value Date/Time    SEDRATE 38 12/20/2023 12:54 AM     CK:   Lab Results   Component Value Date/Time    CKTOTAL 92 05/06/2015 01:28 PM     CRP:   Lab Results   Component Value Date/Time    CRP 74.8 12/27/2023 04:34 AM     Hepatic Function Panel:   Lab Results   Component Value Date/Time    ALKPHOS 340 12/28/2023 04:30 AM    ALT 57 12/28/2023 04:30 AM    AST 66 12/28/2023 04:30 AM    PROT 5.4 12/28/2023 04:30 AM    BILITOT 0.7 12/28/2023 04:30 AM    BILIDIR 0.6 12/23/2023 11:23 AM    IBILI 0.1

## 2023-12-28 NOTE — PROGRESS NOTES
Cardiology - PROGRESS NOTE    Admit Date: 12/16/2023     Reason for follow up: BiV failure      65 y.o. female with PMH cerebral palsy/contractures, dysphagia w PEG tube. Resides in [facility]. Admitted to [facility] with acute hypoxic respiratory failure/ Covid PNA  Remains intubated. Echocardiogram revealed severe biventricular failure-started on dobutamine for cardiogenic shock. Levo for hypotension. Also treated for septic shock likely from pneumonia. diuresis with IV Lasix gtt. Social History:   reports that she has never smoked. She has never used smokeless tobacco. She reports that she does not drink alcohol and does not use drugs. Family History: family history is not on file. Living Situation: [facility]      Interval History:   Patient seen and examined and notes reviewed   Remains intubated and sedated   + 11.4 L since admission   -3.3 L overnight   Wt 95.7 kg (baseline 87-89 kg)   All other systems reviewed and negative except as above. Diet: Diet NPO  ADULT TUBE FEEDING; PEG; Peptide Based; Continuous; 15; Yes; 10; Q 4 hours; 55; 30; Q 4 hours; Protein; Prosource x1 daily      In: 3337 [I. V.:414.1; NG/GT:2573]  Out: 3148    Patient Vitals for the past 96 hrs (Last 3 readings):   Weight   12/28/23 0614 95.7 kg (210 lb 15.7 oz)   12/27/23 0400 100 kg (220 lb 7.4 oz)           Data:   Scheduled Meds:   Scheduled Meds:   aspirin  81 mg Oral Daily    meropenem  1,000 mg IntraVENous Q8H    enoxaparin  1 mg/kg SubCUTAneous BID    chlorhexidine  15 mL Mouth/Throat BID    lidocaine 1 % injection  5 mL IntraDERmal Once    sodium chloride flush  5-40 mL IntraVENous 2 times per day    insulin lispro  0-8 Units SubCUTAneous Q4H    pantoprazole (PROTONIX) 40 mg in sodium chloride (PF) 0.9 % 10 mL injection  40 mg IntraVENous Daily    amitriptyline  50 mg Oral Nightly    dantrolene  200 mg Oral BID    levothyroxine  50 mcg Oral Daily    [Held by    TRIG 217 (H) 2015    TRIG 172 (H) 2015     No results found for: \"HDL\"  No results found for: \"LDLCALC\"  No results found for: \"LABVLDL\"      -----------------------------------------------------------------  Telemetry: personally reviewed     HC: 5/28/15  OVERALL IMPRESSIONS:  1.  Patent large dominant right coronary artery.  2.  Patent left main trunk.  3.  Patent left anterior descending artery and its branches.  4.  Patent circumflex and its branches.  5.  Normal left ventricular systolic function with estimated ejection  fraction of 50% to 55%.     EC23  SR at 89 BPM. Anterolateral T wave inversions.     Echo:  23   The LV is normal size with distal 2/3 is akinetic. Possible Takatsubo  cardiomyopathy vs ischemic cardiomyopathy. EF 20%. No thrombus seen.   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 Mild mitral  regurgitation is present.   The left atrium is normal in size.   RV apex is also akinetic Severely reduced systolic function.   Estimated PASP is  56  mmHg assuming a right atrial pressure of 15 mmHg.    Echo:  23  Summary Limited echo. Left ventricular cavity size is normal. Normal left ventricular wall thickness. Overall left ventricular systolic function appears normal. Ejection fraction is visually estimated to be 50-55%; this is significantly improved from prior echo. No regional wall motion abnormalities are noted. The right ventricle is borderline dilated. Right ventricular systolic function appears normal     CXR 2023     IMPRESSION:  1. Left PICC catheter tip overlies the lower SVC.  2. Bibasilar airspace opacities and small left pleural effusion.  If patient  has clinical symptoms of infection, differential would include pneumonia. In  the absence of infectious symptoms, atelectasis can have the same appearance    Objective:   Vitals: /66   Pulse (!) 104   Temp 99.2 °F (37.3 °C) (Bladder)   Resp 18   Ht 1.727 m (5' 7.99\")

## 2023-12-28 NOTE — PROGRESS NOTES
V2.0    [facility] [place] Progress Note      Name:  Lilly Terrell /Age/Sex: 1960  (62 y.o. female)   MRN & CSN:  [id number] & [id number] Encounter Date/Time: 2023 2:17 PM EST   Location:  [id number] PCP: Tyson Hahn     Attending:Vinny Fernández MD       Hospital Day: 13    Assessment and Recommendations   62-year-old male with past medical history of cerebral palsy chronic muscular skeletal contractures, ECF resident presented with altered mental status and tested positive for COVID 19 pneumonia    Sepsis with septic shock secondary to COVID-19 pneumonia  Cardiogenic shock possibly from stress induced cardiomyopathy/Takotsubo  Acute hypoxemic respiratory failure with SpO2 of less than 90 on room air on mechanical ventilation  Possible aspiration pneumonia  COVID-19 infection  Lactic acidosis  Acute encephalopathy  Hypothyroidism  Hypertension  Coffee-ground content with PEG tube  Acute systolic congestive heart failure with EF of 20%    Plan:     Still intubated on vent appreciate pulmonary critical care follow-up and management  Continue dobutamine and Lasix infusion per protocol, cardiology on board, appreciate assistance will try to wean dobutamine today and monitor diuresis if it decreases will resume again  Continue pressors to keep MAP above 65  For sedation continue Precedex drip  Continue full dose anticoagulation with Lovenox 1 mg subcu twice daily  Appreciate ID follow-up  Continue IV Merrem day 9  Supportive care  Appreciate critical care and cardiology follow-up  Labs in a.m.            Comment: Please note this report has been produced using speech recognition software and may contain errors related to that system including errors in grammar, punctuation, and spelling, as well as words and phrases that may be inappropriate. If there are any questions or concerns please feel free to contact the dictating provider for clarification.   Diet Diet NPO  ADULT TUBE FEEDING; PEG; Peptide  contractures  Skin: warm, dry  Neuro: Intubated and sedated  Psych: Intubated and sedated        Medications:   Medications:    clonazePAM  2 mg Oral BID    pregabalin  200 mg Oral TID    aspirin  81 mg Oral Daily    meropenem  1,000 mg IntraVENous Q8H    enoxaparin  1 mg/kg SubCUTAneous BID    chlorhexidine  15 mL Mouth/Throat BID    lidocaine 1 % injection  5 mL IntraDERmal Once    sodium chloride flush  5-40 mL IntraVENous 2 times per day    insulin lispro  0-8 Units SubCUTAneous Q4H    pantoprazole (PROTONIX) 40 mg in sodium chloride (PF) 0.9 % 10 mL injection  40 mg IntraVENous Daily    amitriptyline  50 mg Oral Nightly    dantrolene  200 mg Oral BID    levothyroxine  50 mcg Oral Daily    [Held by provider] metoprolol tartrate  25 mg Oral BID    polyethylene glycol  17 g Oral BID    sertraline  150 mg Oral Daily    sodium chloride flush  5-40 mL IntraVENous 2 times per day      Infusions:    furosemide (LASIX) 100 mg in sodium chloride 0.9 % 100 mL infusion 5 mg/hr (12/28/23 1408)    norepinephrine 4 mcg/min (12/28/23 1136)    sodium chloride      dexmedetomidine 0.4 mcg/kg/hr (12/28/23 1136)    dextrose      sodium chloride      sodium chloride Stopped (12/20/23 0527)     PRN Meds: perflutren lipid microspheres, 1.5 mL, ONCE PRN  sodium chloride, 25 mL, PRN  potassium chloride, 20 mEq, PRN  glucose, 4 tablet, PRN  dextrose bolus, 125 mL, PRN   Or  dextrose bolus, 250 mL, PRN  glucagon (rDNA), 1 mg, PRN  dextrose, , Continuous PRN  sodium chloride flush, 5-40 mL, PRN  sodium chloride, 25 mL, PRN  albuterol, 2.5 mg, Q4H PRN  ipratropium, 0.5 mg, Q4H PRN  sodium chloride, , PRN  magnesium sulfate, 2,000 mg, PRN  ondansetron, 4 mg, Q8H PRN   Or  ondansetron, 4 mg, Q6H PRN  polyethylene glycol, 17 g, Daily PRN  acetaminophen, 650 mg, Q6H PRN   Or  acetaminophen, 650 mg, Q6H PRN        Labs and Imaging   XR CHEST PORTABLE    Result Date: 12/24/2023  EXAMINATION: ONE XRAY VIEW OF THE CHEST 12/24/2023 3:11 pm  line stable. Heart and mediastinum are normal. Developing multifocal airspace opacities in the left mid and lower lung zone and in the right mid and upper lung zones. Prior left shoulder arthroplasty. No acute skeletal finding     Developing multifocal airspace opacities which may represent viral pattern of pneumonia. ARDS or pulmonary edema not excluded. US ABDOMEN LIMITED Specify organ? LIVER    Result Date: 12/20/2023  EXAMINATION: RIGHT UPPER QUADRANT ULTRASOUND 12/20/2023 6:17 pm COMPARISON: None. HISTORY: ORDERING SYSTEM PROVIDED HISTORY: elevated alk phos TECHNOLOGIST PROVIDED HISTORY: Reason for exam:->elevated alk phos Specify organ?->LIVER FINDINGS: LIVER:  Liver demonstrates abnormal echotexture but no evidence of hepatomegaly. There is fatty infiltration but no focal disease. BILIARY SYSTEM:  Gallbladder contracted and difficult to evaluate. Within the limitation of the study the gallbladder is unremarkable without evidence of pericholecystic fluid, wall thickening or stones. Negative sonographic Ojeda's sign. Common bile duct not visualized. RIGHT KIDNEY: The right kidney is grossly unremarkable without evidence of hydronephrosis. PANCREAS:  Visualized portions of the pancreas are unremarkable. OTHER: No evidence of right upper quadrant ascites. Fatty liver. Gallbladder contracted but no obvious abnormality. Nonvisualization of the common bile duct. No evidence of ascites. XR CHEST PORTABLE    Result Date: 12/20/2023  EXAMINATION: ONE XRAY VIEW OF THE CHEST 12/20/2023 1:34 am COMPARISON: 12/20/2023 HISTORY: ORDERING SYSTEM PROVIDED HISTORY: intubation TECHNOLOGIST PROVIDED HISTORY: Reason for exam:->intubation Reason for Exam: ett & line placement FINDINGS: The endotracheal tube terminates 4.4 cm above the jairo. The left internal jugular catheter terminates in the left brachiocephalic vein. There is worsening right airspace disease due to multifocal pneumonia.   The cardiac

## 2023-12-29 LAB
ALBUMIN SERPL-MCNC: 2.7 G/DL (ref 3.4–5)
ALBUMIN/GLOB SERPL: 0.9 {RATIO} (ref 1.1–2.2)
ALP SERPL-CCNC: 342 U/L (ref 40–129)
ALT SERPL-CCNC: 67 U/L (ref 10–40)
ANION GAP SERPL CALCULATED.3IONS-SCNC: 5 MMOL/L (ref 3–16)
ANISOCYTOSIS BLD QL SMEAR: ABNORMAL
AST SERPL-CCNC: 97 U/L (ref 15–37)
BASOPHILS # BLD: 0 K/UL (ref 0–0.2)
BASOPHILS NFR BLD: 0 %
BILIRUB SERPL-MCNC: 0.9 MG/DL (ref 0–1)
BUN SERPL-MCNC: 11 MG/DL (ref 7–20)
CALCIUM SERPL-MCNC: 8.1 MG/DL (ref 8.3–10.6)
CHLORIDE SERPL-SCNC: 98 MMOL/L (ref 99–110)
CO2 SERPL-SCNC: 35 MMOL/L (ref 21–32)
CREAT SERPL-MCNC: <0.5 MG/DL (ref 0.6–1.2)
DEPRECATED RDW RBC AUTO: 15.8 % (ref 12.4–15.4)
EOSINOPHIL # BLD: 0.3 K/UL (ref 0–0.6)
EOSINOPHIL NFR BLD: 2 %
GFR SERPLBLD CREATININE-BSD FMLA CKD-EPI: >60 ML/MIN/{1.73_M2}
GLUCOSE BLD-MCNC: 149 MG/DL (ref 70–99)
GLUCOSE BLD-MCNC: 155 MG/DL (ref 70–99)
GLUCOSE BLD-MCNC: 158 MG/DL (ref 70–99)
GLUCOSE BLD-MCNC: 160 MG/DL (ref 70–99)
GLUCOSE BLD-MCNC: 165 MG/DL (ref 70–99)
GLUCOSE BLD-MCNC: 166 MG/DL (ref 70–99)
GLUCOSE SERPL-MCNC: 160 MG/DL (ref 70–99)
HCT VFR BLD AUTO: 27.1 % (ref 36–48)
HGB BLD-MCNC: 9.1 G/DL (ref 12–16)
LYMPHOCYTES # BLD: 2.2 K/UL (ref 1–5.1)
LYMPHOCYTES NFR BLD: 16 %
MACROCYTES BLD QL SMEAR: ABNORMAL
MAGNESIUM SERPL-MCNC: 2 MG/DL (ref 1.8–2.4)
MCH RBC QN AUTO: 32.8 PG (ref 26–34)
MCHC RBC AUTO-ENTMCNC: 33.4 G/DL (ref 31–36)
MCV RBC AUTO: 98 FL (ref 80–100)
METAMYELOCYTES NFR BLD MANUAL: 2 %
MICROCYTES BLD QL SMEAR: ABNORMAL
MONOCYTES # BLD: 0.7 K/UL (ref 0–1.3)
MONOCYTES NFR BLD: 5 %
MYELOCYTES NFR BLD MANUAL: 2 %
NEUTROPHILS # BLD: 10.8 K/UL (ref 1.7–7.7)
NEUTROPHILS NFR BLD: 69 %
NEUTS BAND NFR BLD MANUAL: 4 % (ref 0–7)
NRBC BLD-RTO: 1 /100 WBC
PERFORMED ON: ABNORMAL
PHOSPHATE SERPL-MCNC: 4.2 MG/DL (ref 2.5–4.9)
PLATELET # BLD AUTO: 79 K/UL (ref 135–450)
PMV BLD AUTO: 10.6 FL (ref 5–10.5)
POIKILOCYTOSIS BLD QL SMEAR: ABNORMAL
POLYCHROMASIA BLD QL SMEAR: ABNORMAL
POTASSIUM SERPL-SCNC: 3.5 MMOL/L (ref 3.5–5.1)
PROT SERPL-MCNC: 5.8 G/DL (ref 6.4–8.2)
RBC # BLD AUTO: 2.76 M/UL (ref 4–5.2)
SLIDE REVIEW: ABNORMAL
SMUDGE CELLS BLD QL SMEAR: PRESENT
SODIUM SERPL-SCNC: 138 MMOL/L (ref 136–145)
TARGETS BLD QL SMEAR: ABNORMAL
WBC # BLD AUTO: 14 K/UL (ref 4–11)

## 2023-12-29 PROCEDURE — 6360000002 HC RX W HCPCS: Performed by: INTERNAL MEDICINE

## 2023-12-29 PROCEDURE — 99233 SBSQ HOSP IP/OBS HIGH 50: CPT | Performed by: INTERNAL MEDICINE

## 2023-12-29 PROCEDURE — 2580000003 HC RX 258: Performed by: INTERNAL MEDICINE

## 2023-12-29 PROCEDURE — 2000000000 HC ICU R&B

## 2023-12-29 PROCEDURE — 2580000003 HC RX 258: Performed by: NURSE PRACTITIONER

## 2023-12-29 PROCEDURE — 85025 COMPLETE CBC W/AUTO DIFF WBC: CPT

## 2023-12-29 PROCEDURE — 6370000000 HC RX 637 (ALT 250 FOR IP): Performed by: HOSPITALIST

## 2023-12-29 PROCEDURE — 2500000003 HC RX 250 WO HCPCS: Performed by: HOSPITALIST

## 2023-12-29 PROCEDURE — 84100 ASSAY OF PHOSPHORUS: CPT

## 2023-12-29 PROCEDURE — 6370000000 HC RX 637 (ALT 250 FOR IP): Performed by: INTERNAL MEDICINE

## 2023-12-29 PROCEDURE — 6360000002 HC RX W HCPCS: Performed by: STUDENT IN AN ORGANIZED HEALTH CARE EDUCATION/TRAINING PROGRAM

## 2023-12-29 PROCEDURE — 99233 SBSQ HOSP IP/OBS HIGH 50: CPT | Performed by: NURSE PRACTITIONER

## 2023-12-29 PROCEDURE — 2700000000 HC OXYGEN THERAPY PER DAY

## 2023-12-29 PROCEDURE — 6360000002 HC RX W HCPCS: Performed by: NURSE PRACTITIONER

## 2023-12-29 PROCEDURE — 6370000000 HC RX 637 (ALT 250 FOR IP): Performed by: NURSE PRACTITIONER

## 2023-12-29 PROCEDURE — 94760 N-INVAS EAR/PLS OXIMETRY 1: CPT

## 2023-12-29 PROCEDURE — 6370000000 HC RX 637 (ALT 250 FOR IP)

## 2023-12-29 PROCEDURE — 94003 VENT MGMT INPAT SUBQ DAY: CPT

## 2023-12-29 PROCEDURE — 94640 AIRWAY INHALATION TREATMENT: CPT

## 2023-12-29 PROCEDURE — 99291 CRITICAL CARE FIRST HOUR: CPT | Performed by: INTERNAL MEDICINE

## 2023-12-29 PROCEDURE — 2580000003 HC RX 258: Performed by: HOSPITALIST

## 2023-12-29 PROCEDURE — C9113 INJ PANTOPRAZOLE SODIUM, VIA: HCPCS | Performed by: INTERNAL MEDICINE

## 2023-12-29 PROCEDURE — 83735 ASSAY OF MAGNESIUM: CPT

## 2023-12-29 PROCEDURE — 80053 COMPREHEN METABOLIC PANEL: CPT

## 2023-12-29 RX ADMIN — PREGABALIN 200 MG: 100 CAPSULE ORAL at 09:10

## 2023-12-29 RX ADMIN — SODIUM CHLORIDE, PRESERVATIVE FREE 10 ML: 5 INJECTION INTRAVENOUS at 08:51

## 2023-12-29 RX ADMIN — ENOXAPARIN SODIUM 100 MG: 100 INJECTION SUBCUTANEOUS at 09:10

## 2023-12-29 RX ADMIN — DEXMEDETOMIDINE HYDROCHLORIDE 0.4 MCG/KG/HR: 400 INJECTION INTRAVENOUS at 17:14

## 2023-12-29 RX ADMIN — ALBUTEROL SULFATE 2.5 MG: 2.5 SOLUTION RESPIRATORY (INHALATION) at 19:52

## 2023-12-29 RX ADMIN — DEXMEDETOMIDINE HYDROCHLORIDE 0.4 MCG/KG/HR: 400 INJECTION INTRAVENOUS at 05:15

## 2023-12-29 RX ADMIN — SERTRALINE 150 MG: 100 TABLET, FILM COATED ORAL at 09:10

## 2023-12-29 RX ADMIN — ENOXAPARIN SODIUM 100 MG: 100 INJECTION SUBCUTANEOUS at 20:33

## 2023-12-29 RX ADMIN — MEROPENEM 1000 MG: 1 INJECTION, POWDER, FOR SOLUTION INTRAVENOUS at 13:47

## 2023-12-29 RX ADMIN — LEVOTHYROXINE SODIUM 50 MCG: 0.05 TABLET ORAL at 05:41

## 2023-12-29 RX ADMIN — PREGABALIN 200 MG: 100 CAPSULE ORAL at 13:47

## 2023-12-29 RX ADMIN — ASPIRIN 81 MG: 81 TABLET, CHEWABLE ORAL at 09:10

## 2023-12-29 RX ADMIN — SODIUM CHLORIDE, PRESERVATIVE FREE 10 ML: 5 INJECTION INTRAVENOUS at 20:34

## 2023-12-29 RX ADMIN — SODIUM CHLORIDE, PRESERVATIVE FREE 10 ML: 5 INJECTION INTRAVENOUS at 20:35

## 2023-12-29 RX ADMIN — DANTROLENE SODIUM 200 MG: 25 CAPSULE ORAL at 09:11

## 2023-12-29 RX ADMIN — SODIUM CHLORIDE, PRESERVATIVE FREE 40 MG: 5 INJECTION INTRAVENOUS at 09:10

## 2023-12-29 RX ADMIN — ACETAMINOPHEN 650 MG: 325 TABLET ORAL at 05:41

## 2023-12-29 RX ADMIN — AMITRIPTYLINE HYDROCHLORIDE 50 MG: 50 TABLET, FILM COATED ORAL at 20:34

## 2023-12-29 RX ADMIN — MEROPENEM 1000 MG: 1 INJECTION, POWDER, FOR SOLUTION INTRAVENOUS at 05:16

## 2023-12-29 RX ADMIN — MEROPENEM 1000 MG: 1 INJECTION, POWDER, FOR SOLUTION INTRAVENOUS at 21:48

## 2023-12-29 RX ADMIN — CHLORHEXIDINE GLUCONATE 0.12% ORAL RINSE 15 ML: 1.2 LIQUID ORAL at 20:42

## 2023-12-29 RX ADMIN — POTASSIUM CHLORIDE 20 MEQ: 29.8 INJECTION, SOLUTION INTRAVENOUS at 09:08

## 2023-12-29 RX ADMIN — FUROSEMIDE 5 MG/HR: 10 INJECTION, SOLUTION INTRAMUSCULAR; INTRAVENOUS at 10:00

## 2023-12-29 RX ADMIN — CLONAZEPAM 2 MG: 1 TABLET ORAL at 09:10

## 2023-12-29 RX ADMIN — POTASSIUM CHLORIDE 20 MEQ: 29.8 INJECTION, SOLUTION INTRAVENOUS at 15:20

## 2023-12-29 RX ADMIN — DANTROLENE SODIUM 200 MG: 25 CAPSULE ORAL at 20:34

## 2023-12-29 RX ADMIN — CHLORHEXIDINE GLUCONATE 0.12% ORAL RINSE 15 ML: 1.2 LIQUID ORAL at 09:12

## 2023-12-29 ASSESSMENT — PULMONARY FUNCTION TESTS
PIF_VALUE: 26
PIF_VALUE: 25
PIF_VALUE: 21
PIF_VALUE: 26
PIF_VALUE: 21
PIF_VALUE: 21
PIF_VALUE: 26
PIF_VALUE: 21
PIF_VALUE: 21
PIF_VALUE: 26
PIF_VALUE: 21
PIF_VALUE: 20
PIF_VALUE: 26
PIF_VALUE: 26
PIF_VALUE: 21
PIF_VALUE: 21
PIF_VALUE: 26
PIF_VALUE: 21
PIF_VALUE: 21
PIF_VALUE: 26
PIF_VALUE: 26
PIF_VALUE: 20
PIF_VALUE: 20
PIF_VALUE: 26
PIF_VALUE: 21
PIF_VALUE: 25
PIF_VALUE: 20
PIF_VALUE: 26
PIF_VALUE: 27
PIF_VALUE: 25
PIF_VALUE: 20
PIF_VALUE: 21
PIF_VALUE: 20
PIF_VALUE: 20
PIF_VALUE: 21
PIF_VALUE: 20
PIF_VALUE: 21
PIF_VALUE: 25
PIF_VALUE: 26
PIF_VALUE: 21
PIF_VALUE: 26
PIF_VALUE: 21
PIF_VALUE: 21
PIF_VALUE: 25
PIF_VALUE: 20
PIF_VALUE: 26
PIF_VALUE: 20
PIF_VALUE: 26
PIF_VALUE: 21
PIF_VALUE: 26
PIF_VALUE: 26
PIF_VALUE: 21
PIF_VALUE: 25
PIF_VALUE: 25
PIF_VALUE: 21
PIF_VALUE: 21
PIF_VALUE: 26
PIF_VALUE: 20
PIF_VALUE: 25
PIF_VALUE: 25
PIF_VALUE: 26
PIF_VALUE: 26
PIF_VALUE: 25
PIF_VALUE: 26
PIF_VALUE: 20
PIF_VALUE: 21
PIF_VALUE: 20
PIF_VALUE: 26
PIF_VALUE: 26
PIF_VALUE: 21
PIF_VALUE: 26
PIF_VALUE: 21
PIF_VALUE: 21
PIF_VALUE: 26
PIF_VALUE: 21
PIF_VALUE: 25
PIF_VALUE: 21
PIF_VALUE: 26
PIF_VALUE: 20
PIF_VALUE: 26
PIF_VALUE: 20
PIF_VALUE: 25
PIF_VALUE: 25
PIF_VALUE: 20
PIF_VALUE: 21
PIF_VALUE: 26
PIF_VALUE: 21
PIF_VALUE: 26
PIF_VALUE: 20
PIF_VALUE: 25
PIF_VALUE: 20
PIF_VALUE: 20
PIF_VALUE: 25
PIF_VALUE: 21
PIF_VALUE: 25
PIF_VALUE: 20

## 2023-12-29 ASSESSMENT — PAIN SCALES - GENERAL
PAINLEVEL_OUTOF10: 0

## 2023-12-29 NOTE — PROGRESS NOTES
Bedside handoff report with Ellen Fleischer RN. Patient repositioned. Skin assessment completed. VSS on ventilator and 1 mcg Levophed.     Electronically signed by Sylwia Gamboa RN on 12/29/2023 at 7:44 AM

## 2023-12-29 NOTE — PROGRESS NOTES
V2.0    [facility]      Name:  Lilly Terrell /Age/Sex: 1960  (62 y.o. female)   MRN & CSN:  [id number] & [id number] Encounter Date/Time: 2023 2:17 PM EST   Location:  [id number] PCP: Tyson Hahn     Attending:Vinny Fernández MD       Hospital Day: 14    Assessment and Recommendations   62-year-old male with past medical history of cerebral palsy chronic muscular skeletal contractures, ECF resident presented with altered mental status and tested positive for COVID 19 pneumonia    Sepsis with septic shock secondary to COVID-19 pneumonia  Cardiogenic shock possibly from stress induced cardiomyopathy/Takotsubo  Acute hypoxemic respiratory failure with SpO2 of less than 90 on room air on mechanical ventilation  Possible aspiration pneumonia  COVID-19 infection  Lactic acidosis  Acute encephalopathy  Hypothyroidism  Hypertension  Coffee-ground content with PEG tube  Acute systolic congestive heart failure with EF of 20%    Plan:     Still intubated on vent appreciate pulmonary critical care follow-up and management  Continue with Lasix drip  Continue with Levophed to keep MAP above 65  For sedation continue Precedex drip  Continue full dose anticoagulation with Lovenox 1 mg subcu twice daily  Appreciate ID follow-up  Continue IV Merrem day 10  Supportive care  Appreciate critical care and cardiology follow-up  Labs in a.m.            Comment: Please note this report has been produced using speech recognition software and may contain errors related to that system including errors in grammar, punctuation, and spelling, as well as words and phrases that may be inappropriate. If there are any questions or concerns please feel free to contact the dictating provider for clarification.   Diet Diet NPO  ADULT TUBE FEEDING; PEG; Peptide Based; Continuous; 15; Yes; 10; Q 4 hours; 55; 30; Q 4 hours; Protein; Prosource x1 daily   DVT Prophylaxis [x] Lovenox, []  Heparin, [x] SCDs, [] Ambulation,   05/06/2015 01:35 PM    WBCUA 3-5 11/27/2022 12:14 AM    RBCUA 11-20 11/27/2022 12:14 AM    BACTERIA 4+ 11/27/2022 12:14 AM    CLARITYU CLOUDY 11/27/2022 12:14 AM    SPECGRAV 1.021 11/27/2022 12:14 AM    LEUKOCYTESUR TRACE 11/27/2022 12:14 AM    UROBILINOGEN 1.0 11/27/2022 12:14 AM    BILIRUBINUR Negative 11/27/2022 12:14 AM    BLOODU TRACE 11/27/2022 12:14 AM    GLUCOSEU Negative 11/27/2022 12:14 AM    KETUA Negative 11/27/2022 12:14 AM    AMORPHOUS 2+ 05/06/2015 01:35 PM     Urine Cultures: No results found for: \"LABURIN\"  Blood Cultures:   Lab Results   Component Value Date/Time    BC No Growth after 4 days of incubation. 12/20/2023 12:53 AM     Lab Results   Component Value Date/Time    BLOODCULT2 No Growth after 4 days of incubation.  12/20/2023 12:53 AM     Organism:   Lab Results   Component Value Date/Time    ORG SARS CoV 2  by PCR 12/17/2023 09:53 AM         Electronically signed by Asuncion Pennington MD on 12/29/2023 at 11:59 AM

## 2023-12-29 NOTE — PLAN OF CARE
therapy assess nares and determine need for appliance change or resting period. Outcome: Progressing     Problem: Nutrition Deficit:  Goal: Optimize nutritional status  Outcome: Progressing  Flowsheets (Taken 12/24/2023 0856 by Alec Ferreira, RD, LD)  Nutrient intake appropriate for improving, restoring, or maintaining nutritional needs: Recommend, monitor, and adjust tube feedings and TPN/PPN based on assessed needs     Problem: Safety - Medical Restraint  Goal: Remains free of injury from restraints (Restraint for Interference with Medical Device)  Description: INTERVENTIONS:  1. Determine that other, less restrictive measures have been tried or would not be effective before applying the restraint  2. Evaluate the patient's condition at the time of restraint application  3. Inform patient/family regarding the reason for restraint  4.  Q2H: Monitor safety, psychosocial status, comfort, nutrition and hydration  Outcome: Progressing  Flowsheets (Taken 12/26/2023 2000 by Dana Engel RN)  Remains free of injury from restraints (restraint for interference with medical device):   Determine that other, less restrictive measures have been tried or would not be effective before applying the restraint   Evaluate the patient's condition at the time of restraint application   Inform patient/family regarding the reason for restraint   Every 2 hours: Monitor safety, psychosocial status, comfort, nutrition and hydration     Problem: Pain  Goal: Verbalizes/displays adequate comfort level or baseline comfort level  Outcome: Progressing  Flowsheets (Taken 12/29/2023 0845)  Verbalizes/displays adequate comfort level or baseline comfort level:   Assess pain using appropriate pain scale   Administer analgesics based on type and severity of pain and evaluate response   Implement non-pharmacological measures as appropriate and evaluate response   Consider cultural and social influences on pain and pain management   Notify Licensed Independent Practitioner if interventions unsuccessful or patient reports new pain     Problem: Cardiovascular - Adult  Goal: Maintains optimal cardiac output and hemodynamic stability  Outcome: Progressing  Flowsheets (Taken 12/29/2023 0845)  Maintains optimal cardiac output and hemodynamic stability:   Monitor blood pressure and heart rate   Monitor urine output and notify Licensed Independent Practitioner for values outside of normal range   Assess for signs of decreased cardiac output   Administer fluid and/or volume expanders as ordered   Administer vasoactive medications as ordered     Problem: Infection - Adult  Goal: Absence of infection at discharge  Outcome: Progressing  Flowsheets (Taken 12/29/2023 0845)  Absence of infection at discharge:   Assess and monitor for signs and symptoms of infection   Monitor lab/diagnostic results   Monitor all insertion sites i.e., indwelling lines, tubes and drains   [place] appropriate cooling/warming therapies per order   Administer medications as ordered   Instruct and encourage patient and family to use good hand hygiene technique   Identify and instruct in appropriate isolation precautions for identified infection/condition     Problem: Decision Making  Goal: Pt/Family able to effectively weigh alternatives and participate in decision making related to treatment and care  Description: INTERVENTIONS:  1. Determine when there are differences between patient's view, family's view, and healthcare provider's view of condition  2. Facilitate patient and family articulation of goals for care  3. Help patient and family identify pros/cons of alternative solutions  4. Provide information as requested by patient/family  5. Respect patient/family right to receive or not to receive information  6. Serve as a liaison between patient and family and health care team  7. Initiate Consults from Ethics, Palliative Care or initiate Family Care Conference as is appropriate  Outcome:  Progressing  Flowsheets (Taken 12/29/2023 0845)  Patient/family able to effectively weigh alternatives and participate in decision making related to treatment and care:   Determine when there are differences between patient's view, family's view, and healthcare provider's view of condition   Facilitate patient and family articulation of goals for care   Help patient and family identify pros/cons of alternative solutions   Provide information as requested by patient/family   Respect patient/family right to receive or not to receive information     Problem: ABCDS Injury Assessment  Goal: Absence of physical injury  Outcome: Progressing  Flowsheets (Taken 12/28/2023 2200 by Priyank Ware, RN)  Absence of Physical Injury: Implement safety measures based on patient assessment

## 2023-12-29 NOTE — PROGRESS NOTES
4 Eyes Skin Assessment     NAME:  Jason Chin  YOB: 1960  MEDICAL RECORD NUMBER:  [id number]    The patient is being assessed for  Shift Handoff    I agree that at least one RN has performed a thorough Head to Toe Skin Assessment on the patient. ALL assessment sites listed below have been assessed. Areas assessed by both nurses:    Head, Face, Ears, Shoulders, Back, Chest, Arms, Elbows, Hands, Sacrum. Buttock, Coccyx, Ischium, Legs. Feet and Heels, and Under Medical Devices         Does the Patient have a Wound?  No noted wound(s)       Mauricio Prevention initiated by RN: Yes  Wound Care Orders initiated by RN: Yes    Pressure Injury (Stage 3,4, Unstageable, DTI, NWPT, and Complex wounds) if present, place Wound referral order by RN under : No    New Ostomies, if present place, Ostomy referral order under : No     Nurse 1 eSignature: Electronically signed by Sandro Castle RN on 12/29/23 at 6:34 PM EST    **SHARE this note so that the co-signing nurse can place an eSignature**    Nurse 2 eSignature: {Esignature:[id number]}

## 2023-12-29 NOTE — PROGRESS NOTES
Infectious Diseases Inpatient Progress Note    CHIEF COMPLAINT:     COVID-19 pneumonia  Septic shock  Cerebral palsy  Procalcitonin elevation  Lactic acidosis  Respiratory failure requiring ventilator  WBC elevated  Fever        HISTORY OF PRESENT ILLNESS:  62 y.o. woman with a history of cerebral  palsy, dysphagia, on PEG tube feedings admitted to hospital secondary shortness of breath and change in mental status.  She was tested positive for COVID-19.  She is admitted from nursing facility its not clear the timing of COVID-19 positivity.  In the hospital now developed respiratory distress high fever progressed to septic shock currently intubated transferred to ICU requiring pressor support.  Tmax 105.8, currently on 100% FiO2.  Rapid response was called earlier on 12/20/23, due to respiratory distress and now in the ICU on the ventilator chest x-ray indicating right middle and lower lobe pneumonia.  Procalcitonin was not elevated admission but now it 51.6 lactic acid 6.8 creatinine 0.5 WBC 20.4 with left shift, blood cultures collected in process admission blood cultures negative MRSA probe negative.  Given ongoing septic shock with respiratory failure and pneumonia COVID-19 infection we are consulted for recommendations    Interval History : Remains on the vent intubated on pressor support still  and WBC mild elevation noted and tolerating IV ABX ok mother at bed side and d/w her in detail ICU team working on weaning from the vent  - she is on Fio2 35%  on going low grade temps+    Past Medical History:    Past Medical History:   Diagnosis Date    Cardiac arrest (HCC)     Cerebral palsy (HCC)     Closed anterior dislocation of humerus     Humeral dislocation    Dysphagia     Infantile cerebral palsy, unspecified     Cerebral palsy    Irritable bowel syndrome     Irritable bowel       Past Surgical History:    Past Surgical History:   Procedure Laterality Date    DE ARTHRP ACETBLR/PROX FEM PROSTC  12/29/2023 04:00 AM    BILIDIR 0.6 12/23/2023 11:23 AM    IBILI 0.1 12/23/2023 11:23 AM    LABALBU 2.7 12/29/2023 04:00 AM     UA:  Lab Results   Component Value Date/Time    COLORU DARK YELLOW 11/27/2022 12:14 AM    CLARITYU CLOUDY 11/27/2022 12:14 AM    GLUCOSEU Negative 11/27/2022 12:14 AM    BILIRUBINUR Negative 11/27/2022 12:14 AM    KETUA Negative 11/27/2022 12:14 AM    SPECGRAV 1.021 11/27/2022 12:14 AM    BLOODU TRACE 11/27/2022 12:14 AM    PHUR 7.0 11/27/2022 12:14 AM    PROTEINU TRACE 11/27/2022 12:14 AM    UROBILINOGEN 1.0 11/27/2022 12:14 AM    NITRU Negative 11/27/2022 12:14 AM    LEUKOCYTESUR TRACE 11/27/2022 12:14 AM    URINETYPE Other 11/27/2022 12:14 AM      Urine Microscopic:   Lab Results   Component Value Date/Time    LABCAST 0-1 Hyaline 05/06/2015 01:35 PM    BACTERIA 4+ 11/27/2022 12:14 AM    COMU see below 11/27/2022 12:14 AM    HYALCAST 5 11/27/2022 12:14 AM    WBCUA 3-5 11/27/2022 12:14 AM    RBCUA 11-20 11/27/2022 12:14 AM    EPIU 9 11/27/2022 12:14 AM     Urine Reflex to Culture:   Lab Results   Component Value Date/Time    URRFLXCULT Not Indicated 11/27/2022 12:14 AM     Procal  51.26     Lactic acid  6.8     CRP  38.9     Wbc 22.4      MICRO: cultures reviewed and updated by me            Culture, Blood 2 [[id number]] Collected: 12/20/23 0053   Order Status: Sent Specimen: Blood Updated: 12/20/23 0059   Culture, Blood 1 [[id number]] Collected: 12/20/23 0053   Order Status: Sent Specimen: Blood Updated: 12/20/23 0059   Blood Culture 1 [[id number]] Collected: 12/16/23 1544   Order Status: Completed Specimen: Blood Updated: 12/17/23 1815    Blood Culture, Routine No Growth to date. Any change in status will be called.    Narrative:     ORDER#: [id number]                          ORDERED BY: Esvin Pop   SOURCE: Blood                              COLLECTED:  12/16/23 15:44   ANTIBIOTICS AT MAURICE.:                      RECEIVED :  12/16/23 15:44   If child <=2 yrs old please draw pediatric Altered mental status, unspecified altered mental status type  R41.82       4. Acute respiratory failure with hypoxia and hypercarbia (HCC)  J96.01     J96.02       5. Pneumonia of both lower lobes due to infectious organism  J18.9          Septic shock  Rt lower lobe PNA  Aspiration PNA  HAP  Covid 19 pna  High fevers T max  105  Procal elevation   WBC elevation  BNP elevation  Lactic acidosis resolving   Blood cx in process -ve   PNA panel -ve  Resp failure on the ventilator  PEG tube in place  Cerebral palsy  MRSA probe =ve  COVID 19 PCR +Ve on 12/17/23 on this admit-   H/O COVID 19 in 2022   Thrombocytopenia    She remains critically ill in ICU was on   pressor support for BP due to on going septic shock her procal was not elevated on admit and now  elevated  since she developed high fevers on 12/20/23 followed by resp failure, and WBC elevation, Procal elevation indicating acute bacterial PNA process or Blood stream infection and cx in process  so far negative     Wbc slow trend down procal improving but remains intubated on the vent from septic shock and resp failure    Resp cx negative so far and will trend Lactic acid improving   and PROCAL for monitoring slow trend down     Hopefully able to wean from the vent soon but still very ill  has on going low grade fevers  d/w Mother at bed side        Labs, Microbiology, Radiology and pertinent results from current hospitalization and care every where were reviewed by me as a part of the consultation.    PLAN :  Cont IV Meropenem x 1 gm X q  8 hrs will be able to d/c once WBC trend down and Procal improved    Trend Procal  1.45  will check tomorrow   Blood cx  -ve  Trend Lactic acid resolved     Finished Dexamethasone  d/c on 12/23  CT chest pattern more favor aspiration PNA vs Bacterial PNA  Given NH status and risk for MDRO   MRSA probe -ve   Watch platelets still low   LFT elevation noted      Discussed with patient/Family and Nursing  d/w Mother at bed side

## 2023-12-29 NOTE — PLAN OF CARE
Problem: Discharge Planning  Goal: Discharge to home or other facility with appropriate resources  Outcome: Progressing  Flowsheets (Taken 12/28/2023 2000)  Discharge to home or other facility with appropriate resources:   Identify barriers to discharge with patient and caregiver   Arrange for needed discharge resources and transportation as appropriate   Identify discharge learning needs (meds, wound care, etc)   Arrange for interpreters to assist at discharge as needed   Refer to discharge planning if patient needs post-hospital services based on physician order or complex needs related to functional status, cognitive ability or social support system     Problem: Neurosensory - Adult  Goal: Achieves stable or improved neurological status  Outcome: Progressing  Flowsheets (Taken 12/28/2023 2000)  Achieves stable or improved neurological status:   Assess for and report changes in neurological status   Initiate measures to prevent increased intracranial pressure   Maintain blood pressure and fluid volume within ordered parameters to optimize cerebral perfusion and minimize risk of hemorrhage   Monitor temperature, glucose, and sodium. Initiate appropriate interventions as ordered  Goal: Absence of seizures  Outcome: Progressing  Flowsheets (Taken 12/28/2023 2000)  Absence of seizures:   Monitor for seizure activity.  If seizure occurs, document type and location of movements and any associated apnea   If seizure occurs, turn head to side and suction secretions as needed   Administer anticonvulsants as ordered   Support airway/breathing, administer oxygen as needed   Diagnostic studies as ordered  Goal: Remains free of injury related to seizures activity  Outcome: Progressing  Flowsheets (Taken 12/28/2023 2000)  Remains free of injury related to seizure activity:   Monitor patient for seizure activity, document and report duration and description of seizure to Licensed Independent Practitioner   If seizure occurs,  caregiver noted to have fall risk factors   Instruct family/caregiver on patient safety     Problem: Skin/Tissue Integrity  Goal: Absence of new skin breakdown  Description: 1. Monitor for areas of redness and/or skin breakdown  2. Assess vascular access sites hourly  3. Every 4-6 hours minimum:  Change oxygen saturation probe site  4. Every 4-6 hours:  If on nasal continuous positive airway pressure, respiratory therapy assess nares and determine need for appliance change or resting period. Outcome: Progressing     Problem: Nutrition Deficit:  Goal: Optimize nutritional status  Outcome: Progressing     Problem: Safety - Medical Restraint  Goal: Remains free of injury from restraints (Restraint for Interference with Medical Device)  Description: INTERVENTIONS:  1. Determine that other, less restrictive measures have been tried or would not be effective before applying the restraint  2. Evaluate the patient's condition at the time of restraint application  3. Inform patient/family regarding the reason for restraint  4.  Q2H: Monitor safety, psychosocial status, comfort, nutrition and hydration  Outcome: Progressing     Problem: Pain  Goal: Verbalizes/displays adequate comfort level or baseline comfort level  Outcome: Progressing  Flowsheets (Taken 12/28/2023 2000)  Verbalizes/displays adequate comfort level or baseline comfort level: Assess pain using appropriate pain scale     Problem: Cardiovascular - Adult  Goal: Maintains optimal cardiac output and hemodynamic stability  Outcome: Progressing  Flowsheets (Taken 12/28/2023 2000)  Maintains optimal cardiac output and hemodynamic stability:   Monitor blood pressure and heart rate   Assess for signs of decreased cardiac output   Monitor urine output and notify Licensed Independent Practitioner for values outside of normal range   Administer fluid and/or volume expanders as ordered   Administer vasoactive medications as ordered     Problem: Infection - Adult  Goal:

## 2023-12-29 NOTE — PROGRESS NOTES
Comprehensive Nutrition Assessment    Type and Reason for Visit:  Reassess    Nutrition Recommendations/Plan:   Continue Vital AF 1.2 @ 55 ml/hr + prosource x1 daily     Malnutrition Assessment:  Malnutrition Status:  At risk for malnutrition (Comment) (12/18/23 0944)    Context:  Chronic Illness       Nutrition Assessment:    Follow up. Pt remains intubated, propofol stopped. Pt receiving TF via PEG. Vital AF 1.2 TF running @ goal rate of 55 ml/hr w/ prsource being administered daily. Current Tf order remains appropriate for pt.    Nutrition Related Findings:    160 glucose, 12/29 BM Wound Type: Stage I, Pressure Injury       Current Nutrition Intake & Therapies:    Average Meal Intake: NPO  Average Supplements Intake: NPO  Diet NPO  ADULT TUBE FEEDING; PEG; Peptide Based; Continuous; 15; Yes; 10; Q 4 hours; 55; 30; Q 4 hours; Protein; Prosource x1 daily  Current Tube Feeding (TF) Orders:  Goal TF & Flush Orders Provides: Vital AF 1.2 @ 55 ml/hr + 1 prosource (Calculated over 20 hrs provides 1100 ml total volume, 1320 kcals, 83 g protein, 892 ml free water + 80 kcals, 20 g protein)    Anthropometric Measures:  Height: 172.7 cm (5' 7.99\")  Ideal Body Weight (IBW): 140 lbs (64 kg)       Current Body Weight: 89.8 kg (197 lb 15.6 oz), 141.4 % IBW.    Current BMI (kg/m2): 30.1                          BMI Categories: Obese Class 1 (BMI 30.0-34.9)    Estimated Daily Nutrient Needs:  Energy Requirements Based On: Kcal/kg  Weight Used for Energy Requirements: Current  Energy (kcal/day): 6621-1302 (18-20kcal/kg)  Weight Used for Protein Requirements: Current  Protein (g/day): 90-108g/kg  Method Used for Fluid Requirements: 1 ml/kcal  Fluid (ml/day): NPO    Nutrition Diagnosis:   Inadequate oral intake related to impaired respiratory function as evidenced by intubation    Nutrition Interventions:   Food and/or Nutrient Delivery: Continue Current Tube Feeding  Nutrition Education/Counseling: Education not  indicated  Coordination of Nutrition Care: Continue to monitor while inpatient       Goals:     Goals: Meet at least 75% of estimated needs, by next RD assessment       Nutrition Monitoring and Evaluation:   Behavioral-Environmental Outcomes: None Identified  Food/Nutrient Intake Outcomes: Enteral Nutrition Intake/Tolerance  Physical Signs/Symptoms Outcomes: Biochemical Data, Weight, Nutrition Focused Physical Findings, GI Status    Discharge Planning:    No discharge needs at this time     Eliseo Bowles, [address]: [phone]

## 2023-12-29 NOTE — PROGRESS NOTES
Cardiology - PROGRESS NOTE    Admit Date: 12/16/2023     Reason for follow up: BiV failure      62 y.o. female with PMH cerebral palsy/contractures, dysphagia w PEG tube. Resides in [facility].     Admitted to [facility] with acute hypoxic respiratory failure/ Covid PNA  Remains intubated.  Echocardiogram revealed severe biventricular failure-started on dobutamine for cardiogenic shock.  Levo for hypotension. Also treated for septic shock likely from pneumonia.  diuresis with IV Lasix gtt.    Social History:   reports that she has never smoked. She has never used smokeless tobacco. She reports that she does not drink alcohol and does not use drugs.   Family History: family history is not on file.  Living Situation: [facility]      Interval History:   Patient seen and examined and notes reviewed   Remains intubated and sedated   + 10.4 L since admission   -1.1 L overnight   Wt 94.3 kg (baseline 87-89 kg)   On levophed, lasix  Precedex   MAP > 65   All other systems reviewed and negative except as above.        Diet: Diet NPO  ADULT TUBE FEEDING; PEG; Peptide Based; Continuous; 15; Yes; 10; Q 4 hours; 55; 30; Q 4 hours; Protein; Prosource x1 daily      In: 3371.2 [I.V.:700.1; NG/GT:2121]  Out: 4550    Patient Vitals for the past 96 hrs (Last 3 readings):   Weight   12/29/23 0600 94.3 kg (207 lb 14.3 oz)   12/28/23 0614 95.7 kg (210 lb 15.7 oz)   12/27/23 0400 100 kg (220 lb 7.4 oz)           Data:   Scheduled Meds:   Scheduled Meds:   clonazePAM  2 mg Oral BID    pregabalin  200 mg Oral TID    aspirin  81 mg Oral Daily    meropenem  1,000 mg IntraVENous Q8H    enoxaparin  1 mg/kg SubCUTAneous BID    chlorhexidine  15 mL Mouth/Throat BID    lidocaine 1 % injection  5 mL IntraDERmal Once    sodium chloride flush  5-40 mL IntraVENous 2 times per day    insulin lispro  0-8 Units SubCUTAneous Q4H    pantoprazole (PROTONIX) 40 mg in sodium chloride (PF) 0.9 % 10 mL injection   40 mg IntraVENous Daily    amitriptyline  50 mg Oral Nightly    dantrolene  200 mg Oral BID    levothyroxine  50 mcg Oral Daily    [Held by provider] metoprolol tartrate  25 mg Oral BID    polyethylene glycol  17 g Oral BID    sertraline  150 mg Oral Daily    sodium chloride flush  5-40 mL IntraVENous 2 times per day     Continuous Infusions:   furosemide (LASIX) 100 mg in sodium chloride 0.9 % 100 mL infusion 5 mg/hr (12/29/23 0615)    norepinephrine 2 mcg/min (12/29/23 0615)    sodium chloride      dexmedeTOMIDine 0.4 mcg/kg/hr (12/29/23 0615)    dextrose      sodium chloride      sodium chloride Stopped (12/20/23 0527)     PRN Meds:.perflutren lipid microspheres, sodium chloride, potassium chloride, glucose, dextrose bolus **OR** dextrose bolus, glucagon (rDNA), dextrose, sodium chloride flush, sodium chloride, albuterol, ipratropium, sodium chloride, magnesium sulfate, ondansetron **OR** ondansetron, polyethylene glycol, acetaminophen **OR** acetaminophen  Continuous Infusions:   furosemide (LASIX) 100 mg in sodium chloride 0.9 % 100 mL infusion 5 mg/hr (12/29/23 0615)    norepinephrine 2 mcg/min (12/29/23 0615)    sodium chloride      dexmedeTOMIDine 0.4 mcg/kg/hr (12/29/23 0615)    dextrose      sodium chloride      sodium chloride Stopped (12/20/23 0527)       Intake/Output Summary (Last 24 hours) at 12/29/2023 0846  Last data filed at 12/29/2023 0615  Gross per 24 hour   Intake 3187.17 ml   Output 4210 ml   Net -1022.83 ml       Lab Results   Component Value Date    ALT 67 (H) 12/29/2023    AST 97 (H) 12/29/2023    ALKPHOS 342 (H) 12/29/2023    BILITOT 0.9 12/29/2023     Lab Results   Component Value Date    CREATININE <0.5 (L) 12/29/2023    BUN 11 12/29/2023     12/29/2023    K 3.5 12/29/2023    CL 98 (L) 12/29/2023    CO2 35 (H) 12/29/2023     No results found for: \"TSH\", \"C1REYCH\", \"Y6FGBUI\", \"THYROIDAB\"  Lab Results   Component Value Date    WBC 14.0 (H) 12/29/2023    HGB 9.1 (L) 12/29/2023    HCT  27.1 (L) 2023    MCV 98.0 2023    PLT 79 (L) 2023     No components found for: \"CHLPL\"  Lab Results   Component Value Date    TRIG 217 (H) 2015    TRIG 172 (H) 2015     No results found for: \"HDL\"  No results found for: \"LDLCALC\"  No results found for: \"LABVLDL\"      -----------------------------------------------------------------  Telemetry: personally reviewed     HC: 5/28/15  OVERALL IMPRESSIONS:  1. Patent large dominant right coronary artery. 2.  Patent left main trunk. 3.  Patent left anterior descending artery and its branches. 4.  Patent circumflex and its branches. 5.  Normal left ventricular systolic function with estimated ejection  fraction of 50% to 55%. EC23  SR at 89 BPM. Anterolateral T wave inversions. Echo:  23   The LV is normal size with distal 2/3 is akinetic. Possible Takatsubo  cardiomyopathy vs ischemic cardiomyopathy. EF 20%. No thrombus seen. 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 Mild mitral  regurgitation is present. The left atrium is normal in size. RV apex is also akinetic Severely reduced systolic function. Estimated PASP is  56  mmHg assuming a right atrial pressure of 15 mmHg. Echo:  23  Summary Limited echo. Left ventricular cavity size is normal. Normal left ventricular wall thickness. Overall left ventricular systolic function appears normal. Ejection fraction is visually estimated to be 50-55%; this is significantly improved from prior echo. No regional wall motion abnormalities are noted. The right ventricle is borderline dilated. Right ventricular systolic function appears normal     CXR 2023     IMPRESSION:  1. Left PICC catheter tip overlies the lower SVC. 2. Bibasilar airspace opacities and small left pleural effusion. If patient  has clinical symptoms of infection, differential would include pneumonia.  In  the absence of infectious symptoms, atelectasis can have the

## 2023-12-30 PROBLEM — I50.23 ACUTE ON CHRONIC SYSTOLIC HEART FAILURE (HCC): Status: ACTIVE | Noted: 2023-12-30

## 2023-12-30 LAB
ANION GAP SERPL CALCULATED.3IONS-SCNC: 6 MMOL/L (ref 3–16)
ANISOCYTOSIS BLD QL SMEAR: ABNORMAL
BASOPHILS # BLD: 0 K/UL (ref 0–0.2)
BASOPHILS NFR BLD: 0 %
BUN SERPL-MCNC: 14 MG/DL (ref 7–20)
CALCIUM SERPL-MCNC: 8.4 MG/DL (ref 8.3–10.6)
CHLORIDE SERPL-SCNC: 95 MMOL/L (ref 99–110)
CO2 SERPL-SCNC: 37 MMOL/L (ref 21–32)
CREAT SERPL-MCNC: <0.5 MG/DL (ref 0.6–1.2)
DEPRECATED RDW RBC AUTO: 16.3 % (ref 12.4–15.4)
EOSINOPHIL # BLD: 0.4 K/UL (ref 0–0.6)
EOSINOPHIL NFR BLD: 3 %
GFR SERPLBLD CREATININE-BSD FMLA CKD-EPI: >60 ML/MIN/{1.73_M2}
GLUCOSE BLD-MCNC: 126 MG/DL (ref 70–99)
GLUCOSE BLD-MCNC: 127 MG/DL (ref 70–99)
GLUCOSE BLD-MCNC: 137 MG/DL (ref 70–99)
GLUCOSE BLD-MCNC: 147 MG/DL (ref 70–99)
GLUCOSE BLD-MCNC: 152 MG/DL (ref 70–99)
GLUCOSE BLD-MCNC: 155 MG/DL (ref 70–99)
GLUCOSE SERPL-MCNC: 116 MG/DL (ref 70–99)
HCT VFR BLD AUTO: 27.7 % (ref 36–48)
HGB BLD-MCNC: 9 G/DL (ref 12–16)
LYMPHOCYTES # BLD: 1.8 K/UL (ref 1–5.1)
LYMPHOCYTES NFR BLD: 14 %
MACROCYTES BLD QL SMEAR: ABNORMAL
MCH RBC QN AUTO: 32.3 PG (ref 26–34)
MCHC RBC AUTO-ENTMCNC: 32.5 G/DL (ref 31–36)
MCV RBC AUTO: 99.4 FL (ref 80–100)
MONOCYTES # BLD: 0.3 K/UL (ref 0–1.3)
MONOCYTES NFR BLD: 2 %
NEUTROPHILS # BLD: 10.3 K/UL (ref 1.7–7.7)
NEUTROPHILS NFR BLD: 76 %
NEUTS BAND NFR BLD MANUAL: 1 % (ref 0–7)
PERFORMED ON: ABNORMAL
PHOSPHATE SERPL-MCNC: 3.7 MG/DL (ref 2.5–4.9)
PLATELET # BLD AUTO: 80 K/UL (ref 135–450)
PMV BLD AUTO: 10.8 FL (ref 5–10.5)
POIKILOCYTOSIS BLD QL SMEAR: ABNORMAL
POLYCHROMASIA BLD QL SMEAR: ABNORMAL
POTASSIUM SERPL-SCNC: 3.8 MMOL/L (ref 3.5–5.1)
PROMYELOCYTES NFR BLD MANUAL: 4 %
RBC # BLD AUTO: 2.78 M/UL (ref 4–5.2)
SODIUM SERPL-SCNC: 138 MMOL/L (ref 136–145)
TARGETS BLD QL SMEAR: ABNORMAL
WBC # BLD AUTO: 12.7 K/UL (ref 4–11)

## 2023-12-30 PROCEDURE — 2500000003 HC RX 250 WO HCPCS: Performed by: HOSPITALIST

## 2023-12-30 PROCEDURE — 2580000003 HC RX 258: Performed by: INTERNAL MEDICINE

## 2023-12-30 PROCEDURE — 6370000000 HC RX 637 (ALT 250 FOR IP): Performed by: NURSE PRACTITIONER

## 2023-12-30 PROCEDURE — 94761 N-INVAS EAR/PLS OXIMETRY MLT: CPT

## 2023-12-30 PROCEDURE — 6370000000 HC RX 637 (ALT 250 FOR IP): Performed by: HOSPITALIST

## 2023-12-30 PROCEDURE — 2000000000 HC ICU R&B

## 2023-12-30 PROCEDURE — 99291 CRITICAL CARE FIRST HOUR: CPT | Performed by: INTERNAL MEDICINE

## 2023-12-30 PROCEDURE — 2700000000 HC OXYGEN THERAPY PER DAY

## 2023-12-30 PROCEDURE — 2580000003 HC RX 258: Performed by: NURSE PRACTITIONER

## 2023-12-30 PROCEDURE — 85025 COMPLETE CBC W/AUTO DIFF WBC: CPT

## 2023-12-30 PROCEDURE — 6360000002 HC RX W HCPCS: Performed by: NURSE PRACTITIONER

## 2023-12-30 PROCEDURE — 80048 BASIC METABOLIC PNL TOTAL CA: CPT

## 2023-12-30 PROCEDURE — 99232 SBSQ HOSP IP/OBS MODERATE 35: CPT | Performed by: INTERNAL MEDICINE

## 2023-12-30 PROCEDURE — 2580000003 HC RX 258: Performed by: HOSPITALIST

## 2023-12-30 PROCEDURE — 84100 ASSAY OF PHOSPHORUS: CPT

## 2023-12-30 PROCEDURE — C9113 INJ PANTOPRAZOLE SODIUM, VIA: HCPCS | Performed by: INTERNAL MEDICINE

## 2023-12-30 PROCEDURE — 6370000000 HC RX 637 (ALT 250 FOR IP): Performed by: INTERNAL MEDICINE

## 2023-12-30 PROCEDURE — 6360000002 HC RX W HCPCS: Performed by: INTERNAL MEDICINE

## 2023-12-30 PROCEDURE — 94003 VENT MGMT INPAT SUBQ DAY: CPT

## 2023-12-30 PROCEDURE — 6360000002 HC RX W HCPCS: Performed by: STUDENT IN AN ORGANIZED HEALTH CARE EDUCATION/TRAINING PROGRAM

## 2023-12-30 PROCEDURE — 6370000000 HC RX 637 (ALT 250 FOR IP)

## 2023-12-30 RX ADMIN — PREGABALIN 200 MG: 100 CAPSULE ORAL at 08:34

## 2023-12-30 RX ADMIN — SODIUM CHLORIDE, PRESERVATIVE FREE 10 ML: 5 INJECTION INTRAVENOUS at 08:39

## 2023-12-30 RX ADMIN — MEROPENEM 1000 MG: 1 INJECTION, POWDER, FOR SOLUTION INTRAVENOUS at 06:18

## 2023-12-30 RX ADMIN — MEROPENEM 1000 MG: 1 INJECTION, POWDER, FOR SOLUTION INTRAVENOUS at 22:10

## 2023-12-30 RX ADMIN — ENOXAPARIN SODIUM 100 MG: 100 INJECTION SUBCUTANEOUS at 08:35

## 2023-12-30 RX ADMIN — SERTRALINE 150 MG: 100 TABLET, FILM COATED ORAL at 08:34

## 2023-12-30 RX ADMIN — AMITRIPTYLINE HYDROCHLORIDE 50 MG: 50 TABLET, FILM COATED ORAL at 20:29

## 2023-12-30 RX ADMIN — PREGABALIN 200 MG: 100 CAPSULE ORAL at 16:22

## 2023-12-30 RX ADMIN — CLONAZEPAM 2 MG: 1 TABLET ORAL at 20:30

## 2023-12-30 RX ADMIN — DEXMEDETOMIDINE HYDROCHLORIDE 0.4 MCG/KG/HR: 400 INJECTION INTRAVENOUS at 16:27

## 2023-12-30 RX ADMIN — POLYETHYLENE GLYCOL 3350 17 G: 17 POWDER, FOR SOLUTION ORAL at 08:34

## 2023-12-30 RX ADMIN — ENOXAPARIN SODIUM 100 MG: 100 INJECTION SUBCUTANEOUS at 20:37

## 2023-12-30 RX ADMIN — MEROPENEM 1000 MG: 1 INJECTION, POWDER, FOR SOLUTION INTRAVENOUS at 14:30

## 2023-12-30 RX ADMIN — PREGABALIN 200 MG: 100 CAPSULE ORAL at 20:31

## 2023-12-30 RX ADMIN — CHLORHEXIDINE GLUCONATE 0.12% ORAL RINSE 15 ML: 1.2 LIQUID ORAL at 20:37

## 2023-12-30 RX ADMIN — SODIUM CHLORIDE, PRESERVATIVE FREE 40 MG: 5 INJECTION INTRAVENOUS at 08:35

## 2023-12-30 RX ADMIN — POLYETHYLENE GLYCOL 3350 17 G: 17 POWDER, FOR SOLUTION ORAL at 20:37

## 2023-12-30 RX ADMIN — LEVOTHYROXINE SODIUM 50 MCG: 0.05 TABLET ORAL at 06:19

## 2023-12-30 RX ADMIN — SODIUM CHLORIDE, PRESERVATIVE FREE 10 ML: 5 INJECTION INTRAVENOUS at 20:40

## 2023-12-30 RX ADMIN — DEXMEDETOMIDINE HYDROCHLORIDE 0.4 MCG/KG/HR: 400 INJECTION INTRAVENOUS at 03:37

## 2023-12-30 RX ADMIN — SODIUM CHLORIDE, PRESERVATIVE FREE 10 ML: 5 INJECTION INTRAVENOUS at 20:39

## 2023-12-30 RX ADMIN — FUROSEMIDE 5 MG/HR: 10 INJECTION, SOLUTION INTRAMUSCULAR; INTRAVENOUS at 06:45

## 2023-12-30 RX ADMIN — DANTROLENE SODIUM 200 MG: 25 CAPSULE ORAL at 20:32

## 2023-12-30 RX ADMIN — CHLORHEXIDINE GLUCONATE 0.12% ORAL RINSE 15 ML: 1.2 LIQUID ORAL at 08:53

## 2023-12-30 RX ADMIN — POTASSIUM CHLORIDE 20 MEQ: 29.8 INJECTION, SOLUTION INTRAVENOUS at 08:33

## 2023-12-30 RX ADMIN — DANTROLENE SODIUM 200 MG: 25 CAPSULE ORAL at 08:39

## 2023-12-30 RX ADMIN — ASPIRIN 81 MG: 81 TABLET, CHEWABLE ORAL at 08:34

## 2023-12-30 ASSESSMENT — PULMONARY FUNCTION TESTS
PIF_VALUE: 21
PIF_VALUE: 19
PIF_VALUE: 21
PIF_VALUE: 17
PIF_VALUE: 21
PIF_VALUE: 21
PIF_VALUE: 17
PIF_VALUE: 17
PIF_VALUE: 20
PIF_VALUE: 18
PIF_VALUE: 17
PIF_VALUE: 20
PIF_VALUE: 20
PIF_VALUE: 21
PIF_VALUE: 20
PIF_VALUE: 21
PIF_VALUE: 20
PIF_VALUE: 18
PIF_VALUE: 21
PIF_VALUE: 16
PIF_VALUE: 21
PIF_VALUE: 21
PIF_VALUE: 17
PIF_VALUE: 20
PIF_VALUE: 16
PIF_VALUE: 20
PIF_VALUE: 16
PIF_VALUE: 20
PIF_VALUE: 21
PIF_VALUE: 20
PIF_VALUE: 21
PIF_VALUE: 17
PIF_VALUE: 21
PIF_VALUE: 20
PIF_VALUE: 21
PIF_VALUE: 17
PIF_VALUE: 20
PIF_VALUE: 17
PIF_VALUE: 17
PIF_VALUE: 21
PIF_VALUE: 19
PIF_VALUE: 16
PIF_VALUE: 21
PIF_VALUE: 19
PIF_VALUE: 21
PIF_VALUE: 20
PIF_VALUE: 17
PIF_VALUE: 16
PIF_VALUE: 21
PIF_VALUE: 21
PIF_VALUE: 20
PIF_VALUE: 16
PIF_VALUE: 17
PIF_VALUE: 20
PIF_VALUE: 21
PIF_VALUE: 17
PIF_VALUE: 20
PIF_VALUE: 21
PIF_VALUE: 20
PIF_VALUE: 17
PIF_VALUE: 20
PIF_VALUE: 20
PIF_VALUE: 21
PIF_VALUE: 21
PIF_VALUE: 20
PIF_VALUE: 20
PIF_VALUE: 17
PIF_VALUE: 21
PIF_VALUE: 16
PIF_VALUE: 17
PIF_VALUE: 17
PIF_VALUE: 18
PIF_VALUE: 17
PIF_VALUE: 18
PIF_VALUE: 20
PIF_VALUE: 17
PIF_VALUE: 18
PIF_VALUE: 20
PIF_VALUE: 21
PIF_VALUE: 18
PIF_VALUE: 20
PIF_VALUE: 20
PIF_VALUE: 21
PIF_VALUE: 20
PIF_VALUE: 21
PIF_VALUE: 18
PIF_VALUE: 18
PIF_VALUE: 16
PIF_VALUE: 21

## 2023-12-30 ASSESSMENT — PAIN SCALES - GENERAL
PAINLEVEL_OUTOF10: 0

## 2023-12-30 NOTE — PLAN OF CARE
Problem: Discharge Planning  Goal: Discharge to home or other facility with appropriate resources  Outcome: Progressing  Flowsheets (Taken 12/30/2023 0800)  Discharge to home or other facility with appropriate resources:   Identify barriers to discharge with patient and caregiver   Arrange for needed discharge resources and transportation as appropriate   Identify discharge learning needs (meds, wound care, etc)     Problem: Neurosensory - Adult  Goal: Achieves stable or improved neurological status  Outcome: Progressing  Flowsheets (Taken 12/30/2023 0800)  Achieves stable or improved neurological status:   Assess for and report changes in neurological status   Initiate measures to prevent increased intracranial pressure  Goal: Absence of seizures  Outcome: Progressing  Flowsheets (Taken 12/30/2023 0800)  Absence of seizures:   Monitor for seizure activity.   If seizure occurs, document type and location of movements and any associated apnea   If seizure occurs, turn head to side and suction secretions as needed   Administer anticonvulsants as ordered  Goal: Remains free of injury related to seizures activity  Outcome: Progressing  Flowsheets (Taken 12/30/2023 0800)  Remains free of injury related to seizure activity:   Maintain airway, patient safety  and administer oxygen as ordered   Monitor patient for seizure activity, document and report duration and description of seizure to Licensed Independent Practitioner  Goal: Achieves maximal functionality and self care  Outcome: Progressing  Flowsheets (Taken 12/30/2023 0800)  Achieves maximal functionality and self care:   Monitor swallowing and airway patency with patient fatigue and changes in neurological status   Encourage and assist patient to increase activity and self care with guidance from physical therapy/occupational therapy   Encourage visually impaired, hearing impaired and aphasic patients to use assistive/communication devices     Problem: Neurosensory

## 2023-12-30 NOTE — PROGRESS NOTES
V2.0    [facility] Progress Note      Name:  Lilly Terrell /Age/Sex: 1960  (62 y.o. female)   MRN & CSN:  [id number] & [id number] Encounter Date/Time: 2023 2:17 PM EST   Location:  [id number] PCP: Tyson Hahn     Attending:Vinny Fernández MD       Hospital Day: 15    Assessment and Recommendations   62-year-old male with past medical history of cerebral palsy chronic muscular skeletal contractures, ECF resident presented with altered mental status and tested positive for COVID 19 pneumonia    Sepsis with septic shock secondary to COVID-19 pneumonia  Cardiogenic shock possibly from stress induced cardiomyopathy/Takotsubo  Acute hypoxemic respiratory failure with SpO2 of less than 90 on room air on mechanical ventilation  Possible aspiration pneumonia  COVID-19 infection  Lactic acidosis  Acute encephalopathy  Hypothyroidism  Hypertension  Coffee-ground content with PEG tube  Acute systolic congestive heart failure with EF of 20%    Plan:     Still intubated on vent appreciate pulmonary critical care follow-up and management  Continue with Lasix drip  Continue with Levophed to keep MAP above 65  For sedation continue Precedex drip  Continue full dose anticoagulation with Lovenox 1 mg subcu twice daily  Appreciate ID follow-up  Continue IV Merrem day 10  Supportive care  Appreciate critical care and cardiology follow-up  Labs in a.m.            Comment: Please note this report has been produced using speech recognition software and may contain errors related to that system including errors in grammar, punctuation, and spelling, as well as words and phrases that may be inappropriate. If there are any questions or concerns please feel free to contact the dictating provider for clarification.   Diet Diet NPO  ADULT TUBE FEEDING; PEG; Peptide Based; Continuous; 15; Yes; 10; Q 4 hours; 55; 30; Q 4 hours; Protein; Prosource x1 daily   DVT Prophylaxis [x] Lovenox, []  Heparin, [x] SCDs, [] Ambulation,   CHEST PORTABLE    Result Date: 12/20/2023  EXAMINATION: ONE XRAY VIEW OF THE CHEST 12/20/2023 12:37 am COMPARISON: 12/16/2023 HISTORY: ORDERING SYSTEM PROVIDED HISTORY: hypoxia, tachycardia TECHNOLOGIST PROVIDED HISTORY: Reason for exam:->hypoxia, tachycardia Reason for Exam: hypoxia, tachycardia FINDINGS: There is new patchy airspace disease in the left mid and lower lung. There is also mild disease suspected in the right perihilar region in addition to the previous noted atelectasis. There is trace right pleural fluid with blunting of the costophrenic angle. No pneumothorax. Heart size is within normal limits. No acute bone finding. New left mid and lower lung airspace disease most consistent with pneumonia. Suspect mild right perihilar disease in addition to the previously noted atelectasis. CBC:   Recent Labs     12/28/23  0430 12/29/23  0400 12/30/23  0915   WBC 12.6* 14.0* 12.7*   HGB 9.3* 9.1* 9.0*   PLT 71* 79* 80*       BMP:    Recent Labs     12/28/23  0430 12/28/23  1140 12/29/23  0400 12/30/23  0915     --  138 138   K 3.5 3.8 3.5 3.8   CL 97*  --  98* 95*   CO2 34*  --  35* 37*   BUN 8  --  11 14   CREATININE <0.5*  --  <0.5* <0.5*   GLUCOSE 166*  --  160* 116*       Hepatic:   Recent Labs     12/28/23  0430 12/29/23  0400   AST 66* 97*   ALT 57* 67*   BILITOT 0.7 0.9   ALKPHOS 340* 342*       Lipids:   Lab Results   Component Value Date/Time    TRIG 217 05/11/2015 05:30 AM     Hemoglobin A1C:   Lab Results   Component Value Date/Time    LABA1C 5.4 12/19/2023 07:45 AM     TSH: No results found for: \"TSH\"  Troponin: No results found for: \"TROPONINT\"  Lactic Acid:   No results for input(s): \"LACTA\" in the last 72 hours. BNP: No results for input(s): \"PROBNP\" in the last 72 hours.   UA:  Lab Results   Component Value Date/Time    NITRU Negative 11/27/2022 12:14 AM    COLORU DARK YELLOW 11/27/2022 12:14 AM    PHUR 7.0 11/27/2022 12:14 AM    LABCAST 0-1 Hyaline 05/06/2015 01:35 PM WBCUA 3-5 11/27/2022 12:14 AM    RBCUA 11-20 11/27/2022 12:14 AM    BACTERIA 4+ 11/27/2022 12:14 AM    CLARITYU CLOUDY 11/27/2022 12:14 AM    SPECGRAV 1.021 11/27/2022 12:14 AM    LEUKOCYTESUR TRACE 11/27/2022 12:14 AM    UROBILINOGEN 1.0 11/27/2022 12:14 AM    BILIRUBINUR Negative 11/27/2022 12:14 AM    BLOODU TRACE 11/27/2022 12:14 AM    GLUCOSEU Negative 11/27/2022 12:14 AM    KETUA Negative 11/27/2022 12:14 AM    AMORPHOUS 2+ 05/06/2015 01:35 PM     Urine Cultures: No results found for: \"LABURIN\"  Blood Cultures:   Lab Results   Component Value Date/Time    BC No Growth after 4 days of incubation. 12/20/2023 12:53 AM     Lab Results   Component Value Date/Time    BLOODCULT2 No Growth after 4 days of incubation. 12/20/2023 12:53 AM     Organism:   Lab Results   Component Value Date/Time    ORG SARS CoV 2  by PCR 12/17/2023 09:53 AM         Electronically signed by Vinny Fernández MD on 12/30/2023 at 2:07 PM

## 2023-12-30 NOTE — PROGRESS NOTES
Bedside handoff with Oren Seth and Lesley Vargas RN. Pt repositioned, skin assessment performed. VSS, ventilator support at 35% on SPONT. Lasix gtt, precedex gtt, levo gtt per MAR.

## 2023-12-30 NOTE — PROGRESS NOTES
Pt has decreased bowel movements over the past 24 hours and reports abdominal discomfort. Tube feed is to be held until 0700 and then primary physician is to be contacted for direction per Dr. Katie Rosenthal.

## 2023-12-31 ENCOUNTER — APPOINTMENT (OUTPATIENT)
Dept: GENERAL RADIOLOGY | Age: 63
DRG: 207 | End: 2023-12-31
Payer: MEDICARE

## 2023-12-31 VITALS
HEIGHT: 68 IN | RESPIRATION RATE: 41 BRPM | BODY MASS INDEX: 30.67 KG/M2 | SYSTOLIC BLOOD PRESSURE: 123 MMHG | HEART RATE: 127 BPM | DIASTOLIC BLOOD PRESSURE: 71 MMHG | WEIGHT: 202.38 LBS | TEMPERATURE: 99.9 F | OXYGEN SATURATION: 97 %

## 2023-12-31 LAB
ANION GAP SERPL CALCULATED.3IONS-SCNC: 6 MMOL/L (ref 3–16)
BASE EXCESS BLDA CALC-SCNC: 10.9 MMOL/L (ref -3–3)
BASOPHILS # BLD: 0.1 K/UL (ref 0–0.2)
BASOPHILS NFR BLD: 0.5 %
BUN SERPL-MCNC: 16 MG/DL (ref 7–20)
CALCIUM SERPL-MCNC: 7.7 MG/DL (ref 8.3–10.6)
CHLORIDE SERPL-SCNC: 96 MMOL/L (ref 99–110)
CO2 BLDA-SCNC: 38.1 MMOL/L
CO2 SERPL-SCNC: 35 MMOL/L (ref 21–32)
COHGB MFR BLDA: 2 % (ref 0–1.5)
CREAT SERPL-MCNC: <0.5 MG/DL (ref 0.6–1.2)
DEPRECATED RDW RBC AUTO: 16.5 % (ref 12.4–15.4)
EOSINOPHIL # BLD: 0.2 K/UL (ref 0–0.6)
EOSINOPHIL NFR BLD: 1.6 %
GFR SERPLBLD CREATININE-BSD FMLA CKD-EPI: >60 ML/MIN/{1.73_M2}
GLUCOSE BLD-MCNC: 128 MG/DL (ref 70–99)
GLUCOSE BLD-MCNC: 155 MG/DL (ref 70–99)
GLUCOSE BLD-MCNC: 167 MG/DL (ref 70–99)
GLUCOSE BLD-MCNC: 169 MG/DL (ref 70–99)
GLUCOSE BLD-MCNC: 175 MG/DL (ref 70–99)
GLUCOSE BLD-MCNC: 176 MG/DL (ref 70–99)
GLUCOSE SERPL-MCNC: 134 MG/DL (ref 70–99)
HCO3 BLDA-SCNC: 36.4 MMOL/L (ref 21–29)
HCT VFR BLD AUTO: 27.4 % (ref 36–48)
HGB BLD-MCNC: 8.9 G/DL (ref 12–16)
HGB BLDA-MCNC: 9.6 G/DL (ref 12–16)
LYMPHOCYTES # BLD: 2.2 K/UL (ref 1–5.1)
LYMPHOCYTES NFR BLD: 18.8 %
MAGNESIUM SERPL-MCNC: 2.1 MG/DL (ref 1.8–2.4)
MCH RBC QN AUTO: 32.6 PG (ref 26–34)
MCHC RBC AUTO-ENTMCNC: 32.5 G/DL (ref 31–36)
MCV RBC AUTO: 100.2 FL (ref 80–100)
METHGB MFR BLDA: 0.4 %
MONOCYTES # BLD: 0.7 K/UL (ref 0–1.3)
MONOCYTES NFR BLD: 6 %
NEUTROPHILS # BLD: 8.6 K/UL (ref 1.7–7.7)
NEUTROPHILS NFR BLD: 73.1 %
O2 THERAPY: ABNORMAL
PCO2 BLDA: 53.1 MMHG (ref 35–45)
PERFORMED ON: ABNORMAL
PH BLDA: 7.45 [PH] (ref 7.35–7.45)
PLATELET # BLD AUTO: 93 K/UL (ref 135–450)
PMV BLD AUTO: 11.3 FL (ref 5–10.5)
PO2 BLDA: 88.3 MMHG (ref 75–108)
POTASSIUM SERPL-SCNC: 3 MMOL/L (ref 3.5–5.1)
POTASSIUM SERPL-SCNC: 4.4 MMOL/L (ref 3.5–5.1)
RBC # BLD AUTO: 2.74 M/UL (ref 4–5.2)
SAO2 % BLDA: 97.9 %
SODIUM SERPL-SCNC: 137 MMOL/L (ref 136–145)
WBC # BLD AUTO: 11.7 K/UL (ref 4–11)

## 2023-12-31 PROCEDURE — C9113 INJ PANTOPRAZOLE SODIUM, VIA: HCPCS | Performed by: INTERNAL MEDICINE

## 2023-12-31 PROCEDURE — 99291 CRITICAL CARE FIRST HOUR: CPT | Performed by: INTERNAL MEDICINE

## 2023-12-31 PROCEDURE — 82803 BLOOD GASES ANY COMBINATION: CPT

## 2023-12-31 PROCEDURE — 2500000003 HC RX 250 WO HCPCS: Performed by: INTERNAL MEDICINE

## 2023-12-31 PROCEDURE — 94761 N-INVAS EAR/PLS OXIMETRY MLT: CPT

## 2023-12-31 PROCEDURE — 2580000003 HC RX 258: Performed by: HOSPITALIST

## 2023-12-31 PROCEDURE — 6360000002 HC RX W HCPCS: Performed by: NURSE PRACTITIONER

## 2023-12-31 PROCEDURE — 84132 ASSAY OF SERUM POTASSIUM: CPT

## 2023-12-31 PROCEDURE — 6360000002 HC RX W HCPCS: Performed by: INTERNAL MEDICINE

## 2023-12-31 PROCEDURE — 80048 BASIC METABOLIC PNL TOTAL CA: CPT

## 2023-12-31 PROCEDURE — 2000000000 HC ICU R&B

## 2023-12-31 PROCEDURE — 6370000000 HC RX 637 (ALT 250 FOR IP): Performed by: HOSPITALIST

## 2023-12-31 PROCEDURE — 2580000003 HC RX 258: Performed by: INTERNAL MEDICINE

## 2023-12-31 PROCEDURE — 94003 VENT MGMT INPAT SUBQ DAY: CPT

## 2023-12-31 PROCEDURE — 71045 X-RAY EXAM CHEST 1 VIEW: CPT

## 2023-12-31 PROCEDURE — 2580000003 HC RX 258: Performed by: NURSE PRACTITIONER

## 2023-12-31 PROCEDURE — 6370000000 HC RX 637 (ALT 250 FOR IP)

## 2023-12-31 PROCEDURE — 36600 WITHDRAWAL OF ARTERIAL BLOOD: CPT

## 2023-12-31 PROCEDURE — 6370000000 HC RX 637 (ALT 250 FOR IP): Performed by: NURSE PRACTITIONER

## 2023-12-31 PROCEDURE — 2700000000 HC OXYGEN THERAPY PER DAY

## 2023-12-31 PROCEDURE — 99232 SBSQ HOSP IP/OBS MODERATE 35: CPT | Performed by: INTERNAL MEDICINE

## 2023-12-31 PROCEDURE — 85025 COMPLETE CBC W/AUTO DIFF WBC: CPT

## 2023-12-31 PROCEDURE — 6360000002 HC RX W HCPCS: Performed by: STUDENT IN AN ORGANIZED HEALTH CARE EDUCATION/TRAINING PROGRAM

## 2023-12-31 PROCEDURE — 2500000003 HC RX 250 WO HCPCS: Performed by: HOSPITALIST

## 2023-12-31 PROCEDURE — 6370000000 HC RX 637 (ALT 250 FOR IP): Performed by: INTERNAL MEDICINE

## 2023-12-31 PROCEDURE — 83735 ASSAY OF MAGNESIUM: CPT

## 2023-12-31 RX ORDER — FUROSEMIDE 40 MG/1
40 TABLET ORAL DAILY
Status: DISPENSED | OUTPATIENT
Start: 2023-12-31 | End: 2024-01-03

## 2023-12-31 RX ADMIN — Medication 4 MCG/MIN: at 01:36

## 2023-12-31 RX ADMIN — FUROSEMIDE 40 MG: 40 TABLET ORAL at 10:47

## 2023-12-31 RX ADMIN — CHLORHEXIDINE GLUCONATE 0.12% ORAL RINSE 15 ML: 1.2 LIQUID ORAL at 21:08

## 2023-12-31 RX ADMIN — POTASSIUM CHLORIDE 20 MEQ: 29.8 INJECTION, SOLUTION INTRAVENOUS at 07:39

## 2023-12-31 RX ADMIN — SODIUM CHLORIDE, PRESERVATIVE FREE 40 MG: 5 INJECTION INTRAVENOUS at 08:10

## 2023-12-31 RX ADMIN — LEVOTHYROXINE SODIUM 50 MCG: 0.05 TABLET ORAL at 08:08

## 2023-12-31 RX ADMIN — MEROPENEM 1000 MG: 1 INJECTION, POWDER, FOR SOLUTION INTRAVENOUS at 14:55

## 2023-12-31 RX ADMIN — AMITRIPTYLINE HYDROCHLORIDE 50 MG: 50 TABLET, FILM COATED ORAL at 21:02

## 2023-12-31 RX ADMIN — DANTROLENE SODIUM 200 MG: 25 CAPSULE ORAL at 08:02

## 2023-12-31 RX ADMIN — CLONAZEPAM 2 MG: 1 TABLET ORAL at 10:46

## 2023-12-31 RX ADMIN — PREGABALIN 200 MG: 100 CAPSULE ORAL at 08:02

## 2023-12-31 RX ADMIN — POTASSIUM CHLORIDE 20 MEQ: 29.8 INJECTION, SOLUTION INTRAVENOUS at 05:50

## 2023-12-31 RX ADMIN — PREGABALIN 200 MG: 100 CAPSULE ORAL at 14:56

## 2023-12-31 RX ADMIN — PREGABALIN 200 MG: 100 CAPSULE ORAL at 21:01

## 2023-12-31 RX ADMIN — MEROPENEM 1000 MG: 1 INJECTION, POWDER, FOR SOLUTION INTRAVENOUS at 21:31

## 2023-12-31 RX ADMIN — ASPIRIN 81 MG: 81 TABLET, CHEWABLE ORAL at 08:08

## 2023-12-31 RX ADMIN — ENOXAPARIN SODIUM 100 MG: 100 INJECTION SUBCUTANEOUS at 08:09

## 2023-12-31 RX ADMIN — DANTROLENE SODIUM 200 MG: 25 CAPSULE ORAL at 21:01

## 2023-12-31 RX ADMIN — CLONAZEPAM 2 MG: 1 TABLET ORAL at 21:02

## 2023-12-31 RX ADMIN — ACETAMINOPHEN 650 MG: 325 TABLET ORAL at 21:02

## 2023-12-31 RX ADMIN — POLYETHYLENE GLYCOL 3350 17 G: 17 POWDER, FOR SOLUTION ORAL at 08:02

## 2023-12-31 RX ADMIN — SODIUM CHLORIDE, PRESERVATIVE FREE 10 ML: 5 INJECTION INTRAVENOUS at 21:32

## 2023-12-31 RX ADMIN — SERTRALINE 150 MG: 100 TABLET, FILM COATED ORAL at 08:08

## 2023-12-31 RX ADMIN — ENOXAPARIN SODIUM 100 MG: 100 INJECTION SUBCUTANEOUS at 21:08

## 2023-12-31 RX ADMIN — DEXMEDETOMIDINE HYDROCHLORIDE 0.3 MCG/KG/HR: 400 INJECTION INTRAVENOUS at 01:28

## 2023-12-31 RX ADMIN — MEROPENEM 1000 MG: 1 INJECTION, POWDER, FOR SOLUTION INTRAVENOUS at 05:43

## 2023-12-31 RX ADMIN — FUROSEMIDE 5 MG/HR: 10 INJECTION, SOLUTION INTRAMUSCULAR; INTRAVENOUS at 01:33

## 2023-12-31 RX ADMIN — POTASSIUM CHLORIDE 20 MEQ: 29.8 INJECTION, SOLUTION INTRAVENOUS at 08:51

## 2023-12-31 RX ADMIN — CHLORHEXIDINE GLUCONATE 0.12% ORAL RINSE 15 ML: 1.2 LIQUID ORAL at 08:13

## 2023-12-31 ASSESSMENT — PULMONARY FUNCTION TESTS
PIF_VALUE: 16
PIF_VALUE: 17
PIF_VALUE: 16
PIF_VALUE: 18
PIF_VALUE: 17
PIF_VALUE: 15
PIF_VALUE: 17
PIF_VALUE: 16
PIF_VALUE: 16
PIF_VALUE: 17
PIF_VALUE: 20
PIF_VALUE: 17
PIF_VALUE: 15
PIF_VALUE: 15
PIF_VALUE: 18
PIF_VALUE: 18
PIF_VALUE: 16
PIF_VALUE: 15
PIF_VALUE: 18
PIF_VALUE: 17
PIF_VALUE: 21
PIF_VALUE: 17
PIF_VALUE: 15
PIF_VALUE: 15
PIF_VALUE: 10
PIF_VALUE: 17
PIF_VALUE: 15
PIF_VALUE: 10
PIF_VALUE: 16
PIF_VALUE: 19
PIF_VALUE: 17
PIF_VALUE: 16
PIF_VALUE: 21
PIF_VALUE: 19
PIF_VALUE: 16
PIF_VALUE: 16
PIF_VALUE: 20
PIF_VALUE: 16
PIF_VALUE: 18
PIF_VALUE: 20

## 2023-12-31 ASSESSMENT — PAIN SCALES - GENERAL
PAINLEVEL_OUTOF10: 4
PAINLEVEL_OUTOF10: 0
PAINLEVEL_OUTOF10: 3
PAINLEVEL_OUTOF10: 0
PAINLEVEL_OUTOF10: 0

## 2023-12-31 ASSESSMENT — PAIN DESCRIPTION - DESCRIPTORS: DESCRIPTORS: ACHING

## 2023-12-31 ASSESSMENT — PAIN DESCRIPTION - LOCATION: LOCATION: ABDOMEN

## 2023-12-31 ASSESSMENT — PAIN - FUNCTIONAL ASSESSMENT: PAIN_FUNCTIONAL_ASSESSMENT: ACTIVITIES ARE NOT PREVENTED

## 2023-12-31 ASSESSMENT — PAIN DESCRIPTION - PAIN TYPE: TYPE: ACUTE PAIN

## 2023-12-31 NOTE — PROGRESS NOTES
V2.0    [facility] [place] Progress Note      Name:  Lilly Terrell /Age/Sex: 1960  (63 y.o. female)   MRN & CSN:  [id number] & [id number] Encounter Date/Time: 2023 2:17 PM EST   Location:  X1M-7124 PCP: Tyson Hahn     Attending:Vinny Fernández MD       Hospital Day: 16    Assessment and Recommendations   62-year-old male with past medical history of cerebral palsy chronic muscular skeletal contractures, ECF resident presented with altered mental status and tested positive for COVID 19 pneumonia    Sepsis with septic shock secondary to COVID-19 pneumonia  Cardiogenic shock possibly from stress induced cardiomyopathy/Takotsubo  Acute hypoxemic respiratory failure with SpO2 of less than 90 on room air on mechanical ventilation  Possible aspiration pneumonia  COVID-19 infection  Lactic acidosis  Acute encephalopathy  Hypothyroidism  Hypertension  Coffee-ground content with PEG tube  Acute systolic congestive heart failure with EF of 20%    Plan:     Extubated today successfully by pulmonary/CCM  Lasix drip has been switched off  Presently on 6 L nasal cannula, continue to titrate supplemental oxygen to keep SaO2 above 92%  continue with Levophed to keep MAP above 65  Precedex drip slowly being titrated off  Continue full dose anticoagulation with Lovenox 1 mg subcu twice daily  Appreciate cardiology follow-up and recommend to restart beta-blocker once off of inotropic support  Continue IV Merrem day 11  Supportive care  Labs in a.m.            Comment: Please note this report has been produced using speech recognition software and may contain errors related to that system including errors in grammar, punctuation, and spelling, as well as words and phrases that may be inappropriate. If there are any questions or concerns please feel free to contact the dictating provider for clarification.   Diet Diet NPO  ADULT TUBE FEEDING; PEG; Peptide Based; Continuous; 15; Yes; 10; Q 4 hours; 55; 30; Q 4 hours;  effusion.  Status post left shoulder arthroplasty.     1. Endotracheal tube 4.4 cm above the jairo.     XR CHEST PORTABLE    Result Date: 12/20/2023  EXAMINATION: ONE XRAY VIEW OF THE CHEST 12/20/2023 12:37 am COMPARISON: 12/16/2023 HISTORY: ORDERING SYSTEM PROVIDED HISTORY: hypoxia, tachycardia TECHNOLOGIST PROVIDED HISTORY: Reason for exam:->hypoxia, tachycardia Reason for Exam: hypoxia, tachycardia FINDINGS: There is new patchy airspace disease in the left mid and lower lung.  There is also mild disease suspected in the right perihilar region in addition to the previous noted atelectasis.  There is trace right pleural fluid with blunting of the costophrenic angle.  No pneumothorax.  Heart size is within normal limits.  No acute bone finding.     New left mid and lower lung airspace disease most consistent with pneumonia. Suspect mild right perihilar disease in addition to the previously noted atelectasis.       CBC:   Recent Labs     12/29/23  0400 12/30/23  0915 12/31/23  0351   WBC 14.0* 12.7* 11.7*   HGB 9.1* 9.0* 8.9*   PLT 79* 80* 93*       BMP:    Recent Labs     12/29/23  0400 12/30/23  0915 12/31/23  0351 12/31/23  1100    138 137  --    K 3.5 3.8 3.0* 4.4   CL 98* 95* 96*  --    CO2 35* 37* 35*  --    BUN 11 14 16  --    CREATININE <0.5* <0.5* <0.5*  --    GLUCOSE 160* 116* 134*  --        Hepatic:   Recent Labs     12/29/23  0400   AST 97*   ALT 67*   BILITOT 0.9   ALKPHOS 342*       Lipids:   Lab Results   Component Value Date/Time    TRIG 217 05/11/2015 05:30 AM     Hemoglobin A1C:   Lab Results   Component Value Date/Time    LABA1C 5.4 12/19/2023 07:45 AM     TSH: No results found for: \"TSH\"  Troponin: No results found for: \"TROPONINT\"  Lactic Acid:   No results for input(s): \"LACTA\" in the last 72 hours.    BNP: No results for input(s): \"PROBNP\" in the last 72 hours.  UA:  Lab Results   Component Value Date/Time    NITRU Negative 11/27/2022 12:14 AM    COLORU DARK YELLOW 11/27/2022 12:14 AM

## 2023-12-31 NOTE — PROGRESS NOTES
Pulmonary Critical Care Progress Note     Patient's name:  Lilly Terrell  Medical Record Number: [id number]  Patient's account/billing number: [id number]  Patient's YOB: 1960  Age: 63 y.o.  Date of Admission: 12/16/2023  3:09 PM  Date of Consult: 12/31/2023      Primary Care Physician: Tyson Hahn      Code Status: Full Code    Chief complaint: Acute respiratory failure and hypoxia    Assessment and Plan     Acute respiratory with hypoxia less than 90% on room air on mechanical ventilation  Acute on chronic CHF  Aspiration PNA  Covid 19 PNA  Septic shock  Elevated troponins  Cerebral palsy   Dysphagia s/p PEG     Plan:  Sbt will extubate  Diuresis change to po  Antibiotics per ID  Sedation with Precedex  GI prophylaxis  Due to the immediate potential for life-threatening deterioration due to above , I spent 35 minutes providing critical care.  This time is excluding time spent performing procedures.  This note was transcribed using Dragon Dictation software. Please disregard any translational errors.      Overnight:  Remains on the vent  Diuresing     REVIEW OF SYSTEMS:  Review of Systems -   Unable to obtain sedated on mechanical ventilation        Physical Exam:    Vitals: BP (!) 122/43   Pulse 97   Temp 98.5 °F (36.9 °C) (Oral)   Resp (!) 37   Ht 1.727 m (5' 7.99\")   Wt 91.8 kg (202 lb 6.1 oz)   SpO2 100%   BMI 30.78 kg/m²     Last Body weight:   Wt Readings from Last 3 Encounters:   12/31/23 91.8 kg (202 lb 6.1 oz)   11/30/22 85.2 kg (187 lb 13.3 oz)   10/06/21 83.7 kg (184 lb 8.4 oz)       Body Mass Index : Body mass index is 30.78 kg/m².      Intake and Output summary:   Intake/Output Summary (Last 24 hours) at 12/31/2023 1405  Last data filed at 12/31/2023 1214  Gross per 24 hour   Intake 2413.74 ml   Output 2310 ml   Net 103.74 ml       Physical Examination:     Gen:  No acute distress sedated on MV   Eyes: PERRL. Anicteric sclera. No conjunctival injection.   ENT: No

## 2023-12-31 NOTE — PROGRESS NOTES
Pt extubated to 6L NC with Jeannette SMITH and this RN at bedside. Mother at bedside. VSS. Call light within reach.

## 2023-12-31 NOTE — PLAN OF CARE
Problem: Discharge Planning  Goal: Discharge to home or other facility with appropriate resources  Outcome: Progressing  Flowsheets (Taken 12/31/2023 0730)  Discharge to home or other facility with appropriate resources:   Identify barriers to discharge with patient and caregiver   Arrange for needed discharge resources and transportation as appropriate   Identify discharge learning needs (meds, wound care, etc)     Problem: Neurosensory - Adult  Goal: Achieves stable or improved neurological status  Outcome: Progressing  Flowsheets (Taken 12/31/2023 0730)  Achieves stable or improved neurological status:   Assess for and report changes in neurological status   Initiate measures to prevent increased intracranial pressure   Maintain blood pressure and fluid volume within ordered parameters to optimize cerebral perfusion and minimize risk of hemorrhage  Goal: Absence of seizures  Outcome: Progressing  Flowsheets (Taken 12/31/2023 0730)  Absence of seizures:   Monitor for seizure activity.  If seizure occurs, document type and location of movements and any associated apnea   If seizure occurs, turn head to side and suction secretions as needed  Goal: Remains free of injury related to seizures activity  Outcome: Progressing  Flowsheets (Taken 12/31/2023 0730)  Remains free of injury related to seizure activity:   Maintain airway, patient safety  and administer oxygen as ordered   Monitor patient for seizure activity, document and report duration and description of seizure to Licensed Independent Practitioner   If seizure occurs, turn patient to side and suction secretions as needed  Goal: Achieves maximal functionality and self care  Outcome: Progressing  Flowsheets (Taken 12/31/2023 0730)  Achieves maximal functionality and self care:   Monitor swallowing and airway patency with patient fatigue and changes in neurological status   Encourage and assist patient to increase activity and self care with guidance from

## 2023-12-31 NOTE — PROGRESS NOTES
Cardiology Progress Note     Admit Date: 2023     Reason for follow up: Biventricular failure    HPI: 62 y.o. female with PMH cerebral palsy/contractures, dysphagia w PEG tube who resides in [facility] and admitted to [facility] with acute hypoxic respiratory failure/COVID PNA. Echo showed sever biventricular failure - started on dobutamine for cardiogenic shock. Levo for hypotension. Also treated for septic shock likely from PNA. Diuresing with IV lasix infusion.    Interval History: Patient seen and examined. Clinical notes reviewed. Telemetry reviewed. No new complaint today. No major events overnight. Denies having angina, shortness of breath, dyspnea on exertion, Orthopnea, PND at the time of this visit.    Review of Systems - patient intubated    Vitals:    23 1830   BP: (!) 112/55   Pulse: 66   Resp: 21   Temp:    SpO2: 99%        Intake/Output Summary (Last 24 hours) at 2023 1941  Last data filed at 2023 1841  Gross per 24 hour   Intake 2042.16 ml   Output 3955 ml   Net -1912.84 ml     In: 2241.9 [I.V.:426.8; NG/GT:1341]  Out: 3955    Wt Readings from Last 3 Encounters:   23 91.1 kg (200 lb 13.4 oz)   22 85.2 kg (187 lb 13.3 oz)   10/06/21 83.7 kg (184 lb 8.4 oz)     Temp  Av.4 °F (37.4 °C)  Min: 98.3 °F (36.8 °C)  Max: 99.9 °F (37.7 °C)  Pulse  Av.5  Min: 62  Max: 92  BP  Min: 73/43  Max: 171/153  SpO2  Av.4 %  Min: 83 %  Max: 100 %  FiO2   Av %  Min: 35 %  Max: 35 %  Telemetry: Sinus rhythm with v-rates 70-80's bpm    Physical Exam  Vitals reviewed.   Constitutional:       General: She is not in acute distress.     Appearance: Normal appearance. She is not ill-appearing.   HENT:      Head: Normocephalic and atraumatic.      Nose: Nose normal.      Mouth/Throat:      Mouth: Mucous membranes are moist.   Eyes:      Conjunctiva/sclera: Conjunctivae normal.      Pupils: Pupils are equal, round, and reactive to light.   Cardiovascular:      Rate and Rhythm: Normal      Lab Results   Component Value Date/Time    TRIG 217 05/11/2015 05:30 AM       Scheduled Meds:   clonazePAM  2 mg Oral BID    pregabalin  200 mg Oral TID    aspirin  81 mg Oral Daily    meropenem  1,000 mg IntraVENous Q8H    enoxaparin  1 mg/kg SubCUTAneous BID    chlorhexidine  15 mL Mouth/Throat BID    lidocaine 1 % injection  5 mL IntraDERmal Once    sodium chloride flush  5-40 mL IntraVENous 2 times per day    insulin lispro  0-8 Units SubCUTAneous Q4H    pantoprazole (PROTONIX) 40 mg in sodium chloride (PF) 0.9 % 10 mL injection  40 mg IntraVENous Daily    amitriptyline  50 mg Oral Nightly    dantrolene  200 mg Oral BID    levothyroxine  50 mcg Oral Daily    [Held by provider] metoprolol tartrate  25 mg Oral BID    polyethylene glycol  17 g Oral BID    sertraline  150 mg Oral Daily    sodium chloride flush  5-40 mL IntraVENous 2 times per day     Continuous Infusions:   furosemide (LASIX) 100 mg in sodium chloride 0.9 % 100 mL infusion 5 mg/hr (12/30/23 1841)    norepinephrine 4 mcg/min (12/30/23 1841)    sodium chloride      dexmedeTOMIDine 0.4 mcg/kg/hr (12/30/23 1841)    dextrose      sodium chloride      sodium chloride Stopped (12/20/23 0527)     PRN Meds:perflutren lipid microspheres, sodium chloride, potassium chloride, glucose, dextrose bolus **OR** dextrose bolus, glucagon (rDNA), dextrose, sodium chloride flush, sodium chloride, albuterol, ipratropium, sodium chloride, magnesium sulfate, ondansetron **OR** ondansetron, polyethylene glycol, acetaminophen **OR** acetaminophen     Patient Active Problem List    Diagnosis Date Noted    Cholangitis 11/27/2022    Acute on chronic systolic heart failure (HCC) 12/30/2023    Septic shock (HCC) 12/24/2023    NSTEMI (non-ST elevated myocardial infarction) (HCC) 12/20/2023    Pneumonia due to severe acute respiratory syndrome coronavirus 2 (SARS-CoV-2) 12/20/2023    High fever 12/20/2023    Neutrophilia 12/20/2023    Endotracheally intubated 12/20/2023

## 2023-12-31 NOTE — PROGRESS NOTES
Cardiology Progress Note     Admit Date: 12/16/2023     Reason for follow up: Biventricular failure    HPI: 62 y.o. female with PMH cerebral palsy/contractures, dysphagia w PEG tube who [place] [facility] [place] [facility] with acute hypoxic respiratory failure/COVID PNA. Echo showed sever biventricular failure - started on dobutamine for cardiogenic shock. Levo for hypotension. Also treated for septic shock likely from PNA. Diuresing with IV lasix infusion.    Interval History: Patient seen and examined. Clinical notes reviewed. Telemetry reviewed. No new complaint today. No major events overnight. Denies having angina, shortness of breath, dyspnea on exertion, Orthopnea, PND at the time of this visit.    Review of Systems   Constitutional:  Negative for chills, fatigue, fever and unexpected weight change.   HENT:  Negative for congestion, hearing loss, sinus pressure, sore throat and trouble swallowing.    Respiratory:  Negative for cough, shortness of breath and wheezing.    Cardiovascular:  Negative for chest pain, palpitations and leg swelling.   Gastrointestinal:  Negative for abdominal pain, blood in stool, constipation, diarrhea, nausea and vomiting.   Genitourinary:  Negative for hematuria.   Musculoskeletal:  Negative for arthralgias, back pain, gait problem and myalgias.   Skin:  Negative for color change, rash and wound.   Neurological:  Negative for dizziness, seizures, syncope, speech difficulty, weakness and light-headedness.   Hematological:  Does not bruise/bleed easily.   All other systems reviewed and are negative.      Vitals:    12/31/23 1114   BP:    Pulse: 86   Resp: (!) 39   Temp:    SpO2: 100%        Intake/Output Summary (Last 24 hours) at 12/31/2023 1145  Last data filed at 12/31/2023 1051  Gross per 24 hour   Intake 2413.74 ml   Output 2735 ml   Net -321.26 ml     In: 2812 [I.V.:587.6; NG/GT:1542]  Out: 3835    Wt Readings from Last 3 Encounters:   12/31/23 91.8 kg (202 lb 6.1 oz)

## 2024-01-01 LAB
ANION GAP SERPL CALCULATED.3IONS-SCNC: 7 MMOL/L (ref 3–16)
BACTERIA URNS QL MICRO: ABNORMAL /HPF
BASE EXCESS BLDV CALC-SCNC: 11.1 MMOL/L
BASOPHILS # BLD: 0.1 K/UL (ref 0–0.2)
BASOPHILS NFR BLD: 1 %
BILIRUB UR QL STRIP.AUTO: ABNORMAL
BUN SERPL-MCNC: 20 MG/DL (ref 7–20)
CALCIUM SERPL-MCNC: 8.7 MG/DL (ref 8.3–10.6)
CHLORIDE SERPL-SCNC: 98 MMOL/L (ref 99–110)
CLARITY UR: CLEAR
CO2 BLDV-SCNC: 40 MMOL/L
CO2 SERPL-SCNC: 37 MMOL/L (ref 21–32)
COHGB MFR BLDV: 2.2 %
COLOR UR: ABNORMAL
CREAT SERPL-MCNC: <0.5 MG/DL (ref 0.6–1.2)
DEPRECATED RDW RBC AUTO: 17.7 % (ref 12.4–15.4)
EOSINOPHIL # BLD: 0.2 K/UL (ref 0–0.6)
EOSINOPHIL NFR BLD: 1.5 %
EPI CELLS #/AREA URNS AUTO: 8 /HPF (ref 0–5)
EPITHELIALS (RENAL), 013149: PRESENT
GFR SERPLBLD CREATININE-BSD FMLA CKD-EPI: >60 ML/MIN/{1.73_M2}
GLUCOSE BLD-MCNC: 148 MG/DL (ref 70–99)
GLUCOSE BLD-MCNC: 154 MG/DL (ref 70–99)
GLUCOSE BLD-MCNC: 157 MG/DL (ref 70–99)
GLUCOSE BLD-MCNC: 162 MG/DL (ref 70–99)
GLUCOSE BLD-MCNC: 163 MG/DL (ref 70–99)
GLUCOSE BLD-MCNC: 168 MG/DL (ref 70–99)
GLUCOSE BLD-MCNC: 186 MG/DL (ref 70–99)
GLUCOSE SERPL-MCNC: 187 MG/DL (ref 70–99)
GLUCOSE UR STRIP.AUTO-MCNC: NEGATIVE MG/DL
HCO3 BLDV-SCNC: 38 MMOL/L (ref 23–29)
HCT VFR BLD AUTO: 27.3 % (ref 36–48)
HGB BLD-MCNC: 8.9 G/DL (ref 12–16)
HGB UR QL STRIP.AUTO: NEGATIVE
HYALINE CASTS #/AREA URNS AUTO: 2 /LPF (ref 0–8)
KETONES UR STRIP.AUTO-MCNC: NEGATIVE MG/DL
LACTATE BLDV-SCNC: 1 MMOL/L (ref 0.4–2)
LEUKOCYTE ESTERASE UR QL STRIP.AUTO: ABNORMAL
LYMPHOCYTES # BLD: 1.8 K/UL (ref 1–5.1)
LYMPHOCYTES NFR BLD: 13 %
MAGNESIUM SERPL-MCNC: 2.6 MG/DL (ref 1.8–2.4)
MCH RBC QN AUTO: 32.9 PG (ref 26–34)
MCHC RBC AUTO-ENTMCNC: 32.6 G/DL (ref 31–36)
MCV RBC AUTO: 100.8 FL (ref 80–100)
METHGB MFR BLDV: 0.5 %
MONOCYTES # BLD: 0.8 K/UL (ref 0–1.3)
MONOCYTES NFR BLD: 5.8 %
NEUTROPHILS # BLD: 11 K/UL (ref 1.7–7.7)
NEUTROPHILS NFR BLD: 78.7 %
NITRITE UR QL STRIP.AUTO: NEGATIVE
O2 THERAPY: ABNORMAL
PCO2 BLDV: 64.3 MMHG (ref 40–50)
PERFORMED ON: ABNORMAL
PH BLDV: 7.38 [PH] (ref 7.35–7.45)
PH UR STRIP.AUTO: 5.5 [PH] (ref 5–8)
PLATELET # BLD AUTO: 122 K/UL (ref 135–450)
PMV BLD AUTO: 10.8 FL (ref 5–10.5)
PO2 BLDV: 52 MMHG
POTASSIUM SERPL-SCNC: 3.4 MMOL/L (ref 3.5–5.1)
PROCALCITONIN SERPL IA-MCNC: 0.66 NG/ML (ref 0–0.15)
PROT UR STRIP.AUTO-MCNC: ABNORMAL MG/DL
RBC # BLD AUTO: 2.71 M/UL (ref 4–5.2)
RBC CLUMPS #/AREA URNS AUTO: 5 /HPF (ref 0–4)
RENAL EPI CELLS #/AREA UR COMP ASSIST: ABNORMAL /HPF (ref 0–1)
SAO2 % BLDV: 85 %
SODIUM SERPL-SCNC: 142 MMOL/L (ref 136–145)
SP GR UR STRIP.AUTO: 1.02 (ref 1–1.03)
UA COMPLETE W REFLEX CULTURE PNL UR: YES
UA DIPSTICK W REFLEX MICRO PNL UR: YES
URN SPEC COLLECT METH UR: ABNORMAL
UROBILINOGEN UR STRIP-ACNC: 2 E.U./DL
WBC # BLD AUTO: 14 K/UL (ref 4–11)
WBC #/AREA URNS AUTO: 11 /HPF (ref 0–5)

## 2024-01-01 PROCEDURE — 6370000000 HC RX 637 (ALT 250 FOR IP): Performed by: REGISTERED NURSE

## 2024-01-01 PROCEDURE — 6370000000 HC RX 637 (ALT 250 FOR IP): Performed by: INTERNAL MEDICINE

## 2024-01-01 PROCEDURE — 92610 EVALUATE SWALLOWING FUNCTION: CPT

## 2024-01-01 PROCEDURE — 2580000003 HC RX 258: Performed by: INTERNAL MEDICINE

## 2024-01-01 PROCEDURE — 6370000000 HC RX 637 (ALT 250 FOR IP): Performed by: HOSPITALIST

## 2024-01-01 PROCEDURE — 85025 COMPLETE CBC W/AUTO DIFF WBC: CPT

## 2024-01-01 PROCEDURE — 6360000002 HC RX W HCPCS: Performed by: INTERNAL MEDICINE

## 2024-01-01 PROCEDURE — 99233 SBSQ HOSP IP/OBS HIGH 50: CPT | Performed by: INTERNAL MEDICINE

## 2024-01-01 PROCEDURE — C9113 INJ PANTOPRAZOLE SODIUM, VIA: HCPCS | Performed by: INTERNAL MEDICINE

## 2024-01-01 PROCEDURE — 99232 SBSQ HOSP IP/OBS MODERATE 35: CPT | Performed by: INTERNAL MEDICINE

## 2024-01-01 PROCEDURE — 6360000002 HC RX W HCPCS: Performed by: STUDENT IN AN ORGANIZED HEALTH CARE EDUCATION/TRAINING PROGRAM

## 2024-01-01 PROCEDURE — 6370000000 HC RX 637 (ALT 250 FOR IP): Performed by: NURSE PRACTITIONER

## 2024-01-01 PROCEDURE — 82803 BLOOD GASES ANY COMBINATION: CPT

## 2024-01-01 PROCEDURE — 83735 ASSAY OF MAGNESIUM: CPT

## 2024-01-01 PROCEDURE — 83605 ASSAY OF LACTIC ACID: CPT

## 2024-01-01 PROCEDURE — 84145 PROCALCITONIN (PCT): CPT

## 2024-01-01 PROCEDURE — 80048 BASIC METABOLIC PNL TOTAL CA: CPT

## 2024-01-01 PROCEDURE — 87086 URINE CULTURE/COLONY COUNT: CPT

## 2024-01-01 PROCEDURE — 81001 URINALYSIS AUTO W/SCOPE: CPT

## 2024-01-01 PROCEDURE — 51702 INSERT TEMP BLADDER CATH: CPT

## 2024-01-01 PROCEDURE — 2060000000 HC ICU INTERMEDIATE R&B

## 2024-01-01 RX ORDER — ACETAMINOPHEN 160 MG/5ML
1000 LIQUID ORAL ONCE
Status: COMPLETED | OUTPATIENT
Start: 2024-01-01 | End: 2024-01-01

## 2024-01-01 RX ORDER — FUROSEMIDE 10 MG/ML
60 INJECTION INTRAMUSCULAR; INTRAVENOUS 2 TIMES DAILY
Status: DISCONTINUED | OUTPATIENT
Start: 2024-01-01 | End: 2024-01-02

## 2024-01-01 RX ADMIN — MEROPENEM 1000 MG: 1 INJECTION, POWDER, FOR SOLUTION INTRAVENOUS at 23:12

## 2024-01-01 RX ADMIN — ENOXAPARIN SODIUM 100 MG: 100 INJECTION SUBCUTANEOUS at 08:50

## 2024-01-01 RX ADMIN — METOPROLOL TARTRATE 25 MG: 25 TABLET, FILM COATED ORAL at 23:08

## 2024-01-01 RX ADMIN — POLYETHYLENE GLYCOL 3350 17 G: 17 POWDER, FOR SOLUTION ORAL at 23:07

## 2024-01-01 RX ADMIN — POTASSIUM CHLORIDE 20 MEQ: 29.8 INJECTION, SOLUTION INTRAVENOUS at 08:58

## 2024-01-01 RX ADMIN — PREGABALIN 200 MG: 100 CAPSULE ORAL at 17:47

## 2024-01-01 RX ADMIN — DANTROLENE SODIUM 200 MG: 25 CAPSULE ORAL at 08:38

## 2024-01-01 RX ADMIN — ACETAMINOPHEN 1000 MG: 650 SOLUTION ORAL at 01:41

## 2024-01-01 RX ADMIN — ASPIRIN 81 MG: 81 TABLET, CHEWABLE ORAL at 08:38

## 2024-01-01 RX ADMIN — FUROSEMIDE 60 MG: 10 INJECTION, SOLUTION INTRAMUSCULAR; INTRAVENOUS at 18:59

## 2024-01-01 RX ADMIN — CLONAZEPAM 2 MG: 1 TABLET ORAL at 23:07

## 2024-01-01 RX ADMIN — PREGABALIN 200 MG: 100 CAPSULE ORAL at 23:07

## 2024-01-01 RX ADMIN — CLONAZEPAM 2 MG: 1 TABLET ORAL at 08:38

## 2024-01-01 RX ADMIN — PREGABALIN 200 MG: 100 CAPSULE ORAL at 08:38

## 2024-01-01 RX ADMIN — FUROSEMIDE 60 MG: 10 INJECTION, SOLUTION INTRAMUSCULAR; INTRAVENOUS at 10:25

## 2024-01-01 RX ADMIN — SERTRALINE 150 MG: 100 TABLET, FILM COATED ORAL at 08:38

## 2024-01-01 RX ADMIN — POTASSIUM CHLORIDE 20 MEQ: 29.8 INJECTION, SOLUTION INTRAVENOUS at 06:09

## 2024-01-01 RX ADMIN — AMITRIPTYLINE HYDROCHLORIDE 50 MG: 50 TABLET, FILM COATED ORAL at 23:07

## 2024-01-01 RX ADMIN — SODIUM CHLORIDE, PRESERVATIVE FREE 40 MG: 5 INJECTION INTRAVENOUS at 08:50

## 2024-01-01 RX ADMIN — METOPROLOL TARTRATE 25 MG: 25 TABLET, FILM COATED ORAL at 10:21

## 2024-01-01 RX ADMIN — MEROPENEM 1000 MG: 1 INJECTION, POWDER, FOR SOLUTION INTRAVENOUS at 15:35

## 2024-01-01 RX ADMIN — SODIUM CHLORIDE, PRESERVATIVE FREE 10 ML: 5 INJECTION INTRAVENOUS at 23:07

## 2024-01-01 RX ADMIN — LEVOTHYROXINE SODIUM 50 MCG: 0.05 TABLET ORAL at 08:38

## 2024-01-01 RX ADMIN — SODIUM CHLORIDE, PRESERVATIVE FREE 10 ML: 5 INJECTION INTRAVENOUS at 08:50

## 2024-01-01 RX ADMIN — DANTROLENE SODIUM 200 MG: 25 CAPSULE ORAL at 23:30

## 2024-01-01 RX ADMIN — MEROPENEM 1000 MG: 1 INJECTION, POWDER, FOR SOLUTION INTRAVENOUS at 06:15

## 2024-01-01 RX ADMIN — ENOXAPARIN SODIUM 100 MG: 100 INJECTION SUBCUTANEOUS at 23:08

## 2024-01-01 ASSESSMENT — ENCOUNTER SYMPTOMS
COUGH: 0
BLOOD IN STOOL: 0
DIARRHEA: 0
BACK PAIN: 0
SHORTNESS OF BREATH: 0
TROUBLE SWALLOWING: 0
WHEEZING: 0
VOMITING: 0
SORE THROAT: 0
COLOR CHANGE: 0
NAUSEA: 0
CONSTIPATION: 0
ABDOMINAL PAIN: 0
SINUS PRESSURE: 0

## 2024-01-01 ASSESSMENT — PAIN SCALES - GENERAL
PAINLEVEL_OUTOF10: 0

## 2024-01-01 NOTE — PROGRESS NOTES
responsive []nonverbal []limited verbal responses  [x]follows one step commands []does not follow dx []follows complex commands  []aphasic []dysarthric  [x] other: aphonic    Dentition: [x]Adequate []Dentures []Missing Many Teeth []Edentulous []Other:  Vocal Quality: []Normal []Dysphonic  [x]Aphonic  []Hoarse []Wet []Weak []Other: strained at times  Volitional Cough: []Strong [x]Weak []Wet []Absent []Congested [] PM []Other:  Volitional Swallow:   []Absent  [x]Delayed []Adequate []Required use of drink [] MP []Other:    Patient Positioning: Upright in bed     Consistencies Presented:   Ice chips x 2  1/4 tsp pudding thick liquid x 2    Oral Mechanism Exam:  []WFL []Mild   [x] Moderate  []Severe  []To be assessed  Impaired:   []Left side      []Right side    [x]Labial ROM/Coordination    []Labial Symmetry   [x]Lingual ROM/Coordination   []Lingual Symmetry  []Gag  []Other:     Oral Phase: []WFL []Mild   [x] Moderate  []Severe  []To be assessed   [x]Impaired/Prolonged Mastication: suspected   []Spillage Left:   []Spillage Right:  []Pocketing Left:   []Pocketing Right:   [x]Decreased Anterior to Posterior Transit: all   [x]Suspected Premature Bolus Loss: all   []Lingual/Palatal Residue:   [x]Other: disorganized oral prep    Pharyngeal Phase: []WFL []Mild   [] Moderate  [x]Severe  []To be further assessed   [x]Delayed Swallow: all   [x]Suspected Pharyngeal Pooling: all   [x]Decreased Laryngeal Elevation: all   []Absent Swallow:  []Wet Vocal Quality:   []Throat Clearing-Immediate:   []Throat Clearing-Delayed:   []Cough-Immediate:   []Cough-Delayed:  []Change in Vital Signs:  []Suspected Delayed Pharyngeal Clearing:  [x]Other: concern for silent aspiration risk    EDUCATION:   Provided education regarding role of SLP, results of assessment, recommendations and general speech pathology plan of care.   [] Pt verbalized understanding and agreement   [x] Pt requires ongoing learning   [] No evidence of comprehension     If

## 2024-01-01 NOTE — PROGRESS NOTES
Report given to receiving RN. All questions answered. Belongings collected and waiting for transport.

## 2024-01-01 NOTE — PROGRESS NOTES
Patients temperature sensing abernathy was reading 101.1. Perfect serve message sent to Belgica Tate NP, d/t the PRN order of tylenol not being due until 0300. See MAR for new orders.

## 2024-01-01 NOTE — PROGRESS NOTES
V2.0    [facility] Progress Note      Name:  Lilly Terrell /Age/Sex: 1960  (63 y.o. female)   MRN & CSN:  [id number] & [id number] Encounter Date/Time: 2024 4:46 PM EST   Location:  [id number] PCP: Tyson Hahn     Attending:Joel Garcia MD       Hospital Day: 17    Assessment and Recommendations   Lilly Terrell is a 63 y.o. female with pmh of IBS, cerebral palsy who presents with septic shock secondary to COVID pneumonia, cardiogenic shock on lasix infusion    Plan:   Septic shock: In the setting of COVID and aspiration pneumonia.  Has been on meropenem since  and continues to have some low-grade fevers.  Blood cultures with no growth, urinary antigens negative.  Further recommendations per infectious disease and pulmonology/critical care following.  Cardiogenic shock: Likely Takotsubo cardiomyopathy had a previous echo showing EF of 20% with repeat improved to 50% on .  Cardiology following with recommendations to continue Lasix IV 60 mg BID and no longer on pressors. Continue BB.  Follow-up with cardiology as outpatient to evaluate for ACE/ARB, Aldactone, SLG 2  Acute respiratory failure with hypoxia: Secondary to above issues and extubated 2023 per pulmonology/critical care.  Weaning off Precedex drip.  Meropenem IV day 12.  Will continue monitor labs.  Hypokalemia: Mild at 3.41/1 and supplementing per electrolyte protocol.      Diet ADULT TUBE FEEDING; PEG; Peptide Based; Continuous; 55; No; 150; Q 4 hours; Protein; once per day   DVT Prophylaxis [x] Lovenox (therapeutic dosing since  and will clarify), []  Heparin, [] SCDs, [] Ambulation,  [] Eliquis, [] Xarelto  [] Coumadin   Code Status Full Code   Disposition From: home  Expected Disposition: TBD  Estimated Date of Discharge: 2-3 days, ~1/4  Patient requires continued admission due to IV diuresis, respiratory failure   Surrogate Decision Maker/ phyllis Soares     Personally reviewed Lab Studies and

## 2024-01-01 NOTE — PROGRESS NOTES
Pulmonary Critical Care Progress Note     Patient's name:  Lilly Terrell  Medical Record Number: [id number]  Patient's account/billing number: [id number]  Patient's YOB: 1960  Age: 63 y.o.  Date of Admission: 12/16/2023  3:09 PM  Date of Consult: 1/1/2024      Primary Care Physician: Tyson Hahn      Code Status: Full Code    Chief complaint: Acute respiratory failure and hypoxia    Assessment and Plan     Acute respiratory with hypoxia less than 90% on room air extubated 12/31  Acute on chronic CHF  Aspiration PNA  Covid 19 PNA  Septic shock  Elevated troponins  Cerebral palsy   Dysphagia s/p PEG     Plan:  Wean oxygen as tolerated keep sats more than 90%  Diuresis   Antibiotics per ID  Sedation with Precedex  GI prophylaxis  Will use BiPAP nightly      Overnight:  Extubated yesterday so far doing well  Afebrile    REVIEW OF SYSTEMS:  Review of Systems -   Denied chest pain  Slightly tachycardic in the  No nausea vomiting diarrhea    Physical Exam:    Vitals: /66   Pulse 95   Temp 100.2 °F (37.9 °C) (Bladder)   Resp (!) 32   Ht 1.727 m (5' 7.99\")   Wt 90.8 kg (200 lb 2.8 oz)   SpO2 97%   BMI 30.44 kg/m²     Last Body weight:   Wt Readings from Last 3 Encounters:   01/01/24 90.8 kg (200 lb 2.8 oz)   11/30/22 85.2 kg (187 lb 13.3 oz)   10/06/21 83.7 kg (184 lb 8.4 oz)       Body Mass Index : Body mass index is 30.44 kg/m².      Intake and Output summary:   Intake/Output Summary (Last 24 hours) at 1/1/2024 1514  Last data filed at 1/1/2024 1423  Gross per 24 hour   Intake 1383.61 ml   Output 1470 ml   Net -86.39 ml       Physical Examination:     Gen:  No acute distress sedated on MV   Eyes: PERRL. Anicteric sclera. No conjunctival injection.   ENT: No discharge. Posterior oropharynx clear. External appearance of ears and nose normal.  Neck: Trachea midline. No mass   Resp: Diminished bilaterally no active wheezing  CV: Regular rate. Regular rhythm. No murmur or rub. +++

## 2024-01-02 LAB
ALBUMIN SERPL-MCNC: 3.1 G/DL (ref 3.4–5)
ALBUMIN/GLOB SERPL: 1.3 {RATIO} (ref 1.1–2.2)
ALP SERPL-CCNC: 357 U/L (ref 40–129)
ALT SERPL-CCNC: 67 U/L (ref 10–40)
ANION GAP SERPL CALCULATED.3IONS-SCNC: 5 MMOL/L (ref 3–16)
AST SERPL-CCNC: 54 U/L (ref 15–37)
BACTERIA UR CULT: NORMAL
BASOPHILS # BLD: 0.1 K/UL (ref 0–0.2)
BASOPHILS NFR BLD: 0.4 %
BILIRUB SERPL-MCNC: 0.8 MG/DL (ref 0–1)
BUN SERPL-MCNC: 19 MG/DL (ref 7–20)
CALCIUM SERPL-MCNC: 8.6 MG/DL (ref 8.3–10.6)
CHLORIDE SERPL-SCNC: 98 MMOL/L (ref 99–110)
CO2 SERPL-SCNC: 39 MMOL/L (ref 21–32)
CREAT SERPL-MCNC: <0.5 MG/DL (ref 0.6–1.2)
DEPRECATED RDW RBC AUTO: 18.4 % (ref 12.4–15.4)
EOSINOPHIL # BLD: 0.2 K/UL (ref 0–0.6)
EOSINOPHIL NFR BLD: 1.1 %
GFR SERPLBLD CREATININE-BSD FMLA CKD-EPI: >60 ML/MIN/{1.73_M2}
GLUCOSE BLD-MCNC: 166 MG/DL (ref 70–99)
GLUCOSE BLD-MCNC: 167 MG/DL (ref 70–99)
GLUCOSE BLD-MCNC: 171 MG/DL (ref 70–99)
GLUCOSE BLD-MCNC: 175 MG/DL (ref 70–99)
GLUCOSE BLD-MCNC: 177 MG/DL (ref 70–99)
GLUCOSE SERPL-MCNC: 154 MG/DL (ref 70–99)
HCT VFR BLD AUTO: 26 % (ref 36–48)
HGB BLD-MCNC: 8.4 G/DL (ref 12–16)
LYMPHOCYTES # BLD: 2.6 K/UL (ref 1–5.1)
LYMPHOCYTES NFR BLD: 17.7 %
MCH RBC QN AUTO: 33.2 PG (ref 26–34)
MCHC RBC AUTO-ENTMCNC: 32.4 G/DL (ref 31–36)
MCV RBC AUTO: 102.3 FL (ref 80–100)
MONOCYTES # BLD: 0.9 K/UL (ref 0–1.3)
MONOCYTES NFR BLD: 6.4 %
NEUTROPHILS # BLD: 11 K/UL (ref 1.7–7.7)
NEUTROPHILS NFR BLD: 74.4 %
PERFORMED ON: ABNORMAL
PLATELET # BLD AUTO: 125 K/UL (ref 135–450)
PMV BLD AUTO: 11.6 FL (ref 5–10.5)
POTASSIUM SERPL-SCNC: 3.7 MMOL/L (ref 3.5–5.1)
PROT SERPL-MCNC: 5.5 G/DL (ref 6.4–8.2)
RBC # BLD AUTO: 2.54 M/UL (ref 4–5.2)
SODIUM SERPL-SCNC: 142 MMOL/L (ref 136–145)
WBC # BLD AUTO: 14.8 K/UL (ref 4–11)

## 2024-01-02 PROCEDURE — 94660 CPAP INITIATION&MGMT: CPT

## 2024-01-02 PROCEDURE — 99232 SBSQ HOSP IP/OBS MODERATE 35: CPT | Performed by: INTERNAL MEDICINE

## 2024-01-02 PROCEDURE — 6360000002 HC RX W HCPCS: Performed by: INTERNAL MEDICINE

## 2024-01-02 PROCEDURE — 2580000003 HC RX 258: Performed by: INTERNAL MEDICINE

## 2024-01-02 PROCEDURE — 2060000000 HC ICU INTERMEDIATE R&B

## 2024-01-02 PROCEDURE — 2700000000 HC OXYGEN THERAPY PER DAY

## 2024-01-02 PROCEDURE — 6370000000 HC RX 637 (ALT 250 FOR IP): Performed by: HOSPITALIST

## 2024-01-02 PROCEDURE — 85025 COMPLETE CBC W/AUTO DIFF WBC: CPT

## 2024-01-02 PROCEDURE — 92526 ORAL FUNCTION THERAPY: CPT

## 2024-01-02 PROCEDURE — 80053 COMPREHEN METABOLIC PANEL: CPT

## 2024-01-02 PROCEDURE — 94761 N-INVAS EAR/PLS OXIMETRY MLT: CPT

## 2024-01-02 PROCEDURE — 99233 SBSQ HOSP IP/OBS HIGH 50: CPT | Performed by: STUDENT IN AN ORGANIZED HEALTH CARE EDUCATION/TRAINING PROGRAM

## 2024-01-02 PROCEDURE — 6370000000 HC RX 637 (ALT 250 FOR IP): Performed by: INTERNAL MEDICINE

## 2024-01-02 PROCEDURE — C9113 INJ PANTOPRAZOLE SODIUM, VIA: HCPCS | Performed by: INTERNAL MEDICINE

## 2024-01-02 PROCEDURE — 6370000000 HC RX 637 (ALT 250 FOR IP): Performed by: NURSE PRACTITIONER

## 2024-01-02 RX ORDER — FUROSEMIDE 10 MG/ML
60 INJECTION INTRAMUSCULAR; INTRAVENOUS DAILY
Status: DISCONTINUED | OUTPATIENT
Start: 2024-01-02 | End: 2024-01-03

## 2024-01-02 RX ADMIN — MEROPENEM 1000 MG: 1 INJECTION, POWDER, FOR SOLUTION INTRAVENOUS at 21:34

## 2024-01-02 RX ADMIN — ACETAMINOPHEN 650 MG: 325 TABLET ORAL at 18:07

## 2024-01-02 RX ADMIN — CLONAZEPAM 2 MG: 1 TABLET ORAL at 10:29

## 2024-01-02 RX ADMIN — PREGABALIN 200 MG: 100 CAPSULE ORAL at 21:05

## 2024-01-02 RX ADMIN — POLYETHYLENE GLYCOL 3350 17 G: 17 POWDER, FOR SOLUTION ORAL at 10:30

## 2024-01-02 RX ADMIN — METOPROLOL TARTRATE 25 MG: 25 TABLET, FILM COATED ORAL at 21:05

## 2024-01-02 RX ADMIN — SERTRALINE 150 MG: 100 TABLET, FILM COATED ORAL at 10:28

## 2024-01-02 RX ADMIN — ASPIRIN 81 MG: 81 TABLET, CHEWABLE ORAL at 10:29

## 2024-01-02 RX ADMIN — METOPROLOL TARTRATE 25 MG: 25 TABLET, FILM COATED ORAL at 10:29

## 2024-01-02 RX ADMIN — SODIUM CHLORIDE, PRESERVATIVE FREE 40 MG: 5 INJECTION INTRAVENOUS at 10:30

## 2024-01-02 RX ADMIN — DANTROLENE SODIUM 200 MG: 25 CAPSULE ORAL at 21:05

## 2024-01-02 RX ADMIN — PREGABALIN 200 MG: 100 CAPSULE ORAL at 10:29

## 2024-01-02 RX ADMIN — MEROPENEM 1000 MG: 1 INJECTION, POWDER, FOR SOLUTION INTRAVENOUS at 14:41

## 2024-01-02 RX ADMIN — LEVOTHYROXINE SODIUM 50 MCG: 0.05 TABLET ORAL at 05:38

## 2024-01-02 RX ADMIN — ENOXAPARIN SODIUM 100 MG: 100 INJECTION SUBCUTANEOUS at 10:30

## 2024-01-02 RX ADMIN — ENOXAPARIN SODIUM 100 MG: 100 INJECTION SUBCUTANEOUS at 21:05

## 2024-01-02 RX ADMIN — MEROPENEM 1000 MG: 1 INJECTION, POWDER, FOR SOLUTION INTRAVENOUS at 05:37

## 2024-01-02 RX ADMIN — FUROSEMIDE 60 MG: 10 INJECTION, SOLUTION INTRAMUSCULAR; INTRAVENOUS at 10:41

## 2024-01-02 RX ADMIN — CLONAZEPAM 2 MG: 1 TABLET ORAL at 21:05

## 2024-01-02 RX ADMIN — POLYETHYLENE GLYCOL 3350 17 G: 17 POWDER, FOR SOLUTION ORAL at 21:04

## 2024-01-02 RX ADMIN — AMITRIPTYLINE HYDROCHLORIDE 50 MG: 50 TABLET, FILM COATED ORAL at 21:05

## 2024-01-02 RX ADMIN — SODIUM CHLORIDE, PRESERVATIVE FREE 10 ML: 5 INJECTION INTRAVENOUS at 21:06

## 2024-01-02 RX ADMIN — PREGABALIN 200 MG: 100 CAPSULE ORAL at 14:49

## 2024-01-02 RX ADMIN — SODIUM CHLORIDE, PRESERVATIVE FREE 10 ML: 5 INJECTION INTRAVENOUS at 10:29

## 2024-01-02 ASSESSMENT — PAIN DESCRIPTION - DESCRIPTORS: DESCRIPTORS: ACHING

## 2024-01-02 ASSESSMENT — PAIN - FUNCTIONAL ASSESSMENT: PAIN_FUNCTIONAL_ASSESSMENT: ACTIVITIES ARE NOT PREVENTED

## 2024-01-02 ASSESSMENT — PAIN SCALES - GENERAL
PAINLEVEL_OUTOF10: 2
PAINLEVEL_OUTOF10: 0
PAINLEVEL_OUTOF10: 4

## 2024-01-02 ASSESSMENT — ENCOUNTER SYMPTOMS
TROUBLE SWALLOWING: 0
BACK PAIN: 0
CONSTIPATION: 0
COLOR CHANGE: 0
WHEEZING: 0
COUGH: 0
DIARRHEA: 0
NAUSEA: 0
BLOOD IN STOOL: 0
ABDOMINAL PAIN: 0
VOMITING: 0
SHORTNESS OF BREATH: 0
SINUS PRESSURE: 0
SORE THROAT: 0

## 2024-01-02 ASSESSMENT — PAIN DESCRIPTION - LOCATION: LOCATION: LEG

## 2024-01-02 ASSESSMENT — PAIN DESCRIPTION - ORIENTATION: ORIENTATION: RIGHT;LEFT

## 2024-01-02 ASSESSMENT — PAIN SCALES - WONG BAKER: WONGBAKER_NUMERICALRESPONSE: 0

## 2024-01-02 NOTE — PROGRESS NOTES
[facility]   Speech Therapy  Daily Dysphagia Treatment Note    Lilly Terrell  AGE: 63 y.o.   GENDER: female  : 1960  [id number]  EPISODE DATE:  2023    Patient Active Problem List   Diagnosis    Cardiac arrest (HCC)    Acute respiratory failure (HCC)    Encephalopathy    Ventricular fibrillation (HCC)    Hypokalemia    Hypoxic encephalopathy (HCC)    Acidosis    Cardiogenic shock (HCC)    Prolonged QT interval    Fluid overload    Aspiration pneumonia (HCC)    Takotsubo cardiomyopathy    Pulmonary edema    Acute type 1 respiratory failure (HCC)    Acute respiratory failure with hypoxia and hypercapnia (HCC)    Aspiration pneumonitis (HCC)    Choking    Metabolic encephalopathy    Cholangitis    Pneumonia, unspecified organism    NSTEMI (non-ST elevated myocardial infarction) (HCC)    Pneumonia due to severe acute respiratory syndrome coronavirus 2 (SARS-CoV-2)    High fever    Neutrophilia    Endotracheally intubated    Elevated procalcitonin    Cerebral palsy (HCC)    Lactic acid acidosis    Sepsis (HCC)    Septic shock (HCC)    Acute on chronic systolic heart failure (HCC)     Allergies   Allergen Reactions    Penicillins      Arm swelled. It has been years ago       Treatment Diagnosis: Dysphagia     CHART REVIEW:  2023 admitted with c/o АНДРЕЙ  MD ADMISSION H&P HPI:  Lilly Terrell is a 62 y.o. female with pmh of  cerebral palsy  who was sent from [facility] with c/o AMS  Patient has h/o chronic respiratory failure and at baseline use 4 L oxygen via NC   Patient recently had covid 19 infection  No h/o chest pain or GI bleed or diarrhea or seizure     2023 rapid response  2023-2023 INTUBATED  2024 swallow re-eval: continue NPO     IMAGING:  Initial CXR: 2023  IMPRESSION:  Small right-sided pleural effusions with fluid extending into the minor fissure.  No confluent infiltrate identified.    Most Recent CXR: 2023  IMPRESSION:  Slight

## 2024-01-02 NOTE — CARE COORDINATION
SW completed chart review, nursing rounds completed. Pending ID recs, pt is getting PICC line placed, and PEG tube. On 6L-O2, and getting bipap at night.     Reynold Can LMSW, [facility] Social Work Case Management   Phone: [phone]  Fax: [phone]

## 2024-01-02 NOTE — PROGRESS NOTES
Pulmonary Critical Care Progress Note     Patient's name:  Lilly Terrell  Medical Record Number: [id number]  Patient's account/billing number: [id number]  Patient's YOB: 1960  Age: 63 y.o.  Date of Admission: 12/16/2023  3:09 PM  Date of Consult: 1/2/2024      Primary Care Physician: Tyson Hahn      Code Status: Full Code    Chief complaint: Acute respiratory failure and hypoxia    Assessment and Plan     Acute respiratory with hypoxia less than 90% on room air extubated 12/31  Acute on chronic CHF  Aspiration PNA  Covid 19 PNA  Septic shock  Elevated troponins  Cerebral palsy   Dysphagia s/p PEG     Plan:  Wean oxygen as tolerated keep sats more than 90%  Consider lowering diuresis   Antibiotics per ID  GI prophylaxis  Bipap qhs      Overnight:  No acute events overnight  On bipap     REVIEW OF SYSTEMS:  Review of Systems -   Denied chest pain  Slightly tachycardic in the  No nausea vomiting diarrhea    Physical Exam:    Vitals: BP (!) 118/57   Pulse (!) 105   Temp 99.2 °F (37.3 °C) (Axillary)   Resp 25   Ht 1.727 m (5' 7.99\")   Wt 87.6 kg (193 lb 2 oz)   SpO2 97%   BMI 29.37 kg/m²     Last Body weight:   Wt Readings from Last 3 Encounters:   01/02/24 87.6 kg (193 lb 2 oz)   11/30/22 85.2 kg (187 lb 13.3 oz)   10/06/21 83.7 kg (184 lb 8.4 oz)       Body Mass Index : Body mass index is 29.37 kg/m².      Intake and Output summary:   Intake/Output Summary (Last 24 hours) at 1/2/2024 1208  Last data filed at 1/2/2024 1052  Gross per 24 hour   Intake 2513 ml   Output 1575 ml   Net 938 ml       Physical Examination:     Gen:  No acute distress sedated on MV   Eyes: PERRL. Anicteric sclera. No conjunctival injection.   ENT: No discharge. Posterior oropharynx clear. External appearance of ears and nose normal.  Neck: Trachea midline. No mass   Resp: Diminished bilaterally no active wheezing  CV: Regular rate. Regular rhythm. No murmur or rub. no edema.   GI: Soft, Non-tender.

## 2024-01-02 NOTE — PROGRESS NOTES
Cardiology Progress Note     Admit Date: 12/16/2023     Reason for follow up: Biventricular failure    HPI: 62 y.o. female with PMH cerebral palsy/contractures, dysphagia w PEG tube who resides in [facility] and admitted to [facility] with acute hypoxic respiratory failure/COVID PNA. Echo showed sever biventricular failure - started on dobutamine for cardiogenic shock. Levo for hypotension. Also treated for septic shock likely from PNA. Diuresing with IV lasix infusion.    Interval History: Patient seen and examined. Clinical notes reviewed. Telemetry reviewed. No new complaint today. No major events overnight. Denies having angina, shortness of breath, dyspnea on exertion, Orthopnea, PND at the time of this visit.    Review of Systems   Constitutional:  Negative for chills, fatigue, fever and unexpected weight change.   HENT:  Negative for congestion, hearing loss, sinus pressure, sore throat and trouble swallowing.    Respiratory:  Negative for cough, shortness of breath and wheezing.    Cardiovascular:  Negative for chest pain, palpitations and leg swelling.   Gastrointestinal:  Negative for abdominal pain, blood in stool, constipation, diarrhea, nausea and vomiting.   Genitourinary:  Negative for hematuria.   Musculoskeletal:  Negative for arthralgias, back pain, gait problem and myalgias.   Skin:  Negative for color change, rash and wound.   Neurological:  Negative for dizziness, seizures, syncope, speech difficulty, weakness and light-headedness.   Hematological:  Does not bruise/bleed easily.   All other systems reviewed and are negative.      Vitals:    01/02/24 1634   BP:    Pulse: 93   Resp: 27   Temp:    SpO2: 100%        Intake/Output Summary (Last 24 hours) at 1/2/2024 1825  Last data filed at 1/2/2024 1817  Gross per 24 hour   Intake 3466 ml   Output 1725 ml   Net 1741 ml       In: 3551 [NG/GT:3551]  Out: 2620    Wt Readings from Last 3 Encounters:   01/02/24 87.6 kg (193 lb 2 oz)   11/30/22 85.2 kg (187 lb 13.3

## 2024-01-02 NOTE — PROGRESS NOTES
4 Eyes Skin Assessment     NAME:  Lilly Terrell  YOB: 1960  MEDICAL RECORD NUMBER:  [id number]    The patient is being assessed for  Admission    I agree that at least one RN has performed a thorough Head to Toe Skin Assessment on the patient. ALL assessment sites listed below have been assessed.      Areas assessed by both nurses:    Head, Face, Ears, Shoulders, Back, Chest, Arms, Elbows, Hands, Sacrum. Buttock, Coccyx, Ischium, Legs. Feet and Heels, and Under Medical Devices         Does the Patient have a Wound? No noted wound(s)       Mauricio Prevention initiated by RN: Yes  Wound Care Orders initiated by RN: No    Pressure Injury (Stage 3,4, Unstageable, DTI, NWPT, and Complex wounds) if present, place Wound referral order by RN under : No    New Ostomies, if present place, Ostomy referral order under : Yes     Nurse 1 eSignature: Electronically signed by Spring Saldivar RN on 1/2/24 at 6:08 AM EST    **SHARE this note so that the co-signing nurse can place an eSignature**    Nurse 2 eSignature: Electronically signed by Angie Ku RN on 1/2/24 at 6:09 AM EST

## 2024-01-02 NOTE — PROGRESS NOTES
V2.0    [facility] Progress Note      Name:  Lilly Terrell /Age/Sex: 1960  (63 y.o. female)   MRN & CSN:  [id number] & [id number] Encounter Date/Time: 2024 4:46 PM EST   Location:  [id number] PCP: Tyson Hahn     Attending:Joel Garcia MD       Hospital Day: 18    Assessment and Recommendations   Lilly Terrell is a 63 y.o. female with pmh of IBS, cerebral palsy who presents with septic shock secondary to COVID pneumonia, cardiogenic shock on lasix infusion    Plan:   Septic shock: In the setting of COVID and aspiration pneumonia.  Has been on meropenem since  and was having low grade fevers but none as of  thus far.  Blood cultures with no growth, urinary antigens negative. WBC stable at 14.8 . Further recommendations per infectious disease and pulmonology/critical care following.  Cardiogenic shock: Likely Takotsubo cardiomyopathy had a previous echo showing EF of 20% with repeat improved to 50% on .  Cardiology following with recommendations to continue Lasix IV 60 mg daily as of  and no longer on pressors. Continue BB.  Follow-up with cardiology as outpatient to evaluate for ACE/ARB, Aldactone, SLG 2  Acute respiratory failure with hypoxia: Secondary to above issues and extubated 2023 per pulmonology/critical care.  Weaning off Precedex drip.  Meropenem IV day 13.  On 6L NC as of  and will continue to wean  Hypokalemia: Mild at 3.4  and supplemented with resolution .      Diet ADULT TUBE FEEDING; PEG; Peptide Based; Continuous; 55; No; 150; Q 4 hours; Protein; once per day   DVT Prophylaxis [x] Lovenox (therapeutic dosing since  and will clarify), []  Heparin, [] SCDs, [] Ambulation,  [] Eliquis, [] Xarelto  [] Coumadin   Code Status Full Code   Disposition From: home  Expected Disposition:  Long Term Care, will not need precert  Estimated Date of Discharge: 2-3 days, ~1/4  Patient requires continued admission due to IV diuresis, respiratory

## 2024-01-02 NOTE — PROGRESS NOTES
Patient arrived to unit at 1640. Patient on specialty bed. Check tubed feeding, abernathy cath and IV access and all are patent. Patient is on 4L of O2 and Lasix given. Patient on telemetry with continuous pulse ox and vital signs are stable. Family was at bedside and has now left for the night.

## 2024-01-02 NOTE — PROGRESS NOTES
Infectious Diseases Inpatient Progress Note    CHIEF COMPLAINT:     COVID-19 pneumonia  Septic shock  Cerebral palsy  Procalcitonin elevation  Lactic acidosis  Respiratory failure requiring ventilator  WBC elevated  Fever        HISTORY OF PRESENT ILLNESS:  63 y.o. woman with a history of cerebral  palsy, dysphagia, on PEG tube feedings admitted to hospital secondary shortness of breath and change in mental status.  She was tested positive for COVID-19.  She is admitted from nursing facility its not clear the timing of COVID-19 positivity.  In the hospital now developed respiratory distress high fever progressed to septic shock currently intubated transferred to ICU requiring pressor support.  Tmax 105.8, currently on 100% FiO2.  Rapid response was called earlier on 12/20/23, due to respiratory distress and now in the ICU on the ventilator chest x-ray indicating right middle and lower lobe pneumonia.  Procalcitonin was not elevated admission but now it 51.6 lactic acid 6.8 creatinine 0.5 WBC 20.4 with left shift, blood cultures collected in process admission blood cultures negative MRSA probe negative.  Given ongoing septic shock with respiratory failure and pneumonia COVID-19 infection we are consulted for recommendations    Interval History : Remains on nasal cannula now  tolerating IV ABX ok mother at bed side       Past Medical History:    Past Medical History:   Diagnosis Date    Cardiac arrest (HCC)     Cerebral palsy (HCC)     Closed anterior dislocation of humerus     Humeral dislocation    Dysphagia     Infantile cerebral palsy, unspecified     Cerebral palsy    Irritable bowel syndrome     Irritable bowel       Past Surgical History:    Past Surgical History:   Procedure Laterality Date    KS ARTHRP ACETBLR/PROX FEM PROSTC AGRFT/ALGRFT Right     Hip Replacement, Total    KS ARTHRP KNE CONDYLE&PLATU MEDIAL&LAT COMPARTMENTS Left     Knee Replacement, Total    SHOULDER ARTHROPLASTY      Shoulder  finding.      Small left-sided pleural effusion.      Mild increased dilatation of common bile duct.      Hepatic steatosis.      Additional findings noted above.         XR CHEST PORTABLE   Final Result   Small right-sided pleural effusions with fluid extending into the minor   fissure.  No confluent infiltrate identified.             All pertinent images and reports for the current Hospitalization were reviewed by me.    IMPRESSION:    Patient Active Problem List   Diagnosis Code    Cardiac arrest ([facility]) I46.9    Acute respiratory failure ([facility]) J96.00    Encephalopathy G93.40    Ventricular fibrillation ([facility]) I49.01    Hypokalemia E87.6    Hypoxic encephalopathy ([facility]) G93.1    Acidosis E87.20    Cardiogenic shock ([facility]) R57.0    Prolonged QT interval R94.31    Fluid overload E87.70    Aspiration pneumonia ([facility]) J69.0    Takotsubo cardiomyopathy I51.81    Pulmonary edema J81.1    Acute type 1 respiratory failure ([facility]) J96.01    Acute respiratory failure with hypoxia and hypercapnia ([facility]) J96.01, J96.02    Aspiration pneumonitis ([facility]) J69.0    Choking T17.308A    Metabolic encephalopathy G93.41    Cholangitis K83.09    Pneumonia, unspecified organism J18.9    NSTEMI (non-ST elevated myocardial infarction) ([facility]) I21.4    Pneumonia due to severe acute respiratory syndrome coronavirus 2 (SARS-CoV-2) U07.1, J12.82    High fever R50.9    Neutrophilia D72.9    Endotracheally intubated Z97.8    Elevated procalcitonin R79.89    Cerebral palsy ([facility]) G80.9    Lactic acid acidosis E87.20    Sepsis ([facility]) A41.9    Septic shock ([facility]) A41.9, R65.21    Acute on chronic systolic heart failure ([facility]) I50.23        ICD-10-CM    1. Acute respiratory failure with hypoxia and hypercapnia ([facility])  J96.01 INTUBATION    J96.02 Intubation      2. Pneumonia of both lungs due to infectious organism, unspecified part of lung  J18.9 CENTRAL LINE     Central Line      3. Altered mental status, unspecified altered mental status type  R41.82       4.

## 2024-01-02 NOTE — PLAN OF CARE
Problem: Discharge Planning  Goal: Discharge to home or other facility with appropriate resources  1/2/2024 0237 by Spring Saldivar RN  Outcome: Progressing  Flowsheets (Taken 1/1/2024 1952)  Discharge to home or other facility with appropriate resources:   Identify barriers to discharge with patient and caregiver   Arrange for needed discharge resources and transportation as appropriate   Identify discharge learning needs (meds, wound care, etc)   Refer to discharge planning if patient needs post-hospital services based on physician order or complex needs related to functional status, cognitive ability or social support system     Problem: Neurosensory - Adult  Goal: Achieves stable or improved neurological status  1/2/2024 0237 by Spring Saldivar RN  Outcome: Progressing  Flowsheets (Taken 1/1/2024 1952)  Achieves stable or improved neurological status:   Assess for and report changes in neurological status   Initiate measures to prevent increased intracranial pressure   Maintain blood pressure and fluid volume within ordered parameters to optimize cerebral perfusion and minimize risk of hemorrhage   Monitor temperature, glucose, and sodium. Initiate appropriate interventions as ordered  Goal: Absence of seizures  1/2/2024 0237 by Spring Saldivar RN  Outcome: Progressing  Flowsheets (Taken 1/1/2024 1952)  Absence of seizures:   Monitor for seizure activity.  If seizure occurs, document type and location of movements and any associated apnea   If seizure occurs, turn head to side and suction secretions as needed   Administer anticonvulsants as ordered   Support airway/breathing, administer oxygen as needed   Diagnostic studies as ordered  Goal: Remains free of injury related to seizures activity  1/2/2024 0237 by Spring Saldivar RN  Outcome: Progressing  Flowsheets (Taken 1/1/2024 1952)  Remains free of injury related to seizure activity:   Maintain airway, patient safety  and administer oxygen as ordered   Monitor  Maintain proper alignment of affected body part  1/2/2024 0237 by Spring Saldivar, RN  Outcome: Progressing  Flowsheets (Taken 1/1/2024 1952)  Maintain proper alignment of affected body part:   Support and protect limb and body alignment per provider's orders   Instruct and reinforce with patient and family use of appropriate assistive device and precautions (e.g. spinal or hip dislocation precautions)  Goal: Return ADL status to a safe level of function  1/2/2024 0237 by Spring Saldivar, RN  Outcome: Progressing  Flowsheets (Taken 1/1/2024 1952)  Return ADL Status to a Safe Level of Function:   Administer medication as ordered   Assess activities of daily living deficits and provide assistive devices as needed   Obtain physical therapy/occupational therapy consults as needed   Assist and instruct patient to increase activity and self care as tolerated

## 2024-01-03 LAB
ALBUMIN SERPL-MCNC: 3 G/DL (ref 3.4–5)
ALBUMIN/GLOB SERPL: 1.1 {RATIO} (ref 1.1–2.2)
ALP SERPL-CCNC: 316 U/L (ref 40–129)
ALT SERPL-CCNC: 52 U/L (ref 10–40)
ANION GAP SERPL CALCULATED.3IONS-SCNC: 5 MMOL/L (ref 3–16)
AST SERPL-CCNC: 39 U/L (ref 15–37)
BASE EXCESS BLDV CALC-SCNC: 14.8 MMOL/L
BASOPHILS # BLD: 0 K/UL (ref 0–0.2)
BASOPHILS NFR BLD: 0.4 %
BILIRUB SERPL-MCNC: 0.7 MG/DL (ref 0–1)
BUN SERPL-MCNC: 17 MG/DL (ref 7–20)
CALCIUM SERPL-MCNC: 8.6 MG/DL (ref 8.3–10.6)
CHLORIDE SERPL-SCNC: 95 MMOL/L (ref 99–110)
CO2 BLDV-SCNC: 43 MMOL/L
CO2 SERPL-SCNC: 40 MMOL/L (ref 21–32)
COHGB MFR BLDV: 2.5 %
CREAT SERPL-MCNC: <0.5 MG/DL (ref 0.6–1.2)
DEPRECATED RDW RBC AUTO: 18.3 % (ref 12.4–15.4)
EOSINOPHIL # BLD: 0.2 K/UL (ref 0–0.6)
EOSINOPHIL NFR BLD: 1.5 %
GFR SERPLBLD CREATININE-BSD FMLA CKD-EPI: >60 ML/MIN/{1.73_M2}
GLUCOSE BLD-MCNC: 167 MG/DL (ref 70–99)
GLUCOSE BLD-MCNC: 171 MG/DL (ref 70–99)
GLUCOSE BLD-MCNC: 172 MG/DL (ref 70–99)
GLUCOSE BLD-MCNC: 174 MG/DL (ref 70–99)
GLUCOSE BLD-MCNC: 181 MG/DL (ref 70–99)
GLUCOSE SERPL-MCNC: 175 MG/DL (ref 70–99)
HCO3 BLDV-SCNC: 41 MMOL/L (ref 23–29)
HCT VFR BLD AUTO: 23.3 % (ref 36–48)
HGB BLD-MCNC: 7.6 G/DL (ref 12–16)
LYMPHOCYTES # BLD: 2 K/UL (ref 1–5.1)
LYMPHOCYTES NFR BLD: 17.2 %
MAGNESIUM SERPL-MCNC: 2.2 MG/DL (ref 1.8–2.4)
MCH RBC QN AUTO: 33.1 PG (ref 26–34)
MCHC RBC AUTO-ENTMCNC: 32.5 G/DL (ref 31–36)
MCV RBC AUTO: 101.7 FL (ref 80–100)
METHGB MFR BLDV: 0.4 %
MONOCYTES # BLD: 0.8 K/UL (ref 0–1.3)
MONOCYTES NFR BLD: 7.1 %
NEUTROPHILS # BLD: 8.6 K/UL (ref 1.7–7.7)
NEUTROPHILS NFR BLD: 73.8 %
O2 THERAPY: ABNORMAL
PCO2 BLDV: 63.6 MMHG (ref 40–50)
PERFORMED ON: ABNORMAL
PH BLDV: 7.42 [PH] (ref 7.35–7.45)
PLATELET # BLD AUTO: 127 K/UL (ref 135–450)
PMV BLD AUTO: 11.1 FL (ref 5–10.5)
PO2 BLDV: 39 MMHG
POTASSIUM SERPL-SCNC: 3.6 MMOL/L (ref 3.5–5.1)
PROT SERPL-MCNC: 5.8 G/DL (ref 6.4–8.2)
RBC # BLD AUTO: 2.29 M/UL (ref 4–5.2)
SAO2 % BLDV: 72 %
SODIUM SERPL-SCNC: 140 MMOL/L (ref 136–145)
WBC # BLD AUTO: 11.6 K/UL (ref 4–11)

## 2024-01-03 PROCEDURE — 2060000000 HC ICU INTERMEDIATE R&B

## 2024-01-03 PROCEDURE — 6370000000 HC RX 637 (ALT 250 FOR IP): Performed by: HOSPITALIST

## 2024-01-03 PROCEDURE — 6370000000 HC RX 637 (ALT 250 FOR IP): Performed by: INTERNAL MEDICINE

## 2024-01-03 PROCEDURE — 82803 BLOOD GASES ANY COMBINATION: CPT

## 2024-01-03 PROCEDURE — 2580000003 HC RX 258: Performed by: INTERNAL MEDICINE

## 2024-01-03 PROCEDURE — 99233 SBSQ HOSP IP/OBS HIGH 50: CPT | Performed by: NURSE PRACTITIONER

## 2024-01-03 PROCEDURE — 2700000000 HC OXYGEN THERAPY PER DAY

## 2024-01-03 PROCEDURE — 6360000002 HC RX W HCPCS: Performed by: INTERNAL MEDICINE

## 2024-01-03 PROCEDURE — 94660 CPAP INITIATION&MGMT: CPT

## 2024-01-03 PROCEDURE — 92526 ORAL FUNCTION THERAPY: CPT

## 2024-01-03 PROCEDURE — 80053 COMPREHEN METABOLIC PANEL: CPT

## 2024-01-03 PROCEDURE — 94760 N-INVAS EAR/PLS OXIMETRY 1: CPT

## 2024-01-03 PROCEDURE — C9113 INJ PANTOPRAZOLE SODIUM, VIA: HCPCS | Performed by: INTERNAL MEDICINE

## 2024-01-03 PROCEDURE — 36592 COLLECT BLOOD FROM PICC: CPT

## 2024-01-03 PROCEDURE — 83735 ASSAY OF MAGNESIUM: CPT

## 2024-01-03 PROCEDURE — 85025 COMPLETE CBC W/AUTO DIFF WBC: CPT

## 2024-01-03 PROCEDURE — 99232 SBSQ HOSP IP/OBS MODERATE 35: CPT | Performed by: INTERNAL MEDICINE

## 2024-01-03 PROCEDURE — 2580000003 HC RX 258: Performed by: HOSPITALIST

## 2024-01-03 PROCEDURE — 6370000000 HC RX 637 (ALT 250 FOR IP): Performed by: NURSE PRACTITIONER

## 2024-01-03 RX ORDER — ACETAMINOPHEN 325 MG/1
650 TABLET ORAL EVERY 6 HOURS PRN
Status: DISCONTINUED | OUTPATIENT
Start: 2024-01-03 | End: 2024-01-09 | Stop reason: HOSPADM

## 2024-01-03 RX ORDER — POLYETHYLENE GLYCOL 3350 17 G/17G
17 POWDER, FOR SOLUTION ORAL DAILY PRN
Status: DISCONTINUED | OUTPATIENT
Start: 2024-01-03 | End: 2024-01-09 | Stop reason: HOSPADM

## 2024-01-03 RX ORDER — FUROSEMIDE 20 MG/1
20 TABLET ORAL 2 TIMES DAILY
Status: DISCONTINUED | OUTPATIENT
Start: 2024-01-03 | End: 2024-01-03

## 2024-01-03 RX ORDER — ONDANSETRON 4 MG/1
4 TABLET, ORALLY DISINTEGRATING ORAL EVERY 8 HOURS PRN
Status: DISCONTINUED | OUTPATIENT
Start: 2024-01-03 | End: 2024-01-09 | Stop reason: HOSPADM

## 2024-01-03 RX ORDER — LEVOTHYROXINE SODIUM 0.05 MG/1
50 TABLET ORAL DAILY
Status: DISCONTINUED | OUTPATIENT
Start: 2024-01-03 | End: 2024-01-09 | Stop reason: HOSPADM

## 2024-01-03 RX ORDER — CLONAZEPAM 1 MG/1
2 TABLET ORAL 2 TIMES DAILY
Status: DISCONTINUED | OUTPATIENT
Start: 2024-01-03 | End: 2024-01-09 | Stop reason: HOSPADM

## 2024-01-03 RX ORDER — ONDANSETRON 2 MG/ML
4 INJECTION INTRAMUSCULAR; INTRAVENOUS EVERY 6 HOURS PRN
Status: DISCONTINUED | OUTPATIENT
Start: 2024-01-03 | End: 2024-01-09 | Stop reason: HOSPADM

## 2024-01-03 RX ORDER — FUROSEMIDE 20 MG/1
20 TABLET ORAL 2 TIMES DAILY
Status: DISCONTINUED | OUTPATIENT
Start: 2024-01-03 | End: 2024-01-09 | Stop reason: HOSPADM

## 2024-01-03 RX ORDER — POLYETHYLENE GLYCOL 3350 17 G/17G
17 POWDER, FOR SOLUTION ORAL 2 TIMES DAILY
Status: DISCONTINUED | OUTPATIENT
Start: 2024-01-03 | End: 2024-01-09 | Stop reason: HOSPADM

## 2024-01-03 RX ORDER — DANTROLENE SODIUM 25 MG/1
200 CAPSULE ORAL 2 TIMES DAILY
Status: DISCONTINUED | OUTPATIENT
Start: 2024-01-03 | End: 2024-01-09 | Stop reason: HOSPADM

## 2024-01-03 RX ORDER — AMITRIPTYLINE HYDROCHLORIDE 50 MG/1
50 TABLET, FILM COATED ORAL NIGHTLY
Status: DISCONTINUED | OUTPATIENT
Start: 2024-01-03 | End: 2024-01-09 | Stop reason: HOSPADM

## 2024-01-03 RX ORDER — PREGABALIN 100 MG/1
200 CAPSULE ORAL ONCE
Status: COMPLETED | OUTPATIENT
Start: 2024-01-03 | End: 2024-01-03

## 2024-01-03 RX ORDER — ASPIRIN 81 MG/1
81 TABLET, CHEWABLE ORAL DAILY
Status: DISCONTINUED | OUTPATIENT
Start: 2024-01-03 | End: 2024-01-09 | Stop reason: HOSPADM

## 2024-01-03 RX ORDER — ACETAMINOPHEN 650 MG/1
650 SUPPOSITORY RECTAL EVERY 6 HOURS PRN
Status: DISCONTINUED | OUTPATIENT
Start: 2024-01-03 | End: 2024-01-09 | Stop reason: HOSPADM

## 2024-01-03 RX ORDER — PREGABALIN 100 MG/1
200 CAPSULE ORAL 3 TIMES DAILY
Status: DISCONTINUED | OUTPATIENT
Start: 2024-01-03 | End: 2024-01-09 | Stop reason: HOSPADM

## 2024-01-03 RX ADMIN — CLONAZEPAM 2 MG: 1 TABLET ORAL at 20:30

## 2024-01-03 RX ADMIN — POLYETHYLENE GLYCOL 3350 17 G: 17 POWDER, FOR SOLUTION ORAL at 20:28

## 2024-01-03 RX ADMIN — SODIUM CHLORIDE: 9 INJECTION, SOLUTION INTRAVENOUS at 17:50

## 2024-01-03 RX ADMIN — MEROPENEM 1000 MG: 1 INJECTION, POWDER, FOR SOLUTION INTRAVENOUS at 06:05

## 2024-01-03 RX ADMIN — POLYETHYLENE GLYCOL 3350 17 G: 17 POWDER, FOR SOLUTION ORAL at 10:14

## 2024-01-03 RX ADMIN — METOPROLOL TARTRATE 25 MG: 25 TABLET, FILM COATED ORAL at 20:28

## 2024-01-03 RX ADMIN — SODIUM CHLORIDE, PRESERVATIVE FREE 40 MG: 5 INJECTION INTRAVENOUS at 10:16

## 2024-01-03 RX ADMIN — AMITRIPTYLINE HYDROCHLORIDE 50 MG: 50 TABLET, FILM COATED ORAL at 20:29

## 2024-01-03 RX ADMIN — PREGABALIN 200 MG: 100 CAPSULE ORAL at 20:30

## 2024-01-03 RX ADMIN — PREGABALIN 200 MG: 100 CAPSULE ORAL at 14:58

## 2024-01-03 RX ADMIN — FUROSEMIDE 20 MG: 20 TABLET ORAL at 18:14

## 2024-01-03 RX ADMIN — SODIUM CHLORIDE, PRESERVATIVE FREE 10 ML: 5 INJECTION INTRAVENOUS at 10:17

## 2024-01-03 RX ADMIN — PREGABALIN 200 MG: 100 CAPSULE ORAL at 10:28

## 2024-01-03 RX ADMIN — SERTRALINE 150 MG: 100 TABLET, FILM COATED ORAL at 10:15

## 2024-01-03 RX ADMIN — SODIUM CHLORIDE, PRESERVATIVE FREE 10 ML: 5 INJECTION INTRAVENOUS at 20:30

## 2024-01-03 RX ADMIN — METOPROLOL TARTRATE 25 MG: 25 TABLET, FILM COATED ORAL at 10:15

## 2024-01-03 RX ADMIN — CLONAZEPAM 2 MG: 1 TABLET ORAL at 10:15

## 2024-01-03 RX ADMIN — FUROSEMIDE 60 MG: 10 INJECTION, SOLUTION INTRAMUSCULAR; INTRAVENOUS at 10:16

## 2024-01-03 RX ADMIN — ENOXAPARIN SODIUM 100 MG: 100 INJECTION SUBCUTANEOUS at 10:16

## 2024-01-03 RX ADMIN — DANTROLENE SODIUM 200 MG: 25 CAPSULE ORAL at 20:31

## 2024-01-03 RX ADMIN — LEVOTHYROXINE SODIUM 50 MCG: 0.05 TABLET ORAL at 06:03

## 2024-01-03 RX ADMIN — ENOXAPARIN SODIUM 100 MG: 100 INJECTION SUBCUTANEOUS at 20:28

## 2024-01-03 RX ADMIN — DANTROLENE SODIUM 200 MG: 25 CAPSULE ORAL at 10:15

## 2024-01-03 RX ADMIN — ASPIRIN 81 MG: 81 TABLET, CHEWABLE ORAL at 10:15

## 2024-01-03 ASSESSMENT — PAIN SCALES - GENERAL
PAINLEVEL_OUTOF10: 0
PAINLEVEL_OUTOF10: 0

## 2024-01-03 NOTE — PLAN OF CARE
Problem: Discharge Planning  Goal: Discharge to home or other facility with appropriate resources  Outcome: Progressing  Flowsheets (Taken 1/3/2024 0832)  Discharge to home or other facility with appropriate resources:   Identify barriers to discharge with patient and caregiver   Arrange for needed discharge resources and transportation as appropriate   Refer to discharge planning if patient needs post-hospital services based on physician order or complex needs related to functional status, cognitive ability or social support system     Problem: Neurosensory - Adult  Goal: Achieves stable or improved neurological status  Outcome: Progressing  Flowsheets (Taken 1/3/2024 0832)  Achieves stable or improved neurological status:   Assess for and report changes in neurological status   Maintain blood pressure and fluid volume within ordered parameters to optimize cerebral perfusion and minimize risk of hemorrhage   Monitor temperature, glucose, and sodium. Initiate appropriate interventions as ordered  Goal: Absence of seizures  Outcome: Progressing  Goal: Remains free of injury related to seizures activity  Outcome: Progressing  Goal: Achieves maximal functionality and self care  Outcome: Progressing  Flowsheets (Taken 1/3/2024 0832)  Achieves maximal functionality and self care: Monitor swallowing and airway patency with patient fatigue and changes in neurological status     Problem: Respiratory - Adult  Goal: Achieves optimal ventilation and oxygenation  Outcome: Progressing  Flowsheets (Taken 1/3/2024 0832)  Achieves optimal ventilation and oxygenation:   Assess for changes in respiratory status   Assess for changes in mentation and behavior   Position to facilitate oxygenation and minimize respiratory effort   Oxygen supplementation based on oxygen saturation or arterial blood gases   Assess the need for suctioning and aspirate as needed   Assess and instruct to report shortness of breath or any respiratory  difficulty   Respiratory therapy support as indicated     Problem: Skin/Tissue Integrity - Adult  Goal: Skin integrity remains intact  Outcome: Progressing  Flowsheets (Taken 1/3/2024 0832)  Skin Integrity Remains Intact: Monitor for areas of redness and/or skin breakdown  Goal: Incisions, wounds, or drain sites healing without S/S of infection  Outcome: Progressing  Flowsheets (Taken 1/3/2024 0832)  Incisions, Wounds, or Drain Sites Healing Without Sign and Symptoms of Infection:   ADMISSION and DAILY: Assess and document risk factors for pressure ulcer development   TWICE DAILY: Assess and document skin integrity  Goal: Oral mucous membranes remain intact  Outcome: Progressing  Flowsheets (Taken 1/3/2024 0832)  Oral Mucous Membranes Remain Intact: Assess oral mucosa and hygiene practices     Problem: Musculoskeletal - Adult  Goal: Return mobility to safest level of function  Outcome: Progressing  Goal: Maintain proper alignment of affected body part  Outcome: Progressing  Goal: Return ADL status to a safe level of function  Outcome: Progressing     Problem: Gastrointestinal - Adult  Goal: Minimal or absence of nausea and vomiting  Outcome: Progressing  Flowsheets (Taken 1/3/2024 0832)  Minimal or absence of nausea and vomiting:   Administer ordered antiemetic medications as needed   Provide nonpharmacologic comfort measures as appropriate  Goal: Maintains or returns to baseline bowel function  Outcome: Progressing  Flowsheets (Taken 1/3/2024 0832)  Maintains or returns to baseline bowel function:   Assess bowel function   Administer ordered medications as needed  Goal: Maintains adequate nutritional intake  Outcome: Progressing  Flowsheets (Taken 1/3/2024 0832)  Maintains adequate nutritional intake: Monitor intake and output, weight and lab values     Problem: Genitourinary - Adult  Goal: Absence of urinary retention  Outcome: Progressing     Problem: Safety - Adult  Goal: Free from fall injury  Outcome:

## 2024-01-03 NOTE — PROGRESS NOTES
All pt's meds are selected for \"oral\" route and pt is NPO on tube feed. Ok to change to peg route per Phi Barkley MD. Called pharm to assist; spoke to Andrew who verifies what meds are able to be crushed.

## 2024-01-03 NOTE — PROGRESS NOTES
Pulmonary Critical Care Progress Note     Patient's name:  Lilly Terrell  Medical Record Number: [id number]  Patient's account/billing number: [id number]  Patient's YOB: 1960  Age: 63 y.o.  Date of Admission: 12/16/2023  3:09 PM  Date of Consult: 1/3/2024      Primary Care Physician: Tyson Hahn      Code Status: Full Code    Chief complaint: Acute respiratory failure and hypoxia    Assessment and Plan     Acute respiratory with hypoxia less than 90% on room air extubated 12/31  Acute on chronic CHF  Aspiration PNA  Covid 19 PNA  Septic shock  Elevated troponins  Cerebral palsy   Dysphagia s/p PEG     Plan:  Wean oxygen as tolerated keep sats more than 90%  Check VBG  Consider lowering diuresis   Antibiotics per ID  GI prophylaxis  Bipap qhs      Overnight:  No acute events overnight  On bipap     REVIEW OF SYSTEMS:  Review of Systems -   Denied chest pain  Slightly tachycardic in the  No nausea vomiting diarrhea    Physical Exam:    Vitals: /62   Pulse (!) 108   Temp 98 °F (36.7 °C) (Axillary)   Resp (!) 0   Ht 1.727 m (5' 7.99\")   Wt 89.6 kg (197 lb 8.5 oz)   SpO2 94%   BMI 30.04 kg/m²     Last Body weight:   Wt Readings from Last 3 Encounters:   01/03/24 89.6 kg (197 lb 8.5 oz)   11/30/22 85.2 kg (187 lb 13.3 oz)   10/06/21 83.7 kg (184 lb 8.4 oz)       Body Mass Index : Body mass index is 30.04 kg/m².      Intake and Output summary:   Intake/Output Summary (Last 24 hours) at 1/3/2024 0831  Last data filed at 1/3/2024 0552  Gross per 24 hour   Intake 1672 ml   Output 1450 ml   Net 222 ml         Physical Examination:     Gen:  No acute distress sedated on MV   Eyes: PERRL. Anicteric sclera. No conjunctival injection.   ENT: No discharge. Posterior oropharynx clear. External appearance of ears and nose normal.  Neck: Trachea midline. No mass   Resp: Diminished bilaterally no active wheezing  CV: Regular rate. Regular rhythm. No murmur or rub. no edema.   GI: Soft,

## 2024-01-03 NOTE — PROGRESS NOTES
nectar thick liquids; could consider SMALL sips thin between meals.  Cricopharyngeal dysfunction noted with potential to exacerbate pharyngeal symptoms of dysphagia.     History/Prior Level of Function:   Living Status: admitted from [facility]  Baseline diet: nursing staff at [facility] reports PO diet: [facility] soft diet with thin liquids  Prior Dysphagia History: limited chart review d/t limited [facility] records/report; prior to current admission, last seen at [facility] 10/6/2021 with rec for minced/moist with mildly/nectar thick liquids; chart review with remote notes indicating g-tube in place but not used, but notes are limited    Subjective:     Current Diet Level: NPO ; NPO     Comments regarding tolerating Current Diet: tube feed ordered      Objective:     Pain: Did not state               Vision: [x] adequate for dysphagia needs [] Impaired:  Hearing: [x]adequate for dysphagia needs [] Impaired:     Cognitive/behavioral/communication:   Oriented to [x] self [] place [] date [] situation  [x]alert []lethargic  [x]cooperative []self-limiting   []confused   []distractible []agitated []impulsive  []verbally responsive []nonverbal []limited verbal responses  [x]follows one step commands []does not follow dx []follows complex commands  []aphasic [x]dysarthric  [x] other: dysphonic    Respiratory Status: []Room Air [x]O2 via nasal cannula []Other:  Dentition: [x]Adequate []Dentures []Missing Many Teeth []Edentulous []Other:  Patient Positioning: Upright in bed     PO Trials:   Thin Liquids via tsp: suspect premature loss to the pharynx, delayed swallow, and decreased laryngeal elevation; concern for reduced pharyngeal peristalsis; concern for wet vocal quality post-swallow; weak, delayed cough  Nectar thick liquids via tsp: suspect premature loss to the pharynx, delayed swallow, and decreased laryngeal elevation; concern for reduced pharyngeal peristalsis; concern for wet vocal quality post-swallow  Honey Thick liquids via tsp:  post-swallow with thin and nectar thick H2O via tsp with weak delayed cough also noted with thin via tsp.  Rec for NPO and MBS noted prior to acute status decline 12/20/2023. Recommend continue NPO with continued SLP monitor for MBS readiness; patient with improving vocal quality but dysphonic. Current clinical presentation remains declined from presentation prior to rapid response and prolonged intubation. Potential for MBS consideration 1/5/2024 if clinical presentation continues to improve?     MEDICAL OR COGNITIVE/BEHAVIORAL FACTORS WHICH CAN EXACERBATE: h/o dysphagia; chronic comorbidities    Recommended Diet and Intervention 1/3/2024:  Solids: NPO;   Liquids: NPO;  Meds: Meds via alt means of nutrition    Compensatory Swallowing Strategies:  To be determined     EDUCATION:   Provided education regarding role of SLP, results of assessment, recommendations and general speech pathology plan of care.   [x] Pt verbalized understanding and agreement   [x] Pt requires ongoing learning   [] No evidence of comprehension     Dysphagia Prognosis: [] good [x]fair []poor [x]guarded  Barriers:  h/o dysphagia; comorbidities    Plan:     Continue Dysphagia Therapy: YES    Interventions: Bolus Control Exercise, Oral Care, Laryngeal Exercises , Pharyngeal Exercise, Diet Tolerance Monitoring , Oral Motor Exercises , Patient/Family Education , Therapeutic Trials with SLP , Instrumental assessment of swallow function (Modified Barium Swallow Study)   Duration/Frequency of therapy while on unit: Speech therapy for dysphagia tx 3-5 times per week during acute care stay.      Discharge Instructions:   Recommend ongoing SLP for dysphagia therapy upon discharge from hospital     This note serves as a D/C Summary in the event that this patient is discharged prior to the next therapy session.    Coded treatment time: 0  Total treatment time: 23    Electronically signed by TAWANDA Medina on 1/3/2024 at 1:29 PM

## 2024-01-03 NOTE — PROGRESS NOTES
V2.0    [facility] Progress Note      Name:  Lilly Terrell /Age/Sex: 1960  (63 y.o. female)   MRN & CSN:  [id number] & [id number] Encounter Date/Time: 1/3/2024 4:46 PM EST   Location:  [id number] PCP: Tyson Hahn     Attending:Phi Barkley MD       Hospital Day: 19    Assessment and Recommendations   Lilly Terrell is a 63 y.o. female with pmh of IBS, cerebral palsy who presents with septic shock secondary to COVID pneumonia, cardiogenic shock on lasix infusion    Plan:   Septic shock: In the setting of COVID and aspiration pneumonia.  Has been on meropenem since  and was having low grade fevers but none as of  thus far.  Blood cultures with no growth, urinary antigens negative. WBC stable at 14.8 . Further recommendations per infectious disease and pulmonology/critical care following.  Cardiogenic shock: Likely Takotsubo cardiomyopathy had a previous echo showing EF of 20% with repeat improved to 50% on .  Cardiology following with recommendations to continue Lasix IV 60 mg daily as of  and no longer on pressors. Continue BB.  Follow-up with cardiology as outpatient to evaluate for ACE/ARB, Aldactone, SLG 2  Acute respiratory failure with hypoxia: Secondary to above issues and extubated 2023 per pulmonology/critical care.  Weaning off Precedex drip.  Meropenem IV day 13.  On 6L NC as of  and will continue to wean       On 1/3 : on BIPAP today am , pulmonary ordered VBG , wean off oxygen as able to  Hypokalemia: Mild at 3.4  and supplemented with resolution .      Diet ADULT TUBE FEEDING; Gastrostomy; Peptide Based; Continuous; 65; No; 150; Q 4 hours; Protein; once per day   DVT Prophylaxis [x] Lovenox (therapeutic dosing since  and will clarify), []  Heparin, [] SCDs, [] Ambulation,  [] Eliquis, [] Xarelto  [] Coumadin   Code Status Full Code   Disposition From: home  Expected Disposition:  Long Term Care, will not need precert  Estimated Date of

## 2024-01-03 NOTE — PROGRESS NOTES
Cardiology - PROGRESS NOTE    Admit Date: 12/16/2023     Reason for follow up: BiV failure      62 y.o. female with PMH cerebral palsy/contractures, dysphagia w PEG tube. [facility]     Admitted to [facility] with acute hypoxic respiratory failure/ Covid PNA  Remains intubated.  Echocardiogram revealed severe biventricular failure-started on dobutamine for cardiogenic shock.  Levo for hypotension. Also treated for septic shock likely from pneumonia.  diuresis with IV Lasix gtt.    Social History:   reports that she has never smoked. She has never used smokeless tobacco. She reports that she does not drink alcohol and does not use drugs.   Family History: family history is not on file.  Living Situation: [facility]      Interval History:   Patient seen and examined and notes reviewed   On BiPap at night    On NC   + 1 L since admission   Wt  89.6 kg (baseline 87-89 kg)   Sleepy but awakens easily   All other systems reviewed and negative except as above.        Diet: ADULT TUBE FEEDING; Gastrostomy; Peptide Based; Continuous; 65; No; 150; Q 4 hours; Protein; once per day      In: 3246 [NG/GT:3246]  Out: 1450    Patient Vitals for the past 96 hrs (Last 3 readings):   Weight   01/03/24 0324 89.6 kg (197 lb 8.5 oz)   01/02/24 0555 87.6 kg (193 lb 2 oz)   01/01/24 0600 90.8 kg (200 lb 2.8 oz)           Data:   Scheduled Meds:   Scheduled Meds:   amitriptyline  50 mg PEG Tube Nightly    aspirin  81 mg PEG Tube Daily    clonazePAM  2 mg PEG Tube BID    levothyroxine  50 mcg PEG Tube Daily    metoprolol tartrate  25 mg PEG Tube BID    polyethylene glycol  17 g Per G Tube BID    pregabalin  200 mg PEG Tube TID    sertraline  150 mg PEG Tube Daily    dantrolene  200 mg PEG Tube BID    furosemide  60 mg IntraVENous Daily    enoxaparin  1 mg/kg SubCUTAneous BID    lidocaine 1 % injection  5 mL IntraDERmal Once    sodium chloride flush  5-40 mL IntraVENous 2 times per

## 2024-01-04 ENCOUNTER — APPOINTMENT (OUTPATIENT)
Dept: GENERAL RADIOLOGY | Age: 64
DRG: 207 | End: 2024-01-04
Payer: MEDICARE

## 2024-01-04 LAB
ANION GAP SERPL CALCULATED.3IONS-SCNC: 2 MMOL/L (ref 3–16)
BASE EXCESS BLDA CALC-SCNC: 16.4 MMOL/L (ref -3–3)
BASOPHILS # BLD: 0 K/UL (ref 0–0.2)
BASOPHILS NFR BLD: 0.5 %
BUN SERPL-MCNC: 16 MG/DL (ref 7–20)
CALCIUM SERPL-MCNC: 8.3 MG/DL (ref 8.3–10.6)
CHLORIDE SERPL-SCNC: 98 MMOL/L (ref 99–110)
CO2 BLDA-SCNC: 44.4 MMOL/L
CO2 SERPL-SCNC: 43 MMOL/L (ref 21–32)
COHGB MFR BLDA: 2.5 % (ref 0–1.5)
CREAT SERPL-MCNC: <0.5 MG/DL (ref 0.6–1.2)
DEPRECATED RDW RBC AUTO: 19.5 % (ref 12.4–15.4)
EOSINOPHIL # BLD: 0.3 K/UL (ref 0–0.6)
EOSINOPHIL NFR BLD: 2.7 %
FERRITIN SERPL IA-MCNC: 174.3 NG/ML (ref 15–150)
GFR SERPLBLD CREATININE-BSD FMLA CKD-EPI: >60 ML/MIN/{1.73_M2}
GLUCOSE BLD-MCNC: 124 MG/DL (ref 70–99)
GLUCOSE BLD-MCNC: 134 MG/DL (ref 70–99)
GLUCOSE BLD-MCNC: 145 MG/DL (ref 70–99)
GLUCOSE BLD-MCNC: 160 MG/DL (ref 70–99)
GLUCOSE BLD-MCNC: 166 MG/DL (ref 70–99)
GLUCOSE BLD-MCNC: 167 MG/DL (ref 70–99)
GLUCOSE BLD-MCNC: 178 MG/DL (ref 70–99)
GLUCOSE SERPL-MCNC: 151 MG/DL (ref 70–99)
HCO3 BLDA-SCNC: 42.6 MMOL/L (ref 21–29)
HCT VFR BLD AUTO: 23 % (ref 36–48)
HGB BLD-MCNC: 7.8 G/DL (ref 12–16)
HGB BLDA-MCNC: 9.9 G/DL (ref 12–16)
IRON SATN MFR SERPL: 23 % (ref 15–50)
IRON SERPL-MCNC: 55 UG/DL (ref 37–145)
LYMPHOCYTES # BLD: 2.1 K/UL (ref 1–5.1)
LYMPHOCYTES NFR BLD: 22.4 %
MCH RBC QN AUTO: 34.4 PG (ref 26–34)
MCHC RBC AUTO-ENTMCNC: 33.6 G/DL (ref 31–36)
MCV RBC AUTO: 102.4 FL (ref 80–100)
METHGB MFR BLDA: 0.5 %
MONOCYTES # BLD: 0.7 K/UL (ref 0–1.3)
MONOCYTES NFR BLD: 7.4 %
NEUTROPHILS # BLD: 6.2 K/UL (ref 1.7–7.7)
NEUTROPHILS NFR BLD: 67 %
NT-PROBNP SERPL-MCNC: 638 PG/ML (ref 0–124)
O2 THERAPY: ABNORMAL
PCO2 BLDA: 60.1 MMHG (ref 35–45)
PERFORMED ON: ABNORMAL
PH BLDA: 7.46 [PH] (ref 7.35–7.45)
PLATELET # BLD AUTO: 124 K/UL (ref 135–450)
PMV BLD AUTO: 10.7 FL (ref 5–10.5)
PO2 BLDA: 56.5 MMHG (ref 75–108)
POTASSIUM SERPL-SCNC: 3.6 MMOL/L (ref 3.5–5.1)
RBC # BLD AUTO: 2.25 M/UL (ref 4–5.2)
SAO2 % BLDA: 90.1 %
SODIUM SERPL-SCNC: 143 MMOL/L (ref 136–145)
TIBC SERPL-MCNC: 239 UG/DL (ref 260–445)
WBC # BLD AUTO: 9.3 K/UL (ref 4–11)

## 2024-01-04 PROCEDURE — 94760 N-INVAS EAR/PLS OXIMETRY 1: CPT

## 2024-01-04 PROCEDURE — 82728 ASSAY OF FERRITIN: CPT

## 2024-01-04 PROCEDURE — 6370000000 HC RX 637 (ALT 250 FOR IP): Performed by: HOSPITALIST

## 2024-01-04 PROCEDURE — 6360000002 HC RX W HCPCS: Performed by: INTERNAL MEDICINE

## 2024-01-04 PROCEDURE — 85025 COMPLETE CBC W/AUTO DIFF WBC: CPT

## 2024-01-04 PROCEDURE — 6360000002 HC RX W HCPCS: Performed by: HOSPITALIST

## 2024-01-04 PROCEDURE — 2580000003 HC RX 258: Performed by: INTERNAL MEDICINE

## 2024-01-04 PROCEDURE — 94660 CPAP INITIATION&MGMT: CPT

## 2024-01-04 PROCEDURE — 36600 WITHDRAWAL OF ARTERIAL BLOOD: CPT

## 2024-01-04 PROCEDURE — 94640 AIRWAY INHALATION TREATMENT: CPT

## 2024-01-04 PROCEDURE — 80048 BASIC METABOLIC PNL TOTAL CA: CPT

## 2024-01-04 PROCEDURE — 83540 ASSAY OF IRON: CPT

## 2024-01-04 PROCEDURE — 6370000000 HC RX 637 (ALT 250 FOR IP): Performed by: NURSE PRACTITIONER

## 2024-01-04 PROCEDURE — 82803 BLOOD GASES ANY COMBINATION: CPT

## 2024-01-04 PROCEDURE — 83880 ASSAY OF NATRIURETIC PEPTIDE: CPT

## 2024-01-04 PROCEDURE — 99233 SBSQ HOSP IP/OBS HIGH 50: CPT | Performed by: INTERNAL MEDICINE

## 2024-01-04 PROCEDURE — 2060000000 HC ICU INTERMEDIATE R&B

## 2024-01-04 PROCEDURE — C9113 INJ PANTOPRAZOLE SODIUM, VIA: HCPCS | Performed by: INTERNAL MEDICINE

## 2024-01-04 PROCEDURE — 2700000000 HC OXYGEN THERAPY PER DAY

## 2024-01-04 PROCEDURE — 71045 X-RAY EXAM CHEST 1 VIEW: CPT

## 2024-01-04 PROCEDURE — 83550 IRON BINDING TEST: CPT

## 2024-01-04 RX ORDER — ACETAZOLAMIDE 500 MG/5ML
500 INJECTION, POWDER, LYOPHILIZED, FOR SOLUTION INTRAVENOUS ONCE
Status: COMPLETED | OUTPATIENT
Start: 2024-01-04 | End: 2024-01-04

## 2024-01-04 RX ORDER — ALBUTEROL SULFATE 2.5 MG/3ML
2.5 SOLUTION RESPIRATORY (INHALATION) 3 TIMES DAILY
Status: DISCONTINUED | OUTPATIENT
Start: 2024-01-04 | End: 2024-01-09 | Stop reason: HOSPADM

## 2024-01-04 RX ORDER — SODIUM CHLORIDE FOR INHALATION 3 %
4 VIAL, NEBULIZER (ML) INHALATION 2 TIMES DAILY
Status: DISCONTINUED | OUTPATIENT
Start: 2024-01-04 | End: 2024-01-09 | Stop reason: HOSPADM

## 2024-01-04 RX ORDER — ALBUTEROL SULFATE 2.5 MG/3ML
SOLUTION RESPIRATORY (INHALATION)
Status: COMPLETED
Start: 2024-01-04 | End: 2024-01-05

## 2024-01-04 RX ADMIN — PREGABALIN 200 MG: 100 CAPSULE ORAL at 09:45

## 2024-01-04 RX ADMIN — DANTROLENE SODIUM 200 MG: 25 CAPSULE ORAL at 09:50

## 2024-01-04 RX ADMIN — PREGABALIN 200 MG: 100 CAPSULE ORAL at 14:30

## 2024-01-04 RX ADMIN — AMITRIPTYLINE HYDROCHLORIDE 50 MG: 50 TABLET, FILM COATED ORAL at 20:59

## 2024-01-04 RX ADMIN — SODIUM CHLORIDE, PRESERVATIVE FREE 10 ML: 5 INJECTION INTRAVENOUS at 09:44

## 2024-01-04 RX ADMIN — FUROSEMIDE 20 MG: 20 TABLET ORAL at 09:45

## 2024-01-04 RX ADMIN — SODIUM CHLORIDE, PRESERVATIVE FREE 40 MG: 5 INJECTION INTRAVENOUS at 09:45

## 2024-01-04 RX ADMIN — SERTRALINE 150 MG: 100 TABLET, FILM COATED ORAL at 09:45

## 2024-01-04 RX ADMIN — CLONAZEPAM 2 MG: 1 TABLET ORAL at 20:59

## 2024-01-04 RX ADMIN — ALBUTEROL SULFATE 2.5 MG: 2.5 SOLUTION RESPIRATORY (INHALATION) at 19:47

## 2024-01-04 RX ADMIN — ASPIRIN 81 MG: 81 TABLET, CHEWABLE ORAL at 09:45

## 2024-01-04 RX ADMIN — ALBUTEROL SULFATE 2.5 MG: 2.5 SOLUTION RESPIRATORY (INHALATION) at 11:48

## 2024-01-04 RX ADMIN — DANTROLENE SODIUM 200 MG: 25 CAPSULE ORAL at 21:02

## 2024-01-04 RX ADMIN — CLONAZEPAM 2 MG: 1 TABLET ORAL at 09:45

## 2024-01-04 RX ADMIN — POLYETHYLENE GLYCOL 3350 17 G: 17 POWDER, FOR SOLUTION ORAL at 09:45

## 2024-01-04 RX ADMIN — SODIUM CHLORIDE SOLN NEBU 3% 4 ML: 3 NEBU SOLN at 09:25

## 2024-01-04 RX ADMIN — FUROSEMIDE 20 MG: 20 TABLET ORAL at 18:25

## 2024-01-04 RX ADMIN — ENOXAPARIN SODIUM 100 MG: 100 INJECTION SUBCUTANEOUS at 20:59

## 2024-01-04 RX ADMIN — LEVOTHYROXINE SODIUM 50 MCG: 0.05 TABLET ORAL at 05:09

## 2024-01-04 RX ADMIN — ACETAZOLAMIDE 500 MG: 500 INJECTION, POWDER, LYOPHILIZED, FOR SOLUTION INTRAVENOUS at 16:15

## 2024-01-04 RX ADMIN — METOPROLOL TARTRATE 25 MG: 25 TABLET, FILM COATED ORAL at 20:59

## 2024-01-04 RX ADMIN — PREGABALIN 200 MG: 100 CAPSULE ORAL at 20:59

## 2024-01-04 RX ADMIN — SODIUM CHLORIDE SOLN NEBU 3% 4 ML: 3 NEBU SOLN at 19:48

## 2024-01-04 RX ADMIN — ENOXAPARIN SODIUM 100 MG: 100 INJECTION SUBCUTANEOUS at 09:45

## 2024-01-04 RX ADMIN — ALBUTEROL SULFATE 2.5 MG: 2.5 SOLUTION RESPIRATORY (INHALATION) at 09:24

## 2024-01-04 RX ADMIN — METOPROLOL TARTRATE 25 MG: 25 TABLET, FILM COATED ORAL at 09:45

## 2024-01-04 RX ADMIN — ACETAMINOPHEN 650 MG: 325 TABLET ORAL at 10:22

## 2024-01-04 ASSESSMENT — PAIN SCALES - GENERAL
PAINLEVEL_OUTOF10: 0
PAINLEVEL_OUTOF10: 3
PAINLEVEL_OUTOF10: 0
PAINLEVEL_OUTOF10: 0

## 2024-01-04 ASSESSMENT — PAIN DESCRIPTION - LOCATION: LOCATION: GENERALIZED

## 2024-01-04 NOTE — PLAN OF CARE
Problem: Discharge Planning  Goal: Discharge to home or other facility with appropriate resources  1/4/2024 0024 by Spring Saldivar RN  Outcome: Progressing  Flowsheets (Taken 1/3/2024 2000)  Discharge to home or other facility with appropriate resources:   Identify barriers to discharge with patient and caregiver   Arrange for needed discharge resources and transportation as appropriate   Identify discharge learning needs (meds, wound care, etc)   Refer to discharge planning if patient needs post-hospital services based on physician order or complex needs related to functional status, cognitive ability or social support system  1/3/2024 1556 by Sabine Mccullough RN  Outcome: Progressing  Flowsheets (Taken 1/3/2024 0832)  Discharge to home or other facility with appropriate resources:   Identify barriers to discharge with patient and caregiver   Arrange for needed discharge resources and transportation as appropriate   Refer to discharge planning if patient needs post-hospital services based on physician order or complex needs related to functional status, cognitive ability or social support system     Problem: Neurosensory - Adult  Goal: Achieves stable or improved neurological status  1/4/2024 0024 by Spring Saldivar RN  Outcome: Progressing  Flowsheets (Taken 1/3/2024 2000)  Achieves stable or improved neurological status:   Assess for and report changes in neurological status   Initiate measures to prevent increased intracranial pressure   Maintain blood pressure and fluid volume within ordered parameters to optimize cerebral perfusion and minimize risk of hemorrhage   Monitor temperature, glucose, and sodium. Initiate appropriate interventions as ordered  1/3/2024 1556 by Sabine Mccullough, RN  Outcome: Progressing  Flowsheets (Taken 1/3/2024 0832)  Achieves stable or improved neurological status:   Assess for and report changes in neurological status   Maintain blood pressure and fluid volume within ordered parameters  perform bladder scan as needed   Assess patient’s ability to void and empty bladder   Place urinary catheter per Licensed Independent Practitioner order if needed   Discuss with Licensed Independent Practitioner  medications to alleviate retention as needed   Discuss catheterization for long term situations as appropriate  1/3/2024 1556 by Sabine Mccullough RN  Outcome: Progressing     Problem: Safety - Adult  Goal: Free from fall injury  1/4/2024 0024 by Spring Saldivar RN  Outcome: Progressing  1/3/2024 1556 by Sabine Mccullough RN  Outcome: Progressing  Flowsheets (Taken 1/3/2024 0832)  Free From Fall Injury: Instruct family/caregiver on patient safety     Problem: Skin/Tissue Integrity  Goal: Absence of new skin breakdown  Description: 1.  Monitor for areas of redness and/or skin breakdown  2.  Assess vascular access sites hourly  3.  Every 4-6 hours minimum:  Change oxygen saturation probe site  4.  Every 4-6 hours:  If on nasal continuous positive airway pressure, respiratory therapy assess nares and determine need for appliance change or resting period.  1/4/2024 0024 by Spring Saldivar RN  Outcome: Progressing  1/3/2024 1556 by Sabine Mccullough RN  Outcome: Progressing     Problem: Nutrition Deficit:  Goal: Optimize nutritional status  1/4/2024 0024 by Spring Saldivar RN  Outcome: Progressing  1/3/2024 1556 by Sabine Mccullough RN  Outcome: Progressing     Problem: Safety - Medical Restraint  Goal: Remains free of injury from restraints (Restraint for Interference with Medical Device)  Description: INTERVENTIONS:  1. Determine that other, less restrictive measures have been tried or would not be effective before applying the restraint  2. Evaluate the patient's condition at the time of restraint application  3. Inform patient/family regarding the reason for restraint  4. Q2H: Monitor safety, psychosocial status, comfort, nutrition and hydration  1/4/2024 0024 by Spring Saldivar RN  Outcome: Progressing  1/3/2024 1556 by Jamel

## 2024-01-04 NOTE — PROGRESS NOTES
at 3.4 1/1 and supplemented with resolution 1/2.      Diet ADULT TUBE FEEDING; Gastrostomy; Peptide Based; Continuous; 65; No; 150; Q 4 hours; Protein; once per day   DVT Prophylaxis [x] Lovenox (therapeutic dosing since 12/25 and will clarify), []  Heparin, [] SCDs, [] Ambulation,  [] Eliquis, [] Xarelto  [] Coumadin   Code Status Full Code   Disposition From: home  Expected Disposition:  Long Term Care, will not need precert  Estimated Date of Discharge: 2-3 days, ~1/4  Patient requires continued admission due to IV diuresis, respiratory failure on 6L   Surrogate Decision Maker/ PONADJA Wang, child     Personally reviewed Lab Studies and Imaging     Drugs that require monitoring for toxicity include IV Lasix and the method of monitoring was Cr    Merem monitoring with CBC    Discussed with CM and probable SNF on DC but not medically ready        Subjective:     Chief Complaint: NESHA Terrell is a 63 y.o. female who presents with septic and cardiogenic shock.  On 6L and needed BIPAP overnight. No fever. Labs showing potassium normal. WBC stable      On high flow Oxygen   Not in acute distress      Review of Systems:      Pertinent positives and negatives discussed in HPI    Objective:     Intake/Output Summary (Last 24 hours) at 1/4/2024 1427  Last data filed at 1/4/2024 1001  Gross per 24 hour   Intake 1867.44 ml   Output 1700 ml   Net 167.44 ml        Vitals:   Vitals:    01/04/24 0930 01/04/24 1139 01/04/24 1148 01/04/24 1415   BP:  (!) 94/56  (!) 94/48   Pulse: (!) 107 87 89 85   Resp: 30 (!) 34 (!) 31 (!) 31   Temp:  98.5 °F (36.9 °C)     TempSrc:  Oral     SpO2: 100% 97% 97% 99%   Weight:       Height:             Physical Exam:      General: NAD, verbalizes some and follows commands  Eyes: EOMI  ENT: neck supple  Cardiovascular: Mild tachycardic, no murmurs  Respiratory: Diminished breath sounds bilaterally on nasal cannula  Gastrointestinal: Soft, non tender, PEG, non-distended, positive bowel  BILIRUBINUR SMALL 01/01/2024 02:02 AM    BLOODU Negative 01/01/2024 02:02 AM    GLUCOSEU Negative 01/01/2024 02:02 AM    KETUA Negative 01/01/2024 02:02 AM    AMORPHOUS 2+ 05/06/2015 01:35 PM     Urine Cultures:   Lab Results   Component Value Date/Time    LABURIN No growth at 18 to 36 hours 01/01/2024 02:02 AM     Blood Cultures:   Lab Results   Component Value Date/Time    BC No Growth after 4 days of incubation. 12/20/2023 12:53 AM     Lab Results   Component Value Date/Time    BLOODCULT2 No Growth after 4 days of incubation. 12/20/2023 12:53 AM     Organism:   Lab Results   Component Value Date/Time    ORG SARS CoV 2  by PCR 12/17/2023 09:53 AM         Electronically signed by Phi Barkley MD on 1/4/2024 at 2:27 PM

## 2024-01-04 NOTE — PROGRESS NOTES
Nina in [facility] called @0840 called to say pt sating at 88%. Checked pt and she is sound asleep. Woke pt and she began to sat in the mid 90's. Pt then said she wanted to get off bipap for day. Switched pt over to HFNC @ 6L already set. Pt began to cough while on NC and sats went down to 60's. Turned up HFNC to 10L and instructed pt to breathe in her nose and out her mouth. Pt o2 sat began to go back up. Dr Sin came in at this moment and says hold on putting pt on bipap, as I was doing, and to get pt an accapella. Dr Sin stayed at bedside while I got it. Came back and helped pt use accapella. Pt not able to blow very strong into it. Pt in low 80's. Turned up O2 to 15L. Dr. Sin and I tried encouraged her to cough. Dr Sin has put in STAT PCXR. Pt sat'ing in high 80's at this time. Dr Sin relays to pt pt on vapotherm if needed. Called Don in [facility] who came and evaluated. Pt sating in the 90's at this point and no VapoTherm warrented at this point per [facility].     PCXR in room @0850. PerfectServed Dr Sin that it was done.    PT O2 sat at 92% on 15L HFNC at this time (@0902). Awaiting Dr. Sin response/orders, if any.    Electronically signed by Sabine Mccullough RN on 1/4/2024 at 9:04 AM         01/04/24 0758 01/04/24 0830 01/04/24 0835   Vital Signs   Pulse 96 (!) 107 (!) 104   Respirations 25 25 30   Oxygen Therapy   SpO2 99 % 90 % (!) 86 %   Pulse via Oximetry  --  109 beats per minute 103 beats per minute   O2 Device  --  PAP (positive airway pressure) PAP (positive airway pressure)   Skin Assessment Clean, dry, & intact  --   --    Skin Protection for O2 Device Yes  --   --    Orientation Middle  --   --    Location Nose  --   --    Intervention(s) Skin Barrier  --   --    FiO2  40 % 40 % 40 %   O2 Flow Rate (L/min)  --   --   --       01/04/24 0840 01/04/24 0845 01/04/24 0847   Vital Signs   Pulse (!) 116 (!) 118 (!) 114   Respirations 29 (!) 36 (!) 42   Oxygen Therapy   SpO2 (!) 63 %  (pt coughing) (!) 80 % (!) 89 %   Pulse  via Oximetry 116 beats per minute 119 beats per minute 114 beats per minute   O2 Device High flow nasal cannula High flow nasal cannula High flow nasal cannula   Skin Assessment  --   --   --    Skin Protection for O2 Device  --   --   --    Orientation  --   --   --    Location  --   --   --    Intervention(s)  --   --   --    FiO2   --   --   --    O2 Flow Rate (L/min) 6 L/min  (turned up to 10L) 10 L/min  (turned up to 15L) 15 L/min      01/04/24 0848 01/04/24 0849 01/04/24 0850   Vital Signs   Pulse (!) 112 (!) 111 (!) 113   Respirations 28 24 (!) 40   Oxygen Therapy   SpO2 90 % 91 % 90 %   Pulse via Oximetry 110 beats per minute 111 beats per minute 112 beats per minute   O2 Device High flow nasal cannula High flow nasal cannula High flow nasal cannula   Skin Assessment  --   --   --    Skin Protection for O2 Device  --   --   --    Orientation  --   --   --    Location  --   --   --    Intervention(s)  --   --   --    FiO2   --   --   --    O2 Flow Rate (L/min) 15 L/min 15 L/min 15 L/min      01/04/24 0900 01/04/24 0905   Vital Signs   Pulse (!) 111 (!) 111   Respirations (!) 40 (!) 44   Oxygen Therapy   SpO2 91 % 92 %   Pulse via Oximetry 110 beats per minute 110 beats per minute   O2 Device High flow nasal cannula High flow nasal cannula   Skin Assessment  --   --    Skin Protection for O2 Device  --   --    Orientation  --   --    Location  --   --    Intervention(s)  --   --    FiO2   --   --    O2 Flow Rate (L/min) 15 L/min 15 L/min

## 2024-01-04 NOTE — PROGRESS NOTES
Pulmonary Critical Care Progress Note     Patient's name:  Lilly Terrell  Medical Record Number: [id number]  Patient's account/billing number: [id number]  Patient's YOB: 1960  Age: 63 y.o.  Date of Admission: 12/16/2023  3:09 PM  Date of Consult: 1/4/2024      Primary Care Physician: Tyson Hahn      Code Status: Full Code    Chief complaint: Acute respiratory failure and hypoxia    Assessment and Plan     Acute respiratory with hypoxia less than 90% on room air extubated 12/31  Acute on chronic CHF  Aspiration PNA  Covid 19 PNA  Septic shock  Elevated troponins  Cerebral palsy   Dysphagia s/p PEG     Plan:  Worsening hypoxia secondary to mucous plugs and ineffective cough   Titrate oxygen as needed to keep sats more than than 90%  Check ABG  Chest x-ray reviewed mild bibasilar disease  Antibiotics per ID  GI prophylaxis  Bipap qhs      Overnight:  Hypoxic this morning requiring significant more oxygen      REVIEW OF SYSTEMS:  Review of Systems -   Denied chest pain  Slightly tachycardic in the  No nausea vomiting diarrhea    Physical Exam:    Vitals: BP (!) 94/56   Pulse 89   Temp 98.5 °F (36.9 °C) (Oral)   Resp (!) 31   Ht 1.727 m (5' 7.99\")   Wt 89.2 kg (196 lb 10.4 oz)   SpO2 97%   BMI 29.91 kg/m²     Last Body weight:   Wt Readings from Last 3 Encounters:   01/04/24 89.2 kg (196 lb 10.4 oz)   11/30/22 85.2 kg (187 lb 13.3 oz)   10/06/21 83.7 kg (184 lb 8.4 oz)       Body Mass Index : Body mass index is 29.91 kg/m².      Intake and Output summary:   Intake/Output Summary (Last 24 hours) at 1/4/2024 1306  Last data filed at 1/4/2024 1001  Gross per 24 hour   Intake 1867.44 ml   Output 1700 ml   Net 167.44 ml       Physical Examination:     Gen:  No acute distress sedated on MV   Eyes: PERRL. Anicteric sclera. No conjunctival injection.   ENT: No discharge. Posterior oropharynx clear. External appearance of ears and nose normal.  Neck: Trachea midline. No mass   Resp:  Diminished bilaterally no active wheezing  CV: Regular rate. Regular rhythm. No murmur or rub. no edema.   GI: Soft, Non-tender. Non-distended. +BS  Skin: Warm, dry, w/o erythema.   Lymph: No cervical or supraclavicular LAD.   M/S: No cyanosis. No clubbing.    Neuro: awake, lethargic on bipap      Laboratory findings:-    CBC:   Recent Labs     01/04/24  0450   WBC 9.3   HGB 7.8*   *     BMP:    Recent Labs     01/02/24  0530 01/03/24  0630 01/04/24  0450    140 143   K 3.7 3.6 3.6   CL 98* 95* 98*   CO2 39* 40* 43*   BUN 19 17 16   CREATININE <0.5* <0.5* <0.5*   GLUCOSE 154* 175* 151*     S. Calcium:  Recent Labs     01/04/24  0450   CALCIUM 8.3       S. Magnesium:  Recent Labs     01/03/24  0630   MG 2.20     S. Phosphorus:  No results for input(s): \"PHOS\" in the last 72 hours.    S. Glucose:  Recent Labs     01/04/24  0350 01/04/24  0733 01/04/24  1214   POCGLU 160* 134* 167*           Radiology Review:  Pertinent images / reports were reviewed as a part of this visit.          Khris Grant MD, M.D.            1/4/2024, 1:06 PM

## 2024-01-04 NOTE — PROGRESS NOTES
SLP NOTE    Dysphagia tx attempted. Chart reviewed and patient discussed with RN. AM events noted. ST to hold this date d/t concern for fragile respiratory status. ST to re-attempt 1/5/2024 pending status unless otherwise notified.    Thank you.  Aleida Douglas MA, CCC-SLP, #[id number]  Speech-Language Pathologist  Portable phone: [phone]

## 2024-01-04 NOTE — FLOWSHEET NOTE
Pharyngitis: Strep (Confirmed)     You have had a positive test for strep throat. Strep throat is a contagious illness. It is spread by coughing, kissing or by touching others after touching your mouth or nose. Symptoms include throat pain which is worse with swallowing, aching all over, headache and fever. It is treated with antibiotic medication. This should help you start to feel better within 1-2 days.  Home care  · Rest at home. Drink plenty of fluids to avoid dehydration.  · No work or school for the first 2 days of taking the antibiotics. After this time, you will not be contagious. You can then return to school or work if you are feeling better.   · The antibiotic medication must be taken for the full 10 days, even if you feel better. This is very important to ensure the infection is treated. It is also important to prevent drug-resistent organisms from developing. If you were given an antibiotic shot, no more antibiotics are needed.  · You may use acetaminophen (Tylenol) or ibuprofen (Motrin, Advil) to control pain or fever, unless another medicine was prescribed for this. (NOTE: If you have chronic liver or kidney disease or ever had a stomach ulcer or GI bleeding, talk with your doctor before using these medicines.)  · Throat lozenges or sprays (such as Chloraseptic) help reduce pain. Gargling with warm salt water will also reduce throat pain. Dissolve 1/2 teaspoon of salt in 1 glass of warm water. This may be useful just before meals.   · Soft foods are okay. Avoid salty or spicy foods.  Follow-up care  Follow up with your healthcare provider or our staff if you are not improving over the next week.  When to seek medical advice  Call your healthcare provider right away if any of these occur:  · Fever as directed by your doctor   · New or worsening ear pain, sinus pain, or headache  · Painful lumps in the back of neck  · Stiff neck  · Lymph nodes are getting larger or becoming soft in the  After pt came off bipap, pt place on HFNC @ 7L. Pt O2 sat WNL. Pt weaned to 6L later and kelsea well. Pt tried on 5L later in the day and kelsea well for a little while then started to dip into the high 80's. Pt with no obvious distress. Pt O2 increased back to 6L and O2 recovered back to the high 90's quickly.      01/03/24 1230 01/03/24 1300 01/03/24 1400   Vital Signs   Pulse 89 87 87   Heart Rate Source Monitor Monitor Monitor   Respirations 21 (!) 32 (!) 33   BP  --   --   --    MAP (Calculated)  --   --   --    Oxygen Therapy   SpO2 98 % 100 % 97 %   Pulse Oximetry Type Continuous Continuous Continuous   Pulse via Oximetry 88 beats per minute 87 beats per minute 88 beats per minute   Pulse Oximeter Device Mode Continuous Continuous Continuous   O2 Device High flow nasal cannula High flow nasal cannula High flow nasal cannula   O2 Flow Rate (L/min) 7 L/min 7 L/min 7 L/min      01/03/24 1500 01/03/24 1530 01/03/24 1645   Vital Signs   Pulse 85 85 89   Heart Rate Source Monitor Monitor Monitor   Respirations 22 27 18   BP  --   --   --    MAP (Calculated)  --   --   --    Oxygen Therapy   SpO2 100 % 100 % 100 %   Pulse Oximetry Type Continuous Continuous Continuous   Pulse via Oximetry 85 beats per minute 85 beats per minute 88 beats per minute   Pulse Oximeter Device Mode Continuous Continuous Continuous   O2 Device High flow nasal cannula High flow nasal cannula High flow nasal cannula   O2 Flow Rate (L/min) (S)  6 L/min  (weaned to 6L) 6 L/min 6 L/min      01/03/24 1700 01/03/24 1800 01/03/24 1814   Vital Signs   Pulse 93 91 89   Heart Rate Source Monitor Monitor Monitor   Respirations 21 26 22   BP  --   --  109/63   MAP (Calculated)  --   --  78   Oxygen Therapy   SpO2 97 % 99 % 97 %   Pulse Oximetry Type Continuous Continuous Continuous   Pulse via Oximetry 92 beats per minute 90 beats per minute 88 beats per minute   Pulse Oximeter Device Mode Continuous Continuous Continuous   O2 Device High flow nasal cannula  middle  · Inability to swallow liquids, excessive drooling, or inability to open mouth wide due to throat pain  · Signs of dehydration (very dark urine or no urine, sunken eyes, dizziness)  · Trouble breathing or noisy breathing  · Muffled voice  · New rash  © 8491-7515 [employer]. [address] [place] [address], [place] [address]. All rights reserved. This information is not intended as a substitute for professional medical care. Always follow your healthcare professional's instructions.

## 2024-01-04 NOTE — CARE COORDINATION
ANDREINA completed chart review, nursing rounds completed. Pt is from [facility], Needing to start looking into LTACH - high O2 demands - she will not be able to D/C to SNF for a while.    ANDREINA placed referrals to    [facility]- yes, coming to see tomorrow.    [facility] following,     Reynold Can, BRADY, [facility] Social Work Case Management   Phone: [phone]  Fax: [phone]

## 2024-01-05 LAB
ANION GAP SERPL CALCULATED.3IONS-SCNC: 7 MMOL/L (ref 3–16)
BASE EXCESS BLDV CALC-SCNC: 8.5 MMOL/L
BASOPHILS # BLD: 0 K/UL (ref 0–0.2)
BASOPHILS NFR BLD: 0.5 %
BUN SERPL-MCNC: 14 MG/DL (ref 7–20)
CALCIUM SERPL-MCNC: 8.5 MG/DL (ref 8.3–10.6)
CHLORIDE SERPL-SCNC: 100 MMOL/L (ref 99–110)
CO2 BLDV-SCNC: 36 MMOL/L
CO2 SERPL-SCNC: 33 MMOL/L (ref 21–32)
COHGB MFR BLDV: 2.4 %
CREAT SERPL-MCNC: <0.5 MG/DL (ref 0.6–1.2)
DEPRECATED RDW RBC AUTO: 19.9 % (ref 12.4–15.4)
EOSINOPHIL # BLD: 0.3 K/UL (ref 0–0.6)
EOSINOPHIL NFR BLD: 2.7 %
GFR SERPLBLD CREATININE-BSD FMLA CKD-EPI: >60 ML/MIN/{1.73_M2}
GLUCOSE BLD-MCNC: 136 MG/DL (ref 70–99)
GLUCOSE BLD-MCNC: 140 MG/DL (ref 70–99)
GLUCOSE BLD-MCNC: 174 MG/DL (ref 70–99)
GLUCOSE BLD-MCNC: 188 MG/DL (ref 70–99)
GLUCOSE BLD-MCNC: 193 MG/DL (ref 70–99)
GLUCOSE SERPL-MCNC: 138 MG/DL (ref 70–99)
HCO3 BLDV-SCNC: 34 MMOL/L (ref 23–29)
HCT VFR BLD AUTO: 23.8 % (ref 36–48)
HGB BLD-MCNC: 7.8 G/DL (ref 12–16)
LYMPHOCYTES # BLD: 1.7 K/UL (ref 1–5.1)
LYMPHOCYTES NFR BLD: 17.7 %
MCH RBC QN AUTO: 33.9 PG (ref 26–34)
MCHC RBC AUTO-ENTMCNC: 32.8 G/DL (ref 31–36)
MCV RBC AUTO: 103.3 FL (ref 80–100)
METHGB MFR BLDV: 0.5 %
MONOCYTES # BLD: 0.7 K/UL (ref 0–1.3)
MONOCYTES NFR BLD: 7.5 %
NEUTROPHILS # BLD: 7 K/UL (ref 1.7–7.7)
NEUTROPHILS NFR BLD: 71.6 %
O2 THERAPY: ABNORMAL
PCO2 BLDV: 55 MMHG (ref 40–50)
PERFORMED ON: ABNORMAL
PH BLDV: 7.41 [PH] (ref 7.35–7.45)
PLATELET # BLD AUTO: 140 K/UL (ref 135–450)
PMV BLD AUTO: 10.4 FL (ref 5–10.5)
PO2 BLDV: 41 MMHG
POTASSIUM SERPL-SCNC: 3.6 MMOL/L (ref 3.5–5.1)
RBC # BLD AUTO: 2.31 M/UL (ref 4–5.2)
SAO2 % BLDV: 76 %
SODIUM SERPL-SCNC: 140 MMOL/L (ref 136–145)
WBC # BLD AUTO: 9.8 K/UL (ref 4–11)

## 2024-01-05 PROCEDURE — 2580000003 HC RX 258: Performed by: INTERNAL MEDICINE

## 2024-01-05 PROCEDURE — C9113 INJ PANTOPRAZOLE SODIUM, VIA: HCPCS | Performed by: INTERNAL MEDICINE

## 2024-01-05 PROCEDURE — 99232 SBSQ HOSP IP/OBS MODERATE 35: CPT | Performed by: INTERNAL MEDICINE

## 2024-01-05 PROCEDURE — 6360000002 HC RX W HCPCS: Performed by: INTERNAL MEDICINE

## 2024-01-05 PROCEDURE — 80048 BASIC METABOLIC PNL TOTAL CA: CPT

## 2024-01-05 PROCEDURE — 85025 COMPLETE CBC W/AUTO DIFF WBC: CPT

## 2024-01-05 PROCEDURE — 94640 AIRWAY INHALATION TREATMENT: CPT

## 2024-01-05 PROCEDURE — 2700000000 HC OXYGEN THERAPY PER DAY

## 2024-01-05 PROCEDURE — 94761 N-INVAS EAR/PLS OXIMETRY MLT: CPT

## 2024-01-05 PROCEDURE — 6370000000 HC RX 637 (ALT 250 FOR IP): Performed by: HOSPITALIST

## 2024-01-05 PROCEDURE — 6360000002 HC RX W HCPCS: Performed by: HOSPITALIST

## 2024-01-05 PROCEDURE — 2060000000 HC ICU INTERMEDIATE R&B

## 2024-01-05 PROCEDURE — 82803 BLOOD GASES ANY COMBINATION: CPT

## 2024-01-05 PROCEDURE — 6370000000 HC RX 637 (ALT 250 FOR IP): Performed by: NURSE PRACTITIONER

## 2024-01-05 PROCEDURE — 6360000002 HC RX W HCPCS

## 2024-01-05 PROCEDURE — 94660 CPAP INITIATION&MGMT: CPT

## 2024-01-05 RX ORDER — FUROSEMIDE 10 MG/ML
40 INJECTION INTRAMUSCULAR; INTRAVENOUS ONCE
Status: COMPLETED | OUTPATIENT
Start: 2024-01-05 | End: 2024-01-05

## 2024-01-05 RX ADMIN — METOPROLOL TARTRATE 25 MG: 25 TABLET, FILM COATED ORAL at 10:53

## 2024-01-05 RX ADMIN — AMITRIPTYLINE HYDROCHLORIDE 50 MG: 50 TABLET, FILM COATED ORAL at 21:17

## 2024-01-05 RX ADMIN — CLONAZEPAM 2 MG: 1 TABLET ORAL at 10:54

## 2024-01-05 RX ADMIN — ENOXAPARIN SODIUM 100 MG: 100 INJECTION SUBCUTANEOUS at 10:52

## 2024-01-05 RX ADMIN — DANTROLENE SODIUM 200 MG: 25 CAPSULE ORAL at 10:54

## 2024-01-05 RX ADMIN — METOPROLOL TARTRATE 25 MG: 25 TABLET, FILM COATED ORAL at 21:17

## 2024-01-05 RX ADMIN — SERTRALINE 150 MG: 100 TABLET, FILM COATED ORAL at 10:53

## 2024-01-05 RX ADMIN — CLONAZEPAM 2 MG: 1 TABLET ORAL at 21:17

## 2024-01-05 RX ADMIN — PREGABALIN 200 MG: 100 CAPSULE ORAL at 15:03

## 2024-01-05 RX ADMIN — ALBUTEROL SULFATE 2.5 MG: 2.5 SOLUTION RESPIRATORY (INHALATION) at 07:40

## 2024-01-05 RX ADMIN — PREGABALIN 200 MG: 100 CAPSULE ORAL at 10:53

## 2024-01-05 RX ADMIN — SODIUM CHLORIDE SOLN NEBU 3% 4 ML: 3 NEBU SOLN at 07:50

## 2024-01-05 RX ADMIN — SODIUM CHLORIDE, PRESERVATIVE FREE 40 MG: 5 INJECTION INTRAVENOUS at 10:53

## 2024-01-05 RX ADMIN — DANTROLENE SODIUM 200 MG: 25 CAPSULE ORAL at 21:17

## 2024-01-05 RX ADMIN — SODIUM CHLORIDE SOLN NEBU 3% 4 ML: 3 NEBU SOLN at 20:57

## 2024-01-05 RX ADMIN — SODIUM CHLORIDE, PRESERVATIVE FREE 10 ML: 5 INJECTION INTRAVENOUS at 10:55

## 2024-01-05 RX ADMIN — POLYETHYLENE GLYCOL 3350 17 G: 17 POWDER, FOR SOLUTION ORAL at 10:52

## 2024-01-05 RX ADMIN — SODIUM CHLORIDE, PRESERVATIVE FREE 10 ML: 5 INJECTION INTRAVENOUS at 21:19

## 2024-01-05 RX ADMIN — ALBUTEROL SULFATE 2.5 MG: 2.5 SOLUTION RESPIRATORY (INHALATION) at 12:32

## 2024-01-05 RX ADMIN — LEVOTHYROXINE SODIUM 50 MCG: 0.05 TABLET ORAL at 06:14

## 2024-01-05 RX ADMIN — FUROSEMIDE 20 MG: 20 TABLET ORAL at 10:53

## 2024-01-05 RX ADMIN — PREGABALIN 200 MG: 100 CAPSULE ORAL at 21:17

## 2024-01-05 RX ADMIN — FUROSEMIDE 20 MG: 20 TABLET ORAL at 17:17

## 2024-01-05 RX ADMIN — ENOXAPARIN SODIUM 100 MG: 100 INJECTION SUBCUTANEOUS at 21:17

## 2024-01-05 RX ADMIN — ALBUTEROL SULFATE 2.5 MG: 2.5 SOLUTION RESPIRATORY (INHALATION) at 20:57

## 2024-01-05 RX ADMIN — FUROSEMIDE 40 MG: 10 INJECTION, SOLUTION INTRAMUSCULAR; INTRAVENOUS at 15:03

## 2024-01-05 RX ADMIN — ASPIRIN 81 MG: 81 TABLET, CHEWABLE ORAL at 10:54

## 2024-01-05 ASSESSMENT — PAIN SCALES - GENERAL
PAINLEVEL_OUTOF10: 0

## 2024-01-05 NOTE — PLAN OF CARE
Problem: Discharge Planning  Goal: Discharge to home or other facility with appropriate resources  Outcome: Progressing     Problem: Neurosensory - Adult  Goal: Achieves stable or improved neurological status  Outcome: Progressing  Goal: Absence of seizures  Outcome: Progressing  Goal: Remains free of injury related to seizures activity  Outcome: Progressing  Goal: Achieves maximal functionality and self care  Outcome: Progressing     Problem: Respiratory - Adult  Goal: Achieves optimal ventilation and oxygenation  Outcome: Progressing     Problem: Skin/Tissue Integrity - Adult  Goal: Skin integrity remains intact  Outcome: Progressing  Goal: Incisions, wounds, or drain sites healing without S/S of infection  Outcome: Progressing  Goal: Oral mucous membranes remain intact  Outcome: Progressing     Problem: Musculoskeletal - Adult  Goal: Return mobility to safest level of function  Outcome: Progressing  Goal: Maintain proper alignment of affected body part  Outcome: Progressing  Goal: Return ADL status to a safe level of function  Outcome: Progressing     Problem: Gastrointestinal - Adult  Goal: Minimal or absence of nausea and vomiting  Outcome: Progressing  Goal: Maintains or returns to baseline bowel function  Outcome: Progressing  Goal: Maintains adequate nutritional intake  Outcome: Progressing     Problem: Genitourinary - Adult  Goal: Absence of urinary retention  Outcome: Progressing     Problem: Safety - Adult  Goal: Free from fall injury  Outcome: Progressing     Problem: Skin/Tissue Integrity  Goal: Absence of new skin breakdown  Description: 1.  Monitor for areas of redness and/or skin breakdown  2.  Assess vascular access sites hourly  3.  Every 4-6 hours minimum:  Change oxygen saturation probe site  4.  Every 4-6 hours:  If on nasal continuous positive airway pressure, respiratory therapy assess nares and determine need for appliance change or resting period.  Outcome: Progressing     Problem:

## 2024-01-05 NOTE — PROGRESS NOTES
Pulmonary Critical Care Progress Note     Patient's name:  Lilly Terrell  Medical Record Number: [id number]  Patient's account/billing number: [id number]  Patient's YOB: 1960  Age: 63 y.o.  Date of Admission: 12/16/2023  3:09 PM  Date of Consult: 1/5/2024      Primary Care Physician: Tyson Hahn      Code Status: Full Code    Chief complaint: Acute respiratory failure and hypoxia    Assessment and Plan     Acute respiratory with hypoxia less than 90% on room air extubated 12/31  Acute on chronic CHF  Aspiration PNA  Covid 19 PNA  Septic shock  Elevated troponins  Cerebral palsy   Dysphagia s/p PEG     Plan:  Titrate oxygen as needed to keep sats more than than 90%  Bipap Qhs  Done with antibiotics  3% and neb albuterol with Acapella   Discharge planning by primary team  Continue bipap Qhs on discharge.       Overnight:  Hypoxia better  No fever        REVIEW OF SYSTEMS:  Review of Systems -   Denied chest pain  Slightly tachycardic in the  No nausea vomiting diarrhea    Physical Exam:    Vitals: /60   Pulse 99   Temp 97.7 °F (36.5 °C) (Axillary)   Resp 24   Ht 1.727 m (5' 7.99\")   Wt 89 kg (196 lb 3.4 oz)   SpO2 96%   BMI 29.84 kg/m²     Last Body weight:   Wt Readings from Last 3 Encounters:   01/05/24 89 kg (196 lb 3.4 oz)   11/30/22 85.2 kg (187 lb 13.3 oz)   10/06/21 83.7 kg (184 lb 8.4 oz)       Body Mass Index : Body mass index is 29.84 kg/m².      Intake and Output summary:   Intake/Output Summary (Last 24 hours) at 1/5/2024 0851  Last data filed at 1/5/2024 0646  Gross per 24 hour   Intake 1326.01 ml   Output 2390 ml   Net -1063.99 ml       Physical Examination:     Gen:  No acute distress sedated on MV   Eyes: PERRL. Anicteric sclera. No conjunctival injection.   ENT: No discharge. Posterior oropharynx clear. External appearance of ears and nose normal.  Neck: Trachea midline. No mass   Resp: Diminished bilaterally no active wheezing  CV: Regular rate. Regular  rhythm. No murmur or rub. no edema.   GI: Soft, Non-tender. Non-distended. +BS  Skin: Warm, dry, w/o erythema.   Lymph: No cervical or supraclavicular LAD.   M/S: No cyanosis. No clubbing.    Neuro: awake, lethargic on bipap      Laboratory findings:-    CBC:   Recent Labs     01/04/24  0450   WBC 9.3   HGB 7.8*   *     BMP:    Recent Labs     01/03/24  0630 01/04/24  0450    143   K 3.6 3.6   CL 95* 98*   CO2 40* 43*   BUN 17 16   CREATININE <0.5* <0.5*   GLUCOSE 175* 151*     S. Calcium:  Recent Labs     01/04/24  0450   CALCIUM 8.3       S. Magnesium:  Recent Labs     01/03/24  0630   MG 2.20     S. Phosphorus:  No results for input(s): \"PHOS\" in the last 72 hours.    S. Glucose:  Recent Labs     01/04/24  2328 01/05/24  0349 01/05/24  0807   POCGLU 145* 140* 188*           Radiology Review:  Pertinent images / reports were reviewed as a part of this visit.          Khris Grant MD, M.D.            1/5/2024, 8:51 AM

## 2024-01-05 NOTE — PROGRESS NOTES
Comprehensive Nutrition Assessment    Type and Reason for Visit:  Reassess    Nutrition Recommendations/Plan:   Continue current TF regimen as ordered   Monitor respiratory and swallow function  Monitor goals of care      Malnutrition Assessment:  Malnutrition Status:  At risk for malnutrition (Comment) (12/18/23 0976)    Context:  Chronic Illness     Findings of the 6 clinical characteristics of malnutrition:  Energy Intake:  No significant decrease in energy intake  Weight Loss:  No significant weight loss     Body Fat Loss:  No significant body fat loss     Muscle Mass Loss:  No significant muscle mass loss    Fluid Accumulation:  Unable to assess     Strength:  Not Performed    Nutrition Assessment:    Follow up:  Continues on enteral nutrition, Vital 1.2 AF at goal rate of 65 ml/hr with protein modular once per day.  BG and labs WNL, +BM yesterday.  Will continue current TF regimen for now.  [facility] eval ordered vs [facility] due to high O2 needs, currently requiring 10 liters.    Nutrition Related Findings:    last BM on 1/4; BG less than 180 mg/dl Wound Type: Stage I, Pressure Injury       Current Nutrition Intake & Therapies:    Average Meal Intake: NPO  Average Supplements Intake: NPO  ADULT TUBE FEEDING; Gastrostomy; Peptide Based; Continuous; 65; No; 150; Q 4 hours; Protein; once per day  Current Tube Feeding (TF) Orders:  Feeding Route: Gastrostomy  Formula: Peptide Based  Schedule: Continuous (at 65 ml per hour)  Feeding Regimen: Vital 1.2 AF formula  Additives/Modulars: Protein (1 time per day per feeding tube. Do not mix directly with TF)  Water Flushes: 150 ml every 4 hours  Current TF & Flush Orders Provides: Vital 1.2 AF at 65 ml per hour (based on 20 hour run r/t routine Nursing care), + 150 ml water flush every 4 hours offers; 1300 ml TV / 1648 kcals (1560 (TF) + 80 (Prosource) / 118 gms pro (98 (TF) + 20 (Prosource) / 1954 ml free water (1054 (TF) + 900 (flush per day.  Goal TF & Flush Orders

## 2024-01-05 NOTE — FLOWSHEET NOTE
Pt came off stayed on 15L HFNC for a while after resp episode this am. Pt weaned down to 10L during my shift and o2 sats sustained WNL. Pt experienced no more obvious resp distress.     01/04/24 0915 01/04/24 0925 01/04/24 0930   Vital Signs   Temp  --   --   --    Temp Source  --   --   --    Pulse (!) 116  --  (!) 107   Heart Rate Source  --   --   --    Respirations (!) 31  --  30   BP  --   --   --    MAP (Calculated)  --   --   --    MAP (mmHg)  --   --   --    BP Location  --   --   --    BP Method  --   --   --    Patient Position  --   --   --    Pain Assessment   Pain Location  --   --   --    Non-Pharmaceutical Pain Intervention(s)  --   --   --    Oxygen Therapy   SpO2 90 % 96 % 100 %   Pulse via Oximetry  --   --  107 beats per minute   O2 Device High flow nasal cannula High flow nasal cannula High flow nasal cannula   O2 Flow Rate (L/min) 15 L/min 14 L/min 14 L/min      01/04/24 1022 01/04/24 1139 01/04/24 1148   Vital Signs   Temp  --  98.5 °F (36.9 °C)  --    Temp Source  --  Oral  --    Pulse  --  87 89   Heart Rate Source  --  Monitor  --    Respirations  --  (!) 34 (!) 31   BP  --  (!) 94/56  --    MAP (Calculated)  --  69  --    MAP (mmHg)  --   --   --    BP Location  --  Right lower arm  --    BP Method  --  Automatic  --    Patient Position  --  Lying left side  --    Pain Assessment   Pain Location Generalized  --   --    Non-Pharmaceutical Pain Intervention(s) Emotional support;Repositioned;Rest  --   --    Oxygen Therapy   SpO2  --  97 % 97 %   Pulse via Oximetry  --   --   --    O2 Device  --  High flow nasal cannula High flow nasal cannula   O2 Flow Rate (L/min)  --  14 L/min 13 L/min      01/04/24 1415 01/04/24 1549 01/04/24 1710   Vital Signs   Temp  --  98.3 °F (36.8 °C)  --    Temp Source  --  Oral  --    Pulse 85 84 90   Heart Rate Source  --  Monitor  --    Respirations (!) 31 22 28   BP (!) 94/48  (recheck) (!) 98/53  --    MAP (Calculated) 63 68  --    MAP (mmHg) (!) 62  --   --     BP Location Right lower arm Right lower arm  --    BP Method Automatic Automatic  --    Patient Position High fowlers Semi fowlers  --    Pain Assessment   Pain Location  --   --   --    Non-Pharmaceutical Pain Intervention(s)  --   --   --    Oxygen Therapy   SpO2 99 % 100 % 98 %   Pulse via Oximetry 84 beats per minute  --  90 beats per minute   O2 Device  --  High flow nasal cannula  --    O2 Flow Rate (L/min) (S)  12 L/min  (turned down to 12L)  --  (S)  11 L/min  (Turned down to 11L)      01/04/24 1800 01/04/24 1810   Vital Signs   Temp  --   --    Temp Source  --   --    Pulse 89 92   Heart Rate Source  --   --    Respirations 19 22   BP  --   --    MAP (Calculated)  --   --    MAP (mmHg)  --   --    BP Location  --   --    BP Method  --   --    Patient Position  --   --    Pain Assessment   Pain Location  --   --    Non-Pharmaceutical Pain Intervention(s)  --   --    Oxygen Therapy   SpO2 100 % 100 %   Pulse via Oximetry 89 beats per minute 92 beats per minute   O2 Device  --   --    O2 Flow Rate (L/min) 11 L/min (S)  10 L/min  (Turned down to 10L)

## 2024-01-05 NOTE — CARE COORDINATION
SW completed chart review, nursing rounds completed, pt was recommended for an LTACH, referrals placed with [facility] and [facility], both accepted. Pt is selecting [facility]. Will require a precert, they will initiate today, and let CM know of precert approval. Location of [facility] is not set yet.   ANDREINA following,     Reynold Can LMSW, [facility] Social Work Case Management   Phone: [phone]  Fax: [phone]

## 2024-01-05 NOTE — PROGRESS NOTES
SLP NOTE    Dysphagia tx attempted. Chart reviewed and patient discussed with RN. ST to hold this date d/t persistent BiPAP need. ST to re-attempt 1/8/2024 pending status unless otherwise notified.    Thank you.  Aleida Douglas MA, CCC-SLP, #[id number]  Speech-Language Pathologist  Portable phone: [phone]

## 2024-01-05 NOTE — PROGRESS NOTES
V2.[place]    [facility] [place]      Name:  Lilly Terrell /Age/Sex: 1960  (63 y.o. female)   MRN & CSN:  [id number] & [id number] Encounter Date/Time: 2024 4:46 PM EST   Location:  [id number]-7815/5107-01 PCP: Tyson Hahn     Attending:Phi Barkley MD       Hospital Day: 21    Assessment and Recommendations   Lilly Terrell is a 63 y.o. female with pmh of IBS, cerebral palsy who presents with septic shock secondary to COVID pneumonia, cardiogenic shock on lasix infusion    Plan:   Septic shock: In the setting of COVID and aspiration pneumonia.  Has been on meropenem since  and was having low grade fevers but none as of  thus far.  Blood cultures with no growth, urinary antigens negative. WBC stable at 14.8 . Further recommendations per infectious disease and pulmonology/critical care following.          Septic shock resolved       Cardiogenic shock: Likely Takotsubo cardiomyopathy had a previous echo showing EF of 20% with repeat improved to 50% on .  Cardiology following with recommendations to continue Lasix IV 60 mg daily as of  and no longer on pressors. Continue BB.  Follow-up with cardiology as outpatient to evaluate for ACE/ARB, Aldactone, SLG 2                 Cardiogenic shock resolved       Acute respiratory failure with hypoxia: Secondary to above issues and extubated 2023 per pulmonology/critical care.  Weaning off Precedex drip.  Meropenem IV day 13.  On 6L NC as of  and will continue to wean       On 1/3 : on BIPAP today am , pulmonary ordered VBG , wean off       oxygen as able to        : on high flow oxygen , plan of care discussed with pulmonology             Worsening hypoxia secondary to mucous plugs and ineffective cough   Titrate oxygen as needed to keep sats more than than 90%  Check ABG  Chest x-ray reviewed mild bibasilar disease        : on BIPAP today am , got diamox 500 mg iv on  , had metabolic alkalosis , bicarb improved from 43 to  01/01/2024 02:02 AM    LEUKOCYTESUR TRACE 01/01/2024 02:02 AM    UROBILINOGEN 2.0 01/01/2024 02:02 AM    BILIRUBINUR SMALL 01/01/2024 02:02 AM    BLOODU Negative 01/01/2024 02:02 AM    GLUCOSEU Negative 01/01/2024 02:02 AM    KETUA Negative 01/01/2024 02:02 AM    AMORPHOUS 2+ 05/06/2015 01:35 PM     Urine Cultures:   Lab Results   Component Value Date/Time    LABURIN No growth at 18 to 36 hours 01/01/2024 02:02 AM     Blood Cultures:   Lab Results   Component Value Date/Time    BC No Growth after 4 days of incubation. 12/20/2023 12:53 AM     Lab Results   Component Value Date/Time    BLOODCULT2 No Growth after 4 days of incubation. 12/20/2023 12:53 AM     Organism:   Lab Results   Component Value Date/Time    ORG SARS CoV 2  by PCR 12/17/2023 09:53 AM         Electronically signed by Phi Barkley MD on 1/5/2024 at 1:42 PM

## 2024-01-06 LAB
ANION GAP SERPL CALCULATED.3IONS-SCNC: 8 MMOL/L (ref 3–16)
BASOPHILS # BLD: 0.1 K/UL (ref 0–0.2)
BASOPHILS NFR BLD: 0.6 %
BUN SERPL-MCNC: 18 MG/DL (ref 7–20)
CALCIUM SERPL-MCNC: 8.7 MG/DL (ref 8.3–10.6)
CHLORIDE SERPL-SCNC: 99 MMOL/L (ref 99–110)
CO2 SERPL-SCNC: 33 MMOL/L (ref 21–32)
CREAT SERPL-MCNC: <0.5 MG/DL (ref 0.6–1.2)
DEPRECATED RDW RBC AUTO: 20.5 % (ref 12.4–15.4)
EOSINOPHIL # BLD: 0.3 K/UL (ref 0–0.6)
EOSINOPHIL NFR BLD: 2.6 %
GFR SERPLBLD CREATININE-BSD FMLA CKD-EPI: >60 ML/MIN/{1.73_M2}
GLUCOSE BLD-MCNC: 168 MG/DL (ref 70–99)
GLUCOSE BLD-MCNC: 173 MG/DL (ref 70–99)
GLUCOSE BLD-MCNC: 179 MG/DL (ref 70–99)
GLUCOSE BLD-MCNC: 180 MG/DL (ref 70–99)
GLUCOSE BLD-MCNC: 186 MG/DL (ref 70–99)
GLUCOSE BLD-MCNC: 187 MG/DL (ref 70–99)
GLUCOSE BLD-MCNC: 243 MG/DL (ref 70–99)
GLUCOSE SERPL-MCNC: 181 MG/DL (ref 70–99)
HCT VFR BLD AUTO: 24.5 % (ref 36–48)
HGB BLD-MCNC: 8.1 G/DL (ref 12–16)
LYMPHOCYTES # BLD: 2.7 K/UL (ref 1–5.1)
LYMPHOCYTES NFR BLD: 23.6 %
MCH RBC QN AUTO: 33.8 PG (ref 26–34)
MCHC RBC AUTO-ENTMCNC: 32.8 G/DL (ref 31–36)
MCV RBC AUTO: 102.9 FL (ref 80–100)
MONOCYTES # BLD: 0.8 K/UL (ref 0–1.3)
MONOCYTES NFR BLD: 7 %
NEUTROPHILS # BLD: 7.6 K/UL (ref 1.7–7.7)
NEUTROPHILS NFR BLD: 66.2 %
PERFORMED ON: ABNORMAL
PLATELET # BLD AUTO: 153 K/UL (ref 135–450)
PMV BLD AUTO: 10.5 FL (ref 5–10.5)
POTASSIUM SERPL-SCNC: 3.5 MMOL/L (ref 3.5–5.1)
RBC # BLD AUTO: 2.39 M/UL (ref 4–5.2)
SODIUM SERPL-SCNC: 140 MMOL/L (ref 136–145)
WBC # BLD AUTO: 11.5 K/UL (ref 4–11)

## 2024-01-06 PROCEDURE — C9113 INJ PANTOPRAZOLE SODIUM, VIA: HCPCS | Performed by: INTERNAL MEDICINE

## 2024-01-06 PROCEDURE — 80048 BASIC METABOLIC PNL TOTAL CA: CPT

## 2024-01-06 PROCEDURE — 94640 AIRWAY INHALATION TREATMENT: CPT

## 2024-01-06 PROCEDURE — 94761 N-INVAS EAR/PLS OXIMETRY MLT: CPT

## 2024-01-06 PROCEDURE — 6360000002 HC RX W HCPCS: Performed by: INTERNAL MEDICINE

## 2024-01-06 PROCEDURE — 6370000000 HC RX 637 (ALT 250 FOR IP): Performed by: NURSE PRACTITIONER

## 2024-01-06 PROCEDURE — 6370000000 HC RX 637 (ALT 250 FOR IP): Performed by: HOSPITALIST

## 2024-01-06 PROCEDURE — 2060000000 HC ICU INTERMEDIATE R&B

## 2024-01-06 PROCEDURE — 6370000000 HC RX 637 (ALT 250 FOR IP): Performed by: INTERNAL MEDICINE

## 2024-01-06 PROCEDURE — 2700000000 HC OXYGEN THERAPY PER DAY

## 2024-01-06 PROCEDURE — 2580000003 HC RX 258: Performed by: INTERNAL MEDICINE

## 2024-01-06 PROCEDURE — 99232 SBSQ HOSP IP/OBS MODERATE 35: CPT | Performed by: INTERNAL MEDICINE

## 2024-01-06 PROCEDURE — 94660 CPAP INITIATION&MGMT: CPT

## 2024-01-06 PROCEDURE — 85025 COMPLETE CBC W/AUTO DIFF WBC: CPT

## 2024-01-06 RX ORDER — ENOXAPARIN SODIUM 100 MG/ML
1 INJECTION SUBCUTANEOUS 2 TIMES DAILY
Status: DISCONTINUED | OUTPATIENT
Start: 2024-01-06 | End: 2024-01-08

## 2024-01-06 RX ADMIN — INSULIN LISPRO 2 UNITS: 100 INJECTION, SOLUTION INTRAVENOUS; SUBCUTANEOUS at 21:47

## 2024-01-06 RX ADMIN — ALBUTEROL SULFATE 2.5 MG: 2.5 SOLUTION RESPIRATORY (INHALATION) at 19:48

## 2024-01-06 RX ADMIN — SODIUM CHLORIDE, PRESERVATIVE FREE 10 ML: 5 INJECTION INTRAVENOUS at 10:37

## 2024-01-06 RX ADMIN — ENOXAPARIN SODIUM 100 MG: 100 INJECTION SUBCUTANEOUS at 10:33

## 2024-01-06 RX ADMIN — DANTROLENE SODIUM 200 MG: 25 CAPSULE ORAL at 10:36

## 2024-01-06 RX ADMIN — ENOXAPARIN SODIUM 90 MG: 100 INJECTION SUBCUTANEOUS at 21:48

## 2024-01-06 RX ADMIN — ALBUTEROL SULFATE 2.5 MG: 2.5 SOLUTION RESPIRATORY (INHALATION) at 12:17

## 2024-01-06 RX ADMIN — CLONAZEPAM 2 MG: 1 TABLET ORAL at 10:36

## 2024-01-06 RX ADMIN — LEVOTHYROXINE SODIUM 50 MCG: 0.05 TABLET ORAL at 05:28

## 2024-01-06 RX ADMIN — SODIUM CHLORIDE, PRESERVATIVE FREE 40 MG: 5 INJECTION INTRAVENOUS at 10:35

## 2024-01-06 RX ADMIN — METOPROLOL TARTRATE 25 MG: 25 TABLET, FILM COATED ORAL at 10:35

## 2024-01-06 RX ADMIN — DANTROLENE SODIUM 200 MG: 25 CAPSULE ORAL at 21:48

## 2024-01-06 RX ADMIN — SODIUM CHLORIDE, PRESERVATIVE FREE 10 ML: 5 INJECTION INTRAVENOUS at 21:48

## 2024-01-06 RX ADMIN — PREGABALIN 200 MG: 100 CAPSULE ORAL at 15:13

## 2024-01-06 RX ADMIN — SERTRALINE 150 MG: 100 TABLET, FILM COATED ORAL at 10:36

## 2024-01-06 RX ADMIN — METOPROLOL TARTRATE 25 MG: 25 TABLET, FILM COATED ORAL at 21:47

## 2024-01-06 RX ADMIN — ASPIRIN 81 MG: 81 TABLET, CHEWABLE ORAL at 10:35

## 2024-01-06 RX ADMIN — FUROSEMIDE 20 MG: 20 TABLET ORAL at 10:36

## 2024-01-06 RX ADMIN — CLONAZEPAM 2 MG: 1 TABLET ORAL at 21:47

## 2024-01-06 RX ADMIN — PREGABALIN 200 MG: 100 CAPSULE ORAL at 21:47

## 2024-01-06 RX ADMIN — AMITRIPTYLINE HYDROCHLORIDE 50 MG: 50 TABLET, FILM COATED ORAL at 21:47

## 2024-01-06 RX ADMIN — ALBUTEROL SULFATE 2.5 MG: 2.5 SOLUTION RESPIRATORY (INHALATION) at 07:48

## 2024-01-06 RX ADMIN — SODIUM CHLORIDE SOLN NEBU 3% 4 ML: 3 NEBU SOLN at 07:48

## 2024-01-06 RX ADMIN — SODIUM CHLORIDE SOLN NEBU 3% 4 ML: 3 NEBU SOLN at 19:48

## 2024-01-06 RX ADMIN — FUROSEMIDE 20 MG: 20 TABLET ORAL at 17:11

## 2024-01-06 RX ADMIN — PREGABALIN 200 MG: 100 CAPSULE ORAL at 10:36

## 2024-01-06 ASSESSMENT — PAIN SCALES - GENERAL: PAINLEVEL_OUTOF10: 0

## 2024-01-06 NOTE — PLAN OF CARE
Problem: Discharge Planning  Goal: Discharge to home or other facility with appropriate resources  Outcome: Progressing     Problem: Neurosensory - Adult  Goal: Achieves stable or improved neurological status  Outcome: Progressing  Goal: Absence of seizures  Outcome: Progressing  Goal: Remains free of injury related to seizures activity  Outcome: Progressing  Goal: Achieves maximal functionality and self care  Outcome: Progressing     Problem: Respiratory - Adult  Goal: Achieves optimal ventilation and oxygenation  Outcome: Progressing     Problem: Skin/Tissue Integrity - Adult  Goal: Skin integrity remains intact  Outcome: Progressing  Goal: Incisions, wounds, or drain sites healing without S/S of infection  Outcome: Progressing  Goal: Oral mucous membranes remain intact  Outcome: Progressing     Problem: Musculoskeletal - Adult  Goal: Return mobility to safest level of function  Outcome: Progressing  Goal: Maintain proper alignment of affected body part  Outcome: Progressing  Goal: Return ADL status to a safe level of function  Outcome: Progressing     Problem: Gastrointestinal - Adult  Goal: Minimal or absence of nausea and vomiting  Outcome: Progressing  Goal: Maintains or returns to baseline bowel function  Outcome: Progressing  Goal: Maintains adequate nutritional intake  Outcome: Progressing     Problem: Genitourinary - Adult  Goal: Absence of urinary retention  Outcome: Progressing     Problem: Safety - Adult  Goal: Free from fall injury  Outcome: Progressing     Problem: Skin/Tissue Integrity  Goal: Absence of new skin breakdown  Description: 1.  Monitor for areas of redness and/or skin breakdown  2.  Assess vascular access sites hourly  3.  Every 4-6 hours minimum:  Change oxygen saturation probe site  4.  Every 4-6 hours:  If on nasal continuous positive airway pressure, respiratory therapy assess nares and determine need for appliance change or resting period.  Outcome: Progressing     Problem:  Nutrition Deficit:  Goal: Optimize nutritional status  Outcome: Progressing     Problem: Safety - Medical Restraint  Goal: Remains free of injury from restraints (Restraint for Interference with Medical Device)  Description: INTERVENTIONS:  1. Determine that other, less restrictive measures have been tried or would not be effective before applying the restraint  2. Evaluate the patient's condition at the time of restraint application  3. Inform patient/family regarding the reason for restraint  4. Q2H: Monitor safety, psychosocial status, comfort, nutrition and hydration  Outcome: Progressing     Problem: Pain  Goal: Verbalizes/displays adequate comfort level or baseline comfort level  Outcome: Progressing     Problem: Cardiovascular - Adult  Goal: Maintains optimal cardiac output and hemodynamic stability  Outcome: Progressing     Problem: Infection - Adult  Goal: Absence of infection at discharge  Outcome: Progressing     Problem: Decision Making  Goal: Pt/Family able to effectively weigh alternatives and participate in decision making related to treatment and care  Description: INTERVENTIONS:  1. Determine when there are differences between patient's view, family's view, and healthcare provider's view of condition  2. Facilitate patient and family articulation of goals for care  3. Help patient and family identify pros/cons of alternative solutions  4. Provide information as requested by patient/family  5. Respect patient/family right to receive or not to receive information  6. Serve as a liaison between patient and family and health care team  7. Initiate Consults from Ethics, Palliative Care or initiate Family Care Conference as is appropriate  Outcome: Progressing     Problem: ABCDS Injury Assessment  Goal: Absence of physical injury  Outcome: Progressing

## 2024-01-06 NOTE — PROGRESS NOTES
Hospitalist Progress Note  1/6/2024 1:42 PM  Subjective:   Admit Date: 12/16/2023  PCP: Tyson Hahn Status: Inpatient   Interval History: Hospital Day: 22, admitted with septic shock in the setting of COVID and aspiration pneumonia.  Completed course of meropenem. Blood cultures with no growth, urinary antigens negative.  Septic shock resolved.  Cardiogenic shock secondary to Takotsubo cardiomyopathy had a previous echo showing EF of 20% with repeat improved to 50% on 12/27.      Diet: Tube feed via gastrostomy at  65 cc/hr  Left basilic PICC (12/24)  RUQ PEG-jejunostomy  Medications:     enoxaparin  1 mg/kg SubCUTAneous BID   sodium chloride   4 mL Nebulization BID   albuterol  2.5 mg Nebulization TID   amitriptyline  50 mg PEG Tube Nightly   aspirin  81 mg PEG Tube Daily   clonazepam  2 mg PEG Tube BID   levothyroxine  50 mcg PEG Tube Daily   metoprolol tartrate  25 mg PEG Tube BID   polyethylene glycol  17 g Per G Tube BID   pregabalin  200 mg PEG Tube TID   sertraline  150 mg PEG Tube Daily   dantrolene  200 mg PEG Tube BID   furosemide  20 mg PEG Tube BID   insulin lispro SSI  0-8 Units SubCUTAneous Q4H   pantoprazole   40 mg IntraVENous Daily     Recent Labs     01/04/24  0450 01/05/24  1145 01/06/24  0520   WBC 9.3 9.8 11.5*   HGB 7.8* 7.8* 8.1*   * 140 153   .4* 103.3* 102.9*     Recent Labs     01/04/24  0450 01/05/24  1145 01/06/24  0520    140 140   K 3.6 3.6 3.5   CL 98* 100 99   CO2 43* 33* 33*   BUN 16 14 18   CREATININE <0.5* <0.5* <0.5*   GLUCOSE 151* 138* 181*       POC Glucose:   Recent Labs     01/05/24  2357 01/06/24  0516 01/06/24  0804 01/06/24  1236   POCGLU 173* 187* 179* 168*     Portable CXR (1/4) Mild degree of pulmonary edema slightly increased.     Objective:   Vitals:  /67   Pulse 89   Temp 99.3 °F (37.4 °C) (Axillary)   Resp (!) 32   Ht 1.727 m (5' 7.99\")   Wt 88.8 kg (195 lb 12.3 oz)   SpO2 99% on 7 LPM  BMI 29.77 kg/m²   General appearance: alert  Yes

## 2024-01-06 NOTE — PROGRESS NOTES
Pulmonary Progress Note    Date of Admission: 12/16/2023   LOS: 21 days       CC:  Chief Complaint   Patient presents with    Altered Mental Status     Altered Mental Status per [facility] staff.  Pt A&O x 4 upon arrival to ER.  Pt dx with covid 6 days ago.        Subjective:  Feeling better.  Family in room.    ROS:   No nausea  No Vomiting  No chest pain      Assessment:     COVID-19  Aspiration pneumonia  Septic shock  Acute hypoxemic respiratory failure  Chronic hypoxemia 2 L nasal cannula    Plan:     This note may have been transcribed using Dragon Dictation software. Please disregard any translational errors.       Hospital Day: 21       Weaned from 5 to 3 L nasal cannula while in room.  Saturations continue to be 97%.  Wean based on saturation.  Likely able to return to baseline 3 L nasal cannula  Continue airway clearance.                Data:        PHYSICAL EXAM:   Blood pressure 112/67, pulse 89, temperature 98.2 °F (36.8 °C), temperature source Axillary, resp. rate 25, height 1.727 m (5' 7.99\"), weight 88.8 kg (195 lb 12.3 oz), SpO2 98 %.'  Body mass index is 29.77 kg/m².   Gen: No distress.    ENT:   Resp: No accessory muscle use. No crackles. No wheezes. No rhonchi.    CV: Regular rate. Regular rhythm. No murmur or rub. No edema.   Skin: Warm, dry, normal texture and turgor. No nodule on exposed extremities.   M/S: No cyanosis. No clubbing. No joint deformity.  Psych: Oriented x 3. No anxiety.  Awake. Alert. Intact judgement and insight. Good Mood / Affect.  Memory appears in tact       Medications:    Scheduled Meds:   enoxaparin  1 mg/kg SubCUTAneous BID    sodium chloride (Inhalant)  4 mL Nebulization BID    albuterol  2.5 mg Nebulization TID    amitriptyline  50 mg PEG Tube Nightly    aspirin  81 mg PEG Tube Daily    clonazePAM  2 mg PEG Tube BID    levothyroxine  50 mcg PEG Tube Daily    metoprolol tartrate  25 mg PEG Tube BID    polyethylene glycol  17 g Per G Tube BID    pregabalin  200 mg

## 2024-01-07 LAB
BASOPHILS # BLD: 0.1 K/UL (ref 0–0.2)
BASOPHILS NFR BLD: 0.6 %
DEPRECATED RDW RBC AUTO: 20.4 % (ref 12.4–15.4)
EOSINOPHIL # BLD: 0.3 K/UL (ref 0–0.6)
EOSINOPHIL NFR BLD: 2.9 %
GLUCOSE BLD-MCNC: 152 MG/DL (ref 70–99)
GLUCOSE BLD-MCNC: 177 MG/DL (ref 70–99)
GLUCOSE BLD-MCNC: 179 MG/DL (ref 70–99)
GLUCOSE BLD-MCNC: 183 MG/DL (ref 70–99)
GLUCOSE BLD-MCNC: 192 MG/DL (ref 70–99)
GLUCOSE BLD-MCNC: 199 MG/DL (ref 70–99)
GLUCOSE BLD-MCNC: 207 MG/DL (ref 70–99)
HCT VFR BLD AUTO: 23.9 % (ref 36–48)
HGB BLD-MCNC: 8 G/DL (ref 12–16)
LYMPHOCYTES # BLD: 1.8 K/UL (ref 1–5.1)
LYMPHOCYTES NFR BLD: 19.8 %
MCH RBC QN AUTO: 34.5 PG (ref 26–34)
MCHC RBC AUTO-ENTMCNC: 33.4 G/DL (ref 31–36)
MCV RBC AUTO: 103.2 FL (ref 80–100)
MONOCYTES # BLD: 0.8 K/UL (ref 0–1.3)
MONOCYTES NFR BLD: 8.3 %
NEUTROPHILS # BLD: 6.4 K/UL (ref 1.7–7.7)
NEUTROPHILS NFR BLD: 68.4 %
PERFORMED ON: ABNORMAL
PLATELET # BLD AUTO: 141 K/UL (ref 135–450)
PMV BLD AUTO: 10.4 FL (ref 5–10.5)
RBC # BLD AUTO: 2.31 M/UL (ref 4–5.2)
WBC # BLD AUTO: 9.3 K/UL (ref 4–11)

## 2024-01-07 PROCEDURE — 2060000000 HC ICU INTERMEDIATE R&B

## 2024-01-07 PROCEDURE — 2700000000 HC OXYGEN THERAPY PER DAY

## 2024-01-07 PROCEDURE — 85025 COMPLETE CBC W/AUTO DIFF WBC: CPT

## 2024-01-07 PROCEDURE — 6370000000 HC RX 637 (ALT 250 FOR IP): Performed by: NURSE PRACTITIONER

## 2024-01-07 PROCEDURE — C9113 INJ PANTOPRAZOLE SODIUM, VIA: HCPCS | Performed by: INTERNAL MEDICINE

## 2024-01-07 PROCEDURE — 6360000002 HC RX W HCPCS: Performed by: INTERNAL MEDICINE

## 2024-01-07 PROCEDURE — 6370000000 HC RX 637 (ALT 250 FOR IP): Performed by: HOSPITALIST

## 2024-01-07 PROCEDURE — 2580000003 HC RX 258: Performed by: INTERNAL MEDICINE

## 2024-01-07 PROCEDURE — 94669 MECHANICAL CHEST WALL OSCILL: CPT

## 2024-01-07 PROCEDURE — 94640 AIRWAY INHALATION TREATMENT: CPT

## 2024-01-07 PROCEDURE — 99232 SBSQ HOSP IP/OBS MODERATE 35: CPT | Performed by: INTERNAL MEDICINE

## 2024-01-07 PROCEDURE — 94660 CPAP INITIATION&MGMT: CPT

## 2024-01-07 PROCEDURE — 94761 N-INVAS EAR/PLS OXIMETRY MLT: CPT

## 2024-01-07 RX ADMIN — FUROSEMIDE 20 MG: 20 TABLET ORAL at 10:45

## 2024-01-07 RX ADMIN — CLONAZEPAM 2 MG: 1 TABLET ORAL at 10:44

## 2024-01-07 RX ADMIN — ALBUTEROL SULFATE 2.5 MG: 2.5 SOLUTION RESPIRATORY (INHALATION) at 07:56

## 2024-01-07 RX ADMIN — ALBUTEROL SULFATE 2.5 MG: 2.5 SOLUTION RESPIRATORY (INHALATION) at 21:06

## 2024-01-07 RX ADMIN — ENOXAPARIN SODIUM 90 MG: 100 INJECTION SUBCUTANEOUS at 10:45

## 2024-01-07 RX ADMIN — LEVOTHYROXINE SODIUM 50 MCG: 0.05 TABLET ORAL at 06:23

## 2024-01-07 RX ADMIN — ENOXAPARIN SODIUM 90 MG: 100 INJECTION SUBCUTANEOUS at 21:43

## 2024-01-07 RX ADMIN — PREGABALIN 200 MG: 100 CAPSULE ORAL at 10:44

## 2024-01-07 RX ADMIN — CLONAZEPAM 2 MG: 1 TABLET ORAL at 21:43

## 2024-01-07 RX ADMIN — SERTRALINE 150 MG: 100 TABLET, FILM COATED ORAL at 10:44

## 2024-01-07 RX ADMIN — SODIUM CHLORIDE, PRESERVATIVE FREE 40 MG: 5 INJECTION INTRAVENOUS at 10:44

## 2024-01-07 RX ADMIN — FUROSEMIDE 20 MG: 20 TABLET ORAL at 17:21

## 2024-01-07 RX ADMIN — PREGABALIN 200 MG: 100 CAPSULE ORAL at 21:43

## 2024-01-07 RX ADMIN — SODIUM CHLORIDE, PRESERVATIVE FREE 10 ML: 5 INJECTION INTRAVENOUS at 21:44

## 2024-01-07 RX ADMIN — ASPIRIN 81 MG: 81 TABLET, CHEWABLE ORAL at 10:45

## 2024-01-07 RX ADMIN — ALBUTEROL SULFATE 2.5 MG: 2.5 SOLUTION RESPIRATORY (INHALATION) at 12:03

## 2024-01-07 RX ADMIN — DANTROLENE SODIUM 200 MG: 25 CAPSULE ORAL at 10:45

## 2024-01-07 RX ADMIN — AMITRIPTYLINE HYDROCHLORIDE 50 MG: 50 TABLET, FILM COATED ORAL at 21:43

## 2024-01-07 RX ADMIN — SODIUM CHLORIDE, PRESERVATIVE FREE 10 ML: 5 INJECTION INTRAVENOUS at 10:46

## 2024-01-07 RX ADMIN — METOPROLOL TARTRATE 25 MG: 25 TABLET, FILM COATED ORAL at 10:45

## 2024-01-07 RX ADMIN — PREGABALIN 200 MG: 100 CAPSULE ORAL at 13:58

## 2024-01-07 RX ADMIN — DANTROLENE SODIUM 200 MG: 25 CAPSULE ORAL at 21:43

## 2024-01-07 RX ADMIN — SODIUM CHLORIDE SOLN NEBU 3% 4 ML: 3 NEBU SOLN at 07:56

## 2024-01-07 RX ADMIN — METOPROLOL TARTRATE 25 MG: 25 TABLET, FILM COATED ORAL at 21:43

## 2024-01-07 RX ADMIN — SODIUM CHLORIDE SOLN NEBU 3% 4 ML: 3 NEBU SOLN at 21:05

## 2024-01-07 RX ADMIN — ACETAMINOPHEN 650 MG: 325 TABLET ORAL at 13:03

## 2024-01-07 ASSESSMENT — PAIN SCALES - GENERAL
PAINLEVEL_OUTOF10: 9
PAINLEVEL_OUTOF10: 3

## 2024-01-07 ASSESSMENT — PAIN DESCRIPTION - DESCRIPTORS: DESCRIPTORS: ACHING

## 2024-01-07 ASSESSMENT — PAIN - FUNCTIONAL ASSESSMENT: PAIN_FUNCTIONAL_ASSESSMENT: ACTIVITIES ARE NOT PREVENTED

## 2024-01-07 ASSESSMENT — PAIN DESCRIPTION - LOCATION: LOCATION: OTHER (COMMENT)

## 2024-01-07 NOTE — PROGRESS NOTES
Hospitalist Progress Note  1/7/2024 1:57 PM  Subjective:   Admit Date: 12/16/2023  PCP: Tyson Hahn Status: Inpatient   Interval History: Hospital Day: 23, awaiting LTACH placement.     History of present illness:  Admitted with septic shock in the setting of COVID and aspiration pneumonia.  Completed course of meropenem. Blood cultures with no growth, urinary antigens negative.  Septic shock resolved.  Cardiogenic shock secondary to Takotsubo cardiomyopathy had a previous echo showing EF of 20% with repeat improved to 50% on 12/27.       Diet: Tube feed via gastrostomy at  65 cc/hr  Left basilic PICC (12/24)  RUQ PEG-jejunostomy  Medications:     enoxaparin  1 mg/kg SubCUTAneous BID   sodium chloride   4 mL Nebulization BID   albuterol  2.5 mg Nebulization TID   amitriptyline  50 mg PEG Tube Nightly   aspirin  81 mg PEG Tube Daily   clonazepam  2 mg PEG Tube BID   levothyroxine  50 mcg PEG Tube Daily   metoprolol tartrate  25 mg PEG Tube BID   polyethylene glycol  17 g Per G Tube BID   pregabalin  200 mg PEG Tube TID   sertraline  150 mg PEG Tube Daily   dantrolene  200 mg PEG Tube BID   furosemide  20 mg PEG Tube BID   insulin lispro SSI  0-8 Units SubCUTAneous Q4H   pantoprazole   40 mg IntraVENous Daily     Recent Labs     01/05/24  1145 01/06/24  0520 01/07/24  1135   WBC 9.8 11.5* 9.3   HGB 7.8* 8.1* 8.0*    153 141   .3* 102.9* 103.2*     Recent Labs     01/05/24  1145 01/06/24  0520    140   K 3.6 3.5    99   CO2 33* 33*   BUN 14 18   CREATININE <0.5* <0.5*   GLUCOSE 138* 181*       POC Glucose:   Recent Labs     01/06/24  2350 01/07/24  0421 01/07/24  0809 01/07/24  1259   POCGLU 186* 177* 152* 179*     Portable CXR (1/4) Mild degree of pulmonary edema slightly increased     Objective:   Vitals:  /73   Pulse 97   Temp 97.8 °F (36.6 °C) (Oral)   Resp 20   Ht 1.727 m (5' 7.99\")   Wt 89 kg (196 lb 3.4 oz)   SpO2 97% on 3 LPM  BMI 29.84 kg/m²   General appearance:

## 2024-01-07 NOTE — PROGRESS NOTES
Pulmonary Progress Note    Date of Admission: 12/16/2023   LOS: 22 days       CC:  Chief Complaint   Patient presents with    Altered Mental Status     Altered Mental Status per [facility] staff.  Pt A&O x 4 upon arrival to ER.  Pt dx with covid 6 days ago.        Subjective:  On bipa       Assessment:     COVID-19  Aspiration pneumonia  Septic shock  Acute hypoxemic respiratory failure  Chronic hypoxemia 2 L nasal cannula    Plan:     This note may have been transcribed using Dragon Dictation software. Please disregard any translational errors.       Hospital Day: 22        3 L nasal cannula saturation still 99% therefore appears to be back to baseline.    Continue airway clearance.  Bipap for hypercpania. Plan to continue after dischage.        Will sign off at this time.  Thanks for the consult.   Please call with questions.                 Data:        PHYSICAL EXAM:   Blood pressure 124/73, pulse 97, temperature 97.8 °F (36.6 °C), temperature source Oral, resp. rate 20, height 1.727 m (5' 7.99\"), weight 89 kg (196 lb 3.4 oz), SpO2 97 %.'  Body mass index is 29.84 kg/m².   Gen: No distress.    ENT:   Resp: No accessory muscle use. No crackles. No wheezes. No rhonchi.    CV: Regular rate. Regular rhythm. No murmur or rub. No edema.   Skin: Warm, dry, normal texture and turgor. No nodule on exposed extremities.   M/S: No cyanosis. No clubbing. No joint deformity.  Psych:  awake      Medications:    Scheduled Meds:   enoxaparin  1 mg/kg SubCUTAneous BID    sodium chloride (Inhalant)  4 mL Nebulization BID    albuterol  2.5 mg Nebulization TID    amitriptyline  50 mg PEG Tube Nightly    aspirin  81 mg PEG Tube Daily    clonazePAM  2 mg PEG Tube BID    levothyroxine  50 mcg PEG Tube Daily    metoprolol tartrate  25 mg PEG Tube BID    polyethylene glycol  17 g Per G Tube BID    pregabalin  200 mg PEG Tube TID    sertraline  150 mg PEG Tube Daily    dantrolene  200 mg PEG Tube BID    furosemide  20 mg PEG Tube BID

## 2024-01-07 NOTE — PLAN OF CARE
Problem: Discharge Planning  Goal: Discharge to home or other facility with appropriate resources  1/7/2024 1327 by Anabell Burch RN  Outcome: Progressing     Problem: Neurosensory - Adult  Goal: Achieves stable or improved neurological status  1/7/2024 1327 by Anabell Burch RN  Outcome: Progressing     Problem: Neurosensory - Adult  Goal: Remains free of injury related to seizures activity  1/7/2024 1327 by Anabell Burch RN  Outcome: Progressing     Problem: Neurosensory - Adult  Goal: Achieves maximal functionality and self care  1/7/2024 1327 by Anabell Burch RN  Outcome: Progressing     Problem: Respiratory - Adult  Goal: Achieves optimal ventilation and oxygenation  1/7/2024 1327 by Anabell Burch RN  Outcome: Progressing     Problem: Skin/Tissue Integrity - Adult  Goal: Skin integrity remains intact  1/7/2024 1327 by Anabell Burch RN  Outcome: Progressing     Problem: Skin/Tissue Integrity - Adult  Goal: Incisions, wounds, or drain sites healing without S/S of infection  1/7/2024 1327 by Anabell Burch RN  Outcome: Progressing     Problem: Skin/Tissue Integrity - Adult  Goal: Oral mucous membranes remain intact  1/7/2024 1327 by Anabell Burch RN  Outcome: Progressing     Problem: Musculoskeletal - Adult  Goal: Return mobility to safest level of function  1/7/2024 1327 by Anabell Burch RN  Outcome: Progressing     Problem: Musculoskeletal - Adult  Goal: Maintain proper alignment of affected body part  1/7/2024 1327 by Anabell Burch RN  Outcome: Progressing     Problem: Musculoskeletal - Adult  Goal: Return ADL status to a safe level of function  1/7/2024 1327 by Anabell Burch RN  Outcome: Progressing     Problem: Gastrointestinal - Adult  Goal: Minimal or absence of nausea and vomiting  1/7/2024 1327 by Anabell Burch RN  Outcome: Progressing     Problem: Gastrointestinal - Adult  Goal: Maintains or returns to baseline bowel function  1/7/2024 1327 by Marcin

## 2024-01-07 NOTE — PLAN OF CARE
Problem: Discharge Planning  Goal: Discharge to home or other facility with appropriate resources  1/7/2024 0116 by Brooke Pena RN  Outcome: Progressing     Problem: Neurosensory - Adult  Goal: Achieves stable or improved neurological status  1/7/2024 0116 by Brooke Pena RN  Outcome: Progressing     Problem: Neurosensory - Adult  Goal: Absence of seizures  1/7/2024 0116 by Brooke Pena RN  Outcome: Progressing     Problem: Neurosensory - Adult  Goal: Remains free of injury related to seizures activity  1/7/2024 0116 by Brooke Pena RN  Outcome: Progressing     Problem: Neurosensory - Adult  Goal: Achieves maximal functionality and self care  1/7/2024 0116 by Brooke Pena RN  Outcome: Progressing     Problem: Respiratory - Adult  Goal: Achieves optimal ventilation and oxygenation  1/7/2024 0116 by Brooke Pena RN  Outcome: Progressing     Problem: Skin/Tissue Integrity - Adult  Goal: Skin integrity remains intact  1/7/2024 0116 by Brooke Pena RN  Outcome: Progressing     Problem: Skin/Tissue Integrity - Adult  Goal: Incisions, wounds, or drain sites healing without S/S of infection  1/7/2024 0116 by Brooke Pena RN  Outcome: Progressing     Problem: Skin/Tissue Integrity - Adult  Goal: Oral mucous membranes remain intact  1/7/2024 0116 by Brooke Pena RN  Outcome: Progressing     Problem: Musculoskeletal - Adult  Goal: Return mobility to safest level of function  1/7/2024 0116 by Brooke Pena RN  Outcome: Progressing     Problem: Musculoskeletal - Adult  Goal: Maintain proper alignment of affected body part  1/7/2024 0116 by Brooke Pena RN  Outcome: Progressing     Problem: Musculoskeletal - Adult  Goal: Return ADL status to a safe level of function  1/7/2024 0116 by Brooke Pena RN  Outcome: Progressing     Problem: Gastrointestinal - Adult  Goal: Minimal or absence of nausea and vomiting  1/7/2024 0116 by Brooke Pena RN  Outcome: Progressing     Problem:  stability  1/7/2024 0116 by Brooke Pena RN  Outcome: Progressing     Problem: Infection - Adult  Goal: Absence of infection at discharge  1/7/2024 0116 by Brooke Pena RN  Outcome: Progressing     Problem: Decision Making  Goal: Pt/Family able to effectively weigh alternatives and participate in decision making related to treatment and care  Description: INTERVENTIONS:  1. Determine when there are differences between patient's view, family's view, and healthcare provider's view of condition  2. Facilitate patient and family articulation of goals for care  3. Help patient and family identify pros/cons of alternative solutions  4. Provide information as requested by patient/family  5. Respect patient/family right to receive or not to receive information  6. Serve as a liaison between patient and family and health care team  7. Initiate Consults from Ethics, Palliative Care or initiate Family Care Conference as is appropriate  1/7/2024 0116 by Brooke Pena RN  Outcome: Progressing     Problem: ABCDS Injury Assessment  Goal: Absence of physical injury  1/7/2024 0116 by Brooke Pena RN  Outcome: Progressing

## 2024-01-08 LAB
ALBUMIN SERPL-MCNC: 3 G/DL (ref 3.4–5)
ANION GAP SERPL CALCULATED.3IONS-SCNC: 5 MMOL/L (ref 3–16)
BUN SERPL-MCNC: 14 MG/DL (ref 7–20)
CALCIUM SERPL-MCNC: 8.2 MG/DL (ref 8.3–10.6)
CHLORIDE SERPL-SCNC: 101 MMOL/L (ref 99–110)
CO2 SERPL-SCNC: 37 MMOL/L (ref 21–32)
CREAT SERPL-MCNC: <0.5 MG/DL (ref 0.6–1.2)
GFR SERPLBLD CREATININE-BSD FMLA CKD-EPI: >60 ML/MIN/{1.73_M2}
GLUCOSE BLD-MCNC: 136 MG/DL (ref 70–99)
GLUCOSE BLD-MCNC: 187 MG/DL (ref 70–99)
GLUCOSE BLD-MCNC: 187 MG/DL (ref 70–99)
GLUCOSE BLD-MCNC: 202 MG/DL (ref 70–99)
GLUCOSE BLD-MCNC: 208 MG/DL (ref 70–99)
GLUCOSE SERPL-MCNC: 146 MG/DL (ref 70–99)
MAGNESIUM SERPL-MCNC: 2.2 MG/DL (ref 1.8–2.4)
PERFORMED ON: ABNORMAL
PHOSPHATE SERPL-MCNC: 2.7 MG/DL (ref 2.5–4.9)
POTASSIUM SERPL-SCNC: 3.9 MMOL/L (ref 3.5–5.1)
REASON FOR REJECTION: NORMAL
REJECTED TEST: NORMAL
SODIUM SERPL-SCNC: 143 MMOL/L (ref 136–145)

## 2024-01-08 PROCEDURE — 2700000000 HC OXYGEN THERAPY PER DAY

## 2024-01-08 PROCEDURE — 92526 ORAL FUNCTION THERAPY: CPT

## 2024-01-08 PROCEDURE — 94761 N-INVAS EAR/PLS OXIMETRY MLT: CPT

## 2024-01-08 PROCEDURE — 6360000002 HC RX W HCPCS: Performed by: INTERNAL MEDICINE

## 2024-01-08 PROCEDURE — 83735 ASSAY OF MAGNESIUM: CPT

## 2024-01-08 PROCEDURE — 80069 RENAL FUNCTION PANEL: CPT

## 2024-01-08 PROCEDURE — C9113 INJ PANTOPRAZOLE SODIUM, VIA: HCPCS | Performed by: INTERNAL MEDICINE

## 2024-01-08 PROCEDURE — 2580000003 HC RX 258: Performed by: INTERNAL MEDICINE

## 2024-01-08 PROCEDURE — 36415 COLL VENOUS BLD VENIPUNCTURE: CPT

## 2024-01-08 PROCEDURE — 6370000000 HC RX 637 (ALT 250 FOR IP): Performed by: NURSE PRACTITIONER

## 2024-01-08 PROCEDURE — 2060000000 HC ICU INTERMEDIATE R&B

## 2024-01-08 PROCEDURE — 6370000000 HC RX 637 (ALT 250 FOR IP): Performed by: HOSPITALIST

## 2024-01-08 PROCEDURE — 94640 AIRWAY INHALATION TREATMENT: CPT

## 2024-01-08 PROCEDURE — 6370000000 HC RX 637 (ALT 250 FOR IP): Performed by: INTERNAL MEDICINE

## 2024-01-08 RX ORDER — ENOXAPARIN SODIUM 100 MG/ML
40 INJECTION SUBCUTANEOUS DAILY
Status: DISCONTINUED | OUTPATIENT
Start: 2024-01-09 | End: 2024-01-09 | Stop reason: HOSPADM

## 2024-01-08 RX ADMIN — PREGABALIN 200 MG: 100 CAPSULE ORAL at 09:52

## 2024-01-08 RX ADMIN — METOPROLOL TARTRATE 25 MG: 25 TABLET, FILM COATED ORAL at 09:52

## 2024-01-08 RX ADMIN — ALBUTEROL SULFATE 2.5 MG: 2.5 SOLUTION RESPIRATORY (INHALATION) at 07:22

## 2024-01-08 RX ADMIN — SODIUM CHLORIDE, PRESERVATIVE FREE 40 MG: 5 INJECTION INTRAVENOUS at 09:53

## 2024-01-08 RX ADMIN — SODIUM CHLORIDE, PRESERVATIVE FREE 10 ML: 5 INJECTION INTRAVENOUS at 09:55

## 2024-01-08 RX ADMIN — SODIUM CHLORIDE SOLN NEBU 3% 4 ML: 3 NEBU SOLN at 20:23

## 2024-01-08 RX ADMIN — CLONAZEPAM 2 MG: 1 TABLET ORAL at 09:53

## 2024-01-08 RX ADMIN — INSULIN LISPRO 2 UNITS: 100 INJECTION, SOLUTION INTRAVENOUS; SUBCUTANEOUS at 14:02

## 2024-01-08 RX ADMIN — AMITRIPTYLINE HYDROCHLORIDE 50 MG: 50 TABLET, FILM COATED ORAL at 21:53

## 2024-01-08 RX ADMIN — CLONAZEPAM 2 MG: 1 TABLET ORAL at 21:53

## 2024-01-08 RX ADMIN — ENOXAPARIN SODIUM 90 MG: 100 INJECTION SUBCUTANEOUS at 09:52

## 2024-01-08 RX ADMIN — SERTRALINE 150 MG: 100 TABLET, FILM COATED ORAL at 09:52

## 2024-01-08 RX ADMIN — PREGABALIN 200 MG: 100 CAPSULE ORAL at 21:53

## 2024-01-08 RX ADMIN — ALBUTEROL SULFATE 2.5 MG: 2.5 SOLUTION RESPIRATORY (INHALATION) at 14:03

## 2024-01-08 RX ADMIN — SODIUM CHLORIDE, PRESERVATIVE FREE 10 ML: 5 INJECTION INTRAVENOUS at 21:50

## 2024-01-08 RX ADMIN — ASPIRIN 81 MG: 81 TABLET, CHEWABLE ORAL at 09:53

## 2024-01-08 RX ADMIN — DANTROLENE SODIUM 200 MG: 25 CAPSULE ORAL at 09:54

## 2024-01-08 RX ADMIN — LEVOTHYROXINE SODIUM 50 MCG: 0.05 TABLET ORAL at 06:10

## 2024-01-08 RX ADMIN — INSULIN LISPRO 2 UNITS: 100 INJECTION, SOLUTION INTRAVENOUS; SUBCUTANEOUS at 21:48

## 2024-01-08 RX ADMIN — PREGABALIN 200 MG: 100 CAPSULE ORAL at 16:00

## 2024-01-08 RX ADMIN — DANTROLENE SODIUM 200 MG: 25 CAPSULE ORAL at 22:00

## 2024-01-08 RX ADMIN — METOPROLOL TARTRATE 25 MG: 25 TABLET, FILM COATED ORAL at 21:53

## 2024-01-08 RX ADMIN — ALBUTEROL SULFATE 2.5 MG: 2.5 SOLUTION RESPIRATORY (INHALATION) at 20:22

## 2024-01-08 RX ADMIN — SODIUM CHLORIDE SOLN NEBU 3% 4 ML: 3 NEBU SOLN at 07:32

## 2024-01-08 RX ADMIN — FUROSEMIDE 20 MG: 20 TABLET ORAL at 09:52

## 2024-01-08 RX ADMIN — FUROSEMIDE 20 MG: 20 TABLET ORAL at 19:19

## 2024-01-08 ASSESSMENT — PAIN SCALES - GENERAL
PAINLEVEL_OUTOF10: 0

## 2024-01-08 ASSESSMENT — PAIN SCALES - WONG BAKER
WONGBAKER_NUMERICALRESPONSE: 0

## 2024-01-08 NOTE — PROGRESS NOTES
Hospitalist Progress Note  1/8/2024 12:12 PM  Subjective:   Admit Date: 12/16/2023  PCP: Tyson Hahn Status: Inpatient   Interval History: Hospital Day: 24,    Respiratory status improving , oxygen demand improved to 3 L via NC  She is alert,awake and communicating today am    History of present illness:  Admitted with septic shock in the setting of COVID and aspiration pneumonia.  Completed course of meropenem. Blood cultures with no growth, urinary antigens negative.  Septic shock resolved.  Cardiogenic shock secondary to Takotsubo cardiomyopathy had a previous echo showing EF of 20% with repeat improved to 50% on 12/27.       Diet: Tube feed via gastrostomy at  65 cc/hr  Left basilic PICC (12/24)  RUQ PEG-jejunostomy  Medications:     enoxaparin  1 mg/kg SubCUTAneous BID   sodium chloride   4 mL Nebulization BID   albuterol  2.5 mg Nebulization TID   amitriptyline  50 mg PEG Tube Nightly   aspirin  81 mg PEG Tube Daily   clonazepam  2 mg PEG Tube BID   levothyroxine  50 mcg PEG Tube Daily   metoprolol tartrate  25 mg PEG Tube BID   polyethylene glycol  17 g Per G Tube BID   pregabalin  200 mg PEG Tube TID   sertraline  150 mg PEG Tube Daily   dantrolene  200 mg PEG Tube BID   furosemide  20 mg PEG Tube BID   insulin lispro SSI  0-8 Units SubCUTAneous Q4H   pantoprazole   40 mg IntraVENous Daily     Recent Labs     01/06/24  0520 01/07/24  1135   WBC 11.5* 9.3   HGB 8.1* 8.0*    141   .9* 103.2*       Recent Labs     01/06/24  0520 01/08/24  0610    143   K 3.5 3.9   CL 99 101   CO2 33* 37*   BUN 18 14   CREATININE <0.5* <0.5*   GLUCOSE 181* 146*         POC Glucose:   Recent Labs     01/07/24  2029 01/07/24  2357 01/08/24  0505 01/08/24  0801   POCGLU 199* 192* 187* 136*       Portable CXR (1/4) Mild degree of pulmonary edema slightly increased     Objective:   Vitals:  /73   Pulse 97   Temp 97.8 °F (36.6 °C) (Oral)   Resp 20   Ht 1.727 m (5' 7.99\")   Wt 89 kg (196 lb 3.4 oz)

## 2024-01-08 NOTE — PROGRESS NOTES
[facility]   Speech Therapy  Daily Dysphagia Treatment Note    Lilly Terrell  AGE: 63 y.o.   GENDER: female  : 1960  [id number]  EPISODE DATE:  2023    Patient Active Problem List   Diagnosis    Cardiac arrest (HCC)    Acute respiratory failure (HCC)    Encephalopathy    Ventricular fibrillation (HCC)    Hypokalemia    Hypoxic encephalopathy (HCC)    Acidosis    Cardiogenic shock (HCC)    Prolonged QT interval    Fluid overload    Aspiration pneumonia (HCC)    Takotsubo cardiomyopathy    Pulmonary edema    Acute type 1 respiratory failure (HCC)    Acute respiratory failure with hypoxia and hypercapnia (HCC)    Aspiration pneumonitis (HCC)    Choking    Metabolic encephalopathy    Cholangitis    Pneumonia, unspecified organism    NSTEMI (non-ST elevated myocardial infarction) (HCC)    Pneumonia due to severe acute respiratory syndrome coronavirus 2 (SARS-CoV-2)    High fever    Neutrophilia    Endotracheally intubated    Elevated procalcitonin    Cerebral palsy (HCC)    Lactic acid acidosis    Sepsis (HCC)    Septic shock (HCC)    Acute on chronic systolic heart failure (HCC)     Allergies   Allergen Reactions    Penicillins      Arm swelled. It has been years ago       Treatment Diagnosis: Dysphagia     CHART REVIEW:  2023 admitted with c/o [id number]  MD ADMISSION H&P HPI:  Lilly Terrell is a 62 y.o. female with pmh of  cerebral palsy  who was sent from [facility] with c/o AMS  Patient has h/o chronic respiratory failure and at baseline use 4 L oxygen via NC   Patient recently had covid 19 infection  No h/o chest pain or GI bleed or diarrhea or seizure     2023 rapid response  2023-2023 INTUBATED  2024 swallow re-eval: continue NPO     IMAGING:  Initial CXR: 2023  IMPRESSION:  Small right-sided pleural effusions with fluid extending into the minor fissure.  No confluent infiltrate identified.    Most Recent CXR: 2024  IMPRESSION:  Mild degree

## 2024-01-08 NOTE — PROGRESS NOTES
Is using  5 litters  of oxygen  per nasal cannula ,  sating 89 to 92 %   ,, was on 3 l this am ,.,,   Informed dr Barkley

## 2024-01-08 NOTE — CARE COORDINATION
Spoke with Doris at [facility].  Precert still pending.  SW following  Electronically signed by MADDIE Jensen on 1/8/2024 at 12:09 PM

## 2024-01-08 NOTE — PLAN OF CARE
Problem: Discharge Planning  Goal: Discharge to home or other facility with appropriate resources  1/7/2024 2218 by Brooke Pena RN  Outcome: Progressing     Problem: Neurosensory - Adult  Goal: Achieves stable or improved neurological status  1/7/2024 2218 by Brooke Pena RN  Outcome: Progressing     Problem: Neurosensory - Adult  Goal: Absence of seizures  1/7/2024 2218 by Brooke Pena RN  Outcome: Progressing     Problem: Neurosensory - Adult  Goal: Remains free of injury related to seizures activity  1/7/2024 2218 by Brooke Pena RN  Outcome: Progressing     Problem: Neurosensory - Adult  Goal: Achieves maximal functionality and self care  1/7/2024 2218 by Brooke Pena RN  Outcome: Progressing     Problem: Respiratory - Adult  Goal: Achieves optimal ventilation and oxygenation  1/7/2024 2218 by Brooke Pena RN  Outcome: Progressing     Problem: Skin/Tissue Integrity - Adult  Goal: Skin integrity remains intact  1/7/2024 2218 by Brooke Pena RN  Outcome: Progressing     Problem: Skin/Tissue Integrity - Adult  Goal: Incisions, wounds, or drain sites healing without S/S of infection  1/7/2024 2218 by Brooke Pena RN  Outcome: Progressing     Problem: Skin/Tissue Integrity - Adult  Goal: Oral mucous membranes remain intact  1/7/2024 2218 by Brooke Pena RN  Outcome: Progressing     Problem: Musculoskeletal - Adult  Goal: Return mobility to safest level of function  1/7/2024 2218 by Brooke Pena RN  Outcome: Progressing     Problem: Musculoskeletal - Adult  Goal: Maintain proper alignment of affected body part  1/7/2024 2218 by Brooke Pena RN  Outcome: Progressing     Problem: Musculoskeletal - Adult  Goal: Return ADL status to a safe level of function  1/7/2024 2218 by Brooke Pena RN  Outcome: Progressing     Problem: Gastrointestinal - Adult  Goal: Minimal or absence of nausea and vomiting  1/7/2024 2218 by Brooke Pena RN  Outcome: Progressing     Problem:

## 2024-01-08 NOTE — PROGRESS NOTES
Pt sound asleep o2 sat 85% on 3 litters increase to 4 litters  sat 87% increased o2 to 5 litters n/c

## 2024-01-08 NOTE — PROGRESS NOTES
Comprehensive Nutrition Assessment    Type and Reason for Visit:  Reassess    Nutrition Recommendations/Plan:  Continue Vital 1.2 AF at goal rate of 65 ml/hr   Continue protein modular once per day  Continue water flush 150 ml every 4 hour  4.  Will monitor TF intake and tolerance, blood sugar trends, and fluid and electrolyte balances     Malnutrition Assessment:  Malnutrition Status:  At risk for malnutrition (Comment) (12/18/23 0911)    Context:  Chronic Illness     Findings of the 6 clinical characteristics of malnutrition:  Energy Intake:  No significant decrease in energy intake  Weight Loss:  No significant weight loss     Body Fat Loss:  No significant body fat loss     Muscle Mass Loss:  No significant muscle mass loss    Fluid Accumulation:  Unable to assess     Strength:  Not Performed    Nutrition Assessment:    Follow up:  Continues on Vital 1.2 AF at goal rate of 65 ml/hr with Protein modular once per day.  Weight appears stable at this time.   Water flush 150 ml every 4 hours.  Na 143 mmol/L this am.  Negative 11 liters this admission per flow sheets.  Continues on 3-4 liters of O2.  Precert pending for [facility].    Nutrition Related Findings:    labs reviewed, most electrolytes WNL this am; BG trends slightly high this am reaching over 200 mg/dl; last BM on 1/7, did receive Laxative on 1/8/24 Wound Type:  (redness to buttocks noted)       Current Nutrition Intake & Therapies:    Average Meal Intake: NPO  Average Supplements Intake: NPO  ADULT TUBE FEEDING; Gastrostomy; Peptide Based; Continuous; 65; No; 150; Q 4 hours; Protein; once per day  Current Tube Feeding (TF) Orders:  Feeding Route: Gastrostomy  Formula: Peptide Based  Schedule: Continuous (at 65 ml per hour)  Feeding Regimen: Vital 1.2 AF formula  Additives/Modulars: Protein (1 time per day per feeding tube. Do not mix directly with TF)  Water Flushes: 150 ml every 4 hours  Current TF & Flush Orders Provides: Vital 1.2 AF at 65 ml per

## 2024-01-09 ENCOUNTER — APPOINTMENT (OUTPATIENT)
Dept: GENERAL RADIOLOGY | Age: 64
DRG: 207 | End: 2024-01-09
Payer: MEDICARE

## 2024-01-09 VITALS
SYSTOLIC BLOOD PRESSURE: 131 MMHG | TEMPERATURE: 98.3 F | RESPIRATION RATE: 22 BRPM | BODY MASS INDEX: 29.34 KG/M2 | WEIGHT: 193.56 LBS | HEIGHT: 68 IN | DIASTOLIC BLOOD PRESSURE: 73 MMHG | OXYGEN SATURATION: 94 % | HEART RATE: 94 BPM

## 2024-01-09 LAB
GLUCOSE BLD-MCNC: 175 MG/DL (ref 70–99)
GLUCOSE BLD-MCNC: 175 MG/DL (ref 70–99)
GLUCOSE BLD-MCNC: 204 MG/DL (ref 70–99)
GLUCOSE BLD-MCNC: 213 MG/DL (ref 70–99)
PERFORMED ON: ABNORMAL

## 2024-01-09 PROCEDURE — 6370000000 HC RX 637 (ALT 250 FOR IP): Performed by: INTERNAL MEDICINE

## 2024-01-09 PROCEDURE — 6360000002 HC RX W HCPCS: Performed by: INTERNAL MEDICINE

## 2024-01-09 PROCEDURE — 6360000002 HC RX W HCPCS: Performed by: HOSPITALIST

## 2024-01-09 PROCEDURE — 94660 CPAP INITIATION&MGMT: CPT

## 2024-01-09 PROCEDURE — 92526 ORAL FUNCTION THERAPY: CPT

## 2024-01-09 PROCEDURE — 6370000000 HC RX 637 (ALT 250 FOR IP): Performed by: HOSPITALIST

## 2024-01-09 PROCEDURE — 2580000003 HC RX 258: Performed by: INTERNAL MEDICINE

## 2024-01-09 PROCEDURE — 6370000000 HC RX 637 (ALT 250 FOR IP): Performed by: NURSE PRACTITIONER

## 2024-01-09 PROCEDURE — C9113 INJ PANTOPRAZOLE SODIUM, VIA: HCPCS | Performed by: INTERNAL MEDICINE

## 2024-01-09 PROCEDURE — 94640 AIRWAY INHALATION TREATMENT: CPT

## 2024-01-09 PROCEDURE — 2700000000 HC OXYGEN THERAPY PER DAY

## 2024-01-09 PROCEDURE — 94761 N-INVAS EAR/PLS OXIMETRY MLT: CPT

## 2024-01-09 PROCEDURE — 92611 MOTION FLUOROSCOPY/SWALLOW: CPT

## 2024-01-09 PROCEDURE — 74230 X-RAY XM SWLNG FUNCJ C+: CPT

## 2024-01-09 RX ORDER — CLONAZEPAM 2 MG/1
2 TABLET ORAL 2 TIMES DAILY
Qty: 6 TABLET | Refills: 0 | Status: SHIPPED | OUTPATIENT
Start: 2024-01-09 | End: 2024-01-12

## 2024-01-09 RX ADMIN — SODIUM CHLORIDE, PRESERVATIVE FREE 10 ML: 5 INJECTION INTRAVENOUS at 10:57

## 2024-01-09 RX ADMIN — DANTROLENE SODIUM 200 MG: 25 CAPSULE ORAL at 10:56

## 2024-01-09 RX ADMIN — SODIUM CHLORIDE, PRESERVATIVE FREE 40 MG: 5 INJECTION INTRAVENOUS at 11:13

## 2024-01-09 RX ADMIN — ALBUTEROL SULFATE 2.5 MG: 2.5 SOLUTION RESPIRATORY (INHALATION) at 08:12

## 2024-01-09 RX ADMIN — ENOXAPARIN SODIUM 40 MG: 100 INJECTION SUBCUTANEOUS at 11:11

## 2024-01-09 RX ADMIN — ASPIRIN 81 MG: 81 TABLET, CHEWABLE ORAL at 11:11

## 2024-01-09 RX ADMIN — SODIUM CHLORIDE SOLN NEBU 3% 4 ML: 3 NEBU SOLN at 08:12

## 2024-01-09 RX ADMIN — PREGABALIN 200 MG: 100 CAPSULE ORAL at 15:09

## 2024-01-09 RX ADMIN — INSULIN LISPRO 2 UNITS: 100 INJECTION, SOLUTION INTRAVENOUS; SUBCUTANEOUS at 13:18

## 2024-01-09 RX ADMIN — PREGABALIN 200 MG: 100 CAPSULE ORAL at 11:12

## 2024-01-09 RX ADMIN — LEVOTHYROXINE SODIUM 50 MCG: 0.05 TABLET ORAL at 05:42

## 2024-01-09 RX ADMIN — CLONAZEPAM 2 MG: 1 TABLET ORAL at 11:11

## 2024-01-09 RX ADMIN — METOPROLOL TARTRATE 25 MG: 25 TABLET, FILM COATED ORAL at 11:12

## 2024-01-09 RX ADMIN — FUROSEMIDE 20 MG: 20 TABLET ORAL at 11:11

## 2024-01-09 RX ADMIN — SERTRALINE 150 MG: 100 TABLET, FILM COATED ORAL at 11:12

## 2024-01-09 ASSESSMENT — PAIN SCALES - WONG BAKER
WONGBAKER_NUMERICALRESPONSE: 0

## 2024-01-09 NOTE — PROGRESS NOTES
Report called to Leeanna at [facility]. Denies any further questions/concerns.Call back number left with her. Waiting for transportation's arrival to discharge patient back to facility.

## 2024-01-09 NOTE — DISCHARGE SUMMARY
Hospital Medicine Discharge Summary    Patient ID: Lilly Terrell      Patient's PCP: Tyson Hahn    Admit Date: 12/16/2023     Discharge Date:  1/9/23    Admitting Physician: Phi Barkley MD     Discharge Physician: Phi Barkley MD     Discharge Diagnoses:       Active Hospital Problems    Diagnosis     Acute on chronic systolic heart failure (HCC) [I50.23]     Septic shock (HCC) [A41.9, R65.21]     NSTEMI (non-ST elevated myocardial infarction) (HCC) [I21.4]     Pneumonia due to severe acute respiratory syndrome coronavirus 2 (SARS-CoV-2) [U07.1, J12.82]     High fever [R50.9]     Neutrophilia [D72.9]     Endotracheally intubated [Z97.8]     Elevated procalcitonin [R79.89]     Cerebral palsy (HCC) [G80.9]     Lactic acid acidosis [E87.20]     Sepsis (HCC) [A41.9]     Pneumonia, unspecified organism [J18.9]     Takotsubo cardiomyopathy [I51.81]     Cardiogenic shock (HCC) [R57.0]        The patient was seen and examined on day of discharge and this discharge summary is in conjunction with any daily progress note from day of discharge.    Hospital Course:   Lilly Terrell is a 62 y.o. female with pmh of  cerebral palsy  who was sent from [facility] with c/o AMS  Patient has h/o chronic respiratory failure and at baseline use 4 L oxygen via NC   Patient recently had covid 19 infection  No h/o chest pain or GI bleed or diarrhea or seizure      ED     CT chest s/o rigth ML PNA  Patent is alert,awake and communicating      Septic shock in the setting of COVID and aspiration pneumonia.  Completed course of meropenem. Blood cultures with no growth, urinary antigens negative.  Septic shock resolved.  Infectious disease signed off (1/2).   Cardiogenic shock secondary to Takotsubo cardiomyopathy had a previous echo showing EF of 20% with repeat improved to 50% on 12/27.  Cardiology following with recommendations to continue Lasix IBID as of 1/2 and no longer on pressors. Continue BB.   Follow-up with cardiology as outpatient to evaluate for ACE/ARB, spironolactone, and SGLT-2 inhibitor.  Cardiogenic shock has resolved.   Acute respiratory failure with hypoxia: Secondary to above issues and extubated (12/31) to high flow oxygen and now 3-4 LPM during the day and BiPAP at 40% FiO2 at night.   Metabolic alkalosis requiring acetazolamide.   Type 2 Diabetes.  Cover with a \"sliding scale\" lispro moderate scale prandial correction insulin.    Dysphagia s/p PEG, tube feed at nutritional goal.   Hypokalemia:  Potassium chloride IV replacement to goal K+ > 4.0 mmol/L   Macrocytic anemia.       Advance Directive: Full Code  DVT prophylaxis with enoxaparin 40 mg sub-Q daily.   Discharge planning:  SNF Continue BiPAP qHS on discharge.       1/9/23      Patient respiratory status has improved,on 3-4 L oxygen via NC during day time and on nocturnal  BIPAP   Pulmonology has signed off  Tolerating tube feeding     Clinically stable  Vital signs stable  Denies chest pain or sob            Physical Exam Performed:     /73   Pulse 99   Temp 98.3 °F (36.8 °C) (Oral)   Resp 29   Ht 1.727 m (5' 7.99\")   Wt 87.8 kg (193 lb 9 oz)   SpO2 97%   BMI 29.44 kg/m²       General appearance:  No apparent distress, appears stated age and cooperative.  HEENT:  Normal cephalic, atraumatic without obvious deformity. Pupils equal, round, and reactive to light.  Extra ocular muscles intact. Conjunctivae/corneas clear.    Respiratory:  Normal respiratory effort. Clear to auscultation, bilaterally without Rales/Wheezes/Rhonchi.  Cardiovascular:  Regular rate and rhythm with normal S1/S2 without murmurs, rubs or gallops.  Abdomen: Soft, non-tender, non-distended with normal bowel sounds. G tube           Labs: For convenience and continuity at follow-up the following most recent labs are provided:      CBC:    Lab Results   Component Value Date/Time    WBC 9.3 01/07/2024 11:35 AM    HGB 8.0 01/07/2024 11:35 AM    HCT 23.9

## 2024-01-09 NOTE — PLAN OF CARE
Problem: Discharge Planning  Goal: Discharge to home or other facility with appropriate resources  Outcome: Completed     Problem: Neurosensory - Adult  Goal: Achieves stable or improved neurological status  Outcome: Completed  Goal: Absence of seizures  Outcome: Completed  Goal: Remains free of injury related to seizures activity  Outcome: Completed  Goal: Achieves maximal functionality and self care  Outcome: Completed     Problem: Respiratory - Adult  Goal: Achieves optimal ventilation and oxygenation  Outcome: Completed     Problem: Skin/Tissue Integrity - Adult  Goal: Skin integrity remains intact  Outcome: Completed  Goal: Incisions, wounds, or drain sites healing without S/S of infection  Outcome: Completed  Goal: Oral mucous membranes remain intact  Outcome: Completed     Problem: Musculoskeletal - Adult  Goal: Return mobility to safest level of function  Outcome: Completed  Goal: Maintain proper alignment of affected body part  Outcome: Completed  Goal: Return ADL status to a safe level of function  Outcome: Completed     Problem: Gastrointestinal - Adult  Goal: Minimal or absence of nausea and vomiting  Outcome: Completed  Goal: Maintains or returns to baseline bowel function  Outcome: Completed  Goal: Maintains adequate nutritional intake  Outcome: Completed     Problem: Genitourinary - Adult  Goal: Absence of urinary retention  Outcome: Completed     Problem: Safety - Adult  Goal: Free from fall injury  Outcome: Completed     Problem: Skin/Tissue Integrity  Goal: Absence of new skin breakdown  Description: 1.  Monitor for areas of redness and/or skin breakdown  2.  Assess vascular access sites hourly  3.  Every 4-6 hours minimum:  Change oxygen saturation probe site  4.  Every 4-6 hours:  If on nasal continuous positive airway pressure, respiratory therapy assess nares and determine need for appliance change or resting period.  Outcome: Completed     Problem: Nutrition Deficit:  Goal: Optimize

## 2024-01-09 NOTE — PROCEDURES
INSTRUMENTAL SWALLOW REPORT  MODIFIED BARIUM SWALLOW    NAME: Lilly Terrell   : 1960  MRN: [id number]       Date of Eval: 2024     Ordering Physician: Filippo  Radiologist: Dannie    Referring Diagnosis: oropharygnela dysphagia; r 13.12    Past Medical History:  has a past medical history of Cardiac arrest (HCC), Cerebral palsy (HCC), Closed anterior dislocation of humerus, Dysphagia, Infantile cerebral palsy, unspecified, and Irritable bowel syndrome.    Past Surgical History:  has a past surgical history that includes pr arthrp acetblr/prox fem prostc agrft/algrft (Right); pr arthrp kne condyle&platu medial&lat compartments (Left); and Total shoulder arthroplasty.    Type of Study: Repeat [facility]    CHART REVIEW:  2023 admitted with c/o AMS  MD ADMISSION H&P HPI:  Lilly Terrell is a 62 y.o. female with pmh of  cerebral palsy  who was sent from [facility] with c/o AMS  Patient has h/o chronic respiratory failure and at baseline use 4 L oxygen via NC   Patient recently had covid 19 infection  No h/o chest pain or GI bleed or diarrhea or seizure      2023 rapid response  2023-2023 INTUBATED  2024 swallow re-eval: continue NPO     IMAGING:  Initial CXR: 2023  IMPRESSION:  Small right-sided pleural effusions with fluid extending into the minor fissure.  No confluent infiltrate identified.     Most Recent CXR: 2024  IMPRESSION:  Mild degree of pulmonary edema slightly increased.     CT CHEST: 2023  IMPRESSION:  Right middle lobe bilateral pulmonary infiltrates most prominent in the right middle lobe.  Area of consolidation related to atelectasis or infiltrate involving the posterior aspect of the right upper lobe.  Follow-up CT of the chest after clinical/antibiotic therapy may be helpful to document stability or resolution of this finding.  Small left-sided pleural effusion.  Mild increased dilatation of common bile duct.  Hepatic steatosis.     Patient

## 2024-01-09 NOTE — CARE COORDINATION
ANDREINA completed chart review, nursing rounds completed. Pt is improving with oxygen and breathing, now at her swallowing test. Doris with [facility] called and left a VM informing that pt's precert was approved with [facility], and there is a decision to make with either returning to [facility] or going to [facility] prior.     Upon speaking with pt's mother Nelly, she prefers her to return to [facility].   ANDREINA left VM for Stephan Trails to return call, to check on status of LTC and ensure nothing is needed to return.    ANDREINA following,    Reynold Can LMSW, [facility] Social Work Case Management   Phone: [phone]  Fax: [phone]

## 2024-01-09 NOTE — PROGRESS NOTES
Hospitalist Progress Note  1/9/2024 12:00 PM  Subjective:   Admit Date: 12/16/2023  PCP: Tyson Hahn Status: Inpatient   Interval History: Hospital Day: 25,    Respiratory status improving , oxygen demand improved to 4 L via NC  She is alert,awake and communicating today am    History of present illness:  Admitted with septic shock in the setting of COVID and aspiration pneumonia.  Completed course of meropenem. Blood cultures with no growth, urinary antigens negative.  Septic shock resolved.  Cardiogenic shock secondary to Takotsubo cardiomyopathy had a previous echo showing EF of 20% with repeat improved to 50% on 12/27.       Diet: Tube feed via gastrostomy at  65 cc/hr  Left basilic PICC (12/24)  RUQ PEG-jejunostomy  Medications:     enoxaparin  1 mg/kg SubCUTAneous BID   sodium chloride   4 mL Nebulization BID   albuterol  2.5 mg Nebulization TID   amitriptyline  50 mg PEG Tube Nightly   aspirin  81 mg PEG Tube Daily   clonazepam  2 mg PEG Tube BID   levothyroxine  50 mcg PEG Tube Daily   metoprolol tartrate  25 mg PEG Tube BID   polyethylene glycol  17 g Per G Tube BID   pregabalin  200 mg PEG Tube TID   sertraline  150 mg PEG Tube Daily   dantrolene  200 mg PEG Tube BID   furosemide  20 mg PEG Tube BID   insulin lispro SSI  0-8 Units SubCUTAneous Q4H   pantoprazole   40 mg IntraVENous Daily     Recent Labs     01/07/24  1135   WBC 9.3   HGB 8.0*      .2*       Recent Labs     01/08/24  0610      K 3.9      CO2 37*   BUN 14   CREATININE <0.5*   GLUCOSE 146*         POC Glucose:   Recent Labs     01/08/24  2025 01/09/24  0147 01/09/24  0420 01/09/24  0744   POCGLU 202* 175* 204* 175*       Portable CXR (1/4) Mild degree of pulmonary edema slightly increased     Objective:   Vitals:  /73   Pulse 97   Temp 97.8 °F (36.6 °C) (Oral)   Resp 20   Ht 1.727 m (5' 7.99\")   Wt 89 kg (196 lb 3.4 oz)   SpO2 97% on 3 LPM  BMI 29.84 kg/m²   General appearance: alert and cooperative

## 2024-01-09 NOTE — CARE COORDINATION
Case Management Discharge Note          Date / Time of Note: 1/9/2024 2:30 PM                  Patient Name: Lilly Terrell   YOB: 1960  Diagnosis: Pneumonia, unspecified organism [J18.9]   Date / Time: 12/16/2023  3:09 PM    Financial:  Payor: [employer] / Plan: AET MEDICARE ADVANTAGE HMO / Product Type: Medicare /      Pharmacy:    [facility] - [address] - P [phone] - F [phone]  [address] [facility] [address]  Phone: [phone] Fax: [phone]      Assistance purchasing medications?: Potential Assistance Purchasing Medications: No  Assistance provided by Case Management: None at this time    DISCHARGE Disposition: Long Term Care Facility (LTC)    Nursing Facility:   Discharging to Facility/ Agency   Name: [facility]  Address:  [address] [place] [address]  Phone:  [phone]  Fax:  [phone]     LOC at discharge: Long Term Care  CECELIA Completed: Yes             NURSING REPORT NUMBER: [id number]               NURSING FAX NUMBER: [phone]    Notification completed in HENS/PAS?:  Not Applicable      Transportation:  Transportation PLAN for discharge: EMS transportation   Mode of Transport: Ambulance stretcher - BLS  Reason for medical transport: Other: cerebral palsy and oxygen needs  Name of Transport Company: [facility] [employer] [facility]  Phone: [phone]  Time of Transport: 5:30pm    Transport form completed: Not Indicated    IMM Completed:   Yes, Case management has presented and reviewed IMM letter #2.           .   Patient and/or family/POA verbalized understanding of their medicare rights and appeal process if needed. Patient and/or family/POA has signed, initialed and placed the date and time on IMM letter #2 on the the appropriate lines. Copy of letter offered and they are aware that the original copy of IMM letter #2 is available prior to discharge from the paper

## 2024-07-05 ENCOUNTER — TRANSCRIBE ORDERS (OUTPATIENT)
Dept: ADMINISTRATIVE | Age: 64
End: 2024-07-05

## 2024-07-05 DIAGNOSIS — R10.9 STOMACH ACHE: Primary | ICD-10-CM

## 2024-07-11 ENCOUNTER — HOSPITAL ENCOUNTER (OUTPATIENT)
Dept: CT IMAGING | Age: 64
Discharge: HOME | End: 2024-07-11
Payer: COMMERCIAL

## 2024-07-11 DIAGNOSIS — G89.4 CHRONIC PAIN SYNDROME: ICD-10-CM

## 2024-07-11 PROCEDURE — 6360000004 HC RX CONTRAST MEDICATION: Performed by: FAMILY MEDICINE

## 2024-07-11 PROCEDURE — 74170 CT ABD WO CNTRST FLWD CNTRST: CPT

## 2024-07-11 RX ADMIN — IOMEPROL INJECTION 100 ML: 714 INJECTION, SOLUTION INTRAVASCULAR at 12:20

## 2025-03-22 ENCOUNTER — APPOINTMENT (OUTPATIENT)
Dept: CT IMAGING | Age: 65
DRG: 871 | End: 2025-03-22
Payer: COMMERCIAL

## 2025-03-22 ENCOUNTER — HOSPITAL ENCOUNTER (INPATIENT)
Age: 65
LOS: 4 days | Discharge: NURSING, REHAB OR LONG-TERM CARE | DRG: 871 | End: 2025-03-26
Attending: STUDENT IN AN ORGANIZED HEALTH CARE EDUCATION/TRAINING PROGRAM | Admitting: INTERNAL MEDICINE
Payer: COMMERCIAL

## 2025-03-22 ENCOUNTER — APPOINTMENT (OUTPATIENT)
Dept: GENERAL RADIOLOGY | Age: 65
DRG: 871 | End: 2025-03-22
Payer: COMMERCIAL

## 2025-03-22 DIAGNOSIS — R06.82 TACHYPNEA: ICD-10-CM

## 2025-03-22 DIAGNOSIS — R94.31 PROLONGED Q-T INTERVAL ON ECG: ICD-10-CM

## 2025-03-22 DIAGNOSIS — K56.609 SMALL BOWEL OBSTRUCTION (HCC): ICD-10-CM

## 2025-03-22 DIAGNOSIS — R06.03 ACUTE RESPIRATORY DISTRESS: ICD-10-CM

## 2025-03-22 DIAGNOSIS — R65.20 SEVERE SEPSIS: Primary | ICD-10-CM

## 2025-03-22 DIAGNOSIS — A41.9 SEVERE SEPSIS: Primary | ICD-10-CM

## 2025-03-22 PROBLEM — E83.52 HYPERCALCEMIA: Status: ACTIVE | Noted: 2025-03-22

## 2025-03-22 PROBLEM — E83.52 HYPERCALCEMIA: Status: RESOLVED | Noted: 2025-03-22 | Resolved: 2025-03-22

## 2025-03-22 PROBLEM — R73.9 HYPERGLYCEMIA: Status: ACTIVE | Noted: 2025-03-22

## 2025-03-22 PROBLEM — E87.0 HYPERNATREMIA: Status: ACTIVE | Noted: 2025-03-22

## 2025-03-22 LAB
ABO + RH BLD: NORMAL
ALBUMIN SERPL-MCNC: 5 G/DL (ref 3.4–5)
ALP SERPL-CCNC: 121 U/L (ref 40–129)
ALT SERPL-CCNC: 22 U/L (ref 10–40)
ANION GAP SERPL CALCULATED.3IONS-SCNC: 21 MMOL/L (ref 3–16)
APTT BLD: 30.1 SEC (ref 22.1–36.4)
AST SERPL-CCNC: 27 U/L (ref 15–37)
BACTERIA URNS QL MICRO: NORMAL /HPF
BASE EXCESS BLDV CALC-SCNC: 10.6 MMOL/L
BASE EXCESS BLDV CALC-SCNC: 12.9 MMOL/L
BASOPHILS # BLD: 0 K/UL (ref 0–0.2)
BASOPHILS NFR BLD: 0.2 %
BILIRUB DIRECT SERPL-MCNC: <0.1 MG/DL (ref 0–0.3)
BILIRUB INDIRECT SERPL-MCNC: 0.3 MG/DL (ref 0–1)
BILIRUB SERPL-MCNC: 0.4 MG/DL (ref 0–1)
BILIRUB UR QL STRIP.AUTO: NEGATIVE
BLD GP AB SCN SERPL QL: NORMAL
BUN SERPL-MCNC: 27 MG/DL (ref 7–20)
CALCIUM SERPL-MCNC: 10.9 MG/DL (ref 8.3–10.6)
CHLORIDE SERPL-SCNC: 92 MMOL/L (ref 99–110)
CK SERPL-CCNC: 24 U/L (ref 26–192)
CLARITY UR: CLEAR
CO2 BLDV-SCNC: 40 MMOL/L
CO2 BLDV-SCNC: 42 MMOL/L
CO2 SERPL-SCNC: 33 MMOL/L (ref 21–32)
COHGB MFR BLDV: 0 %
COHGB MFR BLDV: 0.2 %
COLOR UR: YELLOW
CREAT SERPL-MCNC: 0.7 MG/DL (ref 0.6–1.2)
DEPRECATED RDW RBC AUTO: 16.8 % (ref 12.4–15.4)
EKG ATRIAL RATE: 136 BPM
EKG DIAGNOSIS: NORMAL
EKG P AXIS: -29 DEGREES
EKG P-R INTERVAL: 120 MS
EKG Q-T INTERVAL: 366 MS
EKG QRS DURATION: 90 MS
EKG QTC CALCULATION (BAZETT): 550 MS
EKG R AXIS: 2 DEGREES
EKG T AXIS: 100 DEGREES
EKG VENTRICULAR RATE: 136 BPM
EOSINOPHIL # BLD: 0 K/UL (ref 0–0.6)
EOSINOPHIL NFR BLD: 0 %
EPI CELLS #/AREA URNS AUTO: 3 /HPF (ref 0–5)
EST. AVERAGE GLUCOSE BLD GHB EST-MCNC: 119.8 MG/DL
GFR SERPLBLD CREATININE-BSD FMLA CKD-EPI: >90 ML/MIN/{1.73_M2}
GLUCOSE BLD-MCNC: 157 MG/DL (ref 70–99)
GLUCOSE BLD-MCNC: 168 MG/DL (ref 70–99)
GLUCOSE BLD-MCNC: 173 MG/DL (ref 70–99)
GLUCOSE SERPL-MCNC: 201 MG/DL (ref 70–99)
GLUCOSE UR STRIP.AUTO-MCNC: NEGATIVE MG/DL
HBA1C MFR BLD: 5.8 %
HCO3 BLDV-SCNC: 39 MMOL/L (ref 23–29)
HCO3 BLDV-SCNC: 40 MMOL/L (ref 23–29)
HCT VFR BLD AUTO: 47.9 % (ref 36–48)
HGB BLD-MCNC: 15.5 G/DL (ref 12–16)
HGB UR QL STRIP.AUTO: ABNORMAL
HYALINE CASTS #/AREA URNS AUTO: 4 /LPF (ref 0–8)
INR PPP: 0.92 (ref 0.85–1.15)
KETONES UR STRIP.AUTO-MCNC: 15 MG/DL
LACTATE BLDV-SCNC: 3.3 MMOL/L (ref 0.4–1.9)
LACTATE BLDV-SCNC: 3.4 MMOL/L (ref 0.4–1.9)
LACTATE BLDV-SCNC: 3.5 MMOL/L (ref 0.4–1.9)
LEUKOCYTE ESTERASE UR QL STRIP.AUTO: NEGATIVE
LIPASE SERPL-CCNC: 28 U/L (ref 13–60)
LYMPHOCYTES # BLD: 1 K/UL (ref 1–5.1)
LYMPHOCYTES NFR BLD: 5 %
MAGNESIUM SERPL-MCNC: 2.35 MG/DL (ref 1.8–2.4)
MCH RBC QN AUTO: 28.6 PG (ref 26–34)
MCHC RBC AUTO-ENTMCNC: 32.4 G/DL (ref 31–36)
MCV RBC AUTO: 88.1 FL (ref 80–100)
METHGB MFR BLDV: 0.6 %
METHGB MFR BLDV: 0.6 %
MONOCYTES # BLD: 1 K/UL (ref 0–1.3)
MONOCYTES NFR BLD: 4.6 %
NEUTROPHILS # BLD: 18.7 K/UL (ref 1.7–7.7)
NEUTROPHILS NFR BLD: 90.2 %
NITRITE UR QL STRIP.AUTO: NEGATIVE
NT-PROBNP SERPL-MCNC: 192 PG/ML (ref 0–124)
O2 THERAPY: ABNORMAL
O2 THERAPY: ABNORMAL
OSMOLALITY SERPL: 328 MOSM/KG (ref 280–301)
PCO2 BLDV: 57.8 MMHG (ref 40–50)
PCO2 BLDV: 63.5 MMHG (ref 40–50)
PERFORMED ON: ABNORMAL
PH BLDV: 7.39 [PH] (ref 7.35–7.45)
PH BLDV: 7.45 [PH] (ref 7.35–7.45)
PH UR STRIP.AUTO: 5.5 [PH] (ref 5–8)
PLATELET # BLD AUTO: 285 K/UL (ref 135–450)
PMV BLD AUTO: 9.4 FL (ref 5–10.5)
PO2 BLDV: 40 MMHG
PO2 BLDV: 42 MMHG
POTASSIUM SERPL-SCNC: 4.3 MMOL/L (ref 3.5–5.1)
PROCALCITONIN SERPL IA-MCNC: 0.31 NG/ML (ref 0–0.15)
PROT SERPL-MCNC: 9.8 G/DL (ref 6.4–8.2)
PROT UR STRIP.AUTO-MCNC: 30 MG/DL
PROTHROMBIN TIME: 12.5 SEC (ref 11.9–14.9)
RBC # BLD AUTO: 5.44 M/UL (ref 4–5.2)
RBC CLUMPS #/AREA URNS AUTO: 3 /HPF (ref 0–4)
SAO2 % BLDV: 73 %
SAO2 % BLDV: 76 %
SODIUM SERPL-SCNC: 146 MMOL/L (ref 136–145)
SP GR UR STRIP.AUTO: 1.05 (ref 1–1.03)
TROPONIN, HIGH SENSITIVITY: 12 NG/L (ref 0–14)
TROPONIN, HIGH SENSITIVITY: 15 NG/L (ref 0–14)
TSH SERPL DL<=0.005 MIU/L-ACNC: 1.74 UIU/ML (ref 0.27–4.2)
UA COMPLETE W REFLEX CULTURE PNL UR: ABNORMAL
UA DIPSTICK W REFLEX MICRO PNL UR: YES
URN SPEC COLLECT METH UR: ABNORMAL
UROBILINOGEN UR STRIP-ACNC: 0.2 E.U./DL
WBC # BLD AUTO: 20.8 K/UL (ref 4–11)
WBC #/AREA URNS AUTO: 1 /HPF (ref 0–5)

## 2025-03-22 PROCEDURE — 80048 BASIC METABOLIC PNL TOTAL CA: CPT

## 2025-03-22 PROCEDURE — 83690 ASSAY OF LIPASE: CPT

## 2025-03-22 PROCEDURE — 2500000003 HC RX 250 WO HCPCS: Performed by: INTERNAL MEDICINE

## 2025-03-22 PROCEDURE — 6360000002 HC RX W HCPCS: Performed by: STUDENT IN AN ORGANIZED HEALTH CARE EDUCATION/TRAINING PROGRAM

## 2025-03-22 PROCEDURE — 84484 ASSAY OF TROPONIN QUANT: CPT

## 2025-03-22 PROCEDURE — 80076 HEPATIC FUNCTION PANEL: CPT

## 2025-03-22 PROCEDURE — 93005 ELECTROCARDIOGRAM TRACING: CPT | Performed by: STUDENT IN AN ORGANIZED HEALTH CARE EDUCATION/TRAINING PROGRAM

## 2025-03-22 PROCEDURE — 96374 THER/PROPH/DIAG INJ IV PUSH: CPT

## 2025-03-22 PROCEDURE — 2580000003 HC RX 258

## 2025-03-22 PROCEDURE — 71045 X-RAY EXAM CHEST 1 VIEW: CPT

## 2025-03-22 PROCEDURE — 87040 BLOOD CULTURE FOR BACTERIA: CPT

## 2025-03-22 PROCEDURE — 0D9670Z DRAINAGE OF STOMACH WITH DRAINAGE DEVICE, VIA NATURAL OR ARTIFICIAL OPENING: ICD-10-PCS | Performed by: INTERNAL MEDICINE

## 2025-03-22 PROCEDURE — 86850 RBC ANTIBODY SCREEN: CPT

## 2025-03-22 PROCEDURE — 99254 IP/OBS CNSLTJ NEW/EST MOD 60: CPT | Performed by: STUDENT IN AN ORGANIZED HEALTH CARE EDUCATION/TRAINING PROGRAM

## 2025-03-22 PROCEDURE — 83605 ASSAY OF LACTIC ACID: CPT

## 2025-03-22 PROCEDURE — 5A09357 ASSISTANCE WITH RESPIRATORY VENTILATION, LESS THAN 24 CONSECUTIVE HOURS, CONTINUOUS POSITIVE AIRWAY PRESSURE: ICD-10-PCS | Performed by: INTERNAL MEDICINE

## 2025-03-22 PROCEDURE — 84443 ASSAY THYROID STIM HORMONE: CPT

## 2025-03-22 PROCEDURE — 94761 N-INVAS EAR/PLS OXIMETRY MLT: CPT

## 2025-03-22 PROCEDURE — 6370000000 HC RX 637 (ALT 250 FOR IP): Performed by: STUDENT IN AN ORGANIZED HEALTH CARE EDUCATION/TRAINING PROGRAM

## 2025-03-22 PROCEDURE — 2580000003 HC RX 258: Performed by: INTERNAL MEDICINE

## 2025-03-22 PROCEDURE — 85730 THROMBOPLASTIN TIME PARTIAL: CPT

## 2025-03-22 PROCEDURE — 82550 ASSAY OF CK (CPK): CPT

## 2025-03-22 PROCEDURE — 84145 PROCALCITONIN (PCT): CPT

## 2025-03-22 PROCEDURE — 83036 HEMOGLOBIN GLYCOSYLATED A1C: CPT

## 2025-03-22 PROCEDURE — 86901 BLOOD TYPING SEROLOGIC RH(D): CPT

## 2025-03-22 PROCEDURE — 83735 ASSAY OF MAGNESIUM: CPT

## 2025-03-22 PROCEDURE — 2700000000 HC OXYGEN THERAPY PER DAY

## 2025-03-22 PROCEDURE — 2060000000 HC ICU INTERMEDIATE R&B

## 2025-03-22 PROCEDURE — 85025 COMPLETE CBC W/AUTO DIFF WBC: CPT

## 2025-03-22 PROCEDURE — 6360000004 HC RX CONTRAST MEDICATION: Performed by: STUDENT IN AN ORGANIZED HEALTH CARE EDUCATION/TRAINING PROGRAM

## 2025-03-22 PROCEDURE — 74174 CTA ABD&PLVS W/CONTRAST: CPT

## 2025-03-22 PROCEDURE — 94660 CPAP INITIATION&MGMT: CPT

## 2025-03-22 PROCEDURE — 85610 PROTHROMBIN TIME: CPT

## 2025-03-22 PROCEDURE — 83880 ASSAY OF NATRIURETIC PEPTIDE: CPT

## 2025-03-22 PROCEDURE — 93010 ELECTROCARDIOGRAM REPORT: CPT | Performed by: INTERNAL MEDICINE

## 2025-03-22 PROCEDURE — 83930 ASSAY OF BLOOD OSMOLALITY: CPT

## 2025-03-22 PROCEDURE — 81001 URINALYSIS AUTO W/SCOPE: CPT

## 2025-03-22 PROCEDURE — 99285 EMERGENCY DEPT VISIT HI MDM: CPT

## 2025-03-22 PROCEDURE — 71260 CT THORAX DX C+: CPT

## 2025-03-22 PROCEDURE — 82803 BLOOD GASES ANY COMBINATION: CPT

## 2025-03-22 PROCEDURE — 6360000002 HC RX W HCPCS: Performed by: INTERNAL MEDICINE

## 2025-03-22 PROCEDURE — 86900 BLOOD TYPING SEROLOGIC ABO: CPT

## 2025-03-22 PROCEDURE — 2580000003 HC RX 258: Performed by: STUDENT IN AN ORGANIZED HEALTH CARE EDUCATION/TRAINING PROGRAM

## 2025-03-22 RX ORDER — SODIUM CHLORIDE 0.9 % (FLUSH) 0.9 %
5-40 SYRINGE (ML) INJECTION PRN
Status: DISCONTINUED | OUTPATIENT
Start: 2025-03-22 | End: 2025-03-26 | Stop reason: HOSPADM

## 2025-03-22 RX ORDER — CEFEPIME HYDROCHLORIDE 2 G/50ML
2000 INJECTION, SOLUTION INTRAVENOUS EVERY 12 HOURS
Status: DISCONTINUED | OUTPATIENT
Start: 2025-03-22 | End: 2025-03-26 | Stop reason: HOSPADM

## 2025-03-22 RX ORDER — 0.9 % SODIUM CHLORIDE 0.9 %
1000 INTRAVENOUS SOLUTION INTRAVENOUS ONCE
Status: DISCONTINUED | OUTPATIENT
Start: 2025-03-22 | End: 2025-03-22

## 2025-03-22 RX ORDER — VANCOMYCIN 1.5 G/300ML
1500 INJECTION, SOLUTION INTRAVENOUS ONCE
Status: COMPLETED | OUTPATIENT
Start: 2025-03-22 | End: 2025-03-22

## 2025-03-22 RX ORDER — SODIUM CHLORIDE 0.9 % (FLUSH) 0.9 %
5-40 SYRINGE (ML) INJECTION EVERY 12 HOURS SCHEDULED
Status: DISCONTINUED | OUTPATIENT
Start: 2025-03-22 | End: 2025-03-26 | Stop reason: HOSPADM

## 2025-03-22 RX ORDER — ACETAMINOPHEN 650 MG/1
650 SUPPOSITORY RECTAL EVERY 6 HOURS PRN
Status: DISCONTINUED | OUTPATIENT
Start: 2025-03-22 | End: 2025-03-26 | Stop reason: HOSPADM

## 2025-03-22 RX ORDER — IOPAMIDOL 755 MG/ML
150 INJECTION, SOLUTION INTRAVASCULAR
Status: COMPLETED | OUTPATIENT
Start: 2025-03-22 | End: 2025-03-22

## 2025-03-22 RX ORDER — ENOXAPARIN SODIUM 100 MG/ML
40 INJECTION SUBCUTANEOUS DAILY
Status: DISCONTINUED | OUTPATIENT
Start: 2025-03-22 | End: 2025-03-26 | Stop reason: HOSPADM

## 2025-03-22 RX ORDER — OXYMETAZOLINE HYDROCHLORIDE 0.05 G/100ML
2 SPRAY NASAL ONCE
Status: ACTIVE | OUTPATIENT
Start: 2025-03-22 | End: 2025-03-25

## 2025-03-22 RX ORDER — 0.9 % SODIUM CHLORIDE 0.9 %
1000 INTRAVENOUS SOLUTION INTRAVENOUS ONCE
Status: COMPLETED | OUTPATIENT
Start: 2025-03-22 | End: 2025-03-22

## 2025-03-22 RX ORDER — METRONIDAZOLE 500 MG/100ML
500 INJECTION, SOLUTION INTRAVENOUS EVERY 8 HOURS
Status: DISCONTINUED | OUTPATIENT
Start: 2025-03-22 | End: 2025-03-26 | Stop reason: HOSPADM

## 2025-03-22 RX ORDER — SODIUM CHLORIDE 9 MG/ML
30 INJECTION, SOLUTION INTRAVENOUS ONCE
Status: COMPLETED | OUTPATIENT
Start: 2025-03-22 | End: 2025-03-22

## 2025-03-22 RX ORDER — DEXTROSE MONOHYDRATE 100 MG/ML
INJECTION, SOLUTION INTRAVENOUS CONTINUOUS PRN
Status: DISCONTINUED | OUTPATIENT
Start: 2025-03-22 | End: 2025-03-26 | Stop reason: HOSPADM

## 2025-03-22 RX ORDER — INSULIN LISPRO 100 [IU]/ML
0-4 INJECTION, SOLUTION INTRAVENOUS; SUBCUTANEOUS
Status: DISCONTINUED | OUTPATIENT
Start: 2025-03-22 | End: 2025-03-26 | Stop reason: HOSPADM

## 2025-03-22 RX ORDER — SODIUM CHLORIDE 9 MG/ML
INJECTION, SOLUTION INTRAVENOUS PRN
Status: DISCONTINUED | OUTPATIENT
Start: 2025-03-22 | End: 2025-03-26 | Stop reason: HOSPADM

## 2025-03-22 RX ORDER — METOPROLOL TARTRATE 1 MG/ML
5 INJECTION, SOLUTION INTRAVENOUS EVERY 8 HOURS PRN
Status: DISCONTINUED | OUTPATIENT
Start: 2025-03-22 | End: 2025-03-26 | Stop reason: HOSPADM

## 2025-03-22 RX ORDER — GLUCAGON 1 MG/ML
1 KIT INJECTION PRN
Status: DISCONTINUED | OUTPATIENT
Start: 2025-03-22 | End: 2025-03-26 | Stop reason: HOSPADM

## 2025-03-22 RX ORDER — ACETAMINOPHEN 325 MG/1
650 TABLET ORAL EVERY 6 HOURS PRN
Status: DISCONTINUED | OUTPATIENT
Start: 2025-03-22 | End: 2025-03-26 | Stop reason: HOSPADM

## 2025-03-22 RX ORDER — LIDOCAINE HYDROCHLORIDE 20 MG/ML
JELLY TOPICAL ONCE
Status: COMPLETED | OUTPATIENT
Start: 2025-03-22 | End: 2025-03-22

## 2025-03-22 RX ORDER — SODIUM CHLORIDE, SODIUM LACTATE, POTASSIUM CHLORIDE, CALCIUM CHLORIDE 600; 310; 30; 20 MG/100ML; MG/100ML; MG/100ML; MG/100ML
INJECTION, SOLUTION INTRAVENOUS CONTINUOUS
Status: ACTIVE | OUTPATIENT
Start: 2025-03-22 | End: 2025-03-22

## 2025-03-22 RX ADMIN — LIDOCAINE HYDROCHLORIDE: 20 JELLY TOPICAL at 09:00

## 2025-03-22 RX ADMIN — SODIUM CHLORIDE, PRESERVATIVE FREE 40 MG: 5 INJECTION INTRAVENOUS at 08:49

## 2025-03-22 RX ADMIN — SODIUM CHLORIDE 1000 ML: 0.9 INJECTION, SOLUTION INTRAVENOUS at 03:17

## 2025-03-22 RX ADMIN — CEFEPIME 2000 MG: 2 INJECTION, POWDER, FOR SOLUTION INTRAVENOUS at 03:18

## 2025-03-22 RX ADMIN — SODIUM CHLORIDE 30 ML/KG/HR: 0.9 INJECTION, SOLUTION INTRAVENOUS at 08:55

## 2025-03-22 RX ADMIN — SODIUM CHLORIDE, PRESERVATIVE FREE 10 ML: 5 INJECTION INTRAVENOUS at 13:01

## 2025-03-22 RX ADMIN — SODIUM CHLORIDE, POTASSIUM CHLORIDE, SODIUM LACTATE AND CALCIUM CHLORIDE: 600; 310; 30; 20 INJECTION, SOLUTION INTRAVENOUS at 13:02

## 2025-03-22 RX ADMIN — SODIUM CHLORIDE, PRESERVATIVE FREE 10 ML: 5 INJECTION INTRAVENOUS at 22:10

## 2025-03-22 RX ADMIN — ENOXAPARIN SODIUM 40 MG: 100 INJECTION SUBCUTANEOUS at 16:07

## 2025-03-22 RX ADMIN — METOPROLOL TARTRATE 5 MG: 5 INJECTION INTRAVENOUS at 17:02

## 2025-03-22 RX ADMIN — VANCOMYCIN 1500 MG: 1.5 INJECTION, SOLUTION INTRAVENOUS at 08:06

## 2025-03-22 RX ADMIN — CEFEPIME HYDROCHLORIDE 2000 MG: 2 INJECTION, SOLUTION INTRAVENOUS at 16:07

## 2025-03-22 RX ADMIN — IOPAMIDOL 150 ML: 755 INJECTION, SOLUTION INTRAVENOUS at 04:12

## 2025-03-22 RX ADMIN — METRONIDAZOLE 500 MG: 500 INJECTION, SOLUTION INTRAVENOUS at 18:16

## 2025-03-22 RX ADMIN — METRONIDAZOLE 500 MG: 500 INJECTION, SOLUTION INTRAVENOUS at 08:59

## 2025-03-22 ASSESSMENT — PAIN SCALES - GENERAL
PAINLEVEL_OUTOF10: 4
PAINLEVEL_OUTOF10: 4
PAINLEVEL_OUTOF10: 0

## 2025-03-22 ASSESSMENT — PAIN - FUNCTIONAL ASSESSMENT
PAIN_FUNCTIONAL_ASSESSMENT: 0-10
PAIN_FUNCTIONAL_ASSESSMENT: 0-10

## 2025-03-22 ASSESSMENT — LIFESTYLE VARIABLES
HOW MANY STANDARD DRINKS CONTAINING ALCOHOL DO YOU HAVE ON A TYPICAL DAY: PATIENT DOES NOT DRINK
HOW OFTEN DO YOU HAVE A DRINK CONTAINING ALCOHOL: NEVER

## 2025-03-22 NOTE — ED NOTES
Shift handoff report given to RN Sandeep Castaneda at patient's bedside. VSS and call light within reach. Recent and pending orders reviewed with oncoming nurse. Patient updated on plan of care. All questions answered at this time.

## 2025-03-22 NOTE — PROGRESS NOTES
Pt to room 5272 from ED via stretcher. Arrived on 6L via nonrebreather mask. Patient arrived with ng tube at 52 cm Pt placed on telemetry monitor, sinus tach rhythm verified with cmu. VSS. Head to toe assessment completed. Pt A&Ox4. Pt oriented to room and how to use call light. Standard safety measures in place. All needs met at this time. Care ongoing.    Electronically signed by Leo Tavares RN on 3/22/2025 at 3:31 PM

## 2025-03-22 NOTE — PROGRESS NOTES
03/22/25 0312   NIV Type   $NIV $Daily Charge   Suction Setup and Functional Yes   NIV Started/Stopped On   Equipment Type V60   Mode Bilevel   Mask Type Full face mask   Mask Size Medium   Assessment   Heart Rate Source Monitor   Level of Consciousness 0   Comfort Level Good   Using Accessory Muscles No   Mask Compliance Good   Skin Assessment Clean, dry, & intact   Skin Protection for O2 Device Yes   Orientation Middle   Location Nose   Intervention(s) Skin Barrier   Breath Sounds   Right Upper Lobe Diminished   Right Middle Lobe Diminished   Right Lower Lobe Diminished   Left Upper Lobe Diminished   Left Lower Lobe Diminished   Settings/Measurements   PIP Observed 16 cm H20   IPAP 16 cmH20   CPAP/EPAP 6 cmH2O   Vt (Measured) 513 mL   Rate Ordered 16   Insp Rise Time (%) 3 %   FiO2  40 %   I Time/ I Time % 0.9 s   Minute Volume (L/min) 12.7 Liters   Mask Leak (lpm) 7 lpm   Patient's Home Machine No   Alarm Settings   Alarms On Y   Low Pressure (cmH2O) 4 cmH2O   High Pressure (cmH2O) 40 cmH2O   Delay Alarm 20 sec(s)   RR High (bpm) 40 br/min

## 2025-03-22 NOTE — CONSULTS
GENERAL SURGERY  Consult Note    Patient Name: Lilly Terrell  MRN: [id number]  YOB: 1960   Date of Evaluation: 3/22/2025  Primary Care Physician: Tyson Hahn MD    Referred by: ED provider    Chief Complaint   Patient presents with    Vomiting     Vomiting since 1800 last night. Started having \"coffee ground emesis' approximately 2 hours ago. 1.5 lpm O2 NC at baseline. Pt with cerebal palsey and from [facility].           SUBJECTIVE:     Lilly is a 64 y.o. year-old female.  She has history of cerebral palsy.  She presented with abdominal pain, which she reports has been present for several days, but worsened yesterday.  This was associated with nausea, and coffee ground emesis.  She denies fever or chills.  She has no respiratory complaints.  She reports she has never had abdominal surgery and never had a small bowel obstruction in the past.    REVIEW OF SYSTEMS  A comprehensive review of systems was completed and is negative except for any elements noted above.    Past Medical History:   Diagnosis Date    Cardiac arrest (HCC)     Cerebral palsy (HCC)     Closed anterior dislocation of humerus     Humeral dislocation    Dysphagia     Infantile cerebral palsy, unspecified     Cerebral palsy    Irritable bowel syndrome     Irritable bowel     Past Surgical History:   Procedure Laterality Date    UT ARTHRP ACETBLR/PROX FEM PROSTC AGRFT/ALGRFT Right     Hip Replacement, Total    UT ARTHRP KNE CONDYLE&PLATU MEDIAL&LAT COMPARTMENTS Left     Knee Replacement, Total    SHOULDER ARTHROPLASTY      Shoulder replacement x 2 to left     Prior to Admission medications    Medication Sig Start Date End Date Taking? Authorizing Provider   clonazePAM (KLONOPIN) 2 MG tablet Take 1 tablet by mouth 2 times daily for 3 days. Max Daily Amount: 4 mg 1/9/24 1/12/24  Phi Barkley MD   acetaminophen (TYLENOL) 325 MG tablet Take 2 tablets by mouth every 6 hours as needed for Pain

## 2025-03-22 NOTE — PROGRESS NOTES
Reviewed xray and saw that it suggested to advance ng tube 10 cm. Tube advanced 10 cm and per Dr. York immediately hooked up to suction. Order for portable chest xray placed.   Electronically signed by Leo Tavares RN on 3/22/2025 at 3:33 PM

## 2025-03-22 NOTE — ED NOTES
ED TO INPATIENT SBAR HANDOFF    Patient Name: Lilly Terrell   Preferred Name: Lilly  : 1960  64 y.o.   Family/Caregiver Present: no   Code Status Order: Full Code  PO Status: NPO:Yes  Telemetry Order:   C-SSRS:    Sitter no     Restraints:     Sepsis Risk Score      Situation  Chief Complaint   Patient presents with    Vomiting     Vomiting since 1800 last night. Started having \"coffee ground emesis' approximately 2 hours ago. 1.5 lpm O2 NC at baseline. Pt with cerebal palsey and from [facility].      Brief Description of Patient's Condition: Coffee ground emesis/diarrhea. SBO found on CT. NG tube placed. NPO for bowel rest. Hx of CP, Can only move left arm.   Mental Status: oriented and alert  [facility]  Imaging:   XR CHEST PORTABLE   Final Result   1. Increased minimal bibasilar airspace opacities likely due to atelectasis   with superimposed bronchiolitis, pneumonia, or aspiration not excluded.   2. Unchanged patchy scarring in the mid to basilar lungs.   3. Interstitial prominence due to pulmonary vascular congestion and/or   chronic changes.         CT CHEST PULMONARY EMBOLISM W CONTRAST   Final Result   1.  No evidence for acute pulmonary embolism.  Chronic lung findings without   evidence for acute airspace disease.      2.  Dilated small bowel concerning for obstructing process with transition in   the left abdomen.      3.  Large rectal stool burden.      4.  No CT evidence for active GI bleed.         CTA ABDOMEN PELVIS W WO CONTRAST   Final Result   1.  No evidence for acute pulmonary embolism.  Chronic lung findings without   evidence for acute airspace disease.      2.  Dilated small bowel concerning for obstructing process with transition in   the left abdomen.      3.  Large rectal stool burden.      4.  No CT evidence for active GI bleed.         XR CHEST PORTABLE   Final Result   Interstitial reticulation and suspected areas of scarring.  A

## 2025-03-22 NOTE — CONSULTS
Clinical Pharmacy Note  Vancomycin Consult    Pharmacy consult received for one-time dose of vancomycin in the Emergency Department per Dr. Freitas.    Ht Readings from Last 1 Encounters:   03/22/25 1.676 m (5' 6\")        Wt Readings from Last 1 Encounters:   03/22/25 99.3 kg (218 lb 14.7 oz)         Assessment/Plan:  Vancomycin 1500 mg x 1 in ED.  If vancomycin is to continue on admission and pharmacy is to manage dosing, please re-consult with admission orders.    Isaac Valencia, PharmD

## 2025-03-22 NOTE — H&P
V2.0  History and Physical      Name:  Lilly Terrell /Age/Sex: 1960  (64 y.o. female)   MRN & CSN:  [id number] & [id number] Encounter Date/Time: 3/22/2025 5:57 AM EDT   Location:   PCP: Tyson Hahn MD       Hospital Day: 1    Assessment and Plan:   Lilly Terrell is a 64 y.o. obese female with a pmh of cerebral palsy, dysphagia and irritable bowel syndrome who presents with Severe sepsis with acute organ dysfunction (HCC).    Hospital Problems           Last Modified POA    * (Principal) Severe sepsis with acute organ dysfunction (HCC) 3/22/2025 Yes    Acute respiratory failure with hypoxia and hypercapnia (HCC) 3/22/2025 Yes    SBO (small bowel obstruction) (HCC) 3/22/2025 Yes    Hyperglycemia 3/22/2025 Yes    Hypernatremia 3/22/2025 Yes       Plan:  IV fluids per sepsis protocol, empiric cefepime and Flagyl and follow-up cultures  Noninvasive ventilation and supplemental oxygen, pulmonary consult  IV Protonix, NG decompression once she is off BiPAP, general surgery consult  Check TSH and glycosylated hemoglobin, low insulin sliding scale every 6 hours  Isotonic saline and trend sodium, check osmolality    Disposition:   Current Living situation: Home  Expected Disposition: To be determined  Estimated D/C: 3 to 4 days    Diet Diet NPO   DVT Prophylaxis [] Lovenox, []  Heparin, [x] SCDs, [] Ambulation,  [] Eliquis, [] Xarelto, [] Coumadin   Code Status Full Code   Surrogate Decision Maker/ POA Son     Personally reviewed Lab Studies and Imaging     Discussed management of the case with ED provider who recommended admission.    EKG interpreted personally and results sinus tachycardia with ventricular rate of 136, QTc 550, no acute ischemic changes.    Imaging that was interpreted personally includes chest x-ray and results no acute cardiopulmonary process.    Drugs that require monitoring for toxicity include none and the method of monitoring was n/a.        History from:  significant pleural effusion at the costophrenic angles. There is no sign of pneumothorax.  Cardiac silhouette is not enlarged.  The vasculature is acceptable for the technique. Left shoulder arthroplasty is partially included.  No endotracheal tube or nasogastric tube.     Interstitial reticulation and suspected areas of scarring.  A component of active bronchiolitis could also be present in the correct clinical setting. There is no significant pulmonary vascular congestion at this time.         Electronically signed by Chirsty Abraham MD on 3/22/2025 at 5:57 AM

## 2025-03-22 NOTE — PROGRESS NOTES
Ng tube advanced 5 cm and repeat xray ordered.   Electronically signed by Leo Tavares RN on 3/22/2025 at 4:59 PM     Radiology called saying patient has already had 4 xrays and one ct and wondered if repeat xray was needed. Message sent to dr. York.   Electronically signed by Leo Tavares RN on 3/22/2025 at 5:12 PM

## 2025-03-22 NOTE — PROGRESS NOTES
Message sent to Dr. York notifying her that patient had been tach since she arrived to floor. Md addressed this abnormality at bedside during rounds. Stated to place patient's ng tube suction and start iv fluids and reassess later.   Electronically signed by Leo Tavares RN on 3/22/2025 at 3:35 PM

## 2025-03-22 NOTE — ED PROVIDER NOTES
ED Attending Attestation Note    I personally saw the patient and made/approved the management plan and take responsibility for patient management.     Briefly, 64 y.o. female presents with coffee-ground emesis at around 1600 yesterday, no history of GI bleeds in the past, no anticoagulant use.  Chronically wears 1.5 L recently recovering from influenza.  Mentions abdominal distention    Focused exam:   Physical Exam  Vitals and nursing note reviewed.   Constitutional:       General: She is in acute distress.      Appearance: She is obese. She is ill-appearing. She is not toxic-appearing or diaphoretic.   HENT:      Head: Normocephalic and atraumatic.      Right Ear: External ear normal.      Left Ear: External ear normal.      Nose: Nose normal.   Eyes:      Conjunctiva/sclera: Conjunctivae normal.   Cardiovascular:      Rate and Rhythm: Normal rate and regular rhythm.   Pulmonary:      Effort: Respiratory distress present.      Breath sounds: No stridor. Wheezing present.      Comments: Tachypnea, accessory muscle use  Abdominal:      General: There is distension.      Tenderness: There is abdominal tenderness. There is no guarding or rebound.   Skin:     General: Skin is warm and dry.      Capillary Refill: Capillary refill takes less than 2 seconds.   Neurological:      Mental Status: She is alert.           Imaging:   CT CHEST PULMONARY EMBOLISM W CONTRAST   Final Result   1.  No evidence for acute pulmonary embolism.  Chronic lung findings without   evidence for acute airspace disease.      2.  Dilated small bowel concerning for obstructing process with transition in   the left abdomen.      3.  Large rectal stool burden.      4.  No CT evidence for active GI bleed.         CTA ABDOMEN PELVIS W WO CONTRAST   Final Result   1.  No evidence for acute pulmonary embolism.  Chronic lung findings without   evidence for acute airspace disease.      2.  Dilated small bowel concerning for obstructing process with

## 2025-03-22 NOTE — PROGRESS NOTES
Pt admitted this am, seen/examined today  Gen surgery consulted  NPO, IVF, NG placed to ILWS  Cont IV abx

## 2025-03-22 NOTE — PROGRESS NOTES
4 Eyes Skin Assessment     NAME:  Lilly Terrell  YOB: 1960  MEDICAL RECORD NUMBER:  [id number]    The patient is being assessed for  Admission    I agree that at least one RN has performed a thorough Head to Toe Skin Assessment on the patient. ALL assessment sites listed below have been assessed.      Areas assessed by both nurses:    Head, Face, Ears, Shoulders, Back, Chest, Arms, Elbows, Hands, Sacrum. Buttock, Coccyx, Ischium, Legs. Feet and Heels, and Under Medical Devices         Does the Patient have a Wound? No noted wound(s)       Mauricio Prevention initiated by RN: Yes  Wound Care Orders initiated by RN: No    Pressure Injury (Stage 3,4, Unstageable, DTI, NWPT, and Complex wounds) if present, place Wound referral order by RN under : No    New Ostomies, if present place, Ostomy referral order under : No     Nurse 1 eSignature: Electronically signed by Leo Tavares RN on 3/22/25 at 6:17 PM EDT    **SHARE this note so that the co-signing nurse can place an eSignature**    Nurse 2 eSignature: Electronically signed by Nahun Hickman RN on 3/22/25 at 6:21 PM EDT

## 2025-03-22 NOTE — PROGRESS NOTES
Message sent to dr. York notifying her that despite patient's iv fluids being started and patient being hooked up to low intermittent suction, her heart rate remains elevated sitting at around 130s.   Electronically signed by Leo Tavares RN on 3/22/2025 at 4:27 PM     New orders received for prn metoprolol.  Electronically signed by Leo Tavares RN on 3/22/2025 at 5:12 PM

## 2025-03-22 NOTE — ED PROVIDER NOTES
[facility] EMERGENCY DEPARTMENT  EMERGENCY DEPARTMENT ENCOUNTER        Pt Name: Lilly Terrell  MRN: [id number]  Birthdate 1960  Date of evaluation: 3/22/2025  Provider: Kacy Marroquin PA-C  PCP: Tyson Hahn MD  Note Started: 2:55 AM EDT 3/22/25       I have seen and evaluated this patient with my supervising physician Jay Freitas, *.      CHIEF COMPLAINT       Chief Complaint   Patient presents with    Vomiting     Vomiting since 1800 last night. Started having \"coffee ground emesis' approximately 2 hours ago. 1.5 lpm O2 NC at baseline. Pt with cerebal palsey and from [facility].        HISTORY OF PRESENT ILLNESS: 1 or more Elements     History From: Patient      Lilly Terrell is a 64 y.o. female with a history of cerebral palsy, who presents to the ED from [facility] with a concern of coffee-ground emesis that started today around 4 PM  Denies having similar episodes in the past, no history of GI bleed denies anticoagulation use  Said feels bloated  Denies abdominal pain, nausea, vomiting, fevers, urinary or bowel symptoms  Patient is a 1.5 L of oxygen  Patient said he is coming out of recent influenza diagnosis    Nursing Notes were all reviewed and agreed with or any disagreements were addressed in the HPI.    REVIEW OF SYSTEMS :      Review of Systems    Positives and Pertinent negatives as per HPI.     SURGICAL HISTORY     Past Surgical History:   Procedure Laterality Date    RI ARTHRP ACETBLR/PROX FEM PROSTC AGRFT/ALGRFT Right     Hip Replacement, Total    RI ARTHRP KNE CONDYLE&PLATU MEDIAL&LAT COMPARTMENTS Left     Knee Replacement, Total    SHOULDER ARTHROPLASTY      Shoulder replacement x 2 to left       CURRENTMEDICATIONS       Previous Medications    ACETAMINOPHEN (TYLENOL) 325 MG TABLET    Take 2 tablets by mouth every 6 hours as needed for Pain    AMITRIPTYLINE (ELAVIL) 50 MG TABLET    Take 1 tablet by mouth nightly       Psychiatric:         Mood and Affect: Mood normal.         Behavior: Behavior normal.         DIAGNOSTIC RESULTS   LABS:    Labs Reviewed   CBC WITH AUTO DIFFERENTIAL - Abnormal; Notable for the following components:       Result Value    WBC 20.8 (*)     RBC 5.44 (*)     RDW 16.8 (*)     Neutrophils Absolute 18.7 (*)     All other components within normal limits   BASIC METABOLIC PANEL W/ REFLEX TO MG FOR LOW K - Abnormal; Notable for the following components:    Sodium 146 (*)     Chloride 92 (*)     CO2 33 (*)     Anion Gap 21 (*)     Glucose 201 (*)     BUN 27 (*)     Calcium 10.9 (*)     All other components within normal limits   HEPATIC FUNCTION PANEL - Abnormal; Notable for the following components:    Total Protein 9.8 (*)     All other components within normal limits   LACTATE, SEPSIS - Abnormal; Notable for the following components:    Lactic Acid, Sepsis 3.3 (*)     All other components within normal limits   BLOOD GAS, VENOUS - Abnormal; Notable for the following components:    pCO2, Taran 57.8 (*)     HCO3, Venous 40 (*)     All other components within normal limits   BRAIN NATRIURETIC PEPTIDE - Abnormal; Notable for the following components:    NT Pro- (*)     All other components within normal limits   CULTURE, BLOOD 1   CULTURE, BLOOD 2   MAGNESIUM   LIPASE   APTT   PROTIME-INR   TROPONIN   LACTATE, SEPSIS   TROPONIN   BLOOD GAS, VENOUS   TYPE AND SCREEN       When ordered only abnormal lab results are displayed. All other labs were within normal range or not returned as of this dictation.    EKG: When ordered, EKG's are interpreted by the Emergency Department Physician in the absence of a cardiologist.  Please see their note for interpretation of EKG.    RADIOLOGY:   Non-plain film images such as CT, Ultrasound and MRI are read by the radiologist.      X-Ray Independently interpreted by me:     Interpretation per the Radiologist below, if available at the time of this note:    CT CHEST

## 2025-03-23 ENCOUNTER — APPOINTMENT (OUTPATIENT)
Dept: GENERAL RADIOLOGY | Age: 65
DRG: 871 | End: 2025-03-23
Payer: COMMERCIAL

## 2025-03-23 LAB
ANION GAP SERPL CALCULATED.3IONS-SCNC: 10 MMOL/L (ref 3–16)
ANION GAP SERPL CALCULATED.3IONS-SCNC: 12 MMOL/L (ref 3–16)
BASOPHILS # BLD: 0 K/UL (ref 0–0.2)
BASOPHILS NFR BLD: 0.1 %
BUN SERPL-MCNC: 17 MG/DL (ref 7–20)
BUN SERPL-MCNC: 21 MG/DL (ref 7–20)
CALCIUM SERPL-MCNC: 8.9 MG/DL (ref 8.3–10.6)
CALCIUM SERPL-MCNC: 9.4 MG/DL (ref 8.3–10.6)
CHLORIDE SERPL-SCNC: 105 MMOL/L (ref 99–110)
CHLORIDE SERPL-SCNC: 106 MMOL/L (ref 99–110)
CO2 SERPL-SCNC: 29 MMOL/L (ref 21–32)
CO2 SERPL-SCNC: 30 MMOL/L (ref 21–32)
CREAT SERPL-MCNC: 0.3 MG/DL (ref 0.6–1.2)
CREAT SERPL-MCNC: 0.3 MG/DL (ref 0.6–1.2)
DEPRECATED RDW RBC AUTO: 16.8 % (ref 12.4–15.4)
EOSINOPHIL # BLD: 0 K/UL (ref 0–0.6)
EOSINOPHIL NFR BLD: 0 %
EST. AVERAGE GLUCOSE BLD GHB EST-MCNC: 119.8 MG/DL
GFR SERPLBLD CREATININE-BSD FMLA CKD-EPI: >90 ML/MIN/{1.73_M2}
GFR SERPLBLD CREATININE-BSD FMLA CKD-EPI: >90 ML/MIN/{1.73_M2}
GLUCOSE BLD-MCNC: 142 MG/DL (ref 70–99)
GLUCOSE BLD-MCNC: 144 MG/DL (ref 70–99)
GLUCOSE BLD-MCNC: 149 MG/DL (ref 70–99)
GLUCOSE BLD-MCNC: 180 MG/DL (ref 70–99)
GLUCOSE SERPL-MCNC: 139 MG/DL (ref 70–99)
GLUCOSE SERPL-MCNC: 163 MG/DL (ref 70–99)
HBA1C MFR BLD: 5.8 %
HCT VFR BLD AUTO: 34.4 % (ref 36–48)
HGB BLD-MCNC: 11.1 G/DL (ref 12–16)
LACTATE BLDV-SCNC: 1.1 MMOL/L (ref 0.4–2)
LYMPHOCYTES # BLD: 1 K/UL (ref 1–5.1)
LYMPHOCYTES NFR BLD: 8.8 %
MAGNESIUM SERPL-MCNC: 2.15 MG/DL (ref 1.8–2.4)
MCH RBC QN AUTO: 28.7 PG (ref 26–34)
MCHC RBC AUTO-ENTMCNC: 32.2 G/DL (ref 31–36)
MCV RBC AUTO: 89.3 FL (ref 80–100)
MONOCYTES # BLD: 0.6 K/UL (ref 0–1.3)
MONOCYTES NFR BLD: 5.6 %
NEUTROPHILS # BLD: 9.4 K/UL (ref 1.7–7.7)
NEUTROPHILS NFR BLD: 85.5 %
PERFORMED ON: ABNORMAL
PLATELET # BLD AUTO: 125 K/UL (ref 135–450)
PMV BLD AUTO: 9.5 FL (ref 5–10.5)
POTASSIUM SERPL-SCNC: 3.1 MMOL/L (ref 3.5–5.1)
POTASSIUM SERPL-SCNC: 3.9 MMOL/L (ref 3.5–5.1)
RBC # BLD AUTO: 3.85 M/UL (ref 4–5.2)
SODIUM SERPL-SCNC: 145 MMOL/L (ref 136–145)
SODIUM SERPL-SCNC: 147 MMOL/L (ref 136–145)
WBC # BLD AUTO: 11.1 K/UL (ref 4–11)

## 2025-03-23 PROCEDURE — 85025 COMPLETE CBC W/AUTO DIFF WBC: CPT

## 2025-03-23 PROCEDURE — 94760 N-INVAS EAR/PLS OXIMETRY 1: CPT

## 2025-03-23 PROCEDURE — 2700000000 HC OXYGEN THERAPY PER DAY

## 2025-03-23 PROCEDURE — 6360000002 HC RX W HCPCS: Performed by: STUDENT IN AN ORGANIZED HEALTH CARE EDUCATION/TRAINING PROGRAM

## 2025-03-23 PROCEDURE — 6370000000 HC RX 637 (ALT 250 FOR IP): Performed by: INTERNAL MEDICINE

## 2025-03-23 PROCEDURE — 6360000002 HC RX W HCPCS: Performed by: INTERNAL MEDICINE

## 2025-03-23 PROCEDURE — 99232 SBSQ HOSP IP/OBS MODERATE 35: CPT | Performed by: STUDENT IN AN ORGANIZED HEALTH CARE EDUCATION/TRAINING PROGRAM

## 2025-03-23 PROCEDURE — 2500000003 HC RX 250 WO HCPCS: Performed by: INTERNAL MEDICINE

## 2025-03-23 PROCEDURE — 83605 ASSAY OF LACTIC ACID: CPT

## 2025-03-23 PROCEDURE — 71045 X-RAY EXAM CHEST 1 VIEW: CPT

## 2025-03-23 PROCEDURE — 83036 HEMOGLOBIN GLYCOSYLATED A1C: CPT

## 2025-03-23 PROCEDURE — 74250 X-RAY XM SM INT 1CNTRST STD: CPT

## 2025-03-23 PROCEDURE — 83735 ASSAY OF MAGNESIUM: CPT

## 2025-03-23 PROCEDURE — 6360000004 HC RX CONTRAST MEDICATION: Performed by: STUDENT IN AN ORGANIZED HEALTH CARE EDUCATION/TRAINING PROGRAM

## 2025-03-23 PROCEDURE — 2500000003 HC RX 250 WO HCPCS: Performed by: STUDENT IN AN ORGANIZED HEALTH CARE EDUCATION/TRAINING PROGRAM

## 2025-03-23 PROCEDURE — 2060000000 HC ICU INTERMEDIATE R&B

## 2025-03-23 PROCEDURE — 80048 BASIC METABOLIC PNL TOTAL CA: CPT

## 2025-03-23 PROCEDURE — 36415 COLL VENOUS BLD VENIPUNCTURE: CPT

## 2025-03-23 RX ORDER — VITAMIN B COMPLEX
2000 TABLET ORAL DAILY
Status: DISCONTINUED | OUTPATIENT
Start: 2025-03-23 | End: 2025-03-26 | Stop reason: HOSPADM

## 2025-03-23 RX ORDER — SENNA AND DOCUSATE SODIUM 50; 8.6 MG/1; MG/1
2 TABLET, FILM COATED ORAL 2 TIMES DAILY
Status: DISCONTINUED | OUTPATIENT
Start: 2025-03-23 | End: 2025-03-26 | Stop reason: HOSPADM

## 2025-03-23 RX ORDER — POTASSIUM CHLORIDE 7.45 MG/ML
10 INJECTION INTRAVENOUS
Status: DISPENSED | OUTPATIENT
Start: 2025-03-23 | End: 2025-03-23

## 2025-03-23 RX ORDER — ONDANSETRON 4 MG/1
4 TABLET, ORALLY DISINTEGRATING ORAL EVERY 6 HOURS PRN
COMMUNITY

## 2025-03-23 RX ORDER — POTASSIUM CHLORIDE 1500 MG/1
20 TABLET, EXTENDED RELEASE ORAL DAILY
COMMUNITY
Start: 2025-02-10

## 2025-03-23 RX ORDER — METOPROLOL TARTRATE 25 MG/1
12.5 TABLET, FILM COATED ORAL 2 TIMES DAILY
Status: DISCONTINUED | OUTPATIENT
Start: 2025-03-23 | End: 2025-03-26 | Stop reason: HOSPADM

## 2025-03-23 RX ORDER — ACETAMINOPHEN 500 MG
1000 TABLET ORAL 3 TIMES DAILY
COMMUNITY

## 2025-03-23 RX ORDER — POTASSIUM CHLORIDE 7.45 MG/ML
10 INJECTION INTRAVENOUS
Status: DISCONTINUED | OUTPATIENT
Start: 2025-03-23 | End: 2025-03-23

## 2025-03-23 RX ORDER — DANTROLENE SODIUM 25 MG/1
200 CAPSULE ORAL 2 TIMES DAILY
Status: DISCONTINUED | OUTPATIENT
Start: 2025-03-23 | End: 2025-03-26 | Stop reason: HOSPADM

## 2025-03-23 RX ORDER — DEXTROSE MONOHYDRATE, SODIUM CHLORIDE, AND POTASSIUM CHLORIDE 50; 1.49; 4.5 G/1000ML; G/1000ML; G/1000ML
INJECTION, SOLUTION INTRAVENOUS CONTINUOUS
Status: DISCONTINUED | OUTPATIENT
Start: 2025-03-23 | End: 2025-03-26 | Stop reason: HOSPADM

## 2025-03-23 RX ORDER — FUROSEMIDE 40 MG/1
40 TABLET ORAL DAILY
Status: DISCONTINUED | OUTPATIENT
Start: 2025-03-23 | End: 2025-03-26 | Stop reason: HOSPADM

## 2025-03-23 RX ORDER — ASPIRIN 81 MG/1
81 TABLET, CHEWABLE ORAL DAILY
Status: DISCONTINUED | OUTPATIENT
Start: 2025-03-23 | End: 2025-03-26 | Stop reason: HOSPADM

## 2025-03-23 RX ORDER — DIATRIZOATE MEGLUMINE AND DIATRIZOATE SODIUM 660; 100 MG/ML; MG/ML
240 SOLUTION ORAL; RECTAL
Status: DISCONTINUED | OUTPATIENT
Start: 2025-03-23 | End: 2025-03-26 | Stop reason: HOSPADM

## 2025-03-23 RX ORDER — GUAIFENESIN 600 MG/1
600 TABLET, EXTENDED RELEASE ORAL 2 TIMES DAILY
Status: DISCONTINUED | OUTPATIENT
Start: 2025-03-23 | End: 2025-03-26 | Stop reason: HOSPADM

## 2025-03-23 RX ORDER — IPRATROPIUM BROMIDE AND ALBUTEROL SULFATE 2.5; .5 MG/3ML; MG/3ML
1 SOLUTION RESPIRATORY (INHALATION) EVERY 6 HOURS PRN
Status: DISCONTINUED | OUTPATIENT
Start: 2025-03-23 | End: 2025-03-26 | Stop reason: HOSPADM

## 2025-03-23 RX ORDER — CHOLECALCIFEROL (VITAMIN D3) 50 MCG
2000 TABLET ORAL DAILY
COMMUNITY

## 2025-03-23 RX ORDER — CLONAZEPAM 1 MG/1
2 TABLET ORAL 2 TIMES DAILY
Status: DISCONTINUED | OUTPATIENT
Start: 2025-03-23 | End: 2025-03-26 | Stop reason: HOSPADM

## 2025-03-23 RX ORDER — SERTRALINE HYDROCHLORIDE 100 MG/1
100 TABLET, FILM COATED ORAL DAILY
Status: DISCONTINUED | OUTPATIENT
Start: 2025-03-23 | End: 2025-03-26 | Stop reason: HOSPADM

## 2025-03-23 RX ORDER — POTASSIUM CHLORIDE 7.45 MG/ML
10 INJECTION INTRAVENOUS
Status: COMPLETED | OUTPATIENT
Start: 2025-03-23 | End: 2025-03-23

## 2025-03-23 RX ORDER — POTASSIUM CHLORIDE 1500 MG/1
20 TABLET, EXTENDED RELEASE ORAL DAILY
Status: DISCONTINUED | OUTPATIENT
Start: 2025-03-23 | End: 2025-03-26 | Stop reason: HOSPADM

## 2025-03-23 RX ORDER — METOPROLOL TARTRATE 25 MG/1
12.5 TABLET, FILM COATED ORAL 2 TIMES DAILY
COMMUNITY

## 2025-03-23 RX ORDER — CLONAZEPAM 2 MG/1
2 TABLET ORAL 2 TIMES DAILY
COMMUNITY

## 2025-03-23 RX ORDER — METOPROLOL TARTRATE 25 MG/1
25 TABLET, FILM COATED ORAL 2 TIMES DAILY
Status: DISCONTINUED | OUTPATIENT
Start: 2025-03-23 | End: 2025-03-23

## 2025-03-23 RX ORDER — AMITRIPTYLINE HYDROCHLORIDE 50 MG/1
50 TABLET ORAL NIGHTLY
Status: DISCONTINUED | OUTPATIENT
Start: 2025-03-23 | End: 2025-03-26 | Stop reason: HOSPADM

## 2025-03-23 RX ORDER — LEVOTHYROXINE SODIUM 50 UG/1
50 TABLET ORAL DAILY
Status: DISCONTINUED | OUTPATIENT
Start: 2025-03-23 | End: 2025-03-26 | Stop reason: HOSPADM

## 2025-03-23 RX ORDER — IPRATROPIUM BROMIDE AND ALBUTEROL SULFATE 2.5; .5 MG/3ML; MG/3ML
1 SOLUTION RESPIRATORY (INHALATION) EVERY 6 HOURS PRN
COMMUNITY

## 2025-03-23 RX ORDER — PREGABALIN 100 MG/1
200 CAPSULE ORAL 3 TIMES DAILY
Status: DISCONTINUED | OUTPATIENT
Start: 2025-03-23 | End: 2025-03-26 | Stop reason: HOSPADM

## 2025-03-23 RX ORDER — CALCIUM CARBONATE 500 MG/1
1 TABLET, CHEWABLE ORAL DAILY PRN
COMMUNITY

## 2025-03-23 RX ADMIN — CLONAZEPAM 2 MG: 1 TABLET ORAL at 20:55

## 2025-03-23 RX ADMIN — AMITRIPTYLINE HYDROCHLORIDE 50 MG: 50 TABLET ORAL at 20:55

## 2025-03-23 RX ADMIN — METOPROLOL TARTRATE 12.5 MG: 25 TABLET, FILM COATED ORAL at 16:43

## 2025-03-23 RX ADMIN — ENOXAPARIN SODIUM 40 MG: 100 INJECTION SUBCUTANEOUS at 09:22

## 2025-03-23 RX ADMIN — METOPROLOL TARTRATE 12.5 MG: 25 TABLET, FILM COATED ORAL at 20:55

## 2025-03-23 RX ADMIN — DANTROLENE SODIUM 200 MG: 25 CAPSULE ORAL at 21:06

## 2025-03-23 RX ADMIN — LEVOTHYROXINE SODIUM 50 MCG: 0.05 TABLET ORAL at 16:42

## 2025-03-23 RX ADMIN — PREGABALIN 200 MG: 100 CAPSULE ORAL at 16:42

## 2025-03-23 RX ADMIN — METRONIDAZOLE 500 MG: 500 INJECTION, SOLUTION INTRAVENOUS at 18:07

## 2025-03-23 RX ADMIN — CLONAZEPAM 2 MG: 1 TABLET ORAL at 16:42

## 2025-03-23 RX ADMIN — POTASSIUM CHLORIDE 10 MEQ: 7.46 INJECTION, SOLUTION INTRAVENOUS at 11:11

## 2025-03-23 RX ADMIN — ASPIRIN 81 MG: 81 TABLET, CHEWABLE ORAL at 16:42

## 2025-03-23 RX ADMIN — GUAIFENESIN 600 MG: 600 TABLET ORAL at 20:55

## 2025-03-23 RX ADMIN — Medication 2000 UNITS: at 16:40

## 2025-03-23 RX ADMIN — METRONIDAZOLE 500 MG: 500 INJECTION, SOLUTION INTRAVENOUS at 01:26

## 2025-03-23 RX ADMIN — METRONIDAZOLE 500 MG: 500 INJECTION, SOLUTION INTRAVENOUS at 09:26

## 2025-03-23 RX ADMIN — POTASSIUM CHLORIDE 10 MEQ: 7.46 INJECTION, SOLUTION INTRAVENOUS at 09:48

## 2025-03-23 RX ADMIN — POTASSIUM CHLORIDE 10 MEQ: 7.46 INJECTION, SOLUTION INTRAVENOUS at 18:05

## 2025-03-23 RX ADMIN — SENNOSIDES AND DOCUSATE SODIUM 2 TABLET: 50; 8.6 TABLET ORAL at 16:46

## 2025-03-23 RX ADMIN — SODIUM CHLORIDE, PRESERVATIVE FREE 40 MG: 5 INJECTION INTRAVENOUS at 09:22

## 2025-03-23 RX ADMIN — CEFEPIME HYDROCHLORIDE 2000 MG: 2 INJECTION, SOLUTION INTRAVENOUS at 03:09

## 2025-03-23 RX ADMIN — CEFEPIME HYDROCHLORIDE 2000 MG: 2 INJECTION, SOLUTION INTRAVENOUS at 16:39

## 2025-03-23 RX ADMIN — SENNOSIDES AND DOCUSATE SODIUM 2 TABLET: 50; 8.6 TABLET ORAL at 20:55

## 2025-03-23 RX ADMIN — SODIUM CHLORIDE, PRESERVATIVE FREE 10 ML: 5 INJECTION INTRAVENOUS at 09:23

## 2025-03-23 RX ADMIN — POTASSIUM CHLORIDE 10 MEQ: 7.46 INJECTION, SOLUTION INTRAVENOUS at 17:01

## 2025-03-23 RX ADMIN — FUROSEMIDE 40 MG: 40 TABLET ORAL at 16:42

## 2025-03-23 RX ADMIN — DIATRIZOATE MEGLUMINE AND DIATRIZOATE SODIUM 240 ML: 600; 100 SOLUTION ORAL; RECTAL at 14:48

## 2025-03-23 RX ADMIN — PREGABALIN 200 MG: 100 CAPSULE ORAL at 20:54

## 2025-03-23 RX ADMIN — POTASSIUM CHLORIDE, DEXTROSE MONOHYDRATE AND SODIUM CHLORIDE: 150; 5; 450 INJECTION, SOLUTION INTRAVENOUS at 12:02

## 2025-03-23 RX ADMIN — DANTROLENE SODIUM 200 MG: 25 CAPSULE ORAL at 16:40

## 2025-03-23 RX ADMIN — POTASSIUM CHLORIDE 10 MEQ: 7.46 INJECTION, SOLUTION INTRAVENOUS at 14:46

## 2025-03-23 RX ADMIN — SERTRALINE 100 MG: 100 TABLET, FILM COATED ORAL at 16:41

## 2025-03-23 RX ADMIN — POTASSIUM CHLORIDE 10 MEQ: 7.46 INJECTION, SOLUTION INTRAVENOUS at 13:17

## 2025-03-23 NOTE — PROGRESS NOTES
This rn noted that patient's ng tube was pulled back to 57 cm. Message sent to dr. York on how to proceed. Explained to patient that we will hold off from putting medications through tube until we verify placement.   Electronically signed by Leo Tavares RN on 3/23/2025 at 9:38 AM

## 2025-03-23 NOTE — PROGRESS NOTES
V2.0    [facility] [place]      Name:  Lilly Terrell /Age/Sex: 1960  (64 y.o. female)   MRN & CSN:  [id number] & [id number] Encounter Date/Time: 3/23/2025 5:21 PM EDT   Location:  [id number] PCP: Tyson Hahn MD     Attending:Marce York MD       Hospital Day: 2    Assessment and Recommendations   Lilly Terrell is a 64 y.o. female who presents with Severe sepsis with acute organ dysfunction (HCC)      Plan:   Sepsis  - unknown source  - cont broad spectrum abx  - ?aspiration  PNA vs cystitis  - await blood cx and urine cx    SBO  - NPO, IVF  - gen surgery consulted  - NG placed to ILWS  - plan small bowel follow through series    Acute hypoxic resp failure  - was on BIPAP on admit from ED  - currently on NC 3-4L  - tachypnea improved    Constipation  - CT with rectal stool burden  - had a BM with enema      Diet Diet NPO   DVT Prophylaxis [x] Lovenox, []  Heparin, [] SCDs, [] Ambulation,  [] Eliquis, [] Xarelto  [] Coumadin   Code Status Full Code             Personally reviewed Lab Studies and Imaging       Subjective:     Cont to have NG output but minimal, had a BM with enema, getting SB follow through today    Review of Systems:      Pertinent positives and negatives discussed in HPI    Objective:     Intake/Output Summary (Last 24 hours) at 3/23/2025 1721  Last data filed at 3/23/2025 1446  Gross per 24 hour   Intake 1343.78 ml   Output 850 ml   Net 493.78 ml      Vitals:   Vitals:    25 0400 25 0753 25 1204 25 1509   BP: (!) 142/74 139/75 (!) 149/89 (!) 155/92   Pulse: 94 91 (!) 101 97   Resp: 28 21 (!) 36 28   Temp: 99.3 °F (37.4 °C) 98.3 °F (36.8 °C) 97.6 °F (36.4 °C) 98 °F (36.7 °C)   TempSrc: Axillary Axillary Axillary Axillary   SpO2: 96% 97% 94% 92%   Weight: 99.5 kg (219 lb 5.7 oz)      Height:             Physical Exam:      General: Awake  HEENT: Pupils round, reacting to light, NG tube +  Heart: S1 S2 heard, no murmurs, tachy  Lung:  placement verification FINDINGS: Interval advancement of the enteric tube which is now in the stomach. Stable area of atelectasis or infiltrate in the right lower lobe.  Linear atelectasis in the mid left lung.  Small left-sided pleural effusion.  No pneumothorax.  Stable cardiac and mediastinal silhouettes.     Interval advancement of the enteric tube which is now in the stomach. Stable area of atelectasis or infiltrate in the right lower lobe. Small left-sided pleural effusion.     XR CHEST PORTABLE  Result Date: 3/22/2025  EXAMINATION: ONE XRAY VIEW OF THE CHEST/ABDOMEN 3/22/2025 2:35 pm COMPARISON: None. HISTORY: NG tube placement FINDINGS: The mid and lower chest are included in the field of view, thick opacity linearly oriented across the lower right lung typical of platelike atelectasis. Prominent portions of the pelvis are excluded from the field of view. Visualized portions of the bowel gas pattern are unremarkable. No unusual abdominal soft tissue or calcific density is seen.  Visualized osseous structures appear unremarkable.  Reverse left glenohumeral arthroplasty. NG tube extends below the left hemidiaphragm, into the upper abdomen, tip overlying the expected level of the stomach, left upper quadrant.  Side port tip is at the distal esophagus level, NG tube could be advanced about 5 cm such that the side port is beyond the esophagogastric junction level. Wires and cardiac leads overlying the chest could obscure an underlying finding.     1. Platelike atelectasis right lower lung. 2. NG tube tip projects at the left upper quadrant, overlying the expected location of the stomach. 3. NG tube side port tip projects at the distal esophagus level, NG tube could be advanced about 5 cm such that the side port is beyond the esophagogastric junction level.     XR CHEST PORTABLE  Result Date: 3/22/2025  EXAMINATION: ONE XRAY VIEW OF THE CHEST 3/22/2025 9:49 am COMPARISON: Earlier today HISTORY: NG tube placement.

## 2025-03-23 NOTE — PLAN OF CARE
Problem: Discharge Planning  Goal: Discharge to home or other facility with appropriate resources  Outcome: Not Progressing     Problem: Skin/Tissue Integrity  Goal: Skin integrity remains intact  Description: 1.  Monitor for areas of redness and/or skin breakdown  2.  Assess vascular access sites hourly  3.  Every 4-6 hours minimum:  Change oxygen saturation probe site  4.  Every 4-6 hours:  If on nasal continuous positive airway pressure, respiratory therapy assess nares and determine need for appliance change or resting period  Outcome: Not Progressing     Problem: Pain  Goal: Verbalizes/displays adequate comfort level or baseline comfort level  Outcome: Not Progressing

## 2025-03-23 NOTE — PROGRESS NOTES
Medication Reconciliation    List of medications patient is currently taking is complete.     Source of information: 1. List from [facility]                                      2. EPIC records         Notes regarding home medications:   1. Acetaminophen, metoprolol tartrate, and omeprazole doses updated.  2. Vitamin D and Duoneb added to list.   3. Calcium-vitamin D, ergocalciferol, estradiol cream, and sodium bicarbonate removed from list.       Zaina Owens [facility], PharmD, 3/23/2025 10:30 AM

## 2025-03-23 NOTE — PROGRESS NOTES
Dr. York said okay to use ng tube for medications.  Electronically signed by Leo Tavares RN on 3/23/2025 at 5:28 PM

## 2025-03-23 NOTE — PLAN OF CARE
Problem: Discharge Planning  Goal: Discharge to home or other facility with appropriate resources  3/23/2025 1206 by Leo Tavares RN  Outcome: Progressing  3/23/2025 0700 by Fátima Tinsley RN  Outcome: Not Progressing     Problem: Skin/Tissue Integrity  Goal: Skin integrity remains intact  Description: 1.  Monitor for areas of redness and/or skin breakdown  2.  Assess vascular access sites hourly  3.  Every 4-6 hours minimum:  Change oxygen saturation probe site  4.  Every 4-6 hours:  If on nasal continuous positive airway pressure, respiratory therapy assess nares and determine need for appliance change or resting period  3/23/2025 1206 by Leo Tavares RN  Outcome: Progressing  3/23/2025 0700 by Fátima Tinsley RN  Outcome: Not Progressing     Problem: Safety - Adult  Goal: Free from fall injury  3/23/2025 1206 by Leo Tavares RN  Outcome: Progressing  3/23/2025 0700 by Fátima Tinsley RN  Outcome: Progressing     Problem: Pain  Goal: Verbalizes/displays adequate comfort level or baseline comfort level  3/23/2025 1206 by Leo Tavares RN  Outcome: Progressing  3/23/2025 0700 by Fátima Tinsley RN  Outcome: Not Progressing     Problem: Discharge Planning  Goal: Discharge to home or other facility with appropriate resources  3/23/2025 1206 by Leo Tavares RN  Outcome: Progressing  3/23/2025 0700 by Fátima Tinsley RN  Outcome: Not Progressing     Problem: Skin/Tissue Integrity  Goal: Skin integrity remains intact  Description: 1.  Monitor for areas of redness and/or skin breakdown  2.  Assess vascular access sites hourly  3.  Every 4-6 hours minimum:  Change oxygen saturation probe site  4.  Every 4-6 hours:  If on nasal continuous positive airway pressure, respiratory therapy assess nares and determine need for appliance change or resting period  3/23/2025 1206 by Leo Tavares RN  Outcome: Progressing  3/23/2025 0700 by Fátima Tinsley RN  Outcome: Not Progressing     Problem:  Pain  Goal: Verbalizes/displays adequate comfort level or baseline comfort level  3/23/2025 1206 by Leo Tavares, RN  Outcome: Progressing  3/23/2025 0700 by Fátima Tinsley, RN  Outcome: Not Progressing

## 2025-03-23 NOTE — PROGRESS NOTES
GENERAL SURGERY  Progress Note    Patient Name: Lilly Terrell  MRN: [id number]  YOB: 1960   Date of Evaluation: 3/23/2025  Primary Care Physician: Tyson Hahn MD      Tachypnea [R06.82]  Acute respiratory distress [R06.03]  Small bowel obstruction (HCC) [K56.609]  Prolonged Q-T interval on ECG [R94.31]  Severe sepsis [A41.9, R65.20]  Severe sepsis with acute organ dysfunction (HCC) [A41.9, R65.20]    SUBJECTIVE:     Lilly is stable today.  Her NG tube was advanced and subsequently pulled back overnight.  It seems to be an incorrect position this morning, given low output and discomfort to the patient.  It was advanced with some difficulty.  She has had a couple of bowel movements.  Overall, she feels better and denies abdominal pain.    REVIEW OF SYSTEMS  A comprehensive review of systems was completed and is negative except for any elements noted above.    OBJECTIVE:     BP (!) 149/89   Pulse (!) 101   Temp 97.6 °F (36.4 °C) (Axillary)   Resp (!) 36   Ht 1.676 m (5' 6\")   Wt 99.5 kg (219 lb 5.7 oz)   SpO2 94%   BMI 35.41 kg/m²     PHYSICAL EXAM  Abdomen soft, distended, nontender to palpation      ASSESSMENT & PLAN:       Lilly Terrell is a 64 y.o. year-old female.  She has history of cerebral palsy.  She presents with abdominal pain, nausea, and vomiting, which she reports was coffee-ground in nature.  CT of the abdomen pelvis was obtained and personally reviewed.  It demonstrates evidence of small bowel dilation with possible transition point in the left abdomen.  She was found to have a large stool burden within the entire colon, especially the rectum, consistent with constipation.  Labs were notable for leukocytosis of 20.8, as well as a lactic acid of 3.3, which has not significantly cleared since admission.  Interestingly, she has never had abdominal surgery, which would lessen the likelihood for adhesive small bowel obstruction.     ? SBO vs ileus  - NPO  - IVF  - NGT to

## 2025-03-24 LAB
ANION GAP SERPL CALCULATED.3IONS-SCNC: 10 MMOL/L (ref 3–16)
BUN SERPL-MCNC: 13 MG/DL (ref 7–20)
CALCIUM SERPL-MCNC: 8.2 MG/DL (ref 8.3–10.6)
CHLORIDE SERPL-SCNC: 108 MMOL/L (ref 99–110)
CO2 SERPL-SCNC: 24 MMOL/L (ref 21–32)
CREAT SERPL-MCNC: 0.3 MG/DL (ref 0.6–1.2)
DEPRECATED RDW RBC AUTO: 16.5 % (ref 12.4–15.4)
GFR SERPLBLD CREATININE-BSD FMLA CKD-EPI: >90 ML/MIN/{1.73_M2}
GLUCOSE BLD-MCNC: 121 MG/DL (ref 70–99)
GLUCOSE BLD-MCNC: 121 MG/DL (ref 70–99)
GLUCOSE BLD-MCNC: 151 MG/DL (ref 70–99)
GLUCOSE BLD-MCNC: 177 MG/DL (ref 70–99)
GLUCOSE SERPL-MCNC: 173 MG/DL (ref 70–99)
HCT VFR BLD AUTO: 32.3 % (ref 36–48)
HGB BLD-MCNC: 10.5 G/DL (ref 12–16)
MCH RBC QN AUTO: 28.9 PG (ref 26–34)
MCHC RBC AUTO-ENTMCNC: 32.4 G/DL (ref 31–36)
MCV RBC AUTO: 89.1 FL (ref 80–100)
PERFORMED ON: ABNORMAL
PLATELET # BLD AUTO: 103 K/UL (ref 135–450)
PLATELET BLD QL SMEAR: ABNORMAL
PMV BLD AUTO: 9.1 FL (ref 5–10.5)
POTASSIUM SERPL-SCNC: 3.6 MMOL/L (ref 3.5–5.1)
RBC # BLD AUTO: 3.62 M/UL (ref 4–5.2)
SLIDE REVIEW: ABNORMAL
SODIUM SERPL-SCNC: 142 MMOL/L (ref 136–145)
WBC # BLD AUTO: 9.3 K/UL (ref 4–11)

## 2025-03-24 PROCEDURE — 2500000003 HC RX 250 WO HCPCS: Performed by: STUDENT IN AN ORGANIZED HEALTH CARE EDUCATION/TRAINING PROGRAM

## 2025-03-24 PROCEDURE — 2060000000 HC ICU INTERMEDIATE R&B

## 2025-03-24 PROCEDURE — 80048 BASIC METABOLIC PNL TOTAL CA: CPT

## 2025-03-24 PROCEDURE — 2500000003 HC RX 250 WO HCPCS: Performed by: INTERNAL MEDICINE

## 2025-03-24 PROCEDURE — 6370000000 HC RX 637 (ALT 250 FOR IP): Performed by: INTERNAL MEDICINE

## 2025-03-24 PROCEDURE — APPNB15 APP NON BILLABLE TIME 0-15 MINS: Performed by: PHYSICIAN ASSISTANT

## 2025-03-24 PROCEDURE — 6360000002 HC RX W HCPCS: Performed by: INTERNAL MEDICINE

## 2025-03-24 PROCEDURE — 94760 N-INVAS EAR/PLS OXIMETRY 1: CPT

## 2025-03-24 PROCEDURE — 2700000000 HC OXYGEN THERAPY PER DAY

## 2025-03-24 PROCEDURE — 36415 COLL VENOUS BLD VENIPUNCTURE: CPT

## 2025-03-24 PROCEDURE — 6360000002 HC RX W HCPCS: Performed by: STUDENT IN AN ORGANIZED HEALTH CARE EDUCATION/TRAINING PROGRAM

## 2025-03-24 PROCEDURE — APPSS15 APP SPLIT SHARED TIME 0-15 MINUTES: Performed by: PHYSICIAN ASSISTANT

## 2025-03-24 PROCEDURE — 2580000003 HC RX 258: Performed by: INTERNAL MEDICINE

## 2025-03-24 PROCEDURE — 99232 SBSQ HOSP IP/OBS MODERATE 35: CPT | Performed by: STUDENT IN AN ORGANIZED HEALTH CARE EDUCATION/TRAINING PROGRAM

## 2025-03-24 PROCEDURE — 85027 COMPLETE CBC AUTOMATED: CPT

## 2025-03-24 RX ADMIN — SODIUM CHLORIDE, PRESERVATIVE FREE 40 MG: 5 INJECTION INTRAVENOUS at 08:58

## 2025-03-24 RX ADMIN — Medication 2000 UNITS: at 08:57

## 2025-03-24 RX ADMIN — PREGABALIN 200 MG: 100 CAPSULE ORAL at 08:57

## 2025-03-24 RX ADMIN — PREGABALIN 200 MG: 100 CAPSULE ORAL at 21:54

## 2025-03-24 RX ADMIN — AMITRIPTYLINE HYDROCHLORIDE 50 MG: 50 TABLET ORAL at 21:54

## 2025-03-24 RX ADMIN — METRONIDAZOLE 500 MG: 500 INJECTION, SOLUTION INTRAVENOUS at 18:04

## 2025-03-24 RX ADMIN — CEFEPIME HYDROCHLORIDE 2000 MG: 2 INJECTION, SOLUTION INTRAVENOUS at 14:33

## 2025-03-24 RX ADMIN — CEFEPIME HYDROCHLORIDE 2000 MG: 2 INJECTION, SOLUTION INTRAVENOUS at 03:42

## 2025-03-24 RX ADMIN — ENOXAPARIN SODIUM 40 MG: 100 INJECTION SUBCUTANEOUS at 09:00

## 2025-03-24 RX ADMIN — POTASSIUM CHLORIDE, DEXTROSE MONOHYDRATE AND SODIUM CHLORIDE: 150; 5; 450 INJECTION, SOLUTION INTRAVENOUS at 02:12

## 2025-03-24 RX ADMIN — METRONIDAZOLE 500 MG: 500 INJECTION, SOLUTION INTRAVENOUS at 09:12

## 2025-03-24 RX ADMIN — DANTROLENE SODIUM 200 MG: 25 CAPSULE ORAL at 08:59

## 2025-03-24 RX ADMIN — PREGABALIN 200 MG: 100 CAPSULE ORAL at 14:32

## 2025-03-24 RX ADMIN — SERTRALINE 100 MG: 100 TABLET, FILM COATED ORAL at 08:58

## 2025-03-24 RX ADMIN — SODIUM CHLORIDE, PRESERVATIVE FREE 10 ML: 5 INJECTION INTRAVENOUS at 20:34

## 2025-03-24 RX ADMIN — LEVOTHYROXINE SODIUM 50 MCG: 0.05 TABLET ORAL at 06:36

## 2025-03-24 RX ADMIN — METRONIDAZOLE 500 MG: 500 INJECTION, SOLUTION INTRAVENOUS at 01:26

## 2025-03-24 RX ADMIN — POTASSIUM CHLORIDE 20 MEQ: 1500 TABLET, EXTENDED RELEASE ORAL at 08:58

## 2025-03-24 RX ADMIN — DANTROLENE SODIUM 200 MG: 25 CAPSULE ORAL at 23:28

## 2025-03-24 RX ADMIN — GUAIFENESIN 600 MG: 600 TABLET ORAL at 08:58

## 2025-03-24 RX ADMIN — METOPROLOL TARTRATE 12.5 MG: 25 TABLET, FILM COATED ORAL at 08:59

## 2025-03-24 RX ADMIN — POTASSIUM CHLORIDE, DEXTROSE MONOHYDRATE AND SODIUM CHLORIDE: 150; 5; 450 INJECTION, SOLUTION INTRAVENOUS at 20:43

## 2025-03-24 RX ADMIN — ASPIRIN 81 MG: 81 TABLET, CHEWABLE ORAL at 08:57

## 2025-03-24 RX ADMIN — CLONAZEPAM 2 MG: 1 TABLET ORAL at 21:54

## 2025-03-24 RX ADMIN — METOPROLOL TARTRATE 12.5 MG: 25 TABLET, FILM COATED ORAL at 21:54

## 2025-03-24 RX ADMIN — SODIUM CHLORIDE: 9 INJECTION, SOLUTION INTRAVENOUS at 18:04

## 2025-03-24 RX ADMIN — GUAIFENESIN 600 MG: 600 TABLET ORAL at 21:54

## 2025-03-24 RX ADMIN — CLONAZEPAM 2 MG: 1 TABLET ORAL at 08:59

## 2025-03-24 NOTE — PROGRESS NOTES
Patient had large BM. Typical GI bleed smell. Black tarry, liquid stool. No blood clots noted. No bright red blood noted. No complaints of abdominal pain.

## 2025-03-24 NOTE — PROGRESS NOTES
V2.0    [facility] [place]      Name:  Lilly Terrell /Age/Sex: 1960  (64 y.o. female)   MRN & CSN:  [id number] & [id number] Encounter Date/Time: 3/24/2025 2:13 PM EDT   Location:  [id number] PCP: Tyson Hahn MD     Attending:Reece Tan MD       Hospital Day: 3    Assessment and Recommendations   Lilly Terrell is a 64 y.o. female who presents with Severe sepsis with acute organ dysfunction (HCC)      Plan:   Sepsis, present on admission sepsis of unknown origin continue to be on broad-spectrum antibiotic  Small bowel obstruction General Surgery following, tolerating clear liquid diet, IV fluid  Acute respiratory failure with hypoxia was on BiPAP on admission keep saturation above 90%  Constipation received enema and had a bowel movement  Elevated lactic acid on admission improved  Anemia, hemoglobin 10.5 appears chronic follow-up as outpatient anemia, hemoglobin 10.5 appears chronic follow-up as outpatient  Elevated lactic acid on admission improved  Thrombocytopenia platelet count 103 repeat CBC in a.m.      Diet ADULT DIET; Clear Liquid   DVT Prophylaxis [] Lovenox, []  Heparin, [] SCDs, [] Ambulation,  [] Eliquis, [] Xarelto  [] Coumadin   Code Status Full Code             Personally reviewed Lab Studies and Imaging     Discussed management of the case with general surgery who recommended clear liquid diet  Rhythm strip independently interpreted by myself heart rate 57 QRS 0 11 QT 04      Medical Decision Making:  The following items were considered in medical decision making:  Discussion of patient care with other providers  Reviewed clinical lab tests  Reviewed radiology tests  Reviewed other diagnostic tests/interventions  Independent review of radiologic images  Microbiology cultures and other micro tests reviewed      Subjective:     Chief Complaint: Abdominal pain    Lilly Terrell is a 64 y.o. female who presents with aminal pain improved now      Review of Systems:

## 2025-03-24 NOTE — CARE COORDINATION
Case Management Assessment  Initial Evaluation    Date/Time of Evaluation: 3/24/2025 11:47 AM  Assessment Completed by: KILO Nelson    If patient is discharged prior to next notation, then this note serves as note for discharge by case management.    Patient Name: Lilly Terrell                   YOB: 1960  Diagnosis: Tachypnea [R06.82]  Acute respiratory distress [R06.03]  Small bowel obstruction (HCC) [K56.609]  Prolonged Q-T interval on ECG [R94.31]  Severe sepsis [A41.9, R65.20]  Severe sepsis with acute organ dysfunction (HCC) [A41.9, R65.20]                   Date / Time: 3/22/2025  2:16 AM    Patient Admission Status: Inpatient   Readmission Risk (Low < 19, Mod (19-27), High > 27): Readmission Risk Score: 14.9    Current PCP: Tyson Hahn MD  PCP verified by CM? Yes (nursing facliity MD)    Chart Reviewed: Yes      History Provided by: Patient, Medical Record, Other (see comment) (nursing facliity)  Patient Orientation: Alert and Oriented    Patient Cognition: Alert    Hospitalization in the last 30 days (Readmission):  No    If yes, Readmission Assessment in CM Navigator will be completed.    Advance Directives:      Code Status: Full Code   Patient's Primary Decision Maker is: Legal Next of Kin    Primary Decision Maker: Akshat Causey - Child - [phone]    Secondary Decision Maker: Nelly Terrell - Parent - [phone]    Discharge Planning:    Patient lives with: Other (Comment) (nursing facliity) Type of Home: Long-Term Care  Primary Care Giver: Other (Comment) (nursing facliity)  Patient Support Systems include: Children, Parent, Other (Comment) (nursing facliity)   Current Financial resources: Medicare, Medicaid  Current community resources: ECF/Home Care  Current services prior to admission: Extended Care Facility            Current DME:              Type of Home Care services:  None    ADLS  Prior functional level: Assistance with the following:, Other (see comment)

## 2025-03-24 NOTE — PLAN OF CARE
Problem: Discharge Planning  Goal: Discharge to home or other facility with appropriate resources  3/24/2025 1007 by Travis Doll RN  Outcome: Progressing  Flowsheets (Taken 3/24/2025 0850)  Discharge to home or other facility with appropriate resources: Identify barriers to discharge with patient and caregiver     Problem: Skin/Tissue Integrity  Goal: Skin integrity remains intact  Description: 1.  Monitor for areas of redness and/or skin breakdown  2.  Assess vascular access sites hourly  3.  Every 4-6 hours minimum:  Change oxygen saturation probe site  4.  Every 4-6 hours:  If on nasal continuous positive airway pressure, respiratory therapy assess nares and determine need for appliance change or resting period  3/24/2025 1007 by Travis Doll RN  Outcome: Progressing  Flowsheets (Taken 3/24/2025 0850)  Skin Integrity Remains Intact: Monitor for areas of redness and/or skin breakdown     Problem: Safety - Adult  Goal: Free from fall injury  3/24/2025 1007 by Travis Doll RN  Outcome: Progressing     Problem: Pain  Goal: Verbalizes/displays adequate comfort level or baseline comfort level  3/24/2025 1007 by Travis Doll RN  Outcome: Progressing     Problem: Skin/Tissue Integrity - Adult  Goal: Skin integrity remains intact  Description: 1.  Monitor for areas of redness and/or skin breakdown  2.  Assess vascular access sites hourly  3.  Every 4-6 hours minimum:  Change oxygen saturation probe site  4.  Every 4-6 hours:  If on nasal continuous positive airway pressure, respiratory therapy assess nares and determine need for appliance change or resting period  3/24/2025 1007 by Travis Doll RN  Outcome: Progressing  Flowsheets (Taken 3/24/2025 0850)  Skin Integrity Remains Intact: Monitor for areas of redness and/or skin breakdown     Problem: Gastrointestinal - Adult  Goal: Minimal or absence of nausea and vomiting  Outcome: Progressing     Problem: Gastrointestinal - Adult  Goal: Maintains or

## 2025-03-24 NOTE — PROGRESS NOTES
General and Vascular Surgery                                                           Daily Progress Note                                                             Bhupendra Marie PA-C    Pt Name: Lilly Terrell  Medical Record Number: [id number]  Date of Birth 1960   Today's Date: 3/24/2025    ASSESSMENT/PLAN  SBO  Feeling better   Denies any nausea or emesis  +flatulence and BM's. RN stated BM's were dark. Awaiting AM labs   Tolerating clear liquids  Abdomen soft. LLQ pump in place   OOB and ambulate  No general surgery plans at this time    EDUCATION  Patient educated about their illness/diagnosis, stated above, and all questions answered. We discussed the importance of nutrition, medications they are taking, and healthy lifestyle.  SUBJECTIVE  Lilly has improved from yesterday. Pain is well controlled. She has no nausea and no vomiting.  She has passed flatus and has had a bowel movement. She is tolerating clear liquids. Current activity is ad ronna    OBJECTIVE  VITALS:  height is 1.676 m (5' 6\") and weight is 86.8 kg (191 lb 5.8 oz). Her oral temperature is 99 °F (37.2 °C). Her blood pressure is 139/68 and her pulse is 74. Her respiration is 19 and oxygen saturation is 95%. VITALS:  /68   Pulse 74   Temp 99 °F (37.2 °C) (Oral)   Resp 19   Ht 1.676 m (5' 6\")   Wt 86.8 kg (191 lb 5.8 oz)   SpO2 95%   BMI 30.89 kg/m²   GENERAL: alert, no distress  LUNGS: clear to ausculation, without wheezes, rales or rhonci  HEART: normal rate and regular rhythm  ABDOMEN: soft,pain improved. LLQ pain pump, non-distended  EXTREMITY: no cyanosis, clubbing or edema  I/O last 3 completed shifts:  In: 3918.4 [I.V.:2528.6; IV Piggyback:1389.8]  Out: 1100 [Urine:800; Emesis/NG output:300]  No intake/output data recorded.    LABS  Recent Labs     03/22/25  0255 03/22/25  1550 03/23/25  0421 03/23/25  1534   WBC 20.8*  --  11.1*  --    HGB 15.5  --  11.1*

## 2025-03-24 NOTE — DISCHARGE INSTR - COC
Continuity of Care Form    Patient Name: Lilly Terrell   :  1960  MRN:  [id number]    Admit date:  3/22/2025  Discharge date:  3/26/2025    Code Status Order: Full Code   Advance Directives:     Admitting Physician:  Christy Abraham MD  PCP: Tyson Hahn MD    Discharging Nurse: Romeo Perry Rn  Discharging Hospital Unit/Room#: [id number]-4131/5272-01  Discharging Unit Phone Number: [phone]    Emergency Contact:   Extended Emergency Contact Information  Primary Emergency Contact: Akshat Causey  Home Phone: [phone]  Mobile Phone: [phone]  Relation: Child  Secondary Emergency Contact: Nelly Terrell  Home Phone: [phone]  Relation: Parent    Past Surgical History:  Past Surgical History:   Procedure Laterality Date    VA ARTHRP ACETBLR/PROX FEM PROSTC AGRFT/ALGRFT Right     Hip Replacement, Total    VA ARTHRP KNE CONDYLE&PLATU MEDIAL&LAT COMPARTMENTS Left     Knee Replacement, Total    SHOULDER ARTHROPLASTY      Shoulder replacement x 2 to left       Immunization History:   There is no immunization history for the selected administration types on file for this patient.    Active Problems:  Patient Active Problem List   Diagnosis Code    Cardiac arrest ([facility]) I46.9    Acute respiratory failure ([facility]) J96.00    Encephalopathy G93.40    Ventricular fibrillation ([facility]) I49.01    Hypokalemia E87.6    Hypoxic encephalopathy ([facility]) G93.1    Acidosis E87.20    Cardiogenic shock ([facility]) R57.0    Prolonged QT interval R94.31    Fluid overload E87.70    Aspiration pneumonia ([facility]) J69.0    Takotsubo cardiomyopathy I51.81    Pulmonary edema J81.1    Acute type 1 respiratory failure ([facility]) J96.01    Acute respiratory failure with hypoxia and hypercapnia ([facility]) J96.01, J96.02    Aspiration pneumonitis ([facility]) J69.0    Choking T17.308A    Metabolic encephalopathy G93.41    Cholangitis ([facility]) K83.09    Pneumonia, unspecified organism J18.9    NSTEMI (non-ST elevated myocardial infarction) ([facility]) I21.4    Pneumonia

## 2025-03-24 NOTE — PROGRESS NOTES
NGT removed per MD order, pt tolerated well.    Electronically signed by Travis Doll RN on 3/24/2025 at 8:56 AM

## 2025-03-24 NOTE — CARE COORDINATION
Spoke with Ny with [facility]--patient is from long term care. They can accept patient back without a precert unless patient needs IV antibiotics.    Electronically signed by Dunia Leon, MADDIE, LISW, Case Management on 3/24/2025 at 1:13 PM  [place] [phone]

## 2025-03-24 NOTE — ACP (ADVANCE CARE PLANNING)
Advance Care Planning   Healthcare Decision Maker:    Primary Decision Maker: Akshat Causey - Child - [phone]    Secondary Decision Maker: Nelly Terrell - Parent - [phone]  Confirmed above with patient.    Electronically signed by MADDIE Hdz, LISW, Case Management on 3/24/2025 at 11:43 AM  [place] [phone]

## 2025-03-25 LAB
ALBUMIN SERPL-MCNC: 3.4 G/DL (ref 3.4–5)
ALBUMIN/GLOB SERPL: 1.1 {RATIO} (ref 1.1–2.2)
ALP SERPL-CCNC: 62 U/L (ref 40–129)
ALT SERPL-CCNC: 8 U/L (ref 10–40)
ANION GAP SERPL CALCULATED.3IONS-SCNC: 9 MMOL/L (ref 3–16)
AST SERPL-CCNC: 16 U/L (ref 15–37)
BASOPHILS # BLD: 0 K/UL (ref 0–0.2)
BASOPHILS NFR BLD: 0.3 %
BILIRUB SERPL-MCNC: <0.2 MG/DL (ref 0–1)
BUN SERPL-MCNC: 6 MG/DL (ref 7–20)
CALCIUM SERPL-MCNC: 8.4 MG/DL (ref 8.3–10.6)
CHLORIDE SERPL-SCNC: 107 MMOL/L (ref 99–110)
CO2 SERPL-SCNC: 27 MMOL/L (ref 21–32)
CREAT SERPL-MCNC: 0.3 MG/DL (ref 0.6–1.2)
DEPRECATED RDW RBC AUTO: 16.5 % (ref 12.4–15.4)
EOSINOPHIL # BLD: 0.2 K/UL (ref 0–0.6)
EOSINOPHIL NFR BLD: 2.7 %
GFR SERPLBLD CREATININE-BSD FMLA CKD-EPI: >90 ML/MIN/{1.73_M2}
GLUCOSE BLD-MCNC: 131 MG/DL (ref 70–99)
GLUCOSE BLD-MCNC: 136 MG/DL (ref 70–99)
GLUCOSE BLD-MCNC: 151 MG/DL (ref 70–99)
GLUCOSE BLD-MCNC: 193 MG/DL (ref 70–99)
GLUCOSE SERPL-MCNC: 155 MG/DL (ref 70–99)
HCT VFR BLD AUTO: 35.3 % (ref 36–48)
HGB BLD-MCNC: 11.4 G/DL (ref 12–16)
LYMPHOCYTES # BLD: 1 K/UL (ref 1–5.1)
LYMPHOCYTES NFR BLD: 13.5 %
MCH RBC QN AUTO: 29.3 PG (ref 26–34)
MCHC RBC AUTO-ENTMCNC: 32.3 G/DL (ref 31–36)
MCV RBC AUTO: 90.7 FL (ref 80–100)
MONOCYTES # BLD: 0.6 K/UL (ref 0–1.3)
MONOCYTES NFR BLD: 8.4 %
NEUTROPHILS # BLD: 5.8 K/UL (ref 1.7–7.7)
NEUTROPHILS NFR BLD: 75.1 %
PERFORMED ON: ABNORMAL
PLATELET # BLD AUTO: 89 K/UL (ref 135–450)
PMV BLD AUTO: 8.9 FL (ref 5–10.5)
POTASSIUM SERPL-SCNC: 4.2 MMOL/L (ref 3.5–5.1)
PROT SERPL-MCNC: 6.5 G/DL (ref 6.4–8.2)
RBC # BLD AUTO: 3.89 M/UL (ref 4–5.2)
SODIUM SERPL-SCNC: 143 MMOL/L (ref 136–145)
WBC # BLD AUTO: 7.7 K/UL (ref 4–11)

## 2025-03-25 PROCEDURE — 2500000003 HC RX 250 WO HCPCS: Performed by: INTERNAL MEDICINE

## 2025-03-25 PROCEDURE — 2700000000 HC OXYGEN THERAPY PER DAY

## 2025-03-25 PROCEDURE — 85025 COMPLETE CBC W/AUTO DIFF WBC: CPT

## 2025-03-25 PROCEDURE — 36415 COLL VENOUS BLD VENIPUNCTURE: CPT

## 2025-03-25 PROCEDURE — 6370000000 HC RX 637 (ALT 250 FOR IP): Performed by: INTERNAL MEDICINE

## 2025-03-25 PROCEDURE — 6360000002 HC RX W HCPCS: Performed by: INTERNAL MEDICINE

## 2025-03-25 PROCEDURE — 94761 N-INVAS EAR/PLS OXIMETRY MLT: CPT

## 2025-03-25 PROCEDURE — 2500000003 HC RX 250 WO HCPCS: Performed by: STUDENT IN AN ORGANIZED HEALTH CARE EDUCATION/TRAINING PROGRAM

## 2025-03-25 PROCEDURE — 2060000000 HC ICU INTERMEDIATE R&B

## 2025-03-25 PROCEDURE — 80053 COMPREHEN METABOLIC PANEL: CPT

## 2025-03-25 PROCEDURE — 2580000003 HC RX 258: Performed by: INTERNAL MEDICINE

## 2025-03-25 PROCEDURE — 99231 SBSQ HOSP IP/OBS SF/LOW 25: CPT | Performed by: STUDENT IN AN ORGANIZED HEALTH CARE EDUCATION/TRAINING PROGRAM

## 2025-03-25 RX ORDER — PANTOPRAZOLE SODIUM 40 MG/1
40 TABLET, DELAYED RELEASE ORAL EVERY MORNING
Status: DISCONTINUED | OUTPATIENT
Start: 2025-03-25 | End: 2025-03-26 | Stop reason: HOSPADM

## 2025-03-25 RX ADMIN — METOPROLOL TARTRATE 12.5 MG: 25 TABLET, FILM COATED ORAL at 21:26

## 2025-03-25 RX ADMIN — ASPIRIN 81 MG: 81 TABLET, CHEWABLE ORAL at 08:42

## 2025-03-25 RX ADMIN — DANTROLENE SODIUM 200 MG: 25 CAPSULE ORAL at 21:26

## 2025-03-25 RX ADMIN — CEFEPIME HYDROCHLORIDE 2000 MG: 2 INJECTION, SOLUTION INTRAVENOUS at 15:14

## 2025-03-25 RX ADMIN — CEFEPIME HYDROCHLORIDE 2000 MG: 2 INJECTION, SOLUTION INTRAVENOUS at 02:57

## 2025-03-25 RX ADMIN — SODIUM CHLORIDE, PRESERVATIVE FREE 10 ML: 5 INJECTION INTRAVENOUS at 08:44

## 2025-03-25 RX ADMIN — SENNOSIDES AND DOCUSATE SODIUM 2 TABLET: 50; 8.6 TABLET ORAL at 08:42

## 2025-03-25 RX ADMIN — GUAIFENESIN 600 MG: 600 TABLET ORAL at 08:43

## 2025-03-25 RX ADMIN — METRONIDAZOLE 500 MG: 500 INJECTION, SOLUTION INTRAVENOUS at 08:41

## 2025-03-25 RX ADMIN — METRONIDAZOLE 500 MG: 500 INJECTION, SOLUTION INTRAVENOUS at 01:31

## 2025-03-25 RX ADMIN — Medication 2000 UNITS: at 08:42

## 2025-03-25 RX ADMIN — PREGABALIN 200 MG: 100 CAPSULE ORAL at 21:26

## 2025-03-25 RX ADMIN — CLONAZEPAM 2 MG: 1 TABLET ORAL at 08:43

## 2025-03-25 RX ADMIN — PANTOPRAZOLE SODIUM 40 MG: 40 TABLET, DELAYED RELEASE ORAL at 08:43

## 2025-03-25 RX ADMIN — ENOXAPARIN SODIUM 40 MG: 100 INJECTION SUBCUTANEOUS at 08:43

## 2025-03-25 RX ADMIN — POTASSIUM CHLORIDE, DEXTROSE MONOHYDRATE AND SODIUM CHLORIDE: 150; 5; 450 INJECTION, SOLUTION INTRAVENOUS at 05:33

## 2025-03-25 RX ADMIN — PREGABALIN 200 MG: 100 CAPSULE ORAL at 08:43

## 2025-03-25 RX ADMIN — METRONIDAZOLE 500 MG: 500 INJECTION, SOLUTION INTRAVENOUS at 18:19

## 2025-03-25 RX ADMIN — METOPROLOL TARTRATE 12.5 MG: 25 TABLET, FILM COATED ORAL at 08:43

## 2025-03-25 RX ADMIN — AMITRIPTYLINE HYDROCHLORIDE 50 MG: 50 TABLET ORAL at 21:26

## 2025-03-25 RX ADMIN — POTASSIUM CHLORIDE 20 MEQ: 1500 TABLET, EXTENDED RELEASE ORAL at 08:42

## 2025-03-25 RX ADMIN — DANTROLENE SODIUM 200 MG: 25 CAPSULE ORAL at 08:42

## 2025-03-25 RX ADMIN — SERTRALINE 100 MG: 100 TABLET, FILM COATED ORAL at 08:42

## 2025-03-25 RX ADMIN — POTASSIUM CHLORIDE, DEXTROSE MONOHYDRATE AND SODIUM CHLORIDE: 150; 5; 450 INJECTION, SOLUTION INTRAVENOUS at 15:10

## 2025-03-25 RX ADMIN — SODIUM CHLORIDE 100 ML: 9 INJECTION, SOLUTION INTRAVENOUS at 02:55

## 2025-03-25 RX ADMIN — SODIUM CHLORIDE, PRESERVATIVE FREE 10 ML: 5 INJECTION INTRAVENOUS at 21:00

## 2025-03-25 RX ADMIN — INSULIN LISPRO 1 UNITS: 100 INJECTION, SOLUTION INTRAVENOUS; SUBCUTANEOUS at 08:43

## 2025-03-25 RX ADMIN — PREGABALIN 200 MG: 100 CAPSULE ORAL at 15:07

## 2025-03-25 RX ADMIN — GUAIFENESIN 600 MG: 600 TABLET ORAL at 21:26

## 2025-03-25 RX ADMIN — LEVOTHYROXINE SODIUM 50 MCG: 0.05 TABLET ORAL at 05:31

## 2025-03-25 RX ADMIN — CLONAZEPAM 2 MG: 1 TABLET ORAL at 21:26

## 2025-03-25 NOTE — PLAN OF CARE
Problem: Discharge Planning  Goal: Discharge to home or other facility with appropriate resources  3/25/2025 1422 by Adriana Perry RN  Outcome: Progressing     Problem: Skin/Tissue Integrity  Goal: Skin integrity remains intact  Description: 1.  Monitor for areas of redness and/or skin breakdown  2.  Assess vascular access sites hourly  3.  Every 4-6 hours minimum:  Change oxygen saturation probe site  4.  Every 4-6 hours:  If on nasal continuous positive airway pressure, respiratory therapy assess nares and determine need for appliance change or resting period  3/25/2025 1422 by Adriana Perry RN  Outcome: Progressing     Problem: Safety - Adult  Goal: Free from fall injury  3/25/2025 1422 by Adriana Perry RN  Outcome: Progressing     Problem: Pain  Goal: Verbalizes/displays adequate comfort level or baseline comfort level  3/25/2025 1422 by Adriana Perry RN  Outcome: Progressing     Problem: Skin/Tissue Integrity - Adult  Goal: Skin integrity remains intact  Description: 1.  Monitor for areas of redness and/or skin breakdown  2.  Assess vascular access sites hourly  3.  Every 4-6 hours minimum:  Change oxygen saturation probe site  4.  Every 4-6 hours:  If on nasal continuous positive airway pressure, respiratory therapy assess nares and determine need for appliance change or resting period  3/25/2025 1422 by Adriana Perry RN  Outcome: Progressing     Problem: Gastrointestinal - Adult  Goal: Minimal or absence of nausea and vomiting  3/25/2025 1422 by Adriana Perry RN  Outcome: Progressing     Problem: Gastrointestinal - Adult  Goal: Maintains or returns to baseline bowel function  3/25/2025 1422 by Adriana Perry, RN  Outcome: Progressing     Problem: Gastrointestinal - Adult  Goal: Maintains adequate nutritional intake  3/25/2025 1422 by Adriana Perry RN  Outcome: Progressing     Problem: Infection - Adult  Goal: Absence of infection at discharge  3/25/2025 1422 by Adriana Perry,

## 2025-03-25 NOTE — PROGRESS NOTES
General and Vascular Surgery                                                           Daily Progress Note                                                             Bhupendra Marie PA-C    Pt Name: Lilly Terrell  Medical Record Number: [id number]  Date of Birth 1960   Today's Date: 3/25/2025    ASSESSMENT/PLAN  SBO  Feeling better   Denies any nausea or emesis  +flatulence and BM's.   Tolerating clear liquids. ADAT  Abdomen soft. LLQ pain pump in place   OOB and ambulate  No general surgery plans at this time. Will sign off. Please call if we can help in any way.     EDUCATION  Patient educated about their illness/diagnosis, stated above, and all questions answered. We discussed the importance of nutrition, medications they are taking, and healthy lifestyle.  SUBJECTIVE  Lilly has improved from yesterday. Pain is well controlled. She has no nausea and no vomiting.  She has passed flatus and has had a bowel movement. She is tolerating clear liquids. Current activity is ad ronna    OBJECTIVE  VITALS:  height is 1.676 m (5' 6\") and weight is 88.5 kg (195 lb). Her oral temperature is 98.2 °F (36.8 °C). Her blood pressure is 137/67 and her pulse is 94. Her respiration is 20 and oxygen saturation is 98%. VITALS:  /67   Pulse 94   Temp 98.2 °F (36.8 °C) (Oral)   Resp 20   Ht 1.676 m (5' 6\")   Wt 88.5 kg (195 lb)   SpO2 98%   BMI 31.47 kg/m²   GENERAL: alert, no distress  LUNGS: clear to ausculation, without wheezes, rales or rhonci  HEART: normal rate and regular rhythm  ABDOMEN: soft,pain improved. LLQ pain pump, non-distended  EXTREMITY: no cyanosis, clubbing or edema  I/O last 3 completed shifts:  In: 5850.4 [P.O.:300; I.V.:4077.2; IV Piggyback:1473.2]  Out: 200 [Emesis/NG output:200]  I/O this shift:  In: 465.5 [I.V.:428.2; IV Piggyback:37.4]  Out: -     LABS  Recent Labs     03/22/25  1550 03/23/25  0421 03/23/25  1534 03/25/25  0631

## 2025-03-25 NOTE — PROGRESS NOTES
[place]2.[place]    [facility] [place]      Name:  Lilly Terrell /Age/Sex: 1960  (64 y.o. female)   MRN & CSN:  [id number] & [id number] Encounter Date/Time: 3/25/2025 11:56 AM EDT   Location:  [id number] PCP: Tyson Hahn MD     Attending:Reece Tan MD       Hospital Day: 4    Assessment and Recommendations   Lilly Terrell is a 64 y.o. female who presents with Severe sepsis with acute organ dysfunction (HCC)      Plan:   Sepsis, present on admission sepsis of unknown origin continue to be on broad-spectrum antibiotic for now  Small bowel obstruction General Surgery following, tolerating diet.  Acute respiratory failure with hypoxia was on BiPAP on admission keep saturation above 90%.  Constipation received enema and had a bowel movement  Elevated lactic acid on admission improved  Anemia, hemoglobin 11.4 from 10.5 yesterday no signs of bleeding follow-up as outpatient  Elevated lactic acid on admission improved  Thrombocytopenia platelet down to 89 likely due to sepsis        Diet ADULT DIET; Clear Liquid   DVT Prophylaxis [] Lovenox, []  Heparin, [] SCDs, [] Ambulation,  [] Eliquis, [] Xarelto  [] Coumadin   Code Status Full Code             Personally reviewed Lab Studies and Imaging     Discussed management of the case with case management who recommended PT OT    Rhythm strip independently interpreted by myself heart rate 71 no ST elevation      Medical Decision Making:  The following items were considered in medical decision making:  Discussion of patient care with other providers  Reviewed clinical lab tests  Reviewed radiology tests  Reviewed other diagnostic tests/interventions  Independent review of radiologic images  Microbiology cultures and other micro tests reviewed      Subjective:     Chief Complaint: Weakness    Lilly Terrell is a 64 y.o. female who presents with weakness overall slowly improving      Review of Systems:      Pertinent positives and negatives discussed in

## 2025-03-25 NOTE — PLAN OF CARE
Problem: Discharge Planning  Goal: Discharge to home or other facility with appropriate resources  Outcome: Not Progressing  Note: Pt is on IV ABX for intra-abdominal infection.      Problem: Skin/Tissue Integrity  Goal: Skin integrity remains intact  Description: 1.  Monitor for areas of redness and/or skin breakdown  Outcome: Progressing  Note: Pt assisted with repositioning t/o the shift. Pt states she is aware of when she needs to be turned and will reach out to staff for assistance. RN using pillows to reposition pt and float her B heels. Pt is incontinent of urine and stool at beginning of the shift but states she knows when she needs to go and would like to use the bedpan. RN used bedpan next time pt needed to void. RN applying zinc paste to buttocks to prevent irritation to skin.    Problem: Pain  Goal: Verbalizes/displays adequate comfort level or baseline comfort level  Outcome: Progressing  Note: Pt denies pain.   Problem: Gastrointestinal - Adult  Goal: Minimal or absence of nausea and vomiting  Outcome: Progressing  Note: No N/V noted thus far in shift. Pt at ~25% of tray.      Problem: Gastrointestinal - Adult  Goal: Maintains or returns to baseline bowel function  Outcome: Progressing  Note: Pt has had 1 BM so far this shift. It was small, soft/loose, and a dark brown color.      Problem: Gastrointestinal - Adult  Goal: Maintains adequate nutritional intake  3/25/2025 0214 by Ebony Falcon, RN  Outcome: Not Progressing     Problem: Safety - Adult  Goal: Free from fall injury  Outcome: Progressing  Note: Pt is a high fall risk. Bed in locked, low position with side rails up X 2-3 and an active bed alarm. Fall risk sign, socks, and bracelet in place. Pt is a maxi move for xfrs.

## 2025-03-25 NOTE — PROGRESS NOTES
Pt had orders for 72 hour telemetry monitoring that  overnight. RN weaned pt to 2 L NC (baseline) from 4 L NC. Pt has orders for continuous pulse ox.    RN discontinued  order. RN left pt on monitor until continuation of telemetry can be discussed with day shift physician. Pt will need new order to continue with telemetry. Pt also needs clarification on whether or not to continue with continuous pulse ox monitoring since pt is on baseline O2 requirement.

## 2025-03-26 VITALS
WEIGHT: 192.9 LBS | SYSTOLIC BLOOD PRESSURE: 127 MMHG | BODY MASS INDEX: 31 KG/M2 | DIASTOLIC BLOOD PRESSURE: 74 MMHG | RESPIRATION RATE: 26 BRPM | OXYGEN SATURATION: 98 % | HEIGHT: 66 IN | TEMPERATURE: 98.1 F | HEART RATE: 89 BPM

## 2025-03-26 LAB
ANION GAP SERPL CALCULATED.3IONS-SCNC: 11 MMOL/L (ref 3–16)
BACTERIA BLD CULT ORG #2: NORMAL
BACTERIA BLD CULT: NORMAL
BUN SERPL-MCNC: <2 MG/DL (ref 7–20)
CALCIUM SERPL-MCNC: 8.7 MG/DL (ref 8.3–10.6)
CHLORIDE SERPL-SCNC: 102 MMOL/L (ref 99–110)
CO2 SERPL-SCNC: 29 MMOL/L (ref 21–32)
CREAT SERPL-MCNC: <0.2 MG/DL (ref 0.6–1.2)
GFR SERPLBLD CREATININE-BSD FMLA CKD-EPI: >90 ML/MIN/{1.73_M2}
GLUCOSE BLD-MCNC: 121 MG/DL (ref 70–99)
GLUCOSE BLD-MCNC: 157 MG/DL (ref 70–99)
GLUCOSE BLD-MCNC: 190 MG/DL (ref 70–99)
GLUCOSE SERPL-MCNC: 141 MG/DL (ref 70–99)
PERFORMED ON: ABNORMAL
POTASSIUM SERPL-SCNC: 3.9 MMOL/L (ref 3.5–5.1)
SODIUM SERPL-SCNC: 142 MMOL/L (ref 136–145)

## 2025-03-26 PROCEDURE — 6360000002 HC RX W HCPCS: Performed by: INTERNAL MEDICINE

## 2025-03-26 PROCEDURE — 94761 N-INVAS EAR/PLS OXIMETRY MLT: CPT

## 2025-03-26 PROCEDURE — 6370000000 HC RX 637 (ALT 250 FOR IP): Performed by: INTERNAL MEDICINE

## 2025-03-26 PROCEDURE — 80048 BASIC METABOLIC PNL TOTAL CA: CPT

## 2025-03-26 PROCEDURE — 2500000003 HC RX 250 WO HCPCS: Performed by: INTERNAL MEDICINE

## 2025-03-26 PROCEDURE — 36415 COLL VENOUS BLD VENIPUNCTURE: CPT

## 2025-03-26 PROCEDURE — 2500000003 HC RX 250 WO HCPCS: Performed by: STUDENT IN AN ORGANIZED HEALTH CARE EDUCATION/TRAINING PROGRAM

## 2025-03-26 PROCEDURE — 2700000000 HC OXYGEN THERAPY PER DAY

## 2025-03-26 RX ORDER — METRONIDAZOLE 500 MG/1
500 TABLET ORAL 3 TIMES DAILY
Qty: 12 TABLET | Refills: 0
Start: 2025-03-26 | End: 2025-03-30

## 2025-03-26 RX ORDER — CEFDINIR 300 MG/1
300 CAPSULE ORAL 2 TIMES DAILY
Qty: 8 CAPSULE | Refills: 0
Start: 2025-03-26 | End: 2025-03-30

## 2025-03-26 RX ADMIN — PREGABALIN 200 MG: 100 CAPSULE ORAL at 09:14

## 2025-03-26 RX ADMIN — GUAIFENESIN 600 MG: 600 TABLET ORAL at 09:14

## 2025-03-26 RX ADMIN — ENOXAPARIN SODIUM 40 MG: 100 INJECTION SUBCUTANEOUS at 09:15

## 2025-03-26 RX ADMIN — DANTROLENE SODIUM 200 MG: 25 CAPSULE ORAL at 09:15

## 2025-03-26 RX ADMIN — LEVOTHYROXINE SODIUM 50 MCG: 0.05 TABLET ORAL at 06:30

## 2025-03-26 RX ADMIN — SERTRALINE 100 MG: 100 TABLET, FILM COATED ORAL at 09:13

## 2025-03-26 RX ADMIN — SODIUM CHLORIDE, PRESERVATIVE FREE 10 ML: 5 INJECTION INTRAVENOUS at 09:23

## 2025-03-26 RX ADMIN — CLONAZEPAM 2 MG: 1 TABLET ORAL at 09:14

## 2025-03-26 RX ADMIN — POTASSIUM CHLORIDE 20 MEQ: 1500 TABLET, EXTENDED RELEASE ORAL at 09:14

## 2025-03-26 RX ADMIN — PANTOPRAZOLE SODIUM 40 MG: 40 TABLET, DELAYED RELEASE ORAL at 09:14

## 2025-03-26 RX ADMIN — SENNOSIDES AND DOCUSATE SODIUM 2 TABLET: 50; 8.6 TABLET ORAL at 09:14

## 2025-03-26 RX ADMIN — Medication 2000 UNITS: at 09:13

## 2025-03-26 RX ADMIN — CEFEPIME HYDROCHLORIDE 2000 MG: 2 INJECTION, SOLUTION INTRAVENOUS at 03:30

## 2025-03-26 RX ADMIN — POTASSIUM CHLORIDE, DEXTROSE MONOHYDRATE AND SODIUM CHLORIDE: 150; 5; 450 INJECTION, SOLUTION INTRAVENOUS at 00:57

## 2025-03-26 RX ADMIN — METRONIDAZOLE 500 MG: 500 INJECTION, SOLUTION INTRAVENOUS at 09:49

## 2025-03-26 RX ADMIN — ASPIRIN 81 MG: 81 TABLET, CHEWABLE ORAL at 09:14

## 2025-03-26 RX ADMIN — PREGABALIN 200 MG: 100 CAPSULE ORAL at 13:15

## 2025-03-26 RX ADMIN — METRONIDAZOLE 500 MG: 500 INJECTION, SOLUTION INTRAVENOUS at 01:04

## 2025-03-26 RX ADMIN — METOPROLOL TARTRATE 12.5 MG: 25 TABLET, FILM COATED ORAL at 09:14

## 2025-03-26 NOTE — CARE COORDINATION
Case Management Discharge Note          Date / Time of Note: 3/26/2025 11:46 AM                  Patient Name: Lilly Terrell   YOB: 1960  Diagnosis: Tachypnea [R06.82]  Acute respiratory distress [R06.03]  Small bowel obstruction (HCC) [K56.609]  Prolonged Q-T interval on ECG [R94.31]  Severe sepsis [A41.9, R65.20]  Severe sepsis with acute organ dysfunction (HCC) [A41.9, R65.20]   Date / Time: 3/22/2025  2:16 AM    Financial:  Payor: RON MILLIGAN / Plan: RON MILLIGAN DUAL / Product Type: *No Product type* /      Pharmacy:    [facility] [address] - P [phone] - F [phone]  [address] [facility] [address]  Phone: [phone] Fax: [phone]      Assistance purchasing medications?: Potential Assistance Purchasing Medications: No  Assistance provided by Case Management: None at this time    DISCHARGE Disposition: Skilled Nursing Facility (SNF)    Nursing Facility:   Discharging to Facility/ Agency   Name: [facility]  Address:  [address] [place] [id number]  Phone:  [phone]  Fax:  [phone]     LOC at discharge: Skilled Nursing  CECELIA Completed: Yes             NURSING REPORT NUMBER: [phone]               NURSING FAX NUMBER: [phone]    Transportation:  Transportation PLAN for discharge: EMS transportation   Mode of Transport: Ambulance stretcher - [facility]  Reason for medical transport: Severe muscular weakness and de-conditioned state due to cerebral palsy and requires ambulance transport due to severe sepsis with organ disfunction, needs O2 monitored during transport, Max assist   Name of Transport Company: [employer]  Phone: [phone]  Time of Transport: 3pm    Transport form completed: Yes    IMM Completed:   Yes, Case management has presented and reviewed IMM letter #2.       IMM Letter date given:: 03/24/25   .   Patient and/or family/POA

## 2025-03-26 NOTE — PROGRESS NOTES
Discharge orders acknowledged by RN . CAITLIN reviewed with nurse at [facility] and all questions answered.  IV removed. Bedside monitor removed from pt. Pt vitals WDL. Pt discharged with all belongings to [facility].Patient cleaned and changed before discharge. Pt transported off of unit via stretcher No complications.

## 2025-03-26 NOTE — DISCHARGE SUMMARY
Hospital Medicine Discharge Summary    Patient ID: Lilly Terrell      Patient's PCP: Tyson Hahn MD    Admit Date: 3/22/2025     Discharge Date:   03/26/2025    Admitting Provider: Christy Abraham MD     Discharge Provider: Reece Tan MD     Discharge Diagnoses:       Active Hospital Problems    Diagnosis     Severe sepsis with acute organ dysfunction (HCC) [A41.9, R65.20]     SBO (small bowel obstruction) (HCC) [K56.609]     Hyperglycemia [R73.9]     Hypernatremia [E87.0]     Acute respiratory failure with hypoxia and hypercapnia (HCC) [J96.01, J96.02]        The patient was seen and examined on day of discharge and this discharge summary is in conjunction with any daily progress note from day of discharge.    Hospital Course:       From HPI:\"Lilly Terrell is a 64 y.o. obese female with pmh of cerebral palsy, dysphagia and IBS who presents with nausea, vomiting, abdominal pain which started around 1600 hrs. on the day before presentation.  Patient also reports coffee-ground emesis but no melena or hematochezia.  Since her symptoms started she has had a bowel movement, but has not passed gas.  She denies any lightheadedness, chest pain, but does report heart racing, cough and shortness of breath.     In the emergency room her temperature was 37.4, blood pressure 148/113 with a heart rate of 114, respirations 30, sats 90% on 2 L, BMI 35.33.\"      Plan:   Sepsis, present on admission sepsis of unknown origin continue to be on broad-spectrum antibiotic f, will change to Omnicef and Flagyl for 4 more days on discharge  Small bowel obstruction General Surgery following, tolerating diet.  Advancing today discharging home bowel regimen  Acute respiratory failure with hypoxia was on BiPAP on admission keep saturation above 90%.  Constipation received enema and had a bowel movement.  Continue bowel regimen  Hyperglycemia with A1c of 5.8 diet controlled at this time follow-up with PCP for final  and at bedtime To left upper arm, back of neck, left knee      dantrolene (DANTRIUM) 100 MG capsule Take 2 capsules by mouth 2 times daily      cyanocobalamin 1000 MCG/ML injection Inject 1 mL into the muscle every 30 days On the 14th of the month      furosemide (LASIX) 40 MG tablet Take 1 tablet by mouth daily      guaiFENesin (MUCINEX) 600 MG extended release tablet Take 1 tablet by mouth 2 times daily      omeprazole (PRILOSEC) 20 MG delayed release capsule Take 1 capsule by mouth Daily      aspirin 81 MG EC tablet Take 1 tablet by mouth daily      polyethylene glycol (MIRALAX) 17 g PACK packet Take 17 g by mouth 2 times daily      sennosides-docusate sodium (SENOKOT-S) 8.6-50 MG tablet Take 1 tablet by mouth in the morning and at bedtime      sertraline (ZOLOFT) 100 MG tablet Take 1 tablet by mouth daily      levothyroxine (SYNTHROID) 50 MCG tablet Take 1 tablet by mouth every morning (before breakfast)      amitriptyline (ELAVIL) 50 MG tablet Take 1 tablet by mouth nightly      pregabalin (LYRICA) 200 MG capsule Take 1 capsule by mouth 3 times daily.             Time Spent on discharge: 33 minutes in the examination, evaluation, counseling and review of medications and discharge plan.      Signed:    Reece Tan MD   3/26/2025      Thank you Tyson Hahn MD for the opportunity to be involved in this patient's care. If you have any questions or concerns, please feel free to contact me at [phone].    Comment: Please note this report has been produced using speech recognition software and may contain errors related to that system including errors in grammar, punctuation, and spelling, as well as words and phrases that may be inappropriate. If there are any questions or concerns, please feel free to contact the dictating provider for clarification.

## 2025-03-26 NOTE — PLAN OF CARE
Problem: Discharge Planning  Goal: Discharge to home or other facility with appropriate resources  3/26/2025 1055 by Adriana Perry RN  Outcome: Progressing     Problem: Skin/Tissue Integrity  Goal: Skin integrity remains intact  Description: 1.  Monitor for areas of redness and/or skin breakdown  2.  Assess vascular access sites hourly  3.  Every 4-6 hours minimum:  Change oxygen saturation probe site  4.  Every 4-6 hours:  If on nasal continuous positive airway pressure, respiratory therapy assess nares and determine need for appliance change or resting period  3/26/2025 1055 by Adriana Perry RN  Outcome: Progressing     Problem: Safety - Adult  Goal: Free from fall injury  3/26/2025 1055 by Adriana Perry RN  Outcome: Progressing     Problem: Pain  Goal: Verbalizes/displays adequate comfort level or baseline comfort level  3/26/2025 1055 by Adriana Perry RN  Outcome: Progressing     Problem: Skin/Tissue Integrity - Adult  Goal: Skin integrity remains intact  Description: 1.  Monitor for areas of redness and/or skin breakdown  2.  Assess vascular access sites hourly  3.  Every 4-6 hours minimum:  Change oxygen saturation probe site  4.  Every 4-6 hours:  If on nasal continuous positive airway pressure, respiratory therapy assess nares and determine need for appliance change or resting period  3/26/2025 1055 by Adriana Perry RN  Outcome: Progressing     Problem: Gastrointestinal - Adult  Goal: Maintains or returns to baseline bowel function  3/26/2025 1055 by Adriana Perry RN  Outcome: Progressing     Problem: Gastrointestinal - Adult  Goal: Maintains adequate nutritional intake  3/26/2025 1055 by Adriana Perry RN  Outcome: Progressing     Problem: Infection - Adult  Goal: Absence of infection at discharge  3/26/2025 1055 by Adriana Perry RN  Outcome: Progressing     Problem: Metabolic/Fluid and Electrolytes - Adult  Goal: Electrolytes maintained within normal limits  3/26/2025 1055 by

## 2025-03-26 NOTE — PLAN OF CARE
Problem: Discharge Planning  Goal: Discharge to home or other facility with appropriate resources  Outcome: Progressing     Problem: Skin/Tissue Integrity  Goal: Skin integrity remains intact  Description: 1.  Monitor for areas of redness and/or skin breakdown  2.  Assess vascular access sites hourly  3.  Every 4-6 hours minimum:  Change oxygen saturation probe site  4.  Every 4-6 hours:  If on nasal continuous positive airway pressure, respiratory therapy assess nares and determine need for appliance change or resting period  Outcome: Progressing     Problem: Safety - Adult  Goal: Free from fall injury  Outcome: Progressing     Problem: Pain  Goal: Verbalizes/displays adequate comfort level or baseline comfort level  Outcome: Progressing     Problem: Skin/Tissue Integrity - Adult  Goal: Skin integrity remains intact  Description: 1.  Monitor for areas of redness and/or skin breakdown  2.  Assess vascular access sites hourly  3.  Every 4-6 hours minimum:  Change oxygen saturation probe site  4.  Every 4-6 hours:  If on nasal continuous positive airway pressure, respiratory therapy assess nares and determine need for appliance change or resting period  Outcome: Progressing     Problem: Gastrointestinal - Adult  Goal: Minimal or absence of nausea and vomiting  Outcome: Progressing     Problem: Gastrointestinal - Adult  Goal: Maintains or returns to baseline bowel function  Outcome: Progressing     Problem: Gastrointestinal - Adult  Goal: Maintains adequate nutritional intake  Outcome: Progressing     Problem: Infection - Adult  Goal: Absence of infection at discharge  Outcome: Progressing     Problem: Metabolic/Fluid and Electrolytes - Adult  Goal: Electrolytes maintained within normal limits  Outcome: Progressing